# Patient Record
Sex: FEMALE | Race: WHITE | NOT HISPANIC OR LATINO | Employment: OTHER | URBAN - METROPOLITAN AREA
[De-identification: names, ages, dates, MRNs, and addresses within clinical notes are randomized per-mention and may not be internally consistent; named-entity substitution may affect disease eponyms.]

---

## 2017-02-28 ENCOUNTER — TRANSCRIBE ORDERS (OUTPATIENT)
Dept: ADMINISTRATIVE | Facility: HOSPITAL | Age: 82
End: 2017-02-28

## 2017-02-28 ENCOUNTER — HOSPITAL ENCOUNTER (OUTPATIENT)
Dept: CT IMAGING | Facility: HOSPITAL | Age: 82
Discharge: HOME | End: 2017-02-28
Attending: INTERNAL MEDICINE
Payer: MEDICARE

## 2017-02-28 DIAGNOSIS — R06.02 SOB (SHORTNESS OF BREATH): Primary | ICD-10-CM

## 2017-02-28 DIAGNOSIS — R06.02 SOB (SHORTNESS OF BREATH): ICD-10-CM

## 2017-02-28 DIAGNOSIS — R79.89 POSITIVE D DIMER: ICD-10-CM

## 2017-02-28 PROCEDURE — 71275 CT ANGIOGRAPHY CHEST: CPT

## 2017-02-28 RX ADMIN — IOHEXOL 85 ML: 350 INJECTION, SOLUTION INTRAVENOUS at 12:38

## 2017-06-21 ENCOUNTER — ALLSCRIPTS OFFICE VISIT (OUTPATIENT)
Dept: OTHER | Facility: OTHER | Age: 82
End: 2017-06-21

## 2017-12-07 ENCOUNTER — TRANSCRIBE ORDERS (OUTPATIENT)
Dept: ADMINISTRATIVE | Facility: HOSPITAL | Age: 82
End: 2017-12-07

## 2017-12-07 DIAGNOSIS — Z12.39 SCREENING BREAST EXAMINATION: Primary | ICD-10-CM

## 2018-01-12 ENCOUNTER — GENERIC CONVERSION - ENCOUNTER (OUTPATIENT)
Dept: SURGERY | Facility: CLINIC | Age: 83
End: 2018-01-12

## 2018-01-12 ENCOUNTER — HOSPITAL ENCOUNTER (OUTPATIENT)
Dept: RADIOLOGY | Facility: MEDICAL CENTER | Age: 83
Discharge: HOME | End: 2018-01-12
Payer: MEDICARE

## 2018-01-12 DIAGNOSIS — Z12.39 SCREENING BREAST EXAMINATION: ICD-10-CM

## 2018-01-12 PROCEDURE — 77067 SCR MAMMO BI INCL CAD: CPT

## 2018-01-13 VITALS
BODY MASS INDEX: 40.25 KG/M2 | HEIGHT: 60 IN | WEIGHT: 205 LBS | SYSTOLIC BLOOD PRESSURE: 122 MMHG | DIASTOLIC BLOOD PRESSURE: 80 MMHG

## 2019-01-13 DIAGNOSIS — Z12.31 ENCOUNTER FOR SCREENING MAMMOGRAM FOR MALIGNANT NEOPLASM OF BREAST: ICD-10-CM

## 2019-03-11 ENCOUNTER — HOSPITAL ENCOUNTER (EMERGENCY)
Facility: HOSPITAL | Age: 84
Discharge: HOME | End: 2019-03-11
Attending: EMERGENCY MEDICINE
Payer: MEDICARE

## 2019-03-11 ENCOUNTER — APPOINTMENT (EMERGENCY)
Dept: RADIOLOGY | Facility: HOSPITAL | Age: 84
End: 2019-03-11
Payer: MEDICARE

## 2019-03-11 VITALS
DIASTOLIC BLOOD PRESSURE: 83 MMHG | SYSTOLIC BLOOD PRESSURE: 180 MMHG | OXYGEN SATURATION: 96 % | RESPIRATION RATE: 16 BRPM | HEART RATE: 76 BPM | BODY MASS INDEX: 42.5 KG/M2 | HEIGHT: 60 IN | TEMPERATURE: 98.6 F | WEIGHT: 216.49 LBS

## 2019-03-11 DIAGNOSIS — R53.1 GENERALIZED WEAKNESS: Primary | ICD-10-CM

## 2019-03-11 DIAGNOSIS — R26.2 AMBULATORY DYSFUNCTION: ICD-10-CM

## 2019-03-11 LAB
ALBUMIN SERPL BCP-MCNC: 3.2 G/DL (ref 3.5–5)
ALP SERPL-CCNC: 103 U/L (ref 46–116)
ALT SERPL W P-5'-P-CCNC: 32 U/L (ref 12–78)
ANION GAP SERPL CALCULATED.3IONS-SCNC: 9 MMOL/L (ref 4–13)
APTT PPP: 25 SECONDS (ref 26–38)
AST SERPL W P-5'-P-CCNC: 25 U/L (ref 5–45)
BACTERIA UR QL AUTO: ABNORMAL /HPF
BASOPHILS # BLD AUTO: 0.03 THOUSANDS/ΜL (ref 0–0.1)
BASOPHILS NFR BLD AUTO: 0 % (ref 0–1)
BILIRUB SERPL-MCNC: 0.3 MG/DL (ref 0.2–1)
BILIRUB UR QL STRIP: NEGATIVE
BUN SERPL-MCNC: 12 MG/DL (ref 5–25)
CALCIUM SERPL-MCNC: 8.9 MG/DL (ref 8.3–10.1)
CHLORIDE SERPL-SCNC: 101 MMOL/L (ref 100–108)
CLARITY UR: CLEAR
CO2 SERPL-SCNC: 26 MMOL/L (ref 21–32)
COLOR UR: YELLOW
CREAT SERPL-MCNC: 0.75 MG/DL (ref 0.6–1.3)
EOSINOPHIL # BLD AUTO: 0.26 THOUSAND/ΜL (ref 0–0.61)
EOSINOPHIL NFR BLD AUTO: 4 % (ref 0–6)
ERYTHROCYTE [DISTWIDTH] IN BLOOD BY AUTOMATED COUNT: 13.2 % (ref 11.6–15.1)
GFR SERPL CREATININE-BSD FRML MDRD: 74 ML/MIN/1.73SQ M
GLUCOSE SERPL-MCNC: 287 MG/DL (ref 65–140)
GLUCOSE UR STRIP-MCNC: ABNORMAL MG/DL
HCT VFR BLD AUTO: 37 % (ref 34.8–46.1)
HGB BLD-MCNC: 12 G/DL (ref 11.5–15.4)
HGB UR QL STRIP.AUTO: ABNORMAL
IMM GRANULOCYTES # BLD AUTO: 0.03 THOUSAND/UL (ref 0–0.2)
IMM GRANULOCYTES NFR BLD AUTO: 0 % (ref 0–2)
INR PPP: 0.97 (ref 0.86–1.17)
KETONES UR STRIP-MCNC: NEGATIVE MG/DL
LEUKOCYTE ESTERASE UR QL STRIP: NEGATIVE
LYMPHOCYTES # BLD AUTO: 1.32 THOUSANDS/ΜL (ref 0.6–4.47)
LYMPHOCYTES NFR BLD AUTO: 19 % (ref 14–44)
MCH RBC QN AUTO: 28 PG (ref 26.8–34.3)
MCHC RBC AUTO-ENTMCNC: 32.4 G/DL (ref 31.4–37.4)
MCV RBC AUTO: 86 FL (ref 82–98)
MONOCYTES # BLD AUTO: 0.64 THOUSAND/ΜL (ref 0.17–1.22)
MONOCYTES NFR BLD AUTO: 9 % (ref 4–12)
NEUTROPHILS # BLD AUTO: 4.8 THOUSANDS/ΜL (ref 1.85–7.62)
NEUTS SEG NFR BLD AUTO: 68 % (ref 43–75)
NITRITE UR QL STRIP: NEGATIVE
NON-SQ EPI CELLS URNS QL MICRO: ABNORMAL /HPF
NRBC BLD AUTO-RTO: 0 /100 WBCS
NT-PROBNP SERPL-MCNC: 315 PG/ML
PH UR STRIP.AUTO: 5 [PH]
PLATELET # BLD AUTO: 248 THOUSANDS/UL (ref 149–390)
PMV BLD AUTO: 10.4 FL (ref 8.9–12.7)
POTASSIUM SERPL-SCNC: 3.9 MMOL/L (ref 3.5–5.3)
PROT SERPL-MCNC: 7.6 G/DL (ref 6.4–8.2)
PROT UR STRIP-MCNC: ABNORMAL MG/DL
PROTHROMBIN TIME: 12.6 SECONDS (ref 11.8–14.2)
RBC # BLD AUTO: 4.28 MILLION/UL (ref 3.81–5.12)
RBC #/AREA URNS AUTO: ABNORMAL /HPF
SODIUM SERPL-SCNC: 136 MMOL/L (ref 136–145)
SP GR UR STRIP.AUTO: 1.02 (ref 1–1.03)
TROPONIN I SERPL-MCNC: <0.02 NG/ML
UROBILINOGEN UR QL STRIP.AUTO: 0.2 E.U./DL
WBC # BLD AUTO: 7.08 THOUSAND/UL (ref 4.31–10.16)
WBC #/AREA URNS AUTO: ABNORMAL /HPF

## 2019-03-11 PROCEDURE — 71046 X-RAY EXAM CHEST 2 VIEWS: CPT

## 2019-03-11 PROCEDURE — 36415 COLL VENOUS BLD VENIPUNCTURE: CPT | Performed by: EMERGENCY MEDICINE

## 2019-03-11 PROCEDURE — 99285 EMERGENCY DEPT VISIT HI MDM: CPT

## 2019-03-11 PROCEDURE — 93005 ELECTROCARDIOGRAM TRACING: CPT

## 2019-03-11 PROCEDURE — 84484 ASSAY OF TROPONIN QUANT: CPT | Performed by: EMERGENCY MEDICINE

## 2019-03-11 PROCEDURE — 85610 PROTHROMBIN TIME: CPT | Performed by: EMERGENCY MEDICINE

## 2019-03-11 PROCEDURE — 81001 URINALYSIS AUTO W/SCOPE: CPT | Performed by: EMERGENCY MEDICINE

## 2019-03-11 PROCEDURE — 80053 COMPREHEN METABOLIC PANEL: CPT | Performed by: EMERGENCY MEDICINE

## 2019-03-11 PROCEDURE — 83880 ASSAY OF NATRIURETIC PEPTIDE: CPT | Performed by: EMERGENCY MEDICINE

## 2019-03-11 PROCEDURE — 85025 COMPLETE CBC W/AUTO DIFF WBC: CPT | Performed by: EMERGENCY MEDICINE

## 2019-03-11 PROCEDURE — 85730 THROMBOPLASTIN TIME PARTIAL: CPT | Performed by: EMERGENCY MEDICINE

## 2019-03-11 RX ORDER — PYRIDOXINE HCL (VITAMIN B6) 100 MG
1 TABLET ORAL DAILY
COMMUNITY
End: 2021-03-19

## 2019-03-11 NOTE — ED PROVIDER NOTES
History  Chief Complaint   Patient presents with    Weakness - Generalized     per ems pt was being seen by PCP at home to clean ears due to earwax, noticed pt seemed SOB wanted pt to be evaluated in ER       History provided by:  Patient  Malaise - 7 years or greater   Severity:  Moderate  Onset quality:  Gradual  Duration:  1 week  Timing:  Constant  Progression:  Worsening  Chronicity:  New  Context comment:  Increasing work of breathing, PCP went to her house today to see her noticed her work breathing was significantly worse than baseline center here for evaluation  Relieved by:  None tried  Worsened by:  Nothing  Ineffective treatments:  None tried  Associated symptoms: no abdominal pain, no chest pain, no cough, no diarrhea, no dizziness, no dysuria, no fever, no frequency, no headaches, no nausea, no shortness of breath and no vomiting        Prior to Admission Medications   Prescriptions Last Dose Informant Patient Reported? Taking? ASPIRIN 81 PO   Yes No   Sig: Take 81 mg by mouth daily   Pyridoxine HCl (VITAMIN B-6) 100 MG TABS   Yes No   Sig: Take 1 tablet by mouth daily      Facility-Administered Medications: None       History reviewed  No pertinent past medical history  History reviewed  No pertinent surgical history  History reviewed  No pertinent family history  I have reviewed and agree with the history as documented  Social History     Tobacco Use    Smoking status: Never Smoker    Smokeless tobacco: Never Used   Substance Use Topics    Alcohol use: Not Currently    Drug use: Never        Review of Systems   Constitutional: Negative for activity change, chills, diaphoresis and fever  HENT: Negative for congestion, sinus pressure and sore throat  Eyes: Negative for pain and visual disturbance  Respiratory: Negative for cough, chest tightness, shortness of breath, wheezing and stridor  Cardiovascular: Negative for chest pain and palpitations     Gastrointestinal: Negative for abdominal distention, abdominal pain, constipation, diarrhea, nausea and vomiting  Genitourinary: Negative for dysuria and frequency  Musculoskeletal: Negative for neck pain and neck stiffness  Skin: Negative for rash  Neurological: Negative for dizziness, speech difficulty, light-headedness, numbness and headaches  Physical Exam  Physical Exam   Constitutional: She is oriented to person, place, and time  She appears well-developed  No distress  HENT:   Head: Normocephalic and atraumatic  Eyes: Pupils are equal, round, and reactive to light  Neck: Normal range of motion  Neck supple  No tracheal deviation present  Cardiovascular: Normal rate, regular rhythm, normal heart sounds and intact distal pulses  No murmur heard  Pulmonary/Chest: Effort normal and breath sounds normal  No stridor  Tachypnea noted  No respiratory distress  Abdominal: Soft  She exhibits no distension  There is no tenderness  There is no rebound and no guarding  Musculoskeletal: Normal range of motion  Neurological: She is alert and oriented to person, place, and time  Skin: Skin is warm and dry  She is not diaphoretic  No erythema  No pallor  Psychiatric: She has a normal mood and affect  Vitals reviewed        Vital Signs  ED Triage Vitals   Temperature Pulse Respirations Blood Pressure SpO2   03/11/19 1421 03/11/19 1422 03/11/19 1422 03/11/19 1422 03/11/19 1515   99 5 °F (37 5 °C) 80 18 (!) 202/93 94 %      Temp src Heart Rate Source Patient Position - Orthostatic VS BP Location FiO2 (%)   -- -- -- -- --             Pain Score       03/11/19 1627       No Pain           Vitals:    03/11/19 1422 03/11/19 1515 03/11/19 1627   BP: (!) 202/93 (!) 209/91 (!) 180/83   Pulse: 80 78 76       Visual Acuity      ED Medications  Medications - No data to display    Diagnostic Studies  Results Reviewed     Procedure Component Value Units Date/Time    Protime-INR [[id number]]  (Normal) Collected:  03/11/19 1501    Lab Status:  Final result Specimen:  Blood from Arm, Left Updated:  03/11/19 1602     Protime 12 6 seconds      INR 0 97    APTT [[id number]]  (Abnormal) Collected:  03/11/19 1501    Lab Status:  Final result Specimen:  Blood from Arm, Left Updated:  03/11/19 1602     PTT 25 seconds     B-type natriuretic peptide [[id number]]  (Normal) Collected:  03/11/19 1501    Lab Status:  Final result Specimen:  Blood from Arm, Left Updated:  03/11/19 1537     NT-proBNP 315 pg/mL     Troponin I [[id number]]  (Normal) Collected:  03/11/19 1501    Lab Status:  Final result Specimen:  Blood from Arm, Left Updated:  03/11/19 1531     Troponin I <0 02 ng/mL     Comprehensive metabolic panel [[id number]]  (Abnormal) Collected:  03/11/19 1501    Lab Status:  Final result Specimen:  Blood from Arm, Left Updated:  03/11/19 1529     Sodium 136 mmol/L      Potassium 3 9 mmol/L      Chloride 101 mmol/L      CO2 26 mmol/L      ANION GAP 9 mmol/L      BUN 12 mg/dL      Creatinine 0 75 mg/dL      Glucose 287 mg/dL      Calcium 8 9 mg/dL      AST 25 U/L      ALT 32 U/L      Alkaline Phosphatase 103 U/L      Total Protein 7 6 g/dL      Albumin 3 2 g/dL      Total Bilirubin 0 30 mg/dL      eGFR 74 ml/min/1 73sq m     Narrative:       National Kidney Disease Education Program recommendations are as follows:  GFR calculation is accurate only with a steady state creatinine  Chronic Kidney disease less than 60 ml/min/1 73 sq  meters  Kidney failure less than 15 ml/min/1 73 sq  meters      Urine Microscopic [[id number]]  (Abnormal) Collected:  03/11/19 1503    Lab Status:  Final result Specimen:  Urine, Clean Catch Updated:  03/11/19 1524     RBC, UA 0-1 /hpf      WBC, UA 1-2 /hpf      Epithelial Cells Occasional /hpf      Bacteria, UA Moderate /hpf     UA w Reflex to Microscopic w Reflex to Culture [[id number]]  (Abnormal) Collected:  03/11/19 1503    Lab Status:  Final result Specimen:  Urine, Clean Catch Updated:  03/11/19 1513     Color, UA Yellow     Clarity, UA Clear     Specific [place], UA 1 020     pH, UA 5 0     Leukocytes, UA Negative     Nitrite, UA Negative     Protein, UA Trace mg/dl      Glucose,  (1/2%) mg/dl      Ketones, UA Negative mg/dl      Urobilinogen, UA 0 2 E U /dl      Bilirubin, UA Negative     Blood, UA Trace-Intact    CBC and differential [[id number]] Collected:  03/11/19 1501    Lab Status:  Final result Specimen:  Blood from Arm, Left Updated:  03/11/19 1512     WBC 7 08 Thousand/uL      RBC 4 28 Million/uL      Hemoglobin 12 0 g/dL      Hematocrit 37 0 %      MCV 86 fL      MCH 28 0 pg      MCHC 32 4 g/dL      RDW 13 2 %      MPV 10 4 fL      Platelets 286 Thousands/uL      nRBC 0 /100 WBCs      Neutrophils Relative 68 %      Immat GRANS % 0 %      Lymphocytes Relative 19 %      Monocytes Relative 9 %      Eosinophils Relative 4 %      Basophils Relative 0 %      Neutrophils Absolute 4 80 Thousands/µL      Immature Grans Absolute 0 03 Thousand/uL      Lymphocytes Absolute 1 32 Thousands/µL      Monocytes Absolute 0 64 Thousand/µL      Eosinophils Absolute 0 26 Thousand/µL      Basophils Absolute 0 03 Thousands/µL                  XR chest 2 views   ED Interpretation by Stefania Avila DO (03/11 1644)   No acute pathology                 Procedures  Procedures       Phone Contacts  ED Phone Contact    ED Course  ED Course as of Mar 11 1645   Mon Mar 11, 2019   1643 Patient's daughter now at bedside, we had a very long conversation about patient's condition daughter states patient to her seems well and unchanged from her baseline, patient does not want stay in the hospital no source infection    Will discharge                                  MDM  Number of Diagnoses or Management Options  Ambulatory dysfunction: new and requires workup  Generalized weakness: new and requires workup  Diagnosis management comments:       Initial ED assessment:  52-year-old female, increasing weakness increasing work breathing    Initial DDx includes but is not limited to:   Pneumonia, Congestive heart failure, electrolyte abnormality, renal failure    Initial ED plan:   Blood work, chest x-ray,, likely admission        Final ED summary/disposition:   After evaluation and workup in the emergency department, patient does not want to be admitted to the hospital, workup unremarkable  , will discharge         Amount and/or Complexity of Data Reviewed  Clinical lab tests: ordered and reviewed  Tests in the radiology section of CPT®: ordered and reviewed  Decide to obtain previous medical records or to obtain history from someone other than the patient: yes  Obtain history from someone other than the patient: yes  Review and summarize past medical records: yes  Independent visualization of images, tracings, or specimens: yes        Disposition  Final diagnoses:   Generalized weakness   Ambulatory dysfunction     Time reflects when diagnosis was documented in both MDM as applicable and the Disposition within this note     Time User Action Codes Description Comment    3/11/2019  4:44 PM Fahad LINARES Add [R53 1] Generalized weakness     3/11/2019  4:44 PM Sammie Altamirano Add [R26 2] Ambulatory dysfunction       ED Disposition     ED Disposition Condition Date/Time Comment    Discharge Stable Mon Mar 11, 2019  4:44 PM MIGNON/ Kaiden Abarca discharge to home/self care  Follow-up Information     Follow up With Specialties Details Why Contact Info    Gena Henriquez MD Internal Medicine Call in 1 day To arrange for the next available appointment to arrange for home physical therapy , For repeat evaluation and  to ensure resolution [address] [facility] [address] [facility] [address] [phone]            Patient's Medications   Discharge Prescriptions    No medications on file     No discharge procedures on file      ED Provider  Electronically Signed by           Evon Colin DO  03/11/19 6011

## 2019-03-12 LAB
ATRIAL RATE: 81 BPM
P AXIS: 77 DEGREES
PR INTERVAL: 168 MS
QRS AXIS: 78 DEGREES
QRSD INTERVAL: 86 MS
QT INTERVAL: 392 MS
QTC INTERVAL: 455 MS
T WAVE AXIS: 71 DEGREES
VENTRICULAR RATE: 81 BPM

## 2019-03-12 PROCEDURE — 93010 ELECTROCARDIOGRAM REPORT: CPT | Performed by: INTERNAL MEDICINE

## 2019-03-15 ENCOUNTER — TRANSCRIBE ORDERS (OUTPATIENT)
Dept: ADMINISTRATIVE | Facility: HOSPITAL | Age: 84
End: 2019-03-15

## 2019-03-15 ENCOUNTER — HOSPITAL ENCOUNTER (OUTPATIENT)
Dept: CT IMAGING | Facility: HOSPITAL | Age: 84
Discharge: HOME | End: 2019-03-15
Payer: MEDICARE

## 2019-03-15 DIAGNOSIS — I27.82 CHRONIC PULMONARY THROMBOEMBOLISM SYNDROME (HCC): ICD-10-CM

## 2019-03-15 DIAGNOSIS — R07.9 CHEST PAIN, UNSPECIFIED TYPE: ICD-10-CM

## 2019-03-15 DIAGNOSIS — R07.9 CHEST PAIN, UNSPECIFIED TYPE: Primary | ICD-10-CM

## 2019-03-15 PROCEDURE — 71275 CT ANGIOGRAPHY CHEST: CPT

## 2019-03-15 RX ADMIN — IOHEXOL 85 ML: 350 INJECTION, SOLUTION INTRAVENOUS at 10:40

## 2019-05-10 ENCOUNTER — TRANSCRIBE ORDERS (OUTPATIENT)
Dept: ADMINISTRATIVE | Facility: HOSPITAL | Age: 84
End: 2019-05-10

## 2019-05-10 DIAGNOSIS — Z12.39 BREAST SCREENING, UNSPECIFIED: Primary | ICD-10-CM

## 2019-06-27 ENCOUNTER — OFFICE VISIT (OUTPATIENT)
Dept: OBGYN CLINIC | Facility: MEDICAL CENTER | Age: 84
End: 2019-06-27
Payer: MEDICARE

## 2019-06-27 VITALS — SYSTOLIC BLOOD PRESSURE: 146 MMHG | DIASTOLIC BLOOD PRESSURE: 90 MMHG

## 2019-06-27 DIAGNOSIS — N90.89 LESION OF VULVA: Primary | ICD-10-CM

## 2019-06-27 DIAGNOSIS — N90.4 LICHEN SCLEROSUS OF FEMALE GENITALIA: ICD-10-CM

## 2019-06-27 DIAGNOSIS — B37.3 VULVAR CANDIDIASIS: ICD-10-CM

## 2019-06-27 PROCEDURE — 88305 TISSUE EXAM BY PATHOLOGIST: CPT | Performed by: PATHOLOGY

## 2019-06-27 PROCEDURE — 88342 IMHCHEM/IMCYTCHM 1ST ANTB: CPT | Performed by: PATHOLOGY

## 2019-06-27 PROCEDURE — 99213 OFFICE O/P EST LOW 20 MIN: CPT | Performed by: OBSTETRICS & GYNECOLOGY

## 2019-06-27 RX ORDER — NYSTATIN 100000 U/G
CREAM TOPICAL 2 TIMES DAILY
Qty: 30 G | Refills: 0 | Status: SHIPPED | OUTPATIENT
Start: 2019-06-27 | End: 2019-07-24 | Stop reason: SDUPTHER

## 2019-07-02 ENCOUNTER — TELEPHONE (OUTPATIENT)
Dept: OBGYN CLINIC | Facility: MEDICAL CENTER | Age: 84
End: 2019-07-02

## 2019-07-02 NOTE — TELEPHONE ENCOUNTER
Pt denied coverage for topical Lidocaine, only covered for topical anesthetic for dialysis or chemo port   Please advise

## 2019-07-02 NOTE — TELEPHONE ENCOUNTER
Message left by daughter, Jack Pascal, that [facility] is stating patient needs prior authorization for topical Lidocaine  Contacted [facility] in [place], phone number for [facility] provided  Called [phone], spoke with Max Gil at [facility], form for prior auth faxed to office  Forms received, completed and faxed back to [facility] for approval  Notified Jack Pascal that once decision is made, I will get in contact with her

## 2019-07-24 ENCOUNTER — OFFICE VISIT (OUTPATIENT)
Dept: OBGYN CLINIC | Age: 84
End: 2019-07-24
Payer: MEDICARE

## 2019-07-24 VITALS
SYSTOLIC BLOOD PRESSURE: 134 MMHG | WEIGHT: 202 LBS | RESPIRATION RATE: 16 BRPM | BODY MASS INDEX: 39.45 KG/M2 | DIASTOLIC BLOOD PRESSURE: 70 MMHG

## 2019-07-24 DIAGNOSIS — B37.3 VULVAR CANDIDIASIS: ICD-10-CM

## 2019-07-24 DIAGNOSIS — L90.0 LICHEN SCLEROSUS ET ATROPHICUS: Primary | ICD-10-CM

## 2019-07-24 PROCEDURE — 99213 OFFICE O/P EST LOW 20 MIN: CPT | Performed by: OBSTETRICS & GYNECOLOGY

## 2019-07-24 RX ORDER — CLOBETASOL PROPIONATE 0.5 MG/G
CREAM TOPICAL 2 TIMES DAILY
Qty: 30 G | Refills: 0 | Status: SHIPPED | OUTPATIENT
Start: 2019-07-24 | End: 2019-10-14 | Stop reason: SDUPTHER

## 2019-07-24 RX ORDER — NYSTATIN 100000 U/G
CREAM TOPICAL 2 TIMES DAILY
Qty: 30 G | Refills: 0 | Status: SHIPPED | OUTPATIENT
Start: 2019-07-24 | End: 2019-10-14 | Stop reason: SDUPTHER

## 2019-07-24 NOTE — PROGRESS NOTES
Assessment/Plan:      Diagnoses and all orders for this visit:    Lichen sclerosus et atrophicus  -     clobetasol (TEMOVATE) 0 05 % cream; Apply topically 2 (two) times a day    Vulvar candidiasis  -     nystatin (MYCOSTATIN) cream; Apply topically 2 (two) times a day for 30 days          Subjective:     Patient ID: Rene Sanches is a 81 y o  female  Patient is an 67-year-old female here for follow-up  Patient has been using Mycostatin cream on her perineum, and has had improvement  Patient still experiences intense itching around her clitoris and clitoral howell  Review of Systems   Genitourinary:        Vulvar itching           Objective:     Physical Exam   Genitourinary:   Genitourinary Comments: Extensive yeast infection is much diminished not completely resolved  Clitoral howell is pale and has the typical appearance of lichen sclerosis

## 2019-10-14 DIAGNOSIS — B37.3 VULVAR CANDIDIASIS: ICD-10-CM

## 2019-10-14 DIAGNOSIS — L90.0 LICHEN SCLEROSUS ET ATROPHICUS: ICD-10-CM

## 2019-10-15 RX ORDER — CLOBETASOL PROPIONATE 0.5 MG/G
CREAM TOPICAL 2 TIMES DAILY
Qty: 30 G | Refills: 0 | Status: SHIPPED | OUTPATIENT
Start: 2019-10-15 | End: 2020-09-24

## 2019-10-15 RX ORDER — NYSTATIN 100000 U/G
CREAM TOPICAL 2 TIMES DAILY
Qty: 30 G | Refills: 1 | Status: SHIPPED | OUTPATIENT
Start: 2019-10-15 | End: 2020-09-24

## 2020-01-16 ENCOUNTER — OFFICE VISIT (OUTPATIENT)
Dept: OBGYN CLINIC | Facility: MEDICAL CENTER | Age: 85
End: 2020-01-16
Payer: MEDICARE

## 2020-01-16 VITALS — WEIGHT: 172 LBS | DIASTOLIC BLOOD PRESSURE: 70 MMHG | BODY MASS INDEX: 33.59 KG/M2 | SYSTOLIC BLOOD PRESSURE: 130 MMHG

## 2020-01-16 DIAGNOSIS — L90.0 LICHEN SCLEROSUS ET ATROPHICUS: Primary | ICD-10-CM

## 2020-01-16 PROCEDURE — 99213 OFFICE O/P EST LOW 20 MIN: CPT | Performed by: OBSTETRICS & GYNECOLOGY

## 2020-01-16 NOTE — PROGRESS NOTES
Assessment/Plan:      Diagnoses and all orders for this visit:    Lichen sclerosus et atrophicus        Clobetasol episodically  RTO 1 year    Subjective:     Patient ID: Jose R Rhoades is a 81 y o  female  Patient is an 52-year-old female here with her younger sister Shruthi for follow-up care  Patient was recently given clobetasol for the treatment of lichen sclerosis of the vulva  Patient states that she feels much better, vulvar itching is minimal       Review of Systems   Genitourinary: Negative for pelvic pain, vaginal bleeding, vaginal discharge and vaginal pain  Objective:     Physical Exam   Genitourinary:   Genitourinary Comments: Patient still has mild yeast infection in her skin folds and in her groin  Patient was instructed to continue using an anti fungal cream on a regular basis  Vulvar exam is much improved  There are no ulcerations, or erythema  There is pale thinning skin consistent with lichen sclerosis

## 2020-09-24 ENCOUNTER — APPOINTMENT (EMERGENCY)
Dept: CT IMAGING | Facility: HOSPITAL | Age: 85
DRG: 689 | End: 2020-09-24
Payer: MEDICARE

## 2020-09-24 ENCOUNTER — APPOINTMENT (EMERGENCY)
Dept: RADIOLOGY | Facility: HOSPITAL | Age: 85
DRG: 689 | End: 2020-09-24
Payer: MEDICARE

## 2020-09-24 ENCOUNTER — HOSPITAL ENCOUNTER (INPATIENT)
Facility: HOSPITAL | Age: 85
LOS: 3 days | Discharge: HOME HEALTH | DRG: 689 | End: 2020-09-27
Attending: EMERGENCY MEDICINE | Admitting: STUDENT IN AN ORGANIZED HEALTH CARE EDUCATION/TRAINING PROGRAM
Payer: MEDICARE

## 2020-09-24 DIAGNOSIS — R44.1 VISUAL HALLUCINATIONS: ICD-10-CM

## 2020-09-24 DIAGNOSIS — N39.0 URINARY TRACT INFECTION: Primary | ICD-10-CM

## 2020-09-24 PROBLEM — I48.19 PERSISTENT ATRIAL FIBRILLATION (HCC): Status: ACTIVE | Noted: 2020-09-24

## 2020-09-24 PROBLEM — I49.5 TACHY-BRADY SYNDROME (HCC): Status: ACTIVE | Noted: 2020-09-24

## 2020-09-24 PROBLEM — I25.10 CAD (CORONARY ARTERY DISEASE): Status: ACTIVE | Noted: 2020-09-24

## 2020-09-24 PROBLEM — E11.9 DM (DIABETES MELLITUS), TYPE 2 (HCC): Status: ACTIVE | Noted: 2020-09-24

## 2020-09-24 PROBLEM — G93.40 ACUTE ENCEPHALOPATHY: Status: ACTIVE | Noted: 2020-09-24

## 2020-09-24 LAB
ALBUMIN SERPL BCP-MCNC: 3.6 G/DL (ref 3.5–5)
ALP SERPL-CCNC: 81 U/L (ref 46–116)
ALT SERPL W P-5'-P-CCNC: 30 U/L (ref 12–78)
AMMONIA PLAS-SCNC: <10 UMOL/L (ref 11–35)
ANION GAP SERPL CALCULATED.3IONS-SCNC: 8 MMOL/L (ref 4–13)
APAP SERPL-MCNC: <2 UG/ML (ref 10–20)
APTT PPP: 29 SECONDS (ref 23–37)
AST SERPL W P-5'-P-CCNC: 28 U/L (ref 5–45)
ATRIAL RATE: 182 BPM
BACTERIA UR QL AUTO: ABNORMAL /HPF
BASOPHILS # BLD AUTO: 0.04 THOUSANDS/ΜL (ref 0–0.1)
BASOPHILS NFR BLD AUTO: 1 % (ref 0–1)
BILIRUB SERPL-MCNC: 0.6 MG/DL (ref 0.2–1)
BILIRUB UR QL STRIP: NEGATIVE
BUN SERPL-MCNC: 19 MG/DL (ref 5–25)
CALCIUM SERPL-MCNC: 9.2 MG/DL (ref 8.3–10.1)
CHLORIDE SERPL-SCNC: 103 MMOL/L (ref 100–108)
CLARITY UR: ABNORMAL
CO2 SERPL-SCNC: 30 MMOL/L (ref 21–32)
COLOR UR: YELLOW
CREAT SERPL-MCNC: 0.86 MG/DL (ref 0.6–1.3)
EOSINOPHIL # BLD AUTO: 0.26 THOUSAND/ΜL (ref 0–0.61)
EOSINOPHIL NFR BLD AUTO: 4 % (ref 0–6)
ERYTHROCYTE [DISTWIDTH] IN BLOOD BY AUTOMATED COUNT: 15.3 % (ref 11.6–15.1)
ETHANOL SERPL-MCNC: <3 MG/DL (ref 0–3)
GFR SERPL CREATININE-BSD FRML MDRD: 62 ML/MIN/1.73SQ M
GLUCOSE SERPL-MCNC: 83 MG/DL (ref 65–140)
GLUCOSE UR STRIP-MCNC: NEGATIVE MG/DL
HCT VFR BLD AUTO: 37.1 % (ref 34.8–46.1)
HGB BLD-MCNC: 11.5 G/DL (ref 11.5–15.4)
HGB UR QL STRIP.AUTO: ABNORMAL
IMM GRANULOCYTES # BLD AUTO: 0.02 THOUSAND/UL (ref 0–0.2)
IMM GRANULOCYTES NFR BLD AUTO: 0 % (ref 0–2)
INR PPP: 1.19 (ref 0.84–1.19)
KETONES UR STRIP-MCNC: NEGATIVE MG/DL
LACTATE SERPL-SCNC: 0.8 MMOL/L (ref 0.5–2)
LEUKOCYTE ESTERASE UR QL STRIP: ABNORMAL
LYMPHOCYTES # BLD AUTO: 1.44 THOUSANDS/ΜL (ref 0.6–4.47)
LYMPHOCYTES NFR BLD AUTO: 25 % (ref 14–44)
MCH RBC QN AUTO: 27 PG (ref 26.8–34.3)
MCHC RBC AUTO-ENTMCNC: 31 G/DL (ref 31.4–37.4)
MCV RBC AUTO: 87 FL (ref 82–98)
MONOCYTES # BLD AUTO: 0.56 THOUSAND/ΜL (ref 0.17–1.22)
MONOCYTES NFR BLD AUTO: 10 % (ref 4–12)
NEUTROPHILS # BLD AUTO: 3.56 THOUSANDS/ΜL (ref 1.85–7.62)
NEUTS SEG NFR BLD AUTO: 60 % (ref 43–75)
NITRITE UR QL STRIP: POSITIVE
NON-SQ EPI CELLS URNS QL MICRO: ABNORMAL /HPF
NRBC BLD AUTO-RTO: 0 /100 WBCS
PH UR STRIP.AUTO: 8 [PH]
PLATELET # BLD AUTO: 245 THOUSANDS/UL (ref 149–390)
PMV BLD AUTO: 10.2 FL (ref 8.9–12.7)
POTASSIUM SERPL-SCNC: 4.1 MMOL/L (ref 3.5–5.3)
PROT SERPL-MCNC: 8.4 G/DL (ref 6.4–8.2)
PROT UR STRIP-MCNC: NEGATIVE MG/DL
PROTHROMBIN TIME: 14.5 SECONDS (ref 11.6–14.5)
QRS AXIS: 100 DEGREES
QRSD INTERVAL: 86 MS
QT INTERVAL: 402 MS
QTC INTERVAL: 499 MS
RBC # BLD AUTO: 4.26 MILLION/UL (ref 3.81–5.12)
RBC #/AREA URNS AUTO: ABNORMAL /HPF
SALICYLATES SERPL-MCNC: <3 MG/DL (ref 3–20)
SODIUM SERPL-SCNC: 141 MMOL/L (ref 136–145)
SP GR UR STRIP.AUTO: 1.01 (ref 1–1.03)
T WAVE AXIS: 43 DEGREES
TROPONIN I SERPL-MCNC: <0.02 NG/ML
TSH SERPL DL<=0.05 MIU/L-ACNC: 3.32 UIU/ML (ref 0.36–3.74)
UROBILINOGEN UR QL STRIP.AUTO: 0.2 E.U./DL
VENTRICULAR RATE: 93 BPM
WBC # BLD AUTO: 5.88 THOUSAND/UL (ref 4.31–10.16)
WBC #/AREA URNS AUTO: ABNORMAL /HPF

## 2020-09-24 PROCEDURE — 87077 CULTURE AEROBIC IDENTIFY: CPT | Performed by: PHYSICIAN ASSISTANT

## 2020-09-24 PROCEDURE — G1004 CDSM NDSC: HCPCS

## 2020-09-24 PROCEDURE — 82140 ASSAY OF AMMONIA: CPT | Performed by: PHYSICIAN ASSISTANT

## 2020-09-24 PROCEDURE — 83605 ASSAY OF LACTIC ACID: CPT | Performed by: PHYSICIAN ASSISTANT

## 2020-09-24 PROCEDURE — 70450 CT HEAD/BRAIN W/O DYE: CPT

## 2020-09-24 PROCEDURE — 99285 EMERGENCY DEPT VISIT HI MDM: CPT

## 2020-09-24 PROCEDURE — 1123F ACP DISCUSS/DSCN MKR DOCD: CPT | Performed by: PHYSICIAN ASSISTANT

## 2020-09-24 PROCEDURE — 84484 ASSAY OF TROPONIN QUANT: CPT | Performed by: PHYSICIAN ASSISTANT

## 2020-09-24 PROCEDURE — 80053 COMPREHEN METABOLIC PANEL: CPT | Performed by: PHYSICIAN ASSISTANT

## 2020-09-24 PROCEDURE — 87040 BLOOD CULTURE FOR BACTERIA: CPT | Performed by: PHYSICIAN ASSISTANT

## 2020-09-24 PROCEDURE — 99285 EMERGENCY DEPT VISIT HI MDM: CPT | Performed by: PHYSICIAN ASSISTANT

## 2020-09-24 PROCEDURE — 80320 DRUG SCREEN QUANTALCOHOLS: CPT | Performed by: PHYSICIAN ASSISTANT

## 2020-09-24 PROCEDURE — 80329 ANALGESICS NON-OPIOID 1 OR 2: CPT | Performed by: PHYSICIAN ASSISTANT

## 2020-09-24 PROCEDURE — 36415 COLL VENOUS BLD VENIPUNCTURE: CPT | Performed by: PHYSICIAN ASSISTANT

## 2020-09-24 PROCEDURE — 71045 X-RAY EXAM CHEST 1 VIEW: CPT

## 2020-09-24 PROCEDURE — 96361 HYDRATE IV INFUSION ADD-ON: CPT

## 2020-09-24 PROCEDURE — 85610 PROTHROMBIN TIME: CPT | Performed by: PHYSICIAN ASSISTANT

## 2020-09-24 PROCEDURE — 93005 ELECTROCARDIOGRAM TRACING: CPT

## 2020-09-24 PROCEDURE — 96374 THER/PROPH/DIAG INJ IV PUSH: CPT

## 2020-09-24 PROCEDURE — 85730 THROMBOPLASTIN TIME PARTIAL: CPT | Performed by: PHYSICIAN ASSISTANT

## 2020-09-24 PROCEDURE — 87186 SC STD MICRODIL/AGAR DIL: CPT | Performed by: PHYSICIAN ASSISTANT

## 2020-09-24 PROCEDURE — 81001 URINALYSIS AUTO W/SCOPE: CPT | Performed by: PHYSICIAN ASSISTANT

## 2020-09-24 PROCEDURE — 87086 URINE CULTURE/COLONY COUNT: CPT | Performed by: PHYSICIAN ASSISTANT

## 2020-09-24 PROCEDURE — 84443 ASSAY THYROID STIM HORMONE: CPT | Performed by: PHYSICIAN ASSISTANT

## 2020-09-24 PROCEDURE — 93010 ELECTROCARDIOGRAM REPORT: CPT | Performed by: INTERNAL MEDICINE

## 2020-09-24 PROCEDURE — 85025 COMPLETE CBC W/AUTO DIFF WBC: CPT | Performed by: PHYSICIAN ASSISTANT

## 2020-09-24 PROCEDURE — 99223 1ST HOSP IP/OBS HIGH 75: CPT | Performed by: STUDENT IN AN ORGANIZED HEALTH CARE EDUCATION/TRAINING PROGRAM

## 2020-09-24 RX ORDER — DOCUSATE SODIUM 100 MG/1
100 CAPSULE, LIQUID FILLED ORAL DAILY
Status: DISCONTINUED | OUTPATIENT
Start: 2020-09-25 | End: 2020-09-27 | Stop reason: HOSPADM

## 2020-09-24 RX ORDER — PRAVASTATIN SODIUM 20 MG
20 TABLET ORAL DAILY
COMMUNITY
End: 2021-04-15 | Stop reason: SDUPTHER

## 2020-09-24 RX ORDER — DOCUSATE SODIUM 100 MG/1
100 CAPSULE, LIQUID FILLED ORAL
COMMUNITY

## 2020-09-24 RX ORDER — SERTRALINE HYDROCHLORIDE 25 MG/1
25 TABLET, FILM COATED ORAL DAILY
Status: DISCONTINUED | OUTPATIENT
Start: 2020-09-25 | End: 2020-09-27 | Stop reason: HOSPADM

## 2020-09-24 RX ORDER — METOPROLOL TARTRATE 50 MG/1
100 TABLET, FILM COATED ORAL EVERY 12 HOURS SCHEDULED
Status: DISCONTINUED | OUTPATIENT
Start: 2020-09-24 | End: 2020-09-27 | Stop reason: HOSPADM

## 2020-09-24 RX ORDER — GLIMEPIRIDE 4 MG/1
4 TABLET ORAL
COMMUNITY
End: 2021-04-15 | Stop reason: SDUPTHER

## 2020-09-24 RX ORDER — PRIMIDONE 50 MG/1
TABLET ORAL EVERY 12 HOURS SCHEDULED
COMMUNITY
End: 2021-06-08 | Stop reason: SDUPTHER

## 2020-09-24 RX ORDER — METOPROLOL TARTRATE 100 MG/1
100 TABLET ORAL EVERY 12 HOURS SCHEDULED
COMMUNITY
End: 2021-07-07 | Stop reason: SDUPTHER

## 2020-09-24 RX ORDER — BACLOFEN 10 MG/1
5 TABLET ORAL 2 TIMES DAILY
COMMUNITY
End: 2021-03-19

## 2020-09-24 RX ORDER — PRIMIDONE 50 MG/1
50 TABLET ORAL EVERY 12 HOURS SCHEDULED
Status: DISCONTINUED | OUTPATIENT
Start: 2020-09-24 | End: 2020-09-27 | Stop reason: HOSPADM

## 2020-09-24 RX ORDER — FUROSEMIDE 40 MG/1
40 TABLET ORAL 2 TIMES DAILY
Status: DISCONTINUED | OUTPATIENT
Start: 2020-09-24 | End: 2020-09-27 | Stop reason: HOSPADM

## 2020-09-24 RX ORDER — DESLORATADINE 5 MG/1
5 TABLET ORAL DAILY
COMMUNITY
End: 2021-03-19

## 2020-09-24 RX ORDER — GLUCOSAMINE HCL 500 MG
1 TABLET ORAL
COMMUNITY
End: 2021-04-29 | Stop reason: CLARIF

## 2020-09-24 RX ORDER — PRAVASTATIN SODIUM 20 MG
20 TABLET ORAL DAILY
Status: DISCONTINUED | OUTPATIENT
Start: 2020-09-25 | End: 2020-09-27 | Stop reason: HOSPADM

## 2020-09-24 RX ORDER — BACLOFEN 10 MG/1
5 TABLET ORAL 2 TIMES DAILY
Status: DISCONTINUED | OUTPATIENT
Start: 2020-09-24 | End: 2020-09-27 | Stop reason: HOSPADM

## 2020-09-24 RX ORDER — ASPIRIN 81 MG/1
81 TABLET, CHEWABLE ORAL DAILY
Status: DISCONTINUED | OUTPATIENT
Start: 2020-09-25 | End: 2020-09-27 | Stop reason: HOSPADM

## 2020-09-24 RX ORDER — LOSARTAN POTASSIUM 25 MG/1
25 TABLET ORAL DAILY
COMMUNITY
End: 2021-10-04 | Stop reason: SDUPTHER

## 2020-09-24 RX ORDER — FUROSEMIDE 40 MG/1
40 TABLET ORAL 2 TIMES DAILY
COMMUNITY
End: 2021-06-08 | Stop reason: SDUPTHER

## 2020-09-24 RX ORDER — SERTRALINE HYDROCHLORIDE 25 MG/1
25 TABLET, FILM COATED ORAL DAILY
COMMUNITY
End: 2021-03-19 | Stop reason: SDUPTHER

## 2020-09-24 RX ORDER — LOSARTAN POTASSIUM 25 MG/1
25 TABLET ORAL DAILY
Status: DISCONTINUED | OUTPATIENT
Start: 2020-09-25 | End: 2020-09-27 | Stop reason: HOSPADM

## 2020-09-24 RX ADMIN — BACLOFEN 5 MG: 10 TABLET ORAL at 21:50

## 2020-09-24 RX ADMIN — CEFTRIAXONE SODIUM 1000 MG: 10 INJECTION, POWDER, FOR SOLUTION INTRAVENOUS at 16:07

## 2020-09-24 RX ADMIN — FUROSEMIDE 40 MG: 40 TABLET ORAL at 21:50

## 2020-09-24 RX ADMIN — PRIMIDONE 50 MG: 50 TABLET ORAL at 21:50

## 2020-09-24 RX ADMIN — SODIUM CHLORIDE 1000 ML: 0.9 INJECTION, SOLUTION INTRAVENOUS at 13:30

## 2020-09-24 RX ADMIN — APIXABAN 5 MG: 5 TABLET, FILM COATED ORAL at 21:49

## 2020-09-24 RX ADMIN — METOPROLOL TARTRATE 100 MG: 50 TABLET, FILM COATED ORAL at 21:50

## 2020-09-24 NOTE — ED PROVIDER NOTES
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, coronary artery disease, type 2 diabetes, hypertension presenting via EMS for evaluation of altered mental status  Patient has been having visual hallucinations for the past 2 days  She is reportedly seeing people that are not there including seeing "cute boys" in the room with her  Patient also complains of dysuria and feeling fatigued  She denies any other neurologic symptoms including headaches, visual changes, paresthesias, dysarthria  No recent falls  She does admit to decreased PO intake  History provided by:  Patient and EMS personnel   used: No    Altered Mental Status   Presenting symptoms: no behavior changes, no combativeness and no confusion    Presenting symptoms comment:  Visual hallucinations  Severity:  Moderate  Most recent episode: Today  Episode history:  Multiple  Duration:  2 days  Timing:  Constant  Progression:  Unchanged  Chronicity:  New  Associated symptoms: decreased appetite and hallucinations    Associated symptoms: no abdominal pain, normal movement, no agitation, no difficulty breathing, no fever, no headaches, no light-headedness, no nausea, no palpitations, no rash, no seizures, no slurred speech, no suicidal behavior, no visual change and no vomiting        Prior to Admission Medications   Prescriptions Last Dose Informant Patient Reported? Taking?    ASPIRIN 81 PO   Yes No   Sig: Take 81 mg by mouth daily   Glucosamine HCl 500 MG TABS   Yes Yes   Sig: Take 1 tablet by mouth   Pyridoxine HCl (VITAMIN B-6) 100 MG TABS   Yes No   Sig: Take 1 tablet by mouth daily   apixaban (Eliquis) 5 mg  Self Yes Yes   Sig: Take 5 mg by mouth 2 (two) times a day   baclofen 10 mg tablet  Self Yes Yes   Sig: Take 5 mg by mouth 2 (two) times a day   desloratadine (CLARINEX) 5 MG tablet  Self Yes Yes   Sig: Take 5 mg by mouth daily   docusate sodium (COLACE) 100 mg capsule  Self Yes Yes   Sig: Take 100 mg by mouth daily   furosemide (LASIX) 40 mg tablet  Self Yes Yes   Sig: Take 40 mg by mouth 2 (two) times a day   glimepiride (AMARYL) 4 mg tablet  Self Yes Yes   Sig: Take 4 mg by mouth daily with breakfast   losartan (COZAAR) 25 mg tablet  Self Yes Yes   Sig: Take 25 mg by mouth daily   metFORMIN (GLUCOPHAGE) 500 mg tablet  Self Yes Yes   Sig: Take 500 mg by mouth daily with breakfast   metoprolol tartrate (LOPRESSOR) 100 mg tablet  Self Yes Yes   Sig: Take 100 mg by mouth every 12 (twelve) hours   pravastatin (PRAVACHOL) 20 mg tablet  Self Yes Yes   Sig: Take 20 mg by mouth daily   primidone (MYSOLINE) 50 mg tablet  Self Yes Yes   Sig: Take by mouth every 12 (twelve) hours   sertraline (ZOLOFT) 25 mg tablet  Self Yes Yes   Sig: Take 25 mg by mouth daily      Facility-Administered Medications: None       History reviewed  No pertinent past medical history  History reviewed  No pertinent surgical history  History reviewed  No pertinent family history  I have reviewed and agree with the history as documented  E-Cigarette/Vaping     E-Cigarette/Vaping Substances     Social History     Tobacco Use    Smoking status: Never Smoker    Smokeless tobacco: Never Used   Substance Use Topics    Alcohol use: Not Currently    Drug use: Never       Review of Systems   Constitutional: Positive for appetite change, decreased appetite and fatigue  Negative for chills and fever  HENT: Negative for drooling and voice change  Eyes: Negative for discharge and redness  Respiratory: Negative for shortness of breath and stridor  Cardiovascular: Negative for chest pain, palpitations and leg swelling  Gastrointestinal: Negative for abdominal pain, nausea and vomiting  Genitourinary: Positive for dysuria  Negative for hematuria     Musculoskeletal: Negative for neck pain and neck stiffness  Skin: Negative for color change and rash  Neurological: Negative for seizures, syncope, light-headedness and headaches  Psychiatric/Behavioral: Positive for hallucinations  Negative for agitation and confusion  The patient is not nervous/anxious  All other systems reviewed and are negative  Physical Exam  Physical Exam  Vitals signs and nursing note reviewed  Constitutional:       General: She is not in acute distress  Appearance: She is well-developed  She is not diaphoretic  Comments: Non-toxic appearing   HENT:      Head: Normocephalic and atraumatic  Right Ear: External ear normal       Left Ear: External ear normal       Nose: Nose normal    Eyes:      General: No scleral icterus  Right eye: No discharge  Left eye: No discharge  Conjunctiva/sclera: Conjunctivae normal       Pupils: Pupils are equal, round, and reactive to light  Neck:      Musculoskeletal: Normal range of motion and neck supple  Cardiovascular:      Rate and Rhythm: Normal rate and regular rhythm  Heart sounds: Normal heart sounds  No murmur  Pulmonary:      Effort: Pulmonary effort is normal  No respiratory distress  Breath sounds: Normal breath sounds  No stridor  No wheezing or rales  Abdominal:      General: Bowel sounds are normal  There is no distension  Palpations: Abdomen is soft  Tenderness: There is no abdominal tenderness  There is no guarding  Musculoskeletal: Normal range of motion  General: No deformity  Lymphadenopathy:      Cervical: No cervical adenopathy  Skin:     General: Skin is warm and dry  Neurological:      Mental Status: She is alert  She is not disoriented  GCS: GCS eye subscore is 4  GCS verbal subscore is 5  GCS motor subscore is 6  Comments: No focal neurologic deficits  Patient oriented to person, place, and situation  Able to state it was fall but unsure of the year  Psychiatric:         Mood and Affect: Mood normal          Behavior: Behavior normal          Vital Signs  ED Triage Vitals [09/24/20 1237]   Temperature Pulse Respirations Blood Pressure SpO2   98 °F (36 7 °C) 86 16 154/78 100 %      Temp Source Heart Rate Source Patient Position - Orthostatic VS BP Location FiO2 (%)   Oral Monitor Lying Right arm --      Pain Score       --           Vitals:    09/24/20 1530 09/24/20 1600 09/24/20 1730 09/24/20 1800   BP: 164/70 131/83 162/83 170/81   Pulse: 80 85 91 90   Patient Position - Orthostatic VS: Lying Lying Lying Lying         Visual Acuity      ED Medications  Medications   sodium chloride 0 9 % bolus 1,000 mL (0 mL Intravenous Stopped 9/24/20 1430)   ceftriaxone (ROCEPHIN) 1 g/50 mL in dextrose IVPB (1,000 mg Intravenous New Bag 9/24/20 1607)       Diagnostic Studies  Results Reviewed     Procedure Component Value Units Date/Time    Urine Microscopic [[id number]]  (Abnormal) Collected:  09/24/20 1513    Lab Status:  Final result Specimen:  Urine, Straight Cath Updated:  09/24/20 1542     RBC, UA 1-2 /hpf      WBC, UA 30-50 /hpf      Epithelial Cells Occasional /hpf      Bacteria, UA Innumerable /hpf     Urine culture [[id number]] Collected:  09/24/20 1513    Lab Status:   In process Specimen:  Urine, Straight Cath Updated:  09/24/20 1542    UA w Reflex to Microscopic w Reflex to Culture [[id number]]  (Abnormal) Collected:  09/24/20 1513    Lab Status:  Final result Specimen:  Urine, Straight Cath Updated:  09/24/20 1524     Color, UA Yellow     Clarity, UA Cloudy     Specific Gravity, UA 1 015     pH, UA 8 0     Leukocytes, UA Large     Nitrite, UA Positive     Protein, UA Negative mg/dl      Glucose, UA Negative mg/dl      Ketones, UA Negative mg/dl      Urobilinogen, UA 0 2 E U /dl      Bilirubin, UA Negative     Blood, UA Trace-Intact    Ammonia [[id number]]  (Abnormal) Collected:  09/24/20 1330    Lab Status:  Final result Specimen:  Blood from Arm, Left Updated: 09/24/20 1438     Ammonia <10 umol/L     Lactic acid [[id number]]  (Normal) Collected:  09/24/20 1402    Lab Status:  Final result Specimen:  Blood from Arm, Left Updated:  09/24/20 1431     LACTIC ACID 0 8 mmol/L     Narrative:       Result may be elevated if tourniquet was used during collection  Ethanol [[id number]]  (Normal) Collected:  09/24/20 1330    Lab Status:  Final result Specimen:  Blood from Arm, Left Updated:  09/24/20 1429     Ethanol Lvl <3 mg/dL     TSH [[id number]]  (Normal) Collected:  09/24/20 1330    Lab Status:  Final result Specimen:  Blood from Arm, Left Updated:  09/24/20 1408     TSH 3RD GENERATON 3 319 uIU/mL     Narrative:       Patients undergoing fluorescein dye angiography may retain small amounts of fluorescein in the body for 48-72 hours post procedure  Samples containing fluorescein can produce falsely depressed TSH values  If the patient had this procedure,a specimen should be resubmitted post fluorescein clearance  Salicylate level [[id number]]  (Abnormal) Collected:  09/24/20 1330    Lab Status:  Final result Specimen:  Blood from Arm, Left Updated:  07/31/47 5406     Salicylate Lvl <3 mg/dL     Acetaminophen level-If concentration is detectable, please discuss with medical  on call  [[id number]]  (Abnormal) Collected:  09/24/20 1330    Lab Status:  Final result Specimen:  Blood from Arm, Left Updated:  09/24/20 1408     Acetaminophen Level <2 0 ug/mL     Blood culture #1 [[id number]] Collected:  09/24/20 1402    Lab Status:   In process Specimen:  Blood from Arm, Left Updated:  09/24/20 1405    Protime-INR [[id number]]  (Normal) Collected:  09/24/20 1330    Lab Status:  Final result Specimen:  Blood from Arm, Left Updated:  09/24/20 1402     Protime 14 5 seconds      INR 1 19    APTT [[id number]]  (Normal) Collected:  09/24/20 1330    Lab Status:  Final result Specimen:  Blood from Arm, Left Updated:  09/24/20 1402     PTT 29 seconds     Troponin I [[id number]] (Normal) Collected:  09/24/20 1330    Lab Status:  Final result Specimen:  Blood from Arm, Left Updated:  09/24/20 1354     Troponin I <0 02 ng/mL     Comprehensive metabolic panel [[id number]]  (Abnormal) Collected:  09/24/20 1330    Lab Status:  Final result Specimen:  Blood from Arm, Left Updated:  09/24/20 1352     Sodium 141 mmol/L      Potassium 4 1 mmol/L      Chloride 103 mmol/L      CO2 30 mmol/L      ANION GAP 8 mmol/L      BUN 19 mg/dL      Creatinine 0 86 mg/dL      Glucose 83 mg/dL      Calcium 9 2 mg/dL      AST 28 U/L      ALT 30 U/L      Alkaline Phosphatase 81 U/L      Total Protein 8 4 g/dL      Albumin 3 6 g/dL      Total Bilirubin 0 60 mg/dL      eGFR 62 ml/min/1 73sq m     Narrative:       [place] guidelines for Chronic Kidney Disease (CKD):     Stage 1 with normal or high GFR (GFR > 90 mL/min/1 73 square meters)    Stage 2 Mild CKD (GFR = 60-89 mL/min/1 73 square meters)    Stage 3A Moderate CKD (GFR = 45-59 mL/min/1 73 square meters)    Stage 3B Moderate CKD (GFR = 30-44 mL/min/1 73 square meters)    Stage 4 Severe CKD (GFR = 15-29 mL/min/1 73 square meters)    Stage 5 End Stage CKD (GFR <15 mL/min/1 73 square meters)  Note: GFR calculation is accurate only with a steady state creatinine    CBC and differential [[id number]]  (Abnormal) Collected:  09/24/20 1330    Lab Status:  Final result Specimen:  Blood from Arm, Left Updated:  09/24/20 1337     WBC 5 88 Thousand/uL      RBC 4 26 Million/uL      Hemoglobin 11 5 g/dL      Hematocrit 37 1 %      MCV 87 fL      MCH 27 0 pg      MCHC 31 0 g/dL      RDW 15 3 %      MPV 10 2 fL      Platelets 565 Thousands/uL      nRBC 0 /100 WBCs      Neutrophils Relative 60 %      Immat GRANS % 0 %      Lymphocytes Relative 25 %      Monocytes Relative 10 %      Eosinophils Relative 4 %      Basophils Relative 1 %      Neutrophils Absolute 3 56 Thousands/µL      Immature Grans Absolute 0 02 Thousand/uL      Lymphocytes Absolute 1 44 Thousands/µL      Monocytes Absolute 0 56 Thousand/µL      Eosinophils Absolute 0 26 Thousand/µL      Basophils Absolute 0 04 Thousands/µL     Blood culture #2 [[id number]] Collected:  09/24/20 1330    Lab Status: In process Specimen:  Blood from Arm, Left Updated:  09/24/20 1334                 CT head without contrast   Final Result by Landen Burrows MD (09/24 1316)      Mildly degraded by motion  1   No acute intracranial abnormality  Microangiopathic changes  2   Left mastoid fluid  Workstation performed: [id number]         XR chest 1 view portable   Final Result by Jennifer Kennedy MD (09/24 1884)      No acute cardiopulmonary disease  Workstation performed: [id number]                    Procedures  ECG 12 Lead Documentation Only    Date/Time: 9/24/2020 6:14 PM  Performed by: Jonh Acosta PA-C  Authorized by: Jonh Acosta PA-C     Indications / Diagnosis:  AMS  ECG reviewed by me, the ED Provider: yes    Patient location:  ED  Previous ECG:     Previous ECG:  Compared to current    Comparison ECG info:  3/11/19    Similarity:  No change  Interpretation:     Interpretation: abnormal    Rate:     ECG rate:  93    ECG rate assessment: normal    Rhythm:     Rhythm: atrial fibrillation    Ectopy:     Ectopy: none    QRS:     QRS axis:  Normal    QRS intervals:  Normal  Conduction:     Conduction: normal    ST segments:     ST segments:  Normal  T waves:     T waves: normal               ED Course  ED Course as of Sep 24 1920   Thu Sep 24, 2020   1526 Nitrite, UA(!): Positive                     MDM  Number of Diagnoses or Management Options  Urinary tract infection: new and requires workup  Visual hallucinations: new and requires workup  Diagnosis management comments: 82yo female with a history of afib and CAD presenting for visual hallucinations for the past 2 days  Patient is seeing people in the room that are not present  She has no other neurologic symptoms  +Dysuria  Patient afebrile and is hemodynamically stable  Patient is able to answer questions appropriately with no neurologic deficits on exam  Differential diagnosis includes but is not limited to: UTI, other infectious etiology, dehydration, electrolyte abnormality, intracranial bleed, psychiatric    Initial ED plan: Check full workup including coma panel, ammonia, TSH, UA, blood cultures, and CT head  Final assessment: Blood work overall unremarkable  Blood counts, renal function, electrolytes, TSH, ammonia, and coma panel negative  UA is nitrite positive consistent with infection  UA was obtained via straight cath so sample is adequate  CT head negative  Patient given dose of IV Rocephin  Given AMS, will admit for further management  Amount and/or Complexity of Data Reviewed  Clinical lab tests: ordered and reviewed  Tests in the radiology section of CPT®: ordered and reviewed  Review and summarize past medical records: yes  Discuss the patient with other providers: yes  Independent visualization of images, tracings, or specimens: yes    Risk of Complications, Morbidity, and/or Mortality  Presenting problems: high  Diagnostic procedures: high  Management options: moderate        Disposition  Final diagnoses:   Urinary tract infection   Visual hallucinations     Time reflects when diagnosis was documented in both MDM as applicable and the Disposition within this note     Time User Action Codes Description Comment    9/24/2020  4:15  [facility] [place] [N39 0] Urinary tract infection     9/24/2020  4:15 PM [address] [place] [address], [place] [R44 [id number]] Visual hallucinations       ED Disposition     ED Disposition Condition Date/Time Comment    Admit Stable Thu Sep 24, 2020  4:15 PM Case was discussed with Dr Portillo Gilbert and the patient's admission status was agreed to be Admission Status: inpatient status to the service of Dr Portillo Gilbert           Follow-up Information    None         Patient's Medications   Discharge Prescriptions    No medications on file     No discharge procedures on file      PDMP Review     None          ED Provider  Electronically Signed by           Allan Davies PA-C  09/24/20 2246

## 2020-09-24 NOTE — ASSESSMENT & PLAN NOTE
Lab Results   Component Value Date    HGBA1C 6 3 (H) 06/30/2020       No results for input(s): POCGLU in the last 72 hours  Blood Sugar Average: Last 72 hrs:   present admission history of diabetes  Hemoglobin A1c 6 3  Stable  Sliding scale coverage while inpatient

## 2020-09-24 NOTE — ASSESSMENT & PLAN NOTE
Present on admission with history of persistent AFib  Stable  Continue metoprolol, digoxin, Eliquis  Close outpatient follow-up

## 2020-09-24 NOTE — ASSESSMENT & PLAN NOTE
UA indicative of UTI  Continue IV ceftriaxone  Follow-up with pending urine and blood culture sent in ED

## 2020-09-24 NOTE — ASSESSMENT & PLAN NOTE
81year-old female patient presented with altered mental status, hallucinations  Afebrile, hemodynamically stable  No leukocytosis noted  CT head report reviewed and noted negative for acute finding  Patient's symptoms suspected secondary UTI  Continue IV ceftriaxone  Follow-up with pending blood and urine culture  Delirium precautions  Continue supportive care

## 2020-09-24 NOTE — ASSESSMENT & PLAN NOTE
Present on admission with history of tachy-gale syndrome status post pacemaker  Stable  Continue outpatient follow-up

## 2020-09-24 NOTE — ASSESSMENT & PLAN NOTE
Present on admission with history of CAD  History of CABG  Currently asymptomatic  Continue aspirin, statin, beta-blocker  Outpatient follow-up

## 2020-09-24 NOTE — H&P
H&P- Willie Wilson 1935, 81 y o  female MRN: [id number]    Unit/Bed#: -01 Encounter: [id number]    Primary Care Provider: Ana Oh MD   Date and time admitted to hospital: 9/24/2020 12:34 PM        * Acute encephalopathy  Assessment & Plan  81year-old female patient presented with altered mental status, hallucinations  Currently she is awake, alert, oriented to place, self, time  Poor insight  Cooperative during exam   Currently she denies any further episodes of hallucination  Afebrile, hemodynamically stable  No leukocytosis noted  CT head report reviewed and noted negative for acute finding  UA indicative of UTI  Improving already  Currently acutely nontoxic appearing  Patient's symptoms likely secondary UTI  Continue IV ceftriaxone  Follow-up with pending blood and urine culture  Maintain Delirium precautions  Continue supportive care  Patient and her son both agreeable with above plan  UTI (urinary tract infection)  Assessment & Plan  UA indicative of UTI  Continue IV ceftriaxone  Follow-up with pending urine and blood culture sent in ED  DM (diabetes mellitus), type 2 ([facility] )  Assessment & Plan  Lab Results   Component Value Date    HGBA1C 6 3 (H) 06/30/2020       No results for input(s): POCGLU in the last 72 hours  Blood Sugar Average: Last 72 hrs:   present admission history of diabetes  Hemoglobin A1c 6 3  Stable  Sliding scale coverage while inpatient  CAD (coronary artery disease)  Assessment & Plan  Present on admission with history of CAD  History of CABG  Currently asymptomatic  Continue aspirin, statin, beta-blocker  Outpatient follow-up  Persistent atrial fibrillation [facility])  Assessment & Plan  Present on admission with history of persistent AFib  Stable  Continue metoprolol, digoxin, Eliquis  Close outpatient follow-up      Tachy-gale syndrome [facility])  Assessment & Plan  Present on admission with history of tachy-gale syndrome status post pacemaker  Stable  Continue outpatient follow-up  VTE Prophylaxis: Apixaban (Eliquis)    Code Status:  Level 1 full code  POLST: POLST form is not discussed and not completed at this time  Discussion with family:  Spoke with son over the phone at length  Anticipated Length of Stay:  Patient will be admitted on an Inpatient basis with an anticipated length of stay of  > 2 midnights  Justification for Hospital Stay:  Encephalopathy    Total Time for Visit, including Counseling / Coordination of Care: 1 hour  Greater than 50% of this total time spent on direct patient counseling and coordination of care  Chief Complaint:  Change in mental status    History of Present Illness:    Charlotte Murphy is a 81 y o  female with past medical history of tachy-gale syndrome status post pacemaker, AFib on Eliquis, history of CAD, noncompliance, status post CABG, history of diabetes, who presents with altered mental status of 2 day duration  Currently patient is awake, alert, oriented to self, place, time  Patient is not able to give detailed history  History partially obtained from son over the phone  Currently patient reports that " they found me on the floor"  States that 2 days ago she thought she was talking to or eldest daughter which was not present in the room  Son also reports that yesterday  patient was complaining of seeing kids in her house but no kids were present in the house per son  Currently patient denies chest pain, dyspnea, fever, chills, nausea, vomiting, abdominal pain, dysuria, hematuria, diarrhea, denies any localized trauma, bruising, any other new complaints  Currently she denies hallucinations  No other events reported  Patient reports that she lives with her son  Level 1 full code  Review of Systems:    Review of Systems   Constitutional: Negative for chills, fatigue and fever  HENT: Negative for congestion  Eyes: Negative for visual disturbance     Respiratory: Negative for choking, chest tightness and shortness of breath  Cardiovascular: Negative for chest pain and palpitations  Gastrointestinal: Negative for abdominal pain, diarrhea and nausea  Endocrine: Negative for polyuria  Genitourinary: Negative for dysuria  Musculoskeletal: Negative for neck stiffness  Neurological: Positive for weakness  Negative for dizziness, syncope, speech difficulty, light-headedness and headaches  Psychiatric/Behavioral: Positive for confusion and hallucinations  Negative for agitation, self-injury, sleep disturbance and suicidal ideas  Past Medical and Surgical History:     History reviewed  No pertinent past medical history  History reviewed  No pertinent surgical history  Meds/Allergies:    Prior to Admission medications    Medication Sig Start Date End Date Taking? Authorizing Provider   ASPIRIN 81 PO Take 81 mg by mouth daily    Historical Provider, MD   clobetasol (TEMOVATE) 0 05 % cream Apply topically 2 (two) times a day 10/15/19   Lidya Mancia MD   lidocaine (XYLOCAINE) 2 % topical gel Apply 1 application topically daily for 30 days 6/28/19 7/28/19  Lidya Mancia MD   nystatin (MYCOSTATIN) cream Apply topically 2 (two) times a day 10/15/19 11/14/19  Lidya Mancia MD   Pyridoxine HCl (VITAMIN B-6) 100 MG TABS Take 1 tablet by mouth daily    Historical Provider, MD     I have reviewed home medications with a medical source (PCP, Pharmacy, other)      Allergies: No Known Allergies    Social History:     Marital Status: Single     Substance Use History:   Social History     Substance and Sexual Activity   Alcohol Use Not Currently     Social History     Tobacco Use   Smoking Status Never Smoker   Smokeless Tobacco Never Used     Social History     Substance and Sexual Activity   Drug Use Never       Family History:    non-contributory    Physical Exam:     Vitals:   Blood Pressure: 146/87 (09/24/20 1929)  Pulse: (!) 114 (09/24/20 1929)  Temperature: 98 1 °F (36 7 °C) (09/24/20 1929)  Temp Source: Oral (09/24/20 1237)  Respirations: 18 (09/24/20 1929)  Height: 5' (152 4 cm) (09/24/20 1928)  Weight - Scale: 81 3 kg (179 lb 3 7 oz) (09/24/20 1928)  SpO2: 96 % (09/24/20 1929)    Physical Exam  Constitutional:       General: She is not in acute distress  Appearance: Normal appearance  She is not ill-appearing  Comments: Elderly deconditioned female patient, acutely nontoxic appearing  HENT:      Head: Normocephalic and atraumatic  Comments: No signs of head trauma noted  Nose: No rhinorrhea  Mouth/Throat:      Pharynx: No oropharyngeal exudate or posterior oropharyngeal erythema  Eyes:      Extraocular Movements: Extraocular movements intact  Conjunctiva/sclera: Conjunctivae normal       Pupils: Pupils are equal, round, and reactive to light  Neck:      Musculoskeletal: Normal range of motion and neck supple  No neck rigidity  Cardiovascular:      Rate and Rhythm: Normal rate and regular rhythm  Pulses: Normal pulses  Heart sounds: Normal heart sounds  Pulmonary:      Effort: Pulmonary effort is normal       Breath sounds: Normal breath sounds  Abdominal:      General: Bowel sounds are normal  There is no distension  Palpations: Abdomen is soft  Tenderness: There is no abdominal tenderness  Musculoskeletal: Normal range of motion  General: Swelling (Bilateral lower extremity noted with trace edema) present  Neurological:      General: No focal deficit present  Mental Status: She is alert  Comments: Currently patient is awake, alert, oriented to self, place, year  Poor insight  Cooperative during exam    Psychiatric:         Behavior: Behavior normal          Additional Data:     Lab Results: I have personally reviewed pertinent reports        Results from last 7 days   Lab Units 09/24/20  1330   WBC Thousand/uL 5 88   HEMOGLOBIN g/dL 11 5   HEMATOCRIT % 37 1 PLATELETS Thousands/uL 245   NEUTROS PCT % 60   LYMPHS PCT % 25   MONOS PCT % 10   EOS PCT % 4     Results from last 7 days   Lab Units 09/24/20  1330   SODIUM mmol/L 141   POTASSIUM mmol/L 4 1   CHLORIDE mmol/L 103   CO2 mmol/L 30   BUN mg/dL 19   CREATININE mg/dL 0 86   ANION GAP mmol/L 8   CALCIUM mg/dL 9 2   ALBUMIN g/dL 3 6   TOTAL BILIRUBIN mg/dL 0 60   ALK PHOS U/L 81   ALT U/L 30   AST U/L 28   GLUCOSE RANDOM mg/dL 83     Results from last 7 days   Lab Units 09/24/20  1330   INR  1 19             Results from last 7 days   Lab Units 09/24/20  1402   LACTIC ACID mmol/L 0 8       Imaging: I have personally reviewed pertinent reports  CT head without contrast   Final Result by Mary Beth Shetty MD (09/24 1316)      Mildly degraded by motion  1   No acute intracranial abnormality  Microangiopathic changes  2   Left mastoid fluid  Workstation performed: [id number]         XR chest 1 view portable   Final Result by Michelle Underwood MD (09/24 6322)      No acute cardiopulmonary disease  Workstation performed: [id number]             EKG, Pathology, and Other Studies Reviewed on Admission:   · EKG:  Not available if done  Allscripts / Epic Records Reviewed: Yes     ** Please Note: This note has been constructed using a voice recognition system   **

## 2020-09-24 NOTE — ED NOTES
1  CC: UTI/ Visual hallucinations     2  Admission related to injury?: No    3  Orientation status: Alert and Oriented x4    4  Abnormal labs/ focused assessment/ vitals:  Abnormal UA    5  Medications/ drips:  Rocephin: 100mL/hr    6  Narcotic time/ pain medications: n/a  See MAR    7  IV lines/drains/ etc :   20g L AC    8  Isolation status: standard precautions    9  Skin: clean/dry/intact    10  Ambulation status: assist x2    11   ED phone number: [facility] Keith, SHERYL  09/24/20 1659

## 2020-09-25 LAB
GLUCOSE SERPL-MCNC: 116 MG/DL (ref 65–140)
GLUCOSE SERPL-MCNC: 168 MG/DL (ref 65–140)

## 2020-09-25 PROCEDURE — 82948 REAGENT STRIP/BLOOD GLUCOSE: CPT

## 2020-09-25 PROCEDURE — 99232 SBSQ HOSP IP/OBS MODERATE 35: CPT | Performed by: PHYSICIAN ASSISTANT

## 2020-09-25 RX ORDER — NYSTATIN 100000 [USP'U]/G
POWDER TOPICAL 2 TIMES DAILY
Status: DISCONTINUED | OUTPATIENT
Start: 2020-09-25 | End: 2020-09-27 | Stop reason: HOSPADM

## 2020-09-25 RX ADMIN — METOPROLOL TARTRATE 100 MG: 50 TABLET, FILM COATED ORAL at 21:52

## 2020-09-25 RX ADMIN — BACLOFEN 5 MG: 10 TABLET ORAL at 09:50

## 2020-09-25 RX ADMIN — BACLOFEN 5 MG: 10 TABLET ORAL at 17:36

## 2020-09-25 RX ADMIN — DOCUSATE SODIUM 100 MG: 100 CAPSULE, LIQUID FILLED ORAL at 09:50

## 2020-09-25 RX ADMIN — PRAVASTATIN SODIUM 20 MG: 20 TABLET ORAL at 09:50

## 2020-09-25 RX ADMIN — FUROSEMIDE 40 MG: 40 TABLET ORAL at 09:50

## 2020-09-25 RX ADMIN — APIXABAN 5 MG: 5 TABLET, FILM COATED ORAL at 17:36

## 2020-09-25 RX ADMIN — ASPIRIN 81 MG: 81 TABLET, CHEWABLE ORAL at 09:50

## 2020-09-25 RX ADMIN — APIXABAN 5 MG: 5 TABLET, FILM COATED ORAL at 09:50

## 2020-09-25 RX ADMIN — NYSTATIN: 100000 POWDER TOPICAL at 17:37

## 2020-09-25 RX ADMIN — LOSARTAN POTASSIUM 25 MG: 25 TABLET, FILM COATED ORAL at 09:50

## 2020-09-25 RX ADMIN — INSULIN LISPRO 1 UNITS: 100 INJECTION, SOLUTION INTRAVENOUS; SUBCUTANEOUS at 21:52

## 2020-09-25 RX ADMIN — SERTRALINE HYDROCHLORIDE 25 MG: 25 TABLET, FILM COATED ORAL at 09:50

## 2020-09-25 RX ADMIN — METOPROLOL TARTRATE 100 MG: 50 TABLET, FILM COATED ORAL at 09:50

## 2020-09-25 RX ADMIN — PRIMIDONE 50 MG: 50 TABLET ORAL at 21:53

## 2020-09-25 RX ADMIN — PRIMIDONE 50 MG: 50 TABLET ORAL at 09:50

## 2020-09-25 RX ADMIN — CEFTRIAXONE SODIUM 1000 MG: 10 INJECTION, POWDER, FOR SOLUTION INTRAVENOUS at 09:57

## 2020-09-25 RX ADMIN — FUROSEMIDE 40 MG: 40 TABLET ORAL at 17:36

## 2020-09-25 NOTE — PLAN OF CARE
Problem: Potential for Falls  Goal: Patient will remain free of falls  Description: INTERVENTIONS:  - Assess patient frequently for physical needs  -  Identify cognitive and physical deficits and behaviors that affect risk of falls  -  [place] fall precautions as indicated by assessment   - Educate patient/family on patient safety including physical limitations  - Instruct patient to call for assistance with activity based on assessment  - Modify environment to reduce risk of injury  - Consider OT/PT consult to assist with strengthening/mobility  Outcome: Progressing     Problem: Nutrition/Hydration-ADULT  Goal: Nutrient/Hydration intake appropriate for improving, restoring or maintaining nutritional needs  Description: Monitor and assess patient's nutrition/hydration status for malnutrition  Collaborate with interdisciplinary team and initiate plan and interventions as ordered  Monitor patient's weight and dietary intake as ordered or per policy  Utilize nutrition screening tool and intervene as necessary  Determine patient's food preferences and provide high-protein, high-caloric foods as appropriate       INTERVENTIONS:  - Monitor oral intake, urinary output, labs, and treatment plans  - Assess nutrition and hydration status and recommend course of action  - Evaluate amount of meals eaten  - Assist patient with eating if necessary   - Allow adequate time for meals  - Recommend/ encourage appropriate diets, oral nutritional supplements, and vitamin/mineral supplements  - Order, calculate, and assess calorie counts as needed  - Recommend, monitor, and adjust tube feedings and TPN/PPN based on assessed needs  - Assess need for intravenous fluids  - Provide specific nutrition/hydration education as appropriate  - Include patient/family/caregiver in decisions related to nutrition  Outcome: Progressing     Problem: PAIN - ADULT  Goal: Verbalizes/displays adequate comfort level or baseline comfort level  Description: Interventions:  - Encourage patient to monitor pain and request assistance  - Assess pain using appropriate pain scale  - Administer analgesics based on type and severity of pain and evaluate response  - Implement non-pharmacological measures as appropriate and evaluate response  - Consider cultural and social influences on pain and pain management  - Notify physician/advanced practitioner if interventions unsuccessful or patient reports new pain  Outcome: Progressing     Problem: INFECTION - ADULT  Goal: Absence or prevention of progression during hospitalization  Description: INTERVENTIONS:  - Assess and monitor for signs and symptoms of infection  - Monitor lab/diagnostic results  - Monitor all insertion sites, i e  indwelling lines, tubes, and drains  - Monitor endotracheal if appropriate and nasal secretions for changes in amount and color  - [place] appropriate cooling/warming therapies per order  - Administer medications as ordered  - Instruct and encourage patient and family to use good hand hygiene technique  - Identify and instruct in appropriate isolation precautions for identified infection/condition  Outcome: Progressing  Goal: Absence of fever/infection during neutropenic period  Description: INTERVENTIONS:  - Monitor WBC    Outcome: Progressing     Problem: SAFETY ADULT  Goal: Patient will remain free of falls  Description: INTERVENTIONS:  - Assess patient frequently for physical needs  -  Identify cognitive and physical deficits and behaviors that affect risk of falls    -  [place] fall precautions as indicated by assessment   - Educate patient/family on patient safety including physical limitations  - Instruct patient to call for assistance with activity based on assessment  - Modify environment to reduce risk of injury  - Consider OT/PT consult to assist with strengthening/mobility  Outcome: Progressing  Goal: Maintain or return to baseline ADL function  Description: INTERVENTIONS:  -  Assess patient's ability to carry out ADLs; assess patient's baseline for ADL function and identify physical deficits which impact ability to perform ADLs (bathing, care of mouth/teeth, toileting, grooming, dressing, etc )  - Assess/evaluate cause of self-care deficits   - Assess range of motion  - Assess patient's mobility; develop plan if impaired  - Assess patient's need for assistive devices and provide as appropriate  - Encourage maximum independence but intervene and supervise when necessary  - Involve family in performance of ADLs  - Assess for home care needs following discharge   - Consider OT consult to assist with ADL evaluation and planning for discharge  - Provide patient education as appropriate  Outcome: Progressing  Goal: Maintain or return mobility status to optimal level  Description: INTERVENTIONS:  - Assess patient's baseline mobility status (ambulation, transfers, stairs, etc )    - Identify cognitive and physical deficits and behaviors that affect mobility  - Identify mobility aids required to assist with transfers and/or ambulation (gait belt, sit-to-stand, lift, walker, cane, etc )  - [place] fall precautions as indicated by assessment  - Record patient progress and toleration of activity level on Mobility SBAR; progress patient to next Phase/Stage  - Instruct patient to call for assistance with activity based on assessment  - Consider rehabilitation consult to assist with strengthening/weightbearing, etc   Outcome: Progressing     Problem: DISCHARGE PLANNING  Goal: Discharge to home or other facility with appropriate resources  Description: INTERVENTIONS:  - Identify barriers to discharge w/patient and caregiver  - Arrange for needed discharge resources and transportation as appropriate  - Identify discharge learning needs (meds, wound care, etc )  - Arrange for interpretive services to assist at discharge as needed  - Refer to Case Management Department for coordinating discharge planning if the patient needs post-hospital services based on physician/advanced practitioner order or complex needs related to functional status, cognitive ability, or social support system  Outcome: Progressing     Problem: Knowledge Deficit  Goal: Patient/family/caregiver demonstrates understanding of disease process, treatment plan, medications, and discharge instructions  Description: Complete learning assessment and assess knowledge base    Interventions:  - Provide teaching at level of understanding  - Provide teaching via preferred learning methods  Outcome: Progressing

## 2020-09-25 NOTE — CASE MANAGEMENT
CM met with the pt at bedside to discuss dcp  The resides with her son, but states that she is alone during the day  The pt resides in a 1 Story home with 2 MOSES located in [place]  The pt uses a walker and a cane   She uses 3L of O2 which is supplied by [facility]   The pt does not have a Hx of VNA, STR, MH or SA  The pt uses [facility] in [place] and is able to afford her co-pay  The pt POS is her dtr, Graciela Trent  The pt is retired and her family transports her to and from [facility]  CM reviewed discharge planning process including the following: identifying caregivers at home, preference for d/c planning needs, availability of treatment team to discuss questions or concerns patient and/or family may have regarding diagnosis, plan of care, old or new medications and discharge planning  Discharge Checklist reviewed and CM will continue to monitor for progress toward discharge goals in nursing and provider rounds  The pt states that she does not need any HHC at this time  She states that her family will transport her home at dc

## 2020-09-25 NOTE — ASSESSMENT & PLAN NOTE
· Pt with hx of tachy-gale syndrome s/p pacemaker placement   · Currently stable  · Continue follow up as outpatient

## 2020-09-25 NOTE — ASSESSMENT & PLAN NOTE
Lab Results   Component Value Date    HGBA1C 6 3 (H) 06/30/2020       No results for input(s): POCGLU in the last 72 hours      Blood Sugar Average: Last 72 hrs:  · Appears well controlled as an outpatient   · Will place on SSI coverage   · Hold metformin and Amaryl while inpatient   · QID glucose checks  · Consistent carb diet  · Monitor and adjust regimen as needed

## 2020-09-25 NOTE — ASSESSMENT & PLAN NOTE
· POA, UA (+) for leukocytes, blood and nitrites   · With associated altered mental status and hallucinations, now resolved after IV abx administration  · Continue IV ceftriaxone for now pending urine culture results and sensitivities   · Additional plan as above

## 2020-09-25 NOTE — PROGRESS NOTES
Progress Note - Lenin Price 1935, 81 y o  female MRN: [id number]    Unit/Bed#: -01 Encounter: [id number]    Primary Care Provider: Gee Allen MD   Date and time admitted to hospital: 9/24/2020 12:34 PM      DOS: 9/25/2020    * Acute encephalopathy  Assessment & Plan  · Pt presented with altered mental status and hallucinations, found on the floor by family but does not remember what happened   · Likely metabolic encephalopathy in setting of UTI/volume depletion    · Pt is hemodynamically stable without any leukocytosis or fevers   · CT head and CXR unremarkable   · UA (+) for blood, nitrites and leukocytes   · Continue IV ceftriaxone   · Follow up urine and blood culture results   · Delirium precautions  · Pt's hallucinations have now resolved, appears to be at baseline mentation currently  · Obtain PT/OT consultations as patient reports she feels weak today   · Supportive care    UTI (urinary tract infection)  Assessment & Plan  · POA, UA (+) for leukocytes, blood and nitrites   · With associated altered mental status and hallucinations, now resolved after IV abx administration  · Continue IV ceftriaxone for now pending urine culture results and sensitivities   · Additional plan as above    DM [facility]di[facility] mellitus), [facility] 2 ([facility] )  Assessment & Plan  Lab Results   Component Value Date    HGBA1C 6 3 (H) 06/30/2020       No results for input(s): POCGLU in the last 72 hours      Blood Sugar Average: Last 72 hrs:  · Appears well controlled as an outpatient   · Will place on SSI coverage   · Hold metformin and Amaryl while inpatient   · QID glucose checks  · Consistent carb diet  · Monitor and adjust regimen as needed      CAD (coronary artery disease)  Assessment & Plan  · Hx of CABG  · Pt is asymptomatic, denies any chest pain or shortness of breath   · Continue ASA, statin and lopressor  · Monitor    Persistent atrial fibrillation (HCC)  Assessment & Plan  · HR appears controlled on Lopressor 100 mg BID  · Continue Eliquis 5 mg BID for anticoagulation   · Stable, monitor    Tachy-gale syndrome (HCC)  Assessment & Plan  · Pt with hx of tachy-gale syndrome s/p pacemaker placement   · Currently stable  · Continue follow up as outpatient       VTE Pharmacologic Prophylaxis:   Pharmacologic: Apixaban (Eliquis)  Mechanical VTE Prophylaxis in Place: Yes    Patient Centered Rounds: I have evaluated patient without nursing staff present due to speaking to nurse outside patient's room, [facility] with Specialists or Other Care Team Provider: Discussed with RN, sanjana and reviewed previous notes    Education and Discussions with Family / Patient:  Discussed with patient and patient's son at bedside regarding plan of care    Time Spent for Care: 30 minutes  More than 50% of total time spent on counseling and coordination of care as described above  Current Length of Stay: 1 day(s)    Current Patient Status: Inpatient   Certification Statement: The patient will continue to require additional inpatient hospital stay due to Continued IV antibiotics, urine culture results    Discharge Plan:  Not medically stable as above, anticipate next 24-48 hours pending improvement  P T /OT consultations    Code Status: Level 1 - Full Code      Subjective:   Patient reports that she does not feel well today  She states that she does not have a specific complaint but reports that she does not feel like herself  However she does state that her hallucinations have now resolved  She denies any chest pain, shortness of breath, abdominal pain, nausea or vomiting  Feels weak  Did discuss extensively with patient's son outside of the patient's room regarding home situation  Patient's son reports the patient is not motivated  He reports that she has given up on life and sometimes does not take her medications  He reports he is very frustrated and that this has been ongoing for the past few years    He reports that his 1 sibling had wanted to place the patient and nursing home and patient's son got angry about this and has been taking care of his mother since that time  He does report frustration with her lack of motivation and exhaustion of caregiving  However he continues to report that he will not place her in a nursing home  He is thinking that VNA services would be helpful  Recommend outpatient palliative care  Objective:     Vitals:   Temp (24hrs), Av 7 °F (36 5 °C), Min:97 3 °F (36 3 °C), Max:98 1 °F (36 7 °C)    Temp:  [97 3 °F (36 3 °C)-98 1 °F (36 7 °C)] 97 3 °F (36 3 °C)  HR:  [] 68  Resp:  [14-18] 18  BP: (127-170)/(57-87) 130/57  SpO2:  [95 %-98 %] 97 %  Body mass index is 35 kg/m²  Input and Output Summary (last 24 hours): Intake/Output Summary (Last 24 hours) at 2020 1517  Last data filed at 2020 1350  Gross per 24 hour   Intake 780 ml   Output 1779 ml   Net -999 ml       Physical Exam:     Physical Exam  Vitals signs reviewed  Constitutional:       General: She is not in acute distress  Appearance: She is not toxic-appearing  Comments: Patient is in no acute distress lying in her hospital bed resting comfortably  Baseline mild head tremor  Eyes:      Conjunctiva/sclera: Conjunctivae normal       Pupils: Pupils are equal, round, and reactive to light  Cardiovascular:      Rate and Rhythm: Normal rate and regular rhythm  Pulses: Normal pulses  Heart sounds: Normal heart sounds  Pulmonary:      Effort: Pulmonary effort is normal  No respiratory distress  Breath sounds: Normal breath sounds  No wheezing or rales  Abdominal:      General: Abdomen is flat  Bowel sounds are normal  There is no distension  Palpations: Abdomen is soft  Tenderness: There is no abdominal tenderness  There is no guarding  Musculoskeletal:         General: No swelling        Comments: Mild lower extremity edema bilaterally, nonpitting   Skin:     General: Skin is warm and dry       Findings: No erythema  Neurological:      Mental Status: She is alert  Comments: Alert and oriented x3, no additional hallucinations   Psychiatric:      Comments: Depressed mood         Additional Data:     Labs:    Results from last 7 days   Lab Units 09/24/20  1330   WBC Thousand/uL 5 88   HEMOGLOBIN g/dL 11 5   HEMATOCRIT % 37 1   PLATELETS Thousands/uL 245   NEUTROS PCT % 60   LYMPHS PCT % 25   MONOS PCT % 10   EOS PCT % 4     Results from last 7 days   Lab Units 09/24/20  1330   POTASSIUM mmol/L 4 1   CHLORIDE mmol/L 103   CO2 mmol/L 30   BUN mg/dL 19   CREATININE mg/dL 0 86   CALCIUM mg/dL 9 2   ALK PHOS U/L 81   ALT U/L 30   AST U/L 28     Results from last 7 days   Lab Units 09/24/20  1330   INR  1 19       * I Have Reviewed All Lab Data Listed Above  * Additional Pertinent Lab Tests Reviewed: All Labs Within Last 24 Hours Reviewed    Imaging:    Imaging Reports Reviewed Today Include:  CT head  Imaging Personally Reviewed by Myself Includes:  None    Recent Cultures (last 7 days):     Results from last 7 days   Lab Units 09/24/20  1402 09/24/20  1330   BLOOD CULTURE  Received in Microbiology Lab  Culture in Progress  Received in Microbiology Lab  Culture in Progress         Last 24 Hours Medication List:   Current Facility-Administered Medications   Medication Dose Route Frequency Provider Last Rate    apixaban  5 mg Oral BID Leelee Wells MD      aspirin  81 mg Oral Daily Ck NOVAK MD      baclofen  5 mg Oral BID Ck NOVAK MD      cefTRIAXone  1,000 mg Intravenous Q24H Ck NOVAK MD 1,000 mg (09/25/20 0957)    docusate sodium  100 mg Oral Daily Ck NOVAK MD      furosemide  40 mg Oral BID Ck NOVAK MD      insulin lispro  1-5 Units Subcutaneous TID AC Mayda Sheth PA-C      insulin lispro  1-5 Units Subcutaneous HS Mayda Sheth PA-C      losartan  25 mg Oral Daily Ck NOVAK MD      metoprolol tartrate  100 mg Oral Q12H [address] Ck Yon Cardoza MD      nystatin   Topical BID [place] Dora Sheth PA-C      pravastatin  20 mg Oral Daily Yaneli Martínez MD      primidone  50 mg Oral Q12H [address] Ck Cardoza MD      sertraline  25 mg Oral Daily Yaneli Martínez MD          Today, Patient Was Seen By: Silvia Waite PA-C    ** Please Note: Dictation voice to text software may have been used in the creation of this document   **

## 2020-09-25 NOTE — ASSESSMENT & PLAN NOTE
· Hx of CABG  · Pt is asymptomatic, denies any chest pain or shortness of breath   · Continue ASA, statin and lopressor  · Monitor

## 2020-09-25 NOTE — ASSESSMENT & PLAN NOTE
· Pt presented with altered mental status and hallucinations, found on the floor by family but does not remember what happened   · Likely metabolic encephalopathy in setting of UTI/volume depletion    · Pt is hemodynamically stable without any leukocytosis or fevers   · CT head and CXR unremarkable   · UA (+) for blood, nitrites and leukocytes   · Continue IV ceftriaxone   · Follow up urine and blood culture results   · Delirium precautions  · Pt's hallucinations have now resolved, appears to be at baseline mentation currently  · Obtain PT/OT consultations as patient reports she feels weak today   · Supportive care

## 2020-09-25 NOTE — ASSESSMENT & PLAN NOTE
· HR appears controlled on Lopressor 100 mg BID  · Continue Eliquis 5 mg BID for anticoagulation   · Stable, monitor

## 2020-09-25 NOTE — PLAN OF CARE
Problem: Potential for Falls  Goal: Patient will remain free of falls  Description: INTERVENTIONS:  - Assess patient frequently for physical needs  -  Identify cognitive and physical deficits and behaviors that affect risk of falls  -  [place] fall precautions as indicated by assessment   - Educate patient/family on patient safety including physical limitations  - Instruct patient to call for assistance with activity based on assessment  - Modify environment to reduce risk of injury  - Consider OT/PT consult to assist with strengthening/mobility  Outcome: Progressing     Problem: Nutrition/Hydration-ADULT  Goal: Nutrient/Hydration intake appropriate for improving, restoring or maintaining nutritional needs  Description: Monitor and assess patient's nutrition/hydration status for malnutrition  Collaborate with interdisciplinary team and initiate plan and interventions as ordered  Monitor patient's weight and dietary intake as ordered or per policy  Utilize nutrition screening tool and intervene as necessary  Determine patient's food preferences      INTERVENTIONS:  - Monitor oral intake, urinary output, labs, and treatment plans  - Assess nutrition and hydration status and recommend course of action  - Evaluate amount of meals eaten  - Assist patient with eating if necessary   - Allow adequate time for meals  - Provide specific nutrition/hydration education as appropriate  - Include patient/family/caregiver in decisions related to nutrition  Outcome: Progressing     Problem: PAIN - ADULT  Goal: Verbalizes/displays adequate comfort level or baseline comfort level  Description: Interventions:  - Encourage patient to monitor pain and request assistance  - Assess pain using appropriate pain scale  - Administer analgesics based on type and severity of pain and evaluate response  - Implement non-pharmacological measures as appropriate and evaluate response  - Consider cultural and social influences on pain and pain management  - Notify physician/advanced practitioner if interventions unsuccessful or patient reports new pain  Outcome: Progressing     Problem: INFECTION - ADULT  Goal: Absence or prevention of progression during hospitalization  Description: INTERVENTIONS:  - Assess and monitor for signs and symptoms of infection  - Monitor lab/diagnostic results  - Monitor all insertion sites, i e  indwelling lines, tubes, and drains  - Monitor endotracheal if appropriate and nasal secretions for changes in amount and color  - [place] appropriate cooling/warming therapies per order  - Administer medications as ordered  - Instruct and encourage patient and family to use good hand hygiene technique  - Identify and instruct in appropriate isolation precautions for identified infection/condition  Outcome: Progressing  Goal: Absence of fever/infection during neutropenic period  Description: INTERVENTIONS:  - Monitor WBC    Outcome: Progressing     Problem: SAFETY ADULT  Goal: Patient will remain free of falls  Description: INTERVENTIONS:  - Assess patient frequently for physical needs  -  Identify cognitive and physical deficits and behaviors that affect risk of falls    -  [place] fall precautions as indicated by assessment   - Educate patient/family on patient safety including physical limitations  - Instruct patient to call for assistance with activity based on assessment  - Modify environment to reduce risk of injury  - Consider OT/PT consult to assist with strengthening/mobility  Outcome: Progressing  Goal: Maintain or return to baseline ADL function  Description: INTERVENTIONS:  -  Assess patient's ability to carry out ADLs; assess patient's baseline for ADL function and identify physical deficits which impact ability to perform ADLs (bathing, care of mouth/teeth, toileting, grooming, dressing, etc )  - Assess/evaluate cause of self-care deficits   - Assess range of motion  - Assess patient's mobility; develop plan if impaired  - Assess patient's need for assistive devices and provide as appropriate  - Encourage maximum independence but intervene and supervise when necessary  - Involve family in performance of ADLs  - Assess for home care needs following discharge   - Consider OT consult to assist with ADL evaluation and planning for discharge  - Provide patient education as appropriate  Outcome: Progressing  Goal: Maintain or return mobility status to optimal level  Description: INTERVENTIONS:  - Assess patient's baseline mobility status (ambulation, transfers, stairs, etc )    - Identify cognitive and physical deficits and behaviors that affect mobility  - Identify mobility aids required to assist with transfers and/or ambulation (gait belt, sit-to-stand, lift, walker, cane, etc )  - [place] fall precautions as indicated by assessment  - Record patient progress and toleration of activity level on Mobility SBAR; progress patient to next Phase/Stage  - Instruct patient to call for assistance with activity based on assessment  - Consider rehabilitation consult to assist with strengthening/weightbearing, etc   Outcome: Progressing     Problem: DISCHARGE PLANNING  Goal: Discharge to home or other facility with appropriate resources  Description: INTERVENTIONS:  - Identify barriers to discharge w/patient and caregiver  - Arrange for needed discharge resources and transportation as appropriate  - Identify discharge learning needs (meds, wound care, etc )  - Arrange for interpretive services to assist at discharge as needed  - Refer to Case Management Department for coordinating discharge planning if the patient needs post-hospital services based on physician/advanced practitioner order or complex needs related to functional status, cognitive ability, or social support system  Outcome: Progressing     Problem: Knowledge Deficit  Goal: Patient/family/caregiver demonstrates understanding of disease process, treatment plan, medications, and discharge instructions  Description: Complete learning assessment and assess knowledge base    Interventions:  - Provide teaching at level of understanding  - Provide teaching via preferred learning methods  Outcome: Progressing     Problem: Prexisting or High Potential for Compromised Skin Integrity  Goal: Skin integrity is maintained or improved  Description: INTERVENTIONS:  - Identify patients at risk for skin breakdown  - Assess and monitor skin integrity  - Assess and monitor nutrition and hydration status  - Monitor labs   - Assess for incontinence   - Turn and reposition patient  - Assist with mobility/ambulation  - Relieve pressure over bony prominences  - Avoid friction and shearing  - Provide appropriate hygiene as needed including keeping skin clean and dry  - Evaluate need for skin moisturizer/barrier cream  - Collaborate with interdisciplinary team   - Patient/family teaching  - Consider wound care consult   Outcome: Progressing

## 2020-09-26 PROBLEM — R13.10 DYSPHAGIA: Status: ACTIVE | Noted: 2020-09-26

## 2020-09-26 LAB
ANION GAP SERPL CALCULATED.3IONS-SCNC: 6 MMOL/L (ref 4–13)
BUN SERPL-MCNC: 21 MG/DL (ref 5–25)
CALCIUM SERPL-MCNC: 9 MG/DL (ref 8.3–10.1)
CHLORIDE SERPL-SCNC: 103 MMOL/L (ref 100–108)
CO2 SERPL-SCNC: 28 MMOL/L (ref 21–32)
CREAT SERPL-MCNC: 0.93 MG/DL (ref 0.6–1.3)
ERYTHROCYTE [DISTWIDTH] IN BLOOD BY AUTOMATED COUNT: 15.2 % (ref 11.6–15.1)
GFR SERPL CREATININE-BSD FRML MDRD: 57 ML/MIN/1.73SQ M
GLUCOSE SERPL-MCNC: 100 MG/DL (ref 65–140)
GLUCOSE SERPL-MCNC: 110 MG/DL (ref 65–140)
GLUCOSE SERPL-MCNC: 117 MG/DL (ref 65–140)
GLUCOSE SERPL-MCNC: 119 MG/DL (ref 65–140)
GLUCOSE SERPL-MCNC: 133 MG/DL (ref 65–140)
GLUCOSE SERPL-MCNC: 134 MG/DL (ref 65–140)
HCT VFR BLD AUTO: 35.6 % (ref 34.8–46.1)
HGB BLD-MCNC: 10.9 G/DL (ref 11.5–15.4)
MCH RBC QN AUTO: 27 PG (ref 26.8–34.3)
MCHC RBC AUTO-ENTMCNC: 30.6 G/DL (ref 31.4–37.4)
MCV RBC AUTO: 88 FL (ref 82–98)
PLATELET # BLD AUTO: 228 THOUSANDS/UL (ref 149–390)
PMV BLD AUTO: 10.2 FL (ref 8.9–12.7)
POTASSIUM SERPL-SCNC: 3.8 MMOL/L (ref 3.5–5.3)
RBC # BLD AUTO: 4.04 MILLION/UL (ref 3.81–5.12)
SODIUM SERPL-SCNC: 137 MMOL/L (ref 136–145)
WBC # BLD AUTO: 6.1 THOUSAND/UL (ref 4.31–10.16)

## 2020-09-26 PROCEDURE — 97167 OT EVAL HIGH COMPLEX 60 MIN: CPT

## 2020-09-26 PROCEDURE — 80048 BASIC METABOLIC PNL TOTAL CA: CPT | Performed by: PHYSICIAN ASSISTANT

## 2020-09-26 PROCEDURE — 82948 REAGENT STRIP/BLOOD GLUCOSE: CPT

## 2020-09-26 PROCEDURE — 97163 PT EVAL HIGH COMPLEX 45 MIN: CPT

## 2020-09-26 PROCEDURE — 99232 SBSQ HOSP IP/OBS MODERATE 35: CPT | Performed by: PHYSICIAN ASSISTANT

## 2020-09-26 PROCEDURE — 85027 COMPLETE CBC AUTOMATED: CPT | Performed by: PHYSICIAN ASSISTANT

## 2020-09-26 RX ADMIN — BACLOFEN 5 MG: 10 TABLET ORAL at 09:08

## 2020-09-26 RX ADMIN — BACLOFEN 5 MG: 10 TABLET ORAL at 18:15

## 2020-09-26 RX ADMIN — APIXABAN 5 MG: 5 TABLET, FILM COATED ORAL at 09:07

## 2020-09-26 RX ADMIN — FUROSEMIDE 40 MG: 40 TABLET ORAL at 09:07

## 2020-09-26 RX ADMIN — DOCUSATE SODIUM 100 MG: 100 CAPSULE, LIQUID FILLED ORAL at 09:07

## 2020-09-26 RX ADMIN — LOSARTAN POTASSIUM 25 MG: 25 TABLET, FILM COATED ORAL at 09:08

## 2020-09-26 RX ADMIN — PRIMIDONE 50 MG: 50 TABLET ORAL at 21:17

## 2020-09-26 RX ADMIN — ASPIRIN 81 MG: 81 TABLET, CHEWABLE ORAL at 09:07

## 2020-09-26 RX ADMIN — APIXABAN 5 MG: 5 TABLET, FILM COATED ORAL at 18:15

## 2020-09-26 RX ADMIN — SERTRALINE HYDROCHLORIDE 25 MG: 25 TABLET, FILM COATED ORAL at 09:08

## 2020-09-26 RX ADMIN — FUROSEMIDE 40 MG: 40 TABLET ORAL at 18:15

## 2020-09-26 RX ADMIN — METOPROLOL TARTRATE 100 MG: 50 TABLET, FILM COATED ORAL at 09:07

## 2020-09-26 RX ADMIN — PRIMIDONE 50 MG: 50 TABLET ORAL at 09:07

## 2020-09-26 RX ADMIN — CEFTRIAXONE SODIUM 1000 MG: 10 INJECTION, POWDER, FOR SOLUTION INTRAVENOUS at 09:37

## 2020-09-26 RX ADMIN — PRAVASTATIN SODIUM 20 MG: 20 TABLET ORAL at 09:07

## 2020-09-26 RX ADMIN — NYSTATIN 1 APPLICATION: 100000 POWDER TOPICAL at 09:08

## 2020-09-26 RX ADMIN — METOPROLOL TARTRATE 100 MG: 50 TABLET, FILM COATED ORAL at 21:17

## 2020-09-26 RX ADMIN — NYSTATIN: 100000 POWDER TOPICAL at 18:15

## 2020-09-26 NOTE — PLAN OF CARE
Problem: PHYSICAL THERAPY ADULT  Goal: Performs mobility at highest level of function for planned discharge setting  See evaluation for individualized goals  Description: Treatment/Interventions: Functional transfer training, LE strengthening/ROM, Elevations, Therapeutic exercise, Endurance training, Patient/family training, Equipment eval/education, Bed mobility, Gait training, Spoke to nursing, Spoke to case management, OT  Equipment Recommended: Walker(RW)       See flowsheet documentation for full assessment, interventions and recommendations  Note: Prognosis: Good  Problem List: Decreased strength, Decreased endurance, Impaired balance, Decreased mobility, Decreased cognition, Decreased safety awareness, Pain  Assessment: Pt is 81 y o  female seen for PT evaluation on 9/26/2020 s/p [facility] to [facility] 9[facility]/2020 w/ Acute encephalopathy  Pt presents s/p AMS, being found on the floor by family  PT consulted to assess pt's functional mobility and d/c needs  Order placed for PT eval and tx, w/ up and OOB as tolerated order  Performed at least 2 patient identifiers during session: Name and wristband  Comorbidities affecting pt's physical performance at time of assessment include: tachy-gale syndrome status post pacemaker, AFib on Eliquis, history of CAD, noncompliance, status post CABG, history of diabetes  PTA, pt was independent w/ all functional mobility w/ walker in AM, then no AD in afternoon, ambulates unrestricted distances and all terrain, has 2 MOSES, lives w/ son in 2 level home / 1st floor set up, sponge bathes on 1st floor and retired   Personal factors affecting pt at time of IE include: inaccessible home environment, ambulating w/ assistive device, stairs to enter home, inability to navigate level surfaces w/o external assistance, unable to perform dynamic tasks in community, limited home support, positive fall history, decreased initiation and engagement and limited insight into impairments  Please find objective findings from PT assessment regarding body systems outlined above with impairments and limitations including weakness, impaired balance, decreased endurance, gait deviations, decreased activity tolerance, decreased safety awareness, fall risk and decreased cognition, as well as mobility assessment (need for min assist w/ all phases of mobility when usually ambulating independently and need for cueing for mobility technique)  The following objective measures performed on IE also reveal limitations: [place] Index: 45/100 and Modified [place]: 4 (moderate/severe disability)  Pt's clinical presentation is currently unstable/unpredictable seen in pt's presentation of abnormal lab value(s), need for input for task focus and mobility technique and ongoing medical assessment  Pt to benefit from continued PT tx to address deficits as defined above and maximize level of functional independent mobility and consistency  From PT/mobility standpoint, recommendation at time of d/c would be STR pending progress in order to facilitate return to PLOF  Barriers to Discharge: Decreased caregiver support, Inaccessible home environment     PT Discharge Recommendation: [id number]     PT - OK to Discharge: Yes(when medically cleared if to STR)    See flowsheet documentation for full assessment

## 2020-09-26 NOTE — OCCUPATIONAL THERAPY NOTE
Occupational Therapy Evaluation        Patient Name: Teo Sandoval  XKLCR'C Date: 9/26/2020 09/26/20 1107   Note Type   Note type Eval only   Restrictions/Precautions   Weight Bearing Precautions Per Order No   Braces or Orthoses Other (Comment)  (None at baseline)   Other Precautions Multiple lines; Fall Risk;O2;Chair Alarm; Bed Alarm  (Home O2 at 2 liters- "usually wears at night")   Pain Assessment   Pain Assessment Tool Pain Assessment not indicated - pt denies pain   Pain Score No Pain   Home Living   Type of Home House   Home Layout Two level; Able to live on main level with bedroom/bathroom; Performs ADLs on one level;Stairs to enter with rails  (2 MOSES)   Bathroom Shower/Tub None  (sponge bathes on the 1st floor)   [place] [address] Other (Comment)  (none at baseline)   [address] Walker;Life alert   Additional Comments usually walks with walker in the AM, then without any AD   Prior Function   Level of [place] Needs assistance with IADLs; Needs assistance with ADLs and functional mobility   Lives With Son   Receives Help From Family  (sister and daughter assist with care)   ADL Assistance Needs assistance   IADLs Needs assistance   Falls in the last 6 months 1 to 4  (1 )   Vocational Retired   Lifestyle   Autonomy Patient reported independent with ADLs/ family assists with IADLs  Patient lives with her Son in a 2 story house, 2 MOSES with 1st floor set up  Patient ambulates with RW during AM hours and as needed t/o the day, however mostly independnet with no AD  patient has a Life Alert , pt's sister and daughter visit during the day and assist patient with IADLs, patient is sponge bathing on her own and preparing light meals  without assistance     Reciprocal Relationships Supportive family   Psychosocial   Psychosocial (WDL) WDL   ADL   Eating Assistance 5  Supervision/Setup   Grooming Assistance 5  Supervision/Setup   UB Bathing Assistance 4  Minimal Assistance   LB Bathing Assistance 3  Moderate Assistance   UB Dressing Assistance 4  Minimal Assistance   LB Dressing Assistance 3  Moderate Assistance   Toileting Assistance  3  Moderate Assistance   Functional Assistance 4  Minimal Assistance   Bed Mobility   Supine to Sit 4  Minimal assistance   Additional items Assist x 1;HOB elevated; Bedrails; Increased time required;Verbal cues   Transfers   Sit to Stand 4  Minimal assistance   Additional items Assist x 1; Increased time required;Verbal cues   Stand to Sit 4  Minimal assistance   Additional items Assist x 1; Increased time required;Verbal cues   Functional Mobility   Functional Mobility 4  Minimal assistance   Additional items Rolling walker   Balance   Static Sitting Fair +   Dynamic Sitting Fair   Static Standing Fair   Dynamic Standing Fair -   Ambulatory Poor +   Activity Tolerance   Activity Tolerance Patient limited by fatigue   Nurse Made Aware SHERYL Tello verbalized pt appropriate to see, made aware of session outcome/recs   RUE Assessment   RUE Assessment X  (AROM WFL, strength deficit)   RUE Strength   RUE Overall Strength Deficits  (3+/5)   LUE Assessment   LUE Assessment X  (AROM WFL, strength deficit)   LUE Strength   LUE Overall Strength Deficits  (3+/5)   Hand Function   Gross Motor Coordination Impaired   Fine Motor Coordination Functional   Sensation   Light Touch No apparent deficits  (BUEs)   Vision-Basic Assessment   Current Vision Wears glasses all the time   Patient Visual Report Other (Comment)  (no significant changes reported)   Cognition   Overall Cognitive Status Impaired   Arousal/Participation Alert; Responsive; Cooperative   Attention Within functional limits   Orientation Level Oriented to person;Oriented to place;Oriented to situation;Disoriented to time   Memory Decreased short term memory;Decreased recall of recent events   Following Commands Follows one step commands without difficulty   Assessment Limitation Decreased ADL status; Decreased UE strength;Decreased Safe judgement during ADL;Decreased cognition;Decreased endurance;Decreased self-care trans;Decreased high-level ADLs   Prognosis Good   Assessment Patient is a 81 y o  female seen for OT evaluation s/p admit to [address] [facility] on 9/24/2020 w/Acute encephalopathy  Commorbidities affecting patient's functional performance at time of assessment include: tachy-gale syndrome status post pacemaker, AFib on Eliquis, history of CAD, noncompliance, status post CABG, history of diabetes  Orders placed for OT evaluation and treatment  Performed at least two patient identifiers during session including name and wristband  Prior to admission, Patient reported independent with ADLs/ family assists with IADLs  Patient lives with her Son in a 2 story house, 2 MOSES with 1st floor set up  Patient ambulates with RW during AM hours and as needed t/o the day, however mostly independnet with no AD  patient has a Life Alert , pt's sister and daughter visit during the day and assist patient with IADLs, patient is sponge bathing on her own and preparing light meals  without assistance  Personal factors affecting patient at time of initial evaluation include: steps to enter, difficulty performing ADLs and difficulty performing IADLs  Upon evaluation, patient requires minimal  assist for UB ADLs, moderate assist for LB ADLs, transfers and functional ambulation in room and bathroom with minimal  assist, with the use of Rolling Walker  Patient is alert, oriented to name,, oriented to place,, oriented to situation, and disoriented to time,, presents with limited ability to recall information after a brief period of time (short term memory) / working memory , further Cognitive assessment to follow    Occupational performance is affected by the following deficits: orientation, decreased muscle strength, degenerative arthritic joint changes, impaired gross motor coordination, dynamic sit/ stand balance deficit with poor standing tolerance time for self care and functional mobility, decreased activity tolerance, impaired memory, decreased safety awareness and postural control and postural alignment deficit, requiring external assistance to complete transitional movements  Therapist completed  extensive additional review of medical records and additional review of physical, cognitive or psychosocial history, clinical examination identifying 5 or more performance deficits, clinical decision making of a high complexity , consistent with a high complexity level evaluation  Goals   Patient Goals to return home, pt refuses STR   Plan   Treatment Interventions ADL retraining;Functional transfer training;UE strengthening/ROM; Endurance training;Patient/family training;Equipment evaluation/education; Compensatory technique education;Continued evaluation; Energy conservation; Activityengagement   Goal Expiration Date 10/10/20   Recommendation   OT Discharge Recommendation Post-Acute Rehabilitation Services   Barthel Index   Feeding 5   Bathing 0   Grooming Score 0   Dressing Score 5   Bladder Score 10   Bowels Score 10   Toilet Use Score 5   Transfers (Bed/Chair) Score 10   Mobility (Level Surface) Score 0   Stairs Score 0   Barthel Index Score 45   Modified [place] Scale   Modified [place] Scale 4     Patient to benefit from continued Occupational Therapy treatment while in the hospital to address deficits as defined above and maximize level of functional independence with ADLs and functional mobility  Occupational Performance areas to address include: bathing/ shower, dressing, toilet hygiene, transfer to all surfaces, functional mobility, emergency response, health maintenance, IADLs: safety procedures, IADLs: meal prep/ clean up, Leisure Participation and Social participation   From OT standpoint, recommendation at time of d/c would be Short Term Rehab         1 - Patient will verbalize and demonstrate use of energy conservation/ deep breathing technique and work simplification skills during functional activity with no verbal cues  2 - Patient will verbalize and demonstrate good body mechanics and joint protection techniques during  ADLs/ IADLs with no verbal cues  3 - Patient will increase OOB/ sitting tolerance to 2-4 hours per day for increased participation in self care and leisure tasks with no s/s of exertion  4 - Patient will increase standing tolerance time to 5  minutes with unilateral UE support to complete sink level ADLs@ mod I level  5 - Patient will increase sitting tolerance at edge of bed to 20 minutes to complete UB ADLs @ set up assist level  6 - Patient will transfer bed to Chair / toilet at Set up assist level with AD as indicated  7 - Patient will complete UB ADLs with set up assist      8 - Patient will complete LB ADLs with min assist with the use of adaptive equipment  9 - Patient will complete toileting hygiene with set up assist/ supervision for thoroughness    10 - Patient/ Family  will demonstrate competency with UE Home Exercise Program       11- Patient will participate in further cognitive assessment to ascertain current level of functioning

## 2020-09-26 NOTE — PLAN OF CARE
Problem: OCCUPATIONAL THERAPY ADULT  Goal: Performs self-care activities at highest level of function for planned discharge setting  See evaluation for individualized goals  Description: Treatment Interventions: ADL retraining, Functional transfer training, UE strengthening/ROM, Endurance training, Patient/family training, Equipment evaluation/education, Compensatory technique education, Continued evaluation, Energy conservation, Activityengagement          See flowsheet documentation for full assessment, interventions and recommendations  Note: Limitation: Decreased ADL status, Decreased UE strength, Decreased Safe judgement during ADL, Decreased cognition, Decreased endurance, Decreased self-care trans, Decreased high-level ADLs  Prognosis: Good  Assessment: Patient is a 81 y o  female seen for OT evaluation s/p admit to [id number] [place] [address] on 9/24/2020 w/Acute encephalopathy  Commorbidities affecting patient's functional performance at time of assessment include: tachy-gale syndrome status post pacemaker, AFib on Eliquis, history of CAD, noncompliance, status post CABG, history of diabetes  Orders placed for OT evaluation and treatment  Performed at least two patient identifiers during session including name and wristband  Prior to admission, Patient reported independent with ADLs/ family assists with IADLs  Patient lives with her Son in a 2 story house[facility] with [facility]st [facility] set up  Patient ambulates with RW during AM hours and as needed t/o the day, however mostly independnet with no AD  patient has a Life Alert , pt's sister and daughter visit during the day and assist patient with IADLs, patient is sponge bathing on her own and preparing light meals  without assistance  Personal factors affecting patient at time of initial evaluation include: steps to enter, difficulty performing ADLs and difficulty performing IADLs   Upon evaluation, patient requires minimal  assist for UB ADLs, moderate assist for LB ADLs, transfers and functional ambulation in room and bathroom with minimal  assist, with the use of Rolling Walker  Patient is alert, oriented to name,, oriented to place,, oriented to situation, and disoriented to time,, presents with limited ability to recall information after a brief period of time (short term memory) / working memory , further Cognitive assessment to follow  Occupational performance is affected by the following deficits: orientation, decreased muscle strength, degenerative arthritic joint changes, impaired gross motor coordination, dynamic sit/ stand balance deficit with poor standing tolerance time for self care and functional mobility, decreased activity tolerance, impaired memory, decreased safety awareness and postural control and postural alignment deficit, requiring external assistance to complete transitional movements  Therapist completed  extensive additional review of medical records and additional review of physical, cognitive or psychosocial history, clinical examination identifying 5 or more performance deficits, clinical decision making of a high complexity , consistent with a high complexity level evaluation        OT Discharge Recommendation: Post-Acute Rehabilitation Services

## 2020-09-26 NOTE — PHYSICAL THERAPY NOTE
Physical Therapy Evaluation     Patient's Name: Melia Garcia    Admitting Diagnosis  Hallucination [R44 3]  Visual hallucinations [R44 1]  Urinary tract infection [N39 0]    Problem List  Patient Active Problem List   Diagnosis    [facility]achy-gale syndrome ([facility] [address] )    [facility]    CAD (coronary artery disease)    DM (diabetes mellitus), type 2 ([facility] )    Acute encephalopathy    UTI (urinary tract infection)       Past Medical History  History reviewed  No pertinent past medical history  Past Surgical History  History reviewed  No pertinent surgical history  09/26/20 1128   PT Last Visit   PT Visit Date 09/26/20   Note Type   Note type Eval only   Pain Assessment   Pain Assessment Tool Pain Assessment not indicated - pt denies pain   Pain Score No Pain   Home Living   Type of Home House   Home Layout Two level; Able to live on main level with bedroom/bathroom; Performs ADLs on one level;Stairs to enter with rails  (2 MOSES)   Bathroom Shower/Tub   (sponge bathes on the 1st floor )   Bathroom Toilet Standard   Bathroom Equipment   (none at baseline )   [address] Walker;Life alert   Additional Comments usually walks with walker in the AM, then without any AD    Prior Function   Level of [place] Needs assistance with IADLs; Needs assistance with ADLs and functional mobility   Lives With Son   Receives Help From Family  (sister and daughter assist with care)   ADL Assistance Needs assistance   IADLs Needs assistance   Falls in the last 6 months 1 to 4  (1 )   Vocational Retired   Restrictions/Precautions   [place] Bearing Precautions Per Order No   Braces or Orthoses Other (Comment)  (None at baseline)   Other Precautions Multiple lines; Fall Risk;O2;Chair Alarm; Bed Alarm  (Home O2 at 2 liters- "usually wears at night" )   General   Family/Caregiver Present No   Cognition   Overall Cognitive Status Impaired   Arousal/Participation Alert Orientation Level Oriented to person;Oriented to place;Oriented to situation   Memory Decreased short term memory;Decreased recall of recent events   Following Commands Follows one step commands without difficulty   Comments pt agreeable to PT evaluation   RUE Assessment   RUE Assessment   (defer to OT eval for comments)   LUE Assessment   LUE Assessment   (defer to OT eval for comments)   RLE Assessment   RLE Assessment   (grossly 3+/5)   LLE Assessment   LLE Assessment   (grossly 3+/5)   Coordination   Movements are Fluid and Coordinated   (tremulous movements)   Sensation WFL   Bed Mobility   Supine to Sit 4  Minimal assistance   Additional items Assist x 1;HOB elevated; Bedrails; Increased time required;Verbal cues   Transfers   Sit to Stand 4  Minimal assistance   Additional items Assist x 1; Increased time required;Verbal cues   Stand to Sit 4  Minimal assistance   Additional items Assist x 1; Increased time required;Verbal cues   Ambulation/Elevation   Gait pattern Decreased foot clearance; Short stride   Gait Assistance 4  Minimal assist   Additional items Assist x 1;Verbal cues   Assistive Device Rolling walker   Distance 15'   Stair Management Assistance Not tested   Balance   Static Sitting Fair +   Dynamic Sitting Fair   Static Standing Fair   Dynamic Standing Fair -   Ambulatory Poor +   Endurance Deficit   Endurance Deficit Yes   Activity Tolerance   Activity Tolerance Patient limited by fatigue   Medical Staff Made Aware OT Ophelia Painter notified recommendation via TT   Nurse Made Aware SHERYL Lowe verbalized pt appropriate to see, made aware of session outcome/recs    Assessment   Prognosis Good   Problem List Decreased strength;Decreased endurance; Impaired balance;Decreased mobility; Decreased cognition;Decreased safety awareness;Pain   Assessment Pt is 81 y o  female seen for PT evaluation on 9/26/2020 s/p admit to [facility] on 9/24/2020 w/ Acute encephalopathy   Pt presents s/p AMS, being found on the floor by family  PT consulted to assess pt's functional mobility and d/c needs  Order placed for PT eval and tx, w/ up and OOB as tolerated order  Performed at least 2 patient identifiers during session: Name and wristband  Comorbidities affecting pt's physical performance at time of assessment include: tachy-gale syndrome status post pacemaker, AFib on Eliquis, history of CAD, noncompliance, status post CABG, history of diabetes  PTA, pt was independent w/ all functional mobility w/ walker in AM, then no AD in afternoon, ambulates unrestricted distances and all terrain, has 2 MOSES, lives w/ son in 2 level home / 1st floor set up, sponge bathes on 1st floor and retired  Personal factors affecting pt at time of IE include: inaccessible home environment, ambulating w/ assistive device, stairs to enter home, inability to navigate level surfaces w/o external assistance, unable to perform dynamic tasks in community, limited home support, positive fall history, decreased initiation and engagement and limited insight into impairments  Please find objective findings from PT assessment regarding body systems outlined above with impairments and limitations including weakness, impaired balance, decreased endurance, gait deviations, decreased activity tolerance, decreased safety awareness, fall risk and decreased cognition, as well as mobility assessment (need for min assist w/ all phases of mobility when usually ambulating independently and need for cueing for mobility technique)  The following objective measures performed on IE also reveal limitations: Barthel Index: 45/100 and Modified [place]: 4 (moderate/severe disability)  Pt's clinical presentation is currently unstable/unpredictable seen in pt's presentation of abnormal lab value(s), need for input for task focus and mobility technique and ongoing medical assessment   Pt to benefit from continued PT tx to address deficits as defined above and maximize level of functional independent mobility and consistency  From PT/mobility standpoint, recommendation at time of d/c would be STR pending progress in order to facilitate return to PLOF  Barriers to Discharge Decreased caregiver support; Inaccessible home environment   Goals   Patient Goals to return home, pt refuses STR   STG Expiration Date 10/06/20   Short Term Goal #1 In 7-10 days: Increase bilateral LE strength 1/2 grade to facilitate independent mobility, Perform all bed mobility tasks modified independent to decrease caregiver burden, Perform all transfers modified independent to improve independence, Ambulate > 150 ft  with least restrictive assistive device modified independent w/o LOB and w/ normalized gait pattern 100% of the time, Navigate 2 stairs modified independent with unilateral handrail to facilitate return to previous living environment, Increase all balance 1/2 grade to decrease risk for falls, Tolerate 4 hr OOB to faciliate upright tolerance, Improve Barthel Index score to 60 or greater to facilitate independence and PT provider will perform functional balance assessment to determine fall risk   PT Treatment Day 0   Plan   Treatment/Interventions Functional transfer training;LE strengthening/ROM; Elevations; Therapeutic exercise; Endurance training;Patient/family training;Equipment eval/education; Bed mobility;Gait training;Spoke to nursing;Spoke to case management;OT   PT Frequency   (3-5x/wk)   Recommendation   PT Discharge Recommendation Post-Acute Rehabilitation Services   Equipment Recommended Walker  (RW)   PT - OK to Discharge Yes  (when medically cleared if to STR)   Modified [place] Scale   Modified [place] Scale 4   Barthel Index   Feeding 5   Bathing 0   Grooming Score 0   Dressing Score 5   Bladder Score 10   Bowels Score 10   Toilet Use Score 5   Transfers (Bed/Chair) Score 10   Mobility (Level Surface) Score 0   Stairs Score 0   Barthel Index Score 45       Gabe Reed, PT

## 2020-09-26 NOTE — ASSESSMENT & PLAN NOTE
· Pt with difficulties swallowing noted by nursing staff this AM with breakfast  · Upon further discussion, pt reports that she has underlying dysphagia symptoms   · Obtain SPEECH consultation  · Downgrade patient to level 3 dental soft for now  · Monitor closely

## 2020-09-26 NOTE — ASSESSMENT & PLAN NOTE
· Pt presented with altered mental status and hallucinations, found on the floor by family but does not remember what happened   · Likely metabolic encephalopathy in setting of UTI/volume depletion    · Pt is hemodynamically stable without any leukocytosis or fevers   · CT head and CXR unremarkable   · UA (+) for blood, nitrites and leukocytes   · Continue IV ceftriaxone  · Blood cultures negative x 24 hours  · Urine culture results are pending   · Delirium precautions  · Pt's hallucinations have now resolved, appears to be at baseline mentation currently  · PT/OT recommending STR, however patient is adamantly declining, pt's son also refused during discussion yesterday   Would strongly recommend home with [facility]NA, CM to follow  · Supportive care

## 2020-09-26 NOTE — ASSESSMENT & PLAN NOTE
Lab Results   Component Value Date    HGBA1C 6 3 (H) 06/30/2020       Recent Labs     09/25/20  1526 09/25/20 2019 09/26/20  0625 09/26/20  1047   POCGLU 116 168* 110 133       Blood Sugar Average: Last 72 hrs:  · Appears well controlled as an outpatient   · Continue SSI coverage   · Hold metformin and Amaryl while inpatient   · QID glucose checks  · Consistent carb diet  · Monitor and adjust regimen as needed

## 2020-09-26 NOTE — PROGRESS NOTES
Progress Note - Julee Valladares 1935, 81 y o  female MRN: [id number]    Unit/Bed#: -01 Encounter: [id number]    Primary Care Provider: Annel Christian MD   Date and time admitted to hospital: 2020 12:34 PM      DOS: 2020    * Acute encephalopathy  Assessment & Plan  · Pt presented with altered mental status and hallucinations, found on the floor by family but does not remember what happened   · Likely metabolic encephalopathy in setting of UTI/volume depletion    · Pt is hemodynamically stable without any leukocytosis or fevers   · CT head and CXR unremarkable   · UA (+) for blood, nitrites and leukocytes   · Continue IV ceftriaxone  · Blood cultures negative x 24 hours  · Urine culture results are pending   · Delirium precautions  · Pt's hallucinations have now resolved, appears to be at baseline mentation currently  · PT/OT recommending STR, however patient is adamantly declining, pt's son also refused during discussion yesterday   Would strongly recommend home with [facility] CM to follow  · Supportive care    Dysphagia  Assessment & Plan  · Pt with difficulties swallowing noted by nursing staff this AM with breakfast  · Upon further discussion, pt reports that she has underlying dysphagia symptoms   · Obtain SPEECH consultation  · Downgrade patient to level 3 dental soft for now  · Monitor closely     UTI (urinary tract infection)  Assessment & Plan  · POA, UA (+) for leukocytes, blood and nitrites   · With associated altered mental status and hallucinations, now resolved after IV abx administration  · Continue IV ceftriaxone for now pending urine culture results and sensitivities   · Additional plan as above    DM (diabetes mellitus), type 2 [facility])  Assessment & Plan  Lab Results   Component Value Date    HGBA1C 6 3 (H) 2020       Recent Labs     20  1526 20  0625 20  1047   POCGLU 116 168* 110 133       Blood Sugar Average: Last 72 hrs:  · Appears well controlled as an outpatient   · Continue SSI coverage   · Hold metformin and Amaryl while inpatient   · QID glucose checks  · Consistent carb diet  · Monitor and adjust regimen as needed    CAD (coronary artery disease)  Assessment & Plan  · Hx of CABG  · Pt is asymptomatic, denies any chest pain or shortness of breath   · Continue ASA, statin and lopressor  · Monitor    Persistent atrial fibrillation (HCC)  Assessment & Plan  · HR appears controlled on Lopressor 100 mg BID  · Continue Eliquis 5 mg BID for anticoagulation   · Stable, monitor    Tachy-gale syndrome (HCC)  Assessment & Plan  · Pt with hx of tachy-gale syndrome s/p pacemaker placement   · Currently stable  · Continue follow up as outpatient       VTE Pharmacologic Prophylaxis:   Pharmacologic: Apixaban (Eliquis)  Mechanical VTE Prophylaxis in Place: No    Patient Centered Rounds: I have evaluated patient without nursing staff present due to Speaking to nurse outside patient's room    Discussions with Specialists or Other Care Team Provider:  Discussed with RN, sanjana and reviewed previous notes    Education and Discussions with Family / Patient:  Discussed with patient and patient's daughter Mariella Coyne bedside regarding plan of care    Time Spent for Care: 20 minutes  More than 50% of total time spent on counseling and coordination of care as described above  Current Length of Stay: 2 day(s)    Current Patient Status: Inpatient   Certification Statement: The patient will continue to require additional inpatient hospital stay due to Continued IV antibiotics, urine culture results, speech therapy evaluation    Discharge Plan:  Anticipate next 24 hours pending urine culture results, continued improvement in speech therapy evaluation  Patient is refusing short-term rehab, is agreeable to [address]    Code Status: Level 3 - DNAR and DNI      Subjective:   Patient reports that she is feeling better today    However reports that she had a episode dose morning when she had issues swallowing her breakfast   She reports that she coughed up her food but did not vomit  This was confirmed by nursing staff  Patient does report that she has baseline dysphagia at home but does not have any modified diet  She currently denies any chest pain, shortness of breath, abdominal pain, nausea or vomiting  Denies any hallucinations  Patient's daughter reports that she is at her baseline mentation  Patient does report that she has underlying depression but denies any SI/HI  She is not interested in following up with Psychiatry as an outpatient  Objective:     Vitals:   Temp (24hrs), Av 4 °F (36 3 °C), Min:97 3 °F (36 3 °C), Max:97 6 °F (36 4 °C)    Temp:  [97 3 °F (36 3 °C)-97 6 °F (36 4 °C)] 97 3 °F (36 3 °C)  HR:  [70-74] 74  Resp:  [12-18] 12  BP: (122-147)/(65-80) 122/75  SpO2:  [94 %-98 %] 98 %  Body mass index is 35 kg/m²  Input and Output Summary (last 24 hours): Intake/Output Summary (Last 24 hours) at 2020 1542  Last data filed at 2020 1154  Gross per 24 hour   Intake 240 ml   Output 2130 ml   Net -1890 ml       Physical Exam:     Physical Exam  Vitals signs reviewed  Constitutional:       General: She is not in acute distress  Appearance: She is not toxic-appearing  Comments: Patient is in no acute distress sitting in her hospital chair resting comfortably  Baseline head tremor   HENT:      Head: Normocephalic and atraumatic  Eyes:      Conjunctiva/sclera: Conjunctivae normal       Pupils: Pupils are equal, round, and reactive to light  Cardiovascular:      Rate and Rhythm: Normal rate and regular rhythm  Pulses: Normal pulses  Heart sounds: Normal heart sounds  Pulmonary:      Effort: Pulmonary effort is normal  No respiratory distress  Breath sounds: Normal breath sounds  No wheezing or rales  Abdominal:      General: Abdomen is flat  Bowel sounds are normal  There is no distension  Palpations: Abdomen is soft  Tenderness: There is no abdominal tenderness  There is no guarding  Musculoskeletal:         General: No swelling  Comments: Mild, trace nonpitting lower extremity edema, stable   Skin:     General: Skin is warm and dry  Findings: No erythema  Neurological:      Mental Status: She is alert  Psychiatric:         Mood and Affect: Mood normal       Comments: Patient denies any SI/HI         Additional Data:     Labs:    Results from last 7 days   Lab Units 09/26/20  0434 09/24/20  1330   WBC Thousand/uL 6 10 5 88   HEMOGLOBIN g/dL 10 9* 11 5   HEMATOCRIT % 35 6 37 1   PLATELETS Thousands/uL 228 245   NEUTROS PCT %  --  60   LYMPHS PCT %  --  25   MONOS PCT %  --  10   EOS PCT %  --  4     Results from last 7 days   Lab Units 09/26/20  0434 09/24/20  1330   POTASSIUM mmol/L 3 8 4 1   CHLORIDE mmol/L 103 103   CO2 mmol/L 28 30   BUN mg/dL 21 19   CREATININE mg/dL 0 93 0 86   CALCIUM mg/dL 9 0 9 2   ALK PHOS U/L  --  81   ALT U/L  --  30   AST U/L  --  28     Results from last 7 days   Lab Units 09/24/20  1330   INR  1 19       * I Have Reviewed All Lab Data Listed Above  * Additional Pertinent Lab Tests Reviewed: All Labs Within Last 24 Hours Reviewed    Imaging:    Imaging Reports Reviewed Today Include:  None  Imaging Personally Reviewed by Myself Includes:  None    Recent Cultures (last 7 days):     Results from last 7 days   Lab Units 09/24/20  1513 09/24/20  1402 09/24/20  1330   BLOOD CULTURE   --  No Growth at 24 hrs  No Growth at 24 hrs  URINE CULTURE  Culture results to follow    --   --        Last 24 Hours Medication List:   Current Facility-Administered Medications   Medication Dose Route Frequency Provider Last Rate    apixaban  5 mg Oral BID Joaquim Medina MD      aspirin  81 mg Oral Daily Ck NOVAK MD      baclofen  5 mg Oral BID Fortunato NOVAK MD      cefTRIAXone  1,000 mg Intravenous Q24H Joaquim Medina MD Stopped (09/26/20 1147)    docusate sodium  100 mg Oral Daily Mickie Woodall MD      furosemide  40 mg Oral BID Ck NOVAK MD      insulin lispro  1-5 Units Subcutaneous TID AC Mayda Sheth PA-C      insulin lispro  1-5 Units Subcutaneous HS Mayda Sheth PA-C      losartan  25 mg Oral Daily Ck NOVAK MD      metoprolol tartrate  100 mg Oral Q12H [address] Ck NOVAK MD      nystatin   Topical BID Mayda Sheth PA-C      pravastatin  20 mg Oral Daily Ck NOVAK MD      primidone  50 mg Oral Q12H [address] Ck Aranda MD      sertraline  25 mg Oral Daily Mickie Woodall MD          Today, Patient Was Seen By: Faustino Haro PA-C    ** Please Note: Dictation voice to text software may have been used in the creation of this document   **

## 2020-09-26 NOTE — PLAN OF CARE
Problem: Potential for Falls  Goal: Patient will remain free of falls  Description: INTERVENTIONS:  - Assess patient frequently for physical needs  -  Identify cognitive and physical deficits and behaviors that affect risk of falls  -  [place] fall precautions as indicated by assessment   - Educate patient/family on patient safety including physical limitations  - Instruct patient to call for assistance with activity based on assessment  - Modify environment to reduce risk of injury  - Consider OT/PT consult to assist with strengthening/mobility  Outcome: Progressing     Problem: Nutrition/Hydration-ADULT  Goal: Nutrient/Hydration intake appropriate for improving, restoring or maintaining nutritional needs  Description: Monitor and assess patient's nutrition/hydration status for malnutrition  Collaborate with interdisciplinary team and initiate plan and interventions as ordered  Monitor patient's weight and dietary intake as ordered or per policy  Utilize nutrition screening tool and intervene as necessary  Determine patient's food preferences      INTERVENTIONS:  - Monitor oral intake, urinary output, labs, and treatment plans  - Assess nutrition and hydration status and recommend course of action  - Evaluate amount of meals eaten  - Assist patient with eating if necessary   - Allow adequate time for meals  - Provide specific nutrition/hydration education as appropriate  - Include patient/family/caregiver in decisions related to nutrition  Outcome: Progressing     Problem: PAIN - ADULT  Goal: Verbalizes/displays adequate comfort level or baseline comfort level  Description: Interventions:  - Encourage patient to monitor pain and request assistance  - Assess pain using appropriate pain scale  - Administer analgesics based on type and severity of pain and evaluate response  - Implement non-pharmacological measures as appropriate and evaluate response  - Consider cultural and social influences on pain and pain management  - Notify physician/advanced practitioner if interventions unsuccessful or patient reports new pain  Outcome: Progressing     Problem: INFECTION - ADULT  Goal: Absence or prevention of progression during hospitalization  Description: INTERVENTIONS:  - Assess and monitor for signs and symptoms of infection  - Monitor lab/diagnostic results  - Monitor all insertion sites, i e  indwelling lines, tubes, and drains  - Monitor endotracheal if appropriate and nasal secretions for changes in amount and color  - [place] appropriate cooling/warming therapies per order  - Administer medications as ordered  - Instruct and encourage patient and family to use good hand hygiene technique  - Identify and instruct in appropriate isolation precautions for identified infection/condition  Outcome: Progressing  Goal: Absence of fever/infection during neutropenic period  Description: INTERVENTIONS:  - Monitor WBC    Outcome: Progressing     Problem: SAFETY ADULT  Goal: Patient will remain free of falls  Description: INTERVENTIONS:  - Assess patient frequently for physical needs  -  Identify cognitive and physical deficits and behaviors that affect risk of falls    -  [place] fall precautions as indicated by assessment   - Educate patient/family on patient safety including physical limitations  - Instruct patient to call for assistance with activity based on assessment  - Modify environment to reduce risk of injury  - Consider OT/PT consult to assist with strengthening/mobility  Outcome: Progressing  Goal: Maintain or return to baseline ADL function  Description: INTERVENTIONS:  -  Assess patient's ability to carry out ADLs; assess patient's baseline for ADL function and identify physical deficits which impact ability to perform ADLs (bathing, care of mouth/teeth, toileting, grooming, dressing, etc )  - Assess/evaluate cause of self-care deficits   - Assess range of motion  - Assess patient's mobility; develop plan if impaired  - Assess patient's need for assistive devices and provide as appropriate  - Encourage maximum independence but intervene and supervise when necessary  - Involve family in performance of ADLs  - Assess for home care needs following discharge   - Consider OT consult to assist with ADL evaluation and planning for discharge  - Provide patient education as appropriate  Outcome: Progressing  Goal: Maintain or return mobility status to optimal level  Description: INTERVENTIONS:  - Assess patient's baseline mobility status (ambulation, transfers, stairs, etc )    - Identify cognitive and physical deficits and behaviors that affect mobility  - Identify mobility aids required to assist with transfers and/or ambulation (gait belt, sit-to-stand, lift, walker, cane, etc )  - [place] fall precautions as indicated by assessment  - Record patient progress and toleration of activity level on Mobility SBAR; progress patient to next Phase/Stage  - Instruct patient to call for assistance with activity based on assessment  - Consider rehabilitation consult to assist with strengthening/weightbearing, etc   Outcome: Progressing     Problem: DISCHARGE PLANNING  Goal: Discharge to home or other facility with appropriate resources  Description: INTERVENTIONS:  - Identify barriers to discharge w/patient and caregiver  - Arrange for needed discharge resources and transportation as appropriate  - Identify discharge learning needs (meds, wound care, etc )  - Arrange for interpretive services to assist at discharge as needed  - Refer to Case Management Department for coordinating discharge planning if the patient needs post-hospital services based on physician/advanced practitioner order or complex needs related to functional status, cognitive ability, or social support system  Outcome: Progressing     Problem: Knowledge Deficit  Goal: Patient/family/caregiver demonstrates understanding of disease process, treatment plan, medications, and discharge instructions  Description: Complete learning assessment and assess knowledge base    Interventions:  - Provide teaching at level of understanding  - Provide teaching via preferred learning methods  Outcome: Progressing     Problem: Prexisting or High Potential for Compromised Skin Integrity  Goal: Skin integrity is maintained or improved  Description: INTERVENTIONS:  - Identify patients at risk for skin breakdown  - Assess and monitor skin integrity  - Assess and monitor nutrition and hydration status  - Monitor labs   - Assess for incontinence   - Turn and reposition patient  - Assist with mobility/ambulation  - Relieve pressure over bony prominences  - Avoid friction and shearing  - Provide appropriate hygiene as needed including keeping skin clean and dry  - Evaluate need for skin moisturizer/barrier cream  - Collaborate with interdisciplinary team   - Patient/family teaching  - Consider wound care consult   Outcome: Progressing

## 2020-09-27 VITALS
DIASTOLIC BLOOD PRESSURE: 73 MMHG | HEIGHT: 60 IN | WEIGHT: 179.23 LBS | OXYGEN SATURATION: 98 % | BODY MASS INDEX: 35.19 KG/M2 | SYSTOLIC BLOOD PRESSURE: 145 MMHG | TEMPERATURE: 97.3 F | RESPIRATION RATE: 16 BRPM | HEART RATE: 81 BPM

## 2020-09-27 LAB
BACTERIA UR CULT: ABNORMAL
BACTERIA UR CULT: ABNORMAL
GLUCOSE SERPL-MCNC: 115 MG/DL (ref 65–140)
GLUCOSE SERPL-MCNC: 169 MG/DL (ref 65–140)

## 2020-09-27 PROCEDURE — 92610 EVALUATE SWALLOWING FUNCTION: CPT

## 2020-09-27 PROCEDURE — 99239 HOSP IP/OBS DSCHRG MGMT >30: CPT | Performed by: PHYSICIAN ASSISTANT

## 2020-09-27 PROCEDURE — 82948 REAGENT STRIP/BLOOD GLUCOSE: CPT

## 2020-09-27 RX ORDER — NYSTATIN 100000 [USP'U]/G
POWDER TOPICAL 2 TIMES DAILY
Qty: 15 G | Refills: 0 | Status: SHIPPED | OUTPATIENT
Start: 2020-09-27 | End: 2021-08-31 | Stop reason: HOSPADM

## 2020-09-27 RX ORDER — CEPHALEXIN 500 MG/1
500 CAPSULE ORAL EVERY 12 HOURS SCHEDULED
Qty: 6 CAPSULE | Refills: 0 | Status: SHIPPED | OUTPATIENT
Start: 2020-09-28 | End: 2020-10-01

## 2020-09-27 RX ADMIN — INSULIN LISPRO 1 UNITS: 100 INJECTION, SOLUTION INTRAVENOUS; SUBCUTANEOUS at 12:05

## 2020-09-27 RX ADMIN — PRAVASTATIN SODIUM 20 MG: 20 TABLET ORAL at 09:49

## 2020-09-27 RX ADMIN — NYSTATIN: 100000 POWDER TOPICAL at 09:49

## 2020-09-27 RX ADMIN — LOSARTAN POTASSIUM 25 MG: 25 TABLET, FILM COATED ORAL at 09:49

## 2020-09-27 RX ADMIN — PRIMIDONE 50 MG: 50 TABLET ORAL at 09:49

## 2020-09-27 RX ADMIN — METOPROLOL TARTRATE 100 MG: 50 TABLET, FILM COATED ORAL at 09:49

## 2020-09-27 RX ADMIN — ASPIRIN 81 MG: 81 TABLET, CHEWABLE ORAL at 09:49

## 2020-09-27 RX ADMIN — BACLOFEN 5 MG: 10 TABLET ORAL at 09:49

## 2020-09-27 RX ADMIN — FUROSEMIDE 40 MG: 40 TABLET ORAL at 09:49

## 2020-09-27 RX ADMIN — CEFTRIAXONE SODIUM 1000 MG: 10 INJECTION, POWDER, FOR SOLUTION INTRAVENOUS at 09:48

## 2020-09-27 RX ADMIN — APIXABAN 5 MG: 5 TABLET, FILM COATED ORAL at 09:49

## 2020-09-27 RX ADMIN — DOCUSATE SODIUM 100 MG: 100 CAPSULE, LIQUID FILLED ORAL at 09:49

## 2020-09-27 RX ADMIN — SERTRALINE HYDROCHLORIDE 25 MG: 25 TABLET, FILM COATED ORAL at 09:49

## 2020-09-27 NOTE — SPEECH THERAPY NOTE
Speech Language/Pathology  Speech-Language Pathology Bedside Swallow Evaluation        Patient Name: Delois Collet    AKKZV'R Date: 9/27/2020     Problem List  Principal Problem:    Acute encephalopathy  Active Problems:    Tachy-gale syndrome (HCC)    Persistent atrial fibrillation (HCC)    CAD (coronary artery disease)    DM (diabetes mellitus), type 2 ([address] )    UTI (urinary tract infection)    Dysphagia         Past Medical History  History reviewed  No pertinent past medical history  Past Surgical History  History reviewed  No pertinent surgical history  Summary    Pt presents with swallowing skills grossly WFL on the materials assessed in today's evaluation  However pt  Reports an underlying "swallowing problem" but she could not be specific and no documentation (speech notes/VBS) was found in Epic  Recommendations:   Diet: soft/level 3 diet and thin liquids (only because pt 's lower dentures are at her home)  Meds: whole with liquid, whole with puree and crushed with puree   Frequent Oral care: 4x/day  Aspiration precautions and compensatory swallowing strategies: upright posture and small bites/sips  Other Recommendations/ considerations: none       Current Medical Status  Pt is a 81 y o  female who presented to [facility] with history of atrial fibrillation on Eliquis, tachy-gale syndrome with pacemaker in place, coronary artery disease, type 2 diabetes, hypertension presenting via EMS for evaluation of altered mental status  Patient has been having visual hallucinations for the past 2 days  She is reportedly seeing people that are not there including seeing "cute boys" in the room with her  Patient also complains of dysuria and feeling fatigued  She denies any other neurologic symptoms including headaches, visual changes, paresthesias, dysarthria  No recent falls  She does admit to decreased PO intake  Lajean Javed     Past medical history:   Please see H&P for details    Special Studies:  9/24/20: CT head wo contrast: 1  No acute intracranial abnormality  Microangiopathic changes    2   Left mastoid fluid  Social/Education/Vocational Hx:  Pt lives with family    Swallow Information   Current Risks for Dysphagia & Aspiration: known history of dysphagia  Current Symptoms/Concerns: none  Current Diet: soft/level 3 diet and thin liquids   Baseline Diet: regular diet and thin liquids    Baseline Assessment   Behavior/Cognition: alert  Speech/Language Status: able to participate in basic conversation and able to follow commands  Patient Positioning: upright in bed     Swallow Mechanism Exam   Facial: symmetrical  Labial: WFL  Lingual: WFL  Velum: symmetrical  Mandible: adequate ROM  Dentition: upper dentures and lower dentures (only has upper dentures with her)  Vocal quality:clear/adequate   Volitional Cough: strong/productive   Respiratory: 2L NC    Consistencies Assessed and Performance   Consistencies Administered: thin liquids, puree, soft solids and hard solids  Pt  See with breakfast tray, which was bowl of cream of wheat and a fruit cup  She stated she doesn't know why she did not get more to eat  Pt  Also trialed with a hard cookie  Pt  Reports having 'swallowing difficulties" but denies altering her foods or liquids at home  She could not specifically relay what the difficulties are  She denies problems taking her pills with liquids, denies trouble chewing when her dentures are in, and denies food getting "stuck"  She also denied coughing while eating or drinking at home  Oral Stage: WFL  Orientation, mastication, bolus formation, and transfer were adequate and timely with materials trialed today  No significant oral residue was noted  No pocketing was noted  No overt s/s of reduced oral control  Pharyngeal Stage: [facility]  Swallow initiation appeared prompt  Laryngeal rise was palpated and judged to be within functional limits   No coughing, throat clearing, change in vocal quality, or respiratory status noted with today's materials      Esophageal Concerns: none reported      Results Reviewed with: patient   Dysphagia Goals: none at this time  Discharge recommendation: likely no follow up needed for swallowing    Speech Therapy Prognosis   Prognosis: good    Prognosis Considerations: age and prior medical history    Masha Martínez MS CCC-SLP  Speech Language Pathologist  Available via [address] [place] [address] License # [id number]  [address] # [id number]- [id number]

## 2020-09-27 NOTE — ASSESSMENT & PLAN NOTE
· Pt with difficulties swallowing noted by nursing staff on 9/25  · Pt reports having baseline dysphagia symptoms   · SPEECH evaluated, recommending dental soft diet, thin liquids, no additional recommendations at this time   · Pt doing better on dysphagia diet, encourage to continue while at home

## 2020-09-27 NOTE — PLAN OF CARE
Problem: Potential for Falls  Goal: Patient will remain free of falls  Description: INTERVENTIONS:  - Assess patient frequently for physical needs  -  Identify cognitive and physical deficits and behaviors that affect risk of falls  -  [place] fall precautions as indicated by assessment   - Educate patient/family on patient safety including physical limitations  - Instruct patient to call for assistance with activity based on assessment  - Modify environment to reduce risk of injury  - Consider OT/PT consult to assist with strengthening/mobility  Outcome: Progressing     Problem: Nutrition/Hydration-ADULT  Goal: Nutrient/Hydration intake appropriate for improving, restoring or maintaining nutritional needs  Description: Monitor and assess patient's nutrition/hydration status for malnutrition  Collaborate with interdisciplinary team and initiate plan and interventions as ordered  Monitor patient's weight and dietary intake as ordered or per policy  Utilize nutrition screening tool and intervene as necessary  Determine patient's food preferences      INTERVENTIONS:  - Monitor oral intake, urinary output, labs, and treatment plans  - Assess nutrition and hydration status and recommend course of action  - Evaluate amount of meals eaten  - Assist patient with eating if necessary   - Allow adequate time for meals  - Provide specific nutrition/hydration education as appropriate  - Include patient/family/caregiver in decisions related to nutrition  Outcome: Progressing     Problem: PAIN - ADULT  Goal: Verbalizes/displays adequate comfort level or baseline comfort level  Description: Interventions:  - Encourage patient to monitor pain and request assistance  - Assess pain using appropriate pain scale  - Administer analgesics based on type and severity of pain and evaluate response  - Implement non-pharmacological measures as appropriate and evaluate response  - Consider cultural and social influences on pain and pain management  - Notify physician/advanced practitioner if interventions unsuccessful or patient reports new pain  Outcome: Progressing     Problem: INFECTION - ADULT  Goal: Absence or prevention of progression during hospitalization  Description: INTERVENTIONS:  - Assess and monitor for signs and symptoms of infection  - Monitor lab/diagnostic results  - Monitor all insertion sites, i e  indwelling lines, tubes, and drains  - Monitor endotracheal if appropriate and nasal secretions for changes in amount and color  - [place] appropriate cooling/warming therapies per order  - Administer medications as ordered  - Instruct and encourage patient and family to use good hand hygiene technique  - Identify and instruct in appropriate isolation precautions for identified infection/condition  Outcome: Progressing  Goal: Absence of fever/infection during neutropenic period  Description: INTERVENTIONS:  - Monitor WBC    Outcome: Progressing     Problem: SAFETY ADULT  Goal: Patient will remain free of falls  Description: INTERVENTIONS:  - Assess patient frequently for physical needs  -  Identify cognitive and physical deficits and behaviors that affect risk of falls    -  [place] fall precautions as indicated by assessment   - Educate patient/family on patient safety including physical limitations  - Instruct patient to call for assistance with activity based on assessment  - Modify environment to reduce risk of injury  - Consider OT/PT consult to assist with strengthening/mobility  Outcome: Progressing  Goal: Maintain or return to baseline ADL function  Description: INTERVENTIONS:  -  Assess patient's ability to carry out ADLs; assess patient's baseline for ADL function and identify physical deficits which impact ability to perform ADLs (bathing, care of mouth/teeth, toileting, grooming, dressing, etc )  - Assess/evaluate cause of self-care deficits   - Assess range of motion  - Assess patient's mobility; develop plan if impaired  - Assess patient's need for assistive devices and provide as appropriate  - Encourage maximum independence but intervene and supervise when necessary  - Involve family in performance of ADLs  - Assess for home care needs following discharge   - Consider OT consult to assist with ADL evaluation and planning for discharge  - Provide patient education as appropriate  Outcome: Progressing  Goal: Maintain or return mobility status to optimal level  Description: INTERVENTIONS:  - Assess patient's baseline mobility status (ambulation, transfers, stairs, etc )    - Identify cognitive and physical deficits and behaviors that affect mobility  - Identify mobility aids required to assist with transfers and/or ambulation (gait belt, sit-to-stand, lift, walker, cane, etc )  - [place] fall precautions as indicated by assessment  - Record patient progress and toleration of activity level on Mobility SBAR; progress patient to next Phase/Stage  - Instruct patient to call for assistance with activity based on assessment  - Consider rehabilitation consult to assist with strengthening/weightbearing, etc   Outcome: Progressing     Problem: DISCHARGE PLANNING  Goal: Discharge to home or other facility with appropriate resources  Description: INTERVENTIONS:  - Identify barriers to discharge w/patient and caregiver  - Arrange for needed discharge resources and transportation as appropriate  - Identify discharge learning needs (meds, wound care, etc )  - Arrange for interpretive services to assist at discharge as needed  - Refer to Case Management Department for coordinating discharge planning if the patient needs post-hospital services based on physician/advanced practitioner order or complex needs related to functional status, cognitive ability, or social support system  Outcome: Progressing     Problem: Knowledge Deficit  Goal: Patient/family/caregiver demonstrates understanding of disease process, treatment plan, medications, and discharge instructions  Description: Complete learning assessment and assess knowledge base    Interventions:  - Provide teaching at level of understanding  - Provide teaching via preferred learning methods  Outcome: Progressing     Problem: Prexisting or High Potential for Compromised Skin Integrity  Goal: Skin integrity is maintained or improved  Description: INTERVENTIONS:  - Identify patients at risk for skin breakdown  - Assess and monitor skin integrity  - Assess and monitor nutrition and hydration status  - Monitor labs   - Assess for incontinence   - Turn and reposition patient  - Assist with mobility/ambulation  - Relieve pressure over bony prominences  - Avoid friction and shearing  - Provide appropriate hygiene as needed including keeping skin clean and dry  - Evaluate need for skin moisturizer/barrier cream  - Collaborate with interdisciplinary team   - Patient/family teaching  - Consider wound care consult   Outcome: Progressing

## 2020-09-27 NOTE — ASSESSMENT & PLAN NOTE
Lab Results   Component Value Date    HGBA1C 6 3 (H) 06/30/2020       Recent Labs     09/26/20  1635 09/26/20  2046 09/27/20  0622 09/27/20  1053   POCGLU 119 117 115 169*       Blood Sugar Average: Last 72 hrs:  · Appears well controlled as an outpatient   · Continue SSI coverage   · Hold metformin and Amaryl while inpatient, resume on discharge   · QID glucose checks  · Consistent carb diet  · Monitor and adjust regimen as needed

## 2020-09-27 NOTE — ASSESSMENT & PLAN NOTE
· POA, UA (+) for leukocytes, blood and nitrites   · With associated altered mental status and hallucinations, now resolved after IV abx administration  · Completed 4 days of IV ceftriaxone in the hospital  · Urine culture with > 100,000 gram negative max enteric like, likely e   Coli   · Transition to PO Keflex to complete 7 day course, follow up on final sensitivities for any adjustments  · Additional plan as above

## 2020-09-27 NOTE — CASE MANAGEMENT
Per NP both pt and son (caretaker) are refusing STR  Cm met w pt who is refusing STR  Agreeable to [facility] for PT and RN  [facility]  Referrals sent  Cm attempted to call son Ryder, but received VM  Cm spoke w pts daughter and Dioni Colonshelby  She agreed they did not want STR, but did want [facility]  She will relay information to [address] [place] [address] provided to pt

## 2020-09-27 NOTE — ASSESSMENT & PLAN NOTE
· Pt presented with altered mental status and hallucinations, found on the floor by family but does not remember what happened, resolved pt is at baseline mental status, no additional hallucinations for > 48 hours  · Likely metabolic encephalopathy in setting of UTI/volume depletion    · Pt is hemodynamically stable without any leukocytosis or fevers   · CT head and CXR unremarkable   · UA (+) for blood, nitrites and leukocytes  · Urine culture (+) > 100,000 gram negative max enteric like, likely e  Coli   · Will send patient on PO Keflex 500 mg Q12H to complete total 7 day course, pt does not have history of MDRO  Will follow up on urine culture results and make any adjustments if needed based on sensitivities   · Completed 4 days of IV ceftriaxone in the hospital  · Blood cultures negative x 48 hours  · Delirium precautions  · PT/OT recommending STR, however patient and family adamantly declining   Would strongly recommend home with [facility], CM following, pt and family in agreement to [facility]  · Supportive care

## 2020-09-27 NOTE — PLAN OF CARE
Problem: Potential for Falls  Goal: Patient will remain free of falls  Description: INTERVENTIONS:  - Assess patient frequently for physical needs  -  Identify cognitive and physical deficits and behaviors that affect risk of falls  -  [place] fall precautions as indicated by assessment   - Educate patient/family on patient safety including physical limitations  - Instruct patient to call for assistance with activity based on assessment  - Modify environment to reduce risk of injury  - Consider OT/PT consult to assist with strengthening/mobility  Outcome: Progressing     Problem: Nutrition/Hydration-ADULT  Goal: Nutrient/Hydration intake appropriate for improving, restoring or maintaining nutritional needs  Description: Monitor and assess patient's nutrition/hydration status for malnutrition  Collaborate with interdisciplinary team and initiate plan and interventions as ordered  Monitor patient's weight and dietary intake as ordered or per policy  Utilize nutrition screening tool and intervene as necessary  Determine patient's food preferences      INTERVENTIONS:  - Monitor oral intake, urinary output, labs, and treatment plans  - Assess nutrition and hydration status and recommend course of action  - Evaluate amount of meals eaten  - Assist patient with eating if necessary   - Allow adequate time for meals  - Provide specific nutrition/hydration education as appropriate  - Include patient/family/caregiver in decisions related to nutrition  Outcome: Progressing     Problem: PAIN - ADULT  Goal: Verbalizes/displays adequate comfort level or baseline comfort level  Description: Interventions:  - Encourage patient to monitor pain and request assistance  - Assess pain using appropriate pain scale  - Administer analgesics based on type and severity of pain and evaluate response  - Implement non-pharmacological measures as appropriate and evaluate response  - Consider cultural and social influences on pain and pain management  - Notify physician/advanced practitioner if interventions unsuccessful or patient reports new pain  Outcome: Progressing     Problem: INFECTION - ADULT  Goal: Absence or prevention of progression during hospitalization  Description: INTERVENTIONS:  - Assess and monitor for signs and symptoms of infection  - Monitor lab/diagnostic results  - Monitor all insertion sites, i e  indwelling lines, tubes, and drains  - Monitor endotracheal if appropriate and nasal secretions for changes in amount and color  - [place] appropriate cooling/warming therapies per order  - Administer medications as ordered  - Instruct and encourage patient and family to use good hand hygiene technique  - Identify and instruct in appropriate isolation precautions for identified infection/condition  Outcome: Progressing  Goal: Absence of fever/infection during neutropenic period  Description: INTERVENTIONS:  - Monitor WBC    Outcome: Progressing     Problem: SAFETY ADULT  Goal: Patient will remain free of falls  Description: INTERVENTIONS:  - Assess patient frequently for physical needs  -  Identify cognitive and physical deficits and behaviors that affect risk of falls    -  [place] fall precautions as indicated by assessment   - Educate patient/family on patient safety including physical limitations  - Instruct patient to call for assistance with activity based on assessment  - Modify environment to reduce risk of injury  - Consider OT/PT consult to assist with strengthening/mobility  Outcome: Progressing  Goal: Maintain or return to baseline ADL function  Description: INTERVENTIONS:  -  Assess patient's ability to carry out ADLs; assess patient's baseline for ADL function and identify physical deficits which impact ability to perform ADLs (bathing, care of mouth/teeth, toileting, grooming, dressing, etc )  - Assess/evaluate cause of self-care deficits   - Assess range of motion  - Assess patient's mobility; develop plan if impaired  - Assess patient's need for assistive devices and provide as appropriate  - Encourage maximum independence but intervene and supervise when necessary  - Involve family in performance of ADLs  - Assess for home care needs following discharge   - Consider OT consult to assist with ADL evaluation and planning for discharge  - Provide patient education as appropriate  Outcome: Progressing  Goal: Maintain or return mobility status to optimal level  Description: INTERVENTIONS:  - Assess patient's baseline mobility status (ambulation, transfers, stairs, etc )    - Identify cognitive and physical deficits and behaviors that affect mobility  - Identify mobility aids required to assist with transfers and/or ambulation (gait belt, sit-to-stand, lift, walker, cane, etc )  - [place] fall precautions as indicated by assessment  - Record patient progress and toleration of activity level on Mobility SBAR; progress patient to next Phase/Stage  - Instruct patient to call for assistance with activity based on assessment  - Consider rehabilitation consult to assist with strengthening/weightbearing, etc   Outcome: Progressing     Problem: DISCHARGE PLANNING  Goal: Discharge to home or other facility with appropriate resources  Description: INTERVENTIONS:  - Identify barriers to discharge w/patient and caregiver  - Arrange for needed discharge resources and transportation as appropriate  - Identify discharge learning needs (meds, wound care, etc )  - Arrange for interpretive services to assist at discharge as needed  - Refer to Case Management Department for coordinating discharge planning if the patient needs post-hospital services based on physician/advanced practitioner order or complex needs related to functional status, cognitive ability, or social support system  Outcome: Progressing     Problem: Knowledge Deficit  Goal: Patient/family/caregiver demonstrates understanding of disease process, treatment plan, medications, and discharge instructions  Description: Complete learning assessment and assess knowledge base    Interventions:  - Provide teaching at level of understanding  - Provide teaching via preferred learning methods  Outcome: Progressing     Problem: Prexisting or High Potential for Compromised Skin Integrity  Goal: Skin integrity is maintained or improved  Description: INTERVENTIONS:  - Identify patients at risk for skin breakdown  - Assess and monitor skin integrity  - Assess and monitor nutrition and hydration status  - Monitor labs   - Assess for incontinence   - Turn and reposition patient  - Assist with mobility/ambulation  - Relieve pressure over bony prominences  - Avoid friction and shearing  - Provide appropriate hygiene as needed including keeping skin clean and dry  - Evaluate need for skin moisturizer/barrier cream  - Collaborate with interdisciplinary team   - Patient/family teaching  - Consider wound care consult   Outcome: Progressing

## 2020-09-27 NOTE — DISCHARGE SUMMARY
Discharge- Kristina Blancas 1935, 81 y o  female MRN: [id number]    Unit/Bed#: -01 Encounter: [id number]    Primary Care Provider: Asha Manzo MD   Date and time admitted to hospital: 9/24/2020 12:34 PM      DOS: 9/27/2020    * Acute encephalopathy  Assessment & Plan  · Pt presented with altered mental status and hallucinations, found on the floor by family but does not remember what happened, resolved pt is at baseline mental status, no additional hallucinations for > 48 hours  · Likely metabolic encephalopathy in setting of UTI/volume depletion    · Pt is hemodynamically stable without any leukocytosis or fevers   · CT head and CXR unremarkable   · UA (+) for blood, nitrites and leukocytes  · Urine culture (+) > 100,000 gram negative max enteric like, likely e  Coli   · Will send patient on PO Keflex 500 mg Q12H to complete total 7 day course, pt does not have history of MDRO  Will follow up on urine culture results and make any adjustments if needed based on sensitivities   · Completed 4 days of IV ceftriaxone in the hospital  · Blood cultures negative x 48 hours  · Delirium precautions  · PT/OT recommending STR, however patient and family adamantly declining   Would strongly recommend home with VNA, CM following, pt and family in agreement to [facility]  · Supportive care    Dysphagia  Assessment & Plan  · Pt with difficulties swallowing noted by nursing staff on 9/25  · Pt reports having baseline dysphagia symptoms   · SPEECH evaluated, recommending dental soft diet, thin liquids, no additional recommendations at this time   · Pt doing better on dysphagia diet, encourage to continue while at home    UTI (urinary tract infection)  Assessment & Plan  · POA, UA (+) for leukocytes, blood and nitrites   · With associated altered mental status and hallucinations, now resolved after IV abx administration  · Completed 4 days of IV ceftriaxone in the hospital  · Urine culture with > 100,000 gram negative max enteric like, likely e  Coli   · Transition to PO Keflex to complete 7 day course, follow up on final sensitivities for any adjustments  · Additional plan as above    DM (diabetes mellitus), type 2 [facility])  Assessment & Plan  Lab Results   Component Value Date    HGBA1C 6 3 (H) 06/30/2020       Recent Labs     09/26/20  1635 09/26/20  2046 09/27/20  0622 09/27/20  1053   POCGLU 119 117 115 169*       Blood Sugar Average: Last 72 hrs:  · Appears well controlled as an outpatient   · Continue SSI coverage   · Hold metformin and Amaryl while inpatient, resume on discharge   · QID glucose checks  · Consistent carb diet  · Monitor and adjust regimen as needed    CAD (coronary artery disease)  Assessment & Plan  · Hx of CABG  · Pt is asymptomatic, denies any chest pain or shortness of breath   · Continue ASA, statin and lopressor  · Monitor    Persistent atrial fibrillation (HCC)  Assessment & Plan  · HR appears controlled on Lopressor 100 mg BID  · Continue Eliquis 5 mg BID for anticoagulation   · Stable, monitor    Tachy-gale syndrome ([address] )  Assessment & Plan  · Pt with hx of tachy-gale syndrome s/p pacemaker placement   · Currently stable  · Continue follow up as outpatient       Discharging Physician / Practitioner: Laura Vaughan PA-C  PCP: Mary Santiago MD  Admission Date:   Admission Orders (From admission, onward)     Ordered        09/24/20 1616  Inpatient Admission  Once                   Discharge Date: 09/27/20    Resolved Problems  Date Reviewed: 9/27/2020    None          Consultations During Hospital Stay:  · None    Procedures Performed:   · CXR 9/24 - No acute cardiopulmonary disease   · CT head 9/24 - No acute intracranial abnormality  Microangiopathic changes  Left mastoid fluid       Significant Findings / Test Results:   · Troponin negative   · Lactic acid negative   · Acetaminophen and salicylate levels negative   · TSH WNL   · UA (+) blood, nitrites, leukocytes and innumerable bacteria   · Blood cultures negative x 48 hours   · Urine culture (+) for > 100,000 gram negative max enteric like     Incidental Findings:   · None     Test Results Pending at Discharge (will require follow up): · Final urine culture results and sensitivities   · Final blood cultures      Outpatient Tests Requested:  · Per PCP     Complications:  None    Reason for Admission: Altered mental status/hallucinations and dysuria    Hospital Course:     Omayra Escalante is a 81 y o  female patient with significant past medical history of diabetes mellitus, CAD, atrial fibrillation, tachy-gale syndrome who originally presented to the hospital on 9/24/2020 due to altered mental status and hallucinations  Patient was brought in by family members as patient was having visual hallucination and was found by family member on the floor  She did not recall the events that took place prior to that  Urinalysis was obtained that was positive for likely UTI  Therefore she was placed on IV ceftriaxone with significant improvement of symptoms  Patient was evaluated by PT/OT and recommended short-term rehab, however patient and family were declining  She will be discharged with VNA services  There was concern by family members that the patient is depressed and feels as though she has given up and does not participate much at home  She denies any suicidal or homicidal ideations  She is declining any psychiatric therapy as an outpatient  Patient's hallucinations resolved and dysuria significantly improved  Patient remained hemodynamically stable throughout hospitalization  She was transition to p o  Keflex on discharge  Patient was discharged in stable condition  For additional information please refer to medical records  Medication changes include: Addition of Keflex 500 mg Q 12 hour to complete 7 day course of treatment, nystatin powder for intertrigo      Please see above list of diagnoses and related plan for additional information  Condition at Discharge: stable     Discharge Day Visit / Exam:     Subjective:  Pt reports that she feels well again today  Denies any additional hallucinations, is anxious to go home  Is now agreeable to VNA services  She reports that the dysuria continues to improve  Denies any chest pain, shortness of breath, nausea, vomiting or abdominal pain  Is maintained on 2 L NC at baseline at home  Vitals: Blood Pressure: 145/73 (09/27/20 0736)  Pulse: 81 (09/27/20 0736)  Temperature: (!) 97 3 °F (36 3 °C) (09/27/20 0736)  Temp Source: Oral (09/26/20 1506)  Respirations: 16 (09/27/20 0736)  Height: 5' (152 4 cm) (09/25/20 1458)  Weight - Scale: 81 3 kg (179 lb 3 7 oz) (09/25/20 1454)  SpO2: 98 % (09/27/20 0736)  Exam:   Physical Exam  Vitals signs reviewed  Constitutional:       General: She is not in acute distress  Appearance: She is not toxic-appearing  Comments: Pt is in no acute distress sitting in her hospital chair resting comfortably  Baseline head tremor  HENT:      Head: Normocephalic and atraumatic  Eyes:      Conjunctiva/sclera: Conjunctivae normal       Pupils: Pupils are equal, round, and reactive to light  Cardiovascular:      Rate and Rhythm: Normal rate and regular rhythm  Pulses: Normal pulses  Heart sounds: Normal heart sounds  Pulmonary:      Effort: Pulmonary effort is normal  No respiratory distress  Breath sounds: Normal breath sounds  No wheezing or rales  Abdominal:      General: Abdomen is flat  Bowel sounds are normal  There is no distension  Palpations: Abdomen is soft  Tenderness: There is no abdominal tenderness  There is no guarding  Musculoskeletal:      Right lower leg: Edema present  Left lower leg: Edema present  Comments: Trace lower extremity edema bilaterally, non-pitting   Skin:     General: Skin is warm and dry  Findings: No erythema  Neurological:      Mental Status: She is alert     Psychiatric:         Mood and Affect: Mood normal        Discussion with Family: Discussed with patient and patient's daughter Delmi Mendoza over the phone regarding plan of care  Advised follow-up with PCP within 1 week  Discussed addition of antibiotics to complete as an outpatient  Also advised [facility] services on discharge  Patient is in agreement to plan and verbalized understanding  Discharge instructions/Information to patient and family:   See after visit summary for information provided to patient and family  Provisions for Follow-Up Care:  See after visit summary for information related to follow-up care and any pertinent home health orders  Disposition:     Home with VNA Services (Reminder: Complete face to face encounter)    For Discharges to [facility] SNF:   · Not Applicable to this Patient - Not Applicable to this Patient    Planned Readmission:  None     Discharge Statement:  I spent 40 minutes discharging the patient  This time was spent on the day of discharge  I had direct contact with the patient on the day of discharge  Greater than 50% of the total time was spent examining patient, answering all patient questions, arranging and discussing plan of care with patient as well as directly providing post-discharge instructions  Additional time then spent on discharge activities  Discharge Medications:  See after visit summary for reconciled discharge medications provided to patient and family        ** Please Note: This note has been constructed using a voice recognition system **

## 2020-09-27 NOTE — DISCHARGE INSTRUCTIONS
Please follow up with your primary care provider within one week   Continue antibiotics until completion   If you begin to have any worsening burning with urination or confusion please come back to the hospital for further evaluation  Urinary Tract Infection in Women, Ambulatory Care   GENERAL INFORMATION:   A urinary tract infection (UTI)  is caused by bacteria that get inside your urinary tract  Most bacteria that enter your urinary tract are expelled when you urinate  If the bacteria stay in your urinary tract, you may get an infection  Your urinary tract includes your kidneys, ureters, bladder, and urethra  Urine is made in your kidneys, and it flows from the ureters to the bladder  Urine leaves the bladder through the urethra  A UTI is more common in your lower urinary tract, which includes your bladder and urethra  Common symptoms include the following:   · Urinating more often or waking from sleep to urinate    · Pain or burning when you urinate    · Pain or pressure in your lower abdomen     · Urine that smells bad    · Blood in your urine    · Leaking urine  Seek immediate care for the following symptoms:   · Urinating very little or not at all    · Vomiting    · Shaking chills with a fever    · Side or back pain that gets worse  Treatment for a UTI  may include medicines to treat a bacterial infection  You may also need medicines to decrease pain and burning, or decrease the urge to urinate often  Prevent a UTI:   · Urinate when you feel the urge  Do not hold your urine  Urinate as soon as you feel you have to  · Drink liquids as directed  Ask how much liquid to drink each day and which liquids are best for you  You may need to drink more fluids than usual to help flush out the bacteria  Do not drink alcohol, caffeine, and citrus juices  These can irritate your bladder and increase your symptoms  · Apply heat  on your abdomen for 20 to 30 minutes every 2 hours for as many days as directed  Heat helps decrease discomfort and pressure in your bladder  Follow up with your healthcare provider as directed:  Write down your questions so you remember to ask them during your visits  CARE AGREEMENT:   You have the right to help plan your care  Learn about your health condition and how it may be treated  Discuss treatment options with your caregivers to decide what care you want to receive  You always have the right to refuse treatment  The above information is an  only  It is not intended as medical advice for individual conditions or treatments  Talk to your doctor, nurse or pharmacist before following any medical regimen to see if it is safe and effective for you  © 2014 [address] is for End User's use only and may not be sold, redistributed or otherwise used for commercial purposes  All illustrations and images included in CareNotes® are the copyrighted property of [employer]  or [employer] Burgess

## 2020-09-29 LAB
BACTERIA BLD CULT: NORMAL
BACTERIA BLD CULT: NORMAL

## 2020-12-19 ENCOUNTER — APPOINTMENT (EMERGENCY)
Dept: RADIOLOGY | Facility: HOSPITAL | Age: 85
DRG: 291 | End: 2020-12-19
Payer: MEDICARE

## 2020-12-19 ENCOUNTER — HOSPITAL ENCOUNTER (INPATIENT)
Facility: HOSPITAL | Age: 85
LOS: 3 days | Discharge: HOME HEALTH | DRG: 291 | End: 2020-12-22
Attending: EMERGENCY MEDICINE | Admitting: INTERNAL MEDICINE
Payer: MEDICARE

## 2020-12-19 DIAGNOSIS — I50.9 CHF EXACERBATION (HCC): Primary | ICD-10-CM

## 2020-12-19 DIAGNOSIS — R53.1 WEAKNESS: ICD-10-CM

## 2020-12-19 DIAGNOSIS — I48.91 ATRIAL FIBRILLATION WITH RVR (HCC): ICD-10-CM

## 2020-12-19 PROBLEM — R07.9 CHEST PAIN: Status: ACTIVE | Noted: 2020-12-19

## 2020-12-19 LAB
ALBUMIN SERPL BCP-MCNC: 3.4 G/DL (ref 3.5–5)
ALP SERPL-CCNC: 90 U/L (ref 46–116)
ALT SERPL W P-5'-P-CCNC: 26 U/L (ref 12–78)
ANION GAP SERPL CALCULATED.3IONS-SCNC: 11 MMOL/L (ref 4–13)
AST SERPL W P-5'-P-CCNC: 23 U/L (ref 5–45)
ATRIAL RATE: 340 BPM
BASOPHILS # BLD AUTO: 0.04 THOUSANDS/ΜL (ref 0–0.1)
BASOPHILS NFR BLD AUTO: 1 % (ref 0–1)
BILIRUB SERPL-MCNC: 0.6 MG/DL (ref 0.2–1)
BUN SERPL-MCNC: 16 MG/DL (ref 5–25)
CALCIUM ALBUM COR SERPL-MCNC: 9.7 MG/DL (ref 8.3–10.1)
CALCIUM SERPL-MCNC: 9.2 MG/DL (ref 8.3–10.1)
CHLORIDE SERPL-SCNC: 105 MMOL/L (ref 100–108)
CO2 SERPL-SCNC: 25 MMOL/L (ref 21–32)
CREAT SERPL-MCNC: 0.93 MG/DL (ref 0.6–1.3)
EOSINOPHIL # BLD AUTO: 0.27 THOUSAND/ΜL (ref 0–0.61)
EOSINOPHIL NFR BLD AUTO: 3 % (ref 0–6)
ERYTHROCYTE [DISTWIDTH] IN BLOOD BY AUTOMATED COUNT: 14.3 % (ref 11.6–15.1)
FLUAV RNA RESP QL NAA+PROBE: NEGATIVE
FLUBV RNA RESP QL NAA+PROBE: NEGATIVE
GFR SERPL CREATININE-BSD FRML MDRD: 57 ML/MIN/1.73SQ M
GLUCOSE SERPL-MCNC: 146 MG/DL (ref 65–140)
HCT VFR BLD AUTO: 35.6 % (ref 34.8–46.1)
HGB BLD-MCNC: 11 G/DL (ref 11.5–15.4)
IMM GRANULOCYTES # BLD AUTO: 0.03 THOUSAND/UL (ref 0–0.2)
IMM GRANULOCYTES NFR BLD AUTO: 0 % (ref 0–2)
LYMPHOCYTES # BLD AUTO: 1.54 THOUSANDS/ΜL (ref 0.6–4.47)
LYMPHOCYTES NFR BLD AUTO: 18 % (ref 14–44)
MCH RBC QN AUTO: 28 PG (ref 26.8–34.3)
MCHC RBC AUTO-ENTMCNC: 30.9 G/DL (ref 31.4–37.4)
MCV RBC AUTO: 91 FL (ref 82–98)
MONOCYTES # BLD AUTO: 0.53 THOUSAND/ΜL (ref 0.17–1.22)
MONOCYTES NFR BLD AUTO: 6 % (ref 4–12)
NEUTROPHILS # BLD AUTO: 6.21 THOUSANDS/ΜL (ref 1.85–7.62)
NEUTS SEG NFR BLD AUTO: 72 % (ref 43–75)
NRBC BLD AUTO-RTO: 0 /100 WBCS
NT-PROBNP SERPL-MCNC: 7233 PG/ML
PLATELET # BLD AUTO: 225 THOUSANDS/UL (ref 149–390)
PMV BLD AUTO: 10.5 FL (ref 8.9–12.7)
POTASSIUM SERPL-SCNC: 4.1 MMOL/L (ref 3.5–5.3)
PROT SERPL-MCNC: 8.5 G/DL (ref 6.4–8.2)
QRS AXIS: 103 DEGREES
QRSD INTERVAL: 86 MS
QT INTERVAL: 352 MS
QTC INTERVAL: 497 MS
RBC # BLD AUTO: 3.93 MILLION/UL (ref 3.81–5.12)
RSV RNA RESP QL NAA+PROBE: NEGATIVE
SARS-COV-2 RNA RESP QL NAA+PROBE: NEGATIVE
SODIUM SERPL-SCNC: 141 MMOL/L (ref 136–145)
T WAVE AXIS: -38 DEGREES
TROPONIN I SERPL-MCNC: 0.02 NG/ML
TROPONIN I SERPL-MCNC: <0.02 NG/ML
TROPONIN I SERPL-MCNC: <0.02 NG/ML
VENTRICULAR RATE: 120 BPM
WBC # BLD AUTO: 8.62 THOUSAND/UL (ref 4.31–10.16)

## 2020-12-19 PROCEDURE — 80053 COMPREHEN METABOLIC PANEL: CPT | Performed by: EMERGENCY MEDICINE

## 2020-12-19 PROCEDURE — 0241U HB NFCT DS VIR RESP RNA 4 TRGT: CPT | Performed by: EMERGENCY MEDICINE

## 2020-12-19 PROCEDURE — 99223 1ST HOSP IP/OBS HIGH 75: CPT | Performed by: NURSE PRACTITIONER

## 2020-12-19 PROCEDURE — 99223 1ST HOSP IP/OBS HIGH 75: CPT | Performed by: FAMILY MEDICINE

## 2020-12-19 PROCEDURE — 36415 COLL VENOUS BLD VENIPUNCTURE: CPT | Performed by: EMERGENCY MEDICINE

## 2020-12-19 PROCEDURE — 85025 COMPLETE CBC W/AUTO DIFF WBC: CPT | Performed by: EMERGENCY MEDICINE

## 2020-12-19 PROCEDURE — 96375 TX/PRO/DX INJ NEW DRUG ADDON: CPT

## 2020-12-19 PROCEDURE — 96374 THER/PROPH/DIAG INJ IV PUSH: CPT

## 2020-12-19 PROCEDURE — 93005 ELECTROCARDIOGRAM TRACING: CPT

## 2020-12-19 PROCEDURE — 84484 ASSAY OF TROPONIN QUANT: CPT | Performed by: EMERGENCY MEDICINE

## 2020-12-19 PROCEDURE — 99291 CRITICAL CARE FIRST HOUR: CPT | Performed by: EMERGENCY MEDICINE

## 2020-12-19 PROCEDURE — 83880 ASSAY OF NATRIURETIC PEPTIDE: CPT | Performed by: EMERGENCY MEDICINE

## 2020-12-19 PROCEDURE — 84484 ASSAY OF TROPONIN QUANT: CPT | Performed by: NURSE PRACTITIONER

## 2020-12-19 PROCEDURE — 99285 EMERGENCY DEPT VISIT HI MDM: CPT

## 2020-12-19 PROCEDURE — 71045 X-RAY EXAM CHEST 1 VIEW: CPT

## 2020-12-19 PROCEDURE — 84484 ASSAY OF TROPONIN QUANT: CPT | Performed by: FAMILY MEDICINE

## 2020-12-19 PROCEDURE — 93010 ELECTROCARDIOGRAM REPORT: CPT | Performed by: INTERNAL MEDICINE

## 2020-12-19 RX ORDER — SERTRALINE HYDROCHLORIDE 25 MG/1
25 TABLET, FILM COATED ORAL DAILY
Status: DISCONTINUED | OUTPATIENT
Start: 2020-12-20 | End: 2020-12-22 | Stop reason: HOSPADM

## 2020-12-19 RX ORDER — LORATADINE 10 MG/1
10 TABLET ORAL DAILY
Status: DISCONTINUED | OUTPATIENT
Start: 2020-12-20 | End: 2020-12-22 | Stop reason: HOSPADM

## 2020-12-19 RX ORDER — DOCUSATE SODIUM 100 MG/1
100 CAPSULE, LIQUID FILLED ORAL DAILY
Status: DISCONTINUED | OUTPATIENT
Start: 2020-12-20 | End: 2020-12-22 | Stop reason: HOSPADM

## 2020-12-19 RX ORDER — LOSARTAN POTASSIUM 25 MG/1
25 TABLET ORAL DAILY
Status: DISCONTINUED | OUTPATIENT
Start: 2020-12-20 | End: 2020-12-22 | Stop reason: HOSPADM

## 2020-12-19 RX ORDER — METOPROLOL TARTRATE 5 MG/5ML
5 INJECTION INTRAVENOUS ONCE
Status: COMPLETED | OUTPATIENT
Start: 2020-12-19 | End: 2020-12-19

## 2020-12-19 RX ORDER — FUROSEMIDE 10 MG/ML
40 INJECTION INTRAMUSCULAR; INTRAVENOUS
Status: DISCONTINUED | OUTPATIENT
Start: 2020-12-20 | End: 2020-12-21

## 2020-12-19 RX ORDER — NITROGLYCERIN 0.4 MG/1
0.4 TABLET SUBLINGUAL
Status: DISCONTINUED | OUTPATIENT
Start: 2020-12-19 | End: 2020-12-19

## 2020-12-19 RX ORDER — METOPROLOL TARTRATE 5 MG/5ML
5 INJECTION INTRAVENOUS EVERY 6 HOURS PRN
Status: DISCONTINUED | OUTPATIENT
Start: 2020-12-19 | End: 2020-12-22 | Stop reason: HOSPADM

## 2020-12-19 RX ORDER — PRAVASTATIN SODIUM 20 MG
20 TABLET ORAL DAILY
Status: DISCONTINUED | OUTPATIENT
Start: 2020-12-20 | End: 2020-12-22 | Stop reason: HOSPADM

## 2020-12-19 RX ORDER — BACLOFEN 10 MG/1
5 TABLET ORAL 2 TIMES DAILY
Status: DISCONTINUED | OUTPATIENT
Start: 2020-12-19 | End: 2020-12-22 | Stop reason: HOSPADM

## 2020-12-19 RX ORDER — DILTIAZEM HYDROCHLORIDE 5 MG/ML
20 INJECTION INTRAVENOUS ONCE
Status: COMPLETED | OUTPATIENT
Start: 2020-12-19 | End: 2020-12-19

## 2020-12-19 RX ORDER — ASPIRIN 81 MG/1
81 TABLET, CHEWABLE ORAL DAILY
Status: DISCONTINUED | OUTPATIENT
Start: 2020-12-20 | End: 2020-12-22 | Stop reason: HOSPADM

## 2020-12-19 RX ORDER — FUROSEMIDE 10 MG/ML
40 INJECTION INTRAMUSCULAR; INTRAVENOUS ONCE
Status: COMPLETED | OUTPATIENT
Start: 2020-12-19 | End: 2020-12-19

## 2020-12-19 RX ORDER — METOPROLOL TARTRATE 50 MG/1
100 TABLET, FILM COATED ORAL EVERY 12 HOURS SCHEDULED
Status: DISCONTINUED | OUTPATIENT
Start: 2020-12-19 | End: 2020-12-22 | Stop reason: HOSPADM

## 2020-12-19 RX ORDER — MORPHINE SULFATE 4 MG/ML
4 INJECTION, SOLUTION INTRAMUSCULAR; INTRAVENOUS ONCE
Status: COMPLETED | OUTPATIENT
Start: 2020-12-19 | End: 2020-12-19

## 2020-12-19 RX ORDER — NITROGLYCERIN 0.4 MG/1
0.4 TABLET SUBLINGUAL
Status: DISCONTINUED | OUTPATIENT
Start: 2020-12-19 | End: 2020-12-22 | Stop reason: HOSPADM

## 2020-12-19 RX ADMIN — METOPROLOL TARTRATE 100 MG: 50 TABLET, FILM COATED ORAL at 21:13

## 2020-12-19 RX ADMIN — FUROSEMIDE 40 MG: 10 INJECTION, SOLUTION INTRAMUSCULAR; INTRAVENOUS at 13:38

## 2020-12-19 RX ADMIN — APIXABAN 5 MG: 5 TABLET, FILM COATED ORAL at 18:35

## 2020-12-19 RX ADMIN — BACLOFEN 5 MG: 10 TABLET ORAL at 18:35

## 2020-12-19 RX ADMIN — MORPHINE SULFATE 4 MG: 4 INJECTION INTRAVENOUS at 10:39

## 2020-12-19 RX ADMIN — METOROPROLOL TARTRATE 5 MG: 5 INJECTION, SOLUTION INTRAVENOUS at 14:45

## 2020-12-19 RX ADMIN — MORPHINE SULFATE 2 MG: 2 INJECTION, SOLUTION INTRAMUSCULAR; INTRAVENOUS at 14:45

## 2020-12-19 RX ADMIN — NITROGLYCERIN 0.4 MG: 0.4 TABLET SUBLINGUAL at 14:46

## 2020-12-19 RX ADMIN — DILTIAZEM HYDROCHLORIDE 20 MG: 5 INJECTION INTRAVENOUS at 10:39

## 2020-12-19 NOTE — ASSESSMENT & PLAN NOTE
Lab Results   Component Value Date    HGBA1C 6 4 (H) 12/16/2020       No results for input(s): POCGLU in the last 72 hours      Blood Sugar Average: Last 72 hrs:   hold oral home medication  Initiate SSI and blood glucose monitoring

## 2020-12-19 NOTE — H&P
H&P- Tabatha Mantle 1935, 81 y o  female MRN: [id number]    Unit/Bed#: ED 07 Encounter: [id number]    Primary Care Provider: Leanne Pineda MD   Date and time admitted to hospital: 12/19/2020 10:06 AM        CHF (congestive heart failure) ([facility] [address] )  Assessment & Plan  Wt Readings from Last 3 Encounters:   12/19/20 81 6 kg (179 lb 14 3 oz)   09/25/20 81 3 kg (179 lb 3 7 oz)   01/16/20 78 kg (172 lb)     BNP elevated however weight seems stable from previously  Daily weight, I/O, low-sodium diet  Will initiate IV Lasix 40 mg b i d  Await further recommendations from Cardiology  Patient had no lower extremity edema with some mild pulmonary congestion on x-ray  Check echocardiogram      * Chest pain  Assessment & Plan  · Has a long time history of medication noncompliance complaints of midsternal chest pain and shortness of breath  · Trend troponin x3  · ROGER score of 5  · P o  nitro for chest pain  · Consult cardiology  · Telemetry monitoring  · Continue ASA, statin, beta-blocker  · Note patient is influenza, RSV, COVID negative    CAD (coronary artery disease)  Assessment & Plan  · Continue statin, aspirin, beta-blocker    Persistent atrial fibrillation (HCC)  Assessment & Plan  · Known history continue Eliquis  · Patient noted to be in RVR has received a dose of IV Lopressor and IV Cardizem rate has improved some  · Continue telemetry  · IV Lopressor for sustained heart rates greater than 110    DM (diabetes mellitus), type 2 (HCC)  Assessment & Plan  Lab Results   Component Value Date    HGBA1C 6 4 (H) 12/16/2020       No results for input(s): POCGLU in the last 72 hours      Blood Sugar Average: Last 72 hrs:   hold oral home medication  Initiate SSI and blood glucose monitoring    Tachy-gale syndrome (HCC)  Assessment & Plan  · Known history patient status post permanent pacemaker placement  · Patient appear to be last seen by her cardiologist in 02/2020 where they discussed a cardioversion however declined given patient's ongoing medication noncompliance        VTE Prophylaxis: Apixaban (Eliquis)  / sequential compression device   Code Status:  Full code  POLST: POLST is not applicable to this patient  Discussion with family:      Anticipated Length of Stay:  Patient will be admitted on an Inpatient basis with an anticipated length of stay of  > 2 midnights  Justification for Hospital Stay:  Chest pain workup    Total Time for Visit, including Counseling / Coordination of Care: 40 minutes  Greater than 50% of this total time spent on direct patient counseling and coordination of care  Chief Complaint:   I have chest pain and shortness of breath    History of Present Illness:    Ana Lacy is a 81 y o  female who presents with known history of tachy-gale syndrome status post pacemaker, AFib on Eliquis, history of CAD diabetes with a longstanding history of medication noncompliance presenting to the ED after having chest pain while lying flat that was intermittent for 2 hours a did worsen with walking  Patient longstanding history of not taking her meds appropriately her son also endorses this to the ED staff  Patient brought to the ED for further evaluation where she denies any waking recently denies any radiation to the pain states that stays mid sternal does not radiate to her jaw back or arms  Cites that she has been having shortness of breath the episodes denies dizziness visual changes denies any recent illness no fevers chills  Patient cites that she has lost weight as her sister recently passed away and has not been eating like she did when her sister brought her meals  Review of Systems:    Review of Systems   Constitutional: Positive for activity change and fatigue  Negative for chills, diaphoresis, fever and unexpected weight change  HENT: Negative  Respiratory: Positive for shortness of breath  Cardiovascular: Positive for chest pain and palpitations  Negative for leg swelling  Gastrointestinal: Negative for abdominal pain, constipation, diarrhea and nausea  Genitourinary: Negative  Musculoskeletal: Negative  Neurological: Positive for weakness  Negative for dizziness, light-headedness and headaches  Psychiatric/Behavioral: Negative  Past Medical and Surgical History:     Past Medical History:   Diagnosis Date    CHF (congestive heart failure) ([facility] )     Diabetes mellitus ([facility] )     Hypertension        Past Surgical History:   Procedure Laterality Date    CARDIAC SURGERY         Meds/Allergies:    Prior to Admission medications    Medication Sig Start Date End Date Taking?  Authorizing Provider   apixaban (Eliquis) 5 mg Take 5 mg by mouth 2 (two) times a day    Historical Provider, MD   ASPIRIN 81 PO Take 81 mg by mouth daily    Historical Provider, MD   baclofen 10 mg tablet Take 5 mg by mouth 2 (two) times a day    Historical Provider, MD   desloratadine (CLARINEX) 5 MG tablet Take 5 mg by mouth daily    Historical Provider, MD   docusate sodium (COLACE) 100 mg capsule Take 100 mg by mouth daily    Historical Provider, MD   furosemide (LASIX) 40 mg tablet Take 40 mg by mouth 2 (two) times a day    Historical Provider, MD   glimepiride (AMARYL) 4 mg tablet Take 4 mg by mouth daily with breakfast    Historical Provider, MD   Glucosamine HCl 500 MG TABS Take 1 tablet by mouth    Historical Provider, MD   losartan (COZAAR) 25 mg tablet Take 25 mg by mouth daily    Historical Provider, MD   metFORMIN (GLUCOPHAGE) 500 mg tablet Take 500 mg by mouth daily with breakfast    Historical Provider, MD   metoprolol tartrate (LOPRESSOR) 100 mg tablet Take 100 mg by mouth every 12 (twelve) hours    Historical Provider, MD   nystatin (MYCOSTATIN) powder Apply topically 2 (two) times a day Groin and under breasts 9/27/20   Arnaldo Reis PA-C   pravastatin (PRAVACHOL) 20 mg tablet Take 20 mg by mouth daily    Historical Provider, MD   primidone (MYSOLINE) 50 mg tablet Take by mouth every 12 (twelve) hours    Historical Provider, MD   Pyridoxine HCl (VITAMIN B-6) 100 MG TABS Take 1 tablet by mouth daily    Historical Provider, MD   sertraline (ZOLOFT) 25 mg tablet Take 25 mg by mouth daily    Historical Provider, MD     I have reviewed home medications using allscripts  Allergies: Allergies   Allergen Reactions    Clarithromycin Confusion and Other (See Comments)       Social History:     Marital Status: Single   Occupation:    Patient Pre-hospital Living Situation:    Patient Pre-hospital Level of Mobility:      Patient Pre-hospital Diet Restrictions:    Substance Use History:   Social History     Substance and Sexual Activity   Alcohol Use Not Currently     Social History     Tobacco Use   Smoking Status Never Smoker   Smokeless Tobacco Never Used     Social History     Substance and Sexual Activity   Drug Use Never       Family History:    non-contributory     Physical Exam:     Vitals:   Blood Pressure: 137/67 (12/19/20 1545)  Pulse: 94 (12/19/20 1545)  Temperature: 97 5 °F (36 4 °C) (12/19/20 1022)  Temp Source: Temporal (12/19/20 1022)  Respirations: 18 (12/19/20 1545)  Weight - Scale: 81 6 kg (179 lb 14 3 oz) (12/19/20 1020)  SpO2: 99 % (12/19/20 1545)    Physical Exam  Constitutional:       General: She is sleeping  HENT:      Head: Normocephalic and atraumatic  Nose: Nose normal       Mouth/Throat:      Mouth: Mucous membranes are moist    Eyes:      Pupils: Pupils are equal, round, and reactive to light  Cardiovascular:      Rate and Rhythm: Normal rate and regular rhythm  Pulmonary:      Effort: Accessory muscle usage present  No tachypnea or respiratory distress  Breath sounds: Examination of the right-lower field reveals decreased breath sounds and rhonchi  Examination of the left-lower field reveals decreased breath sounds and rhonchi  Decreased breath sounds and rhonchi present     Abdominal:      General: Bowel sounds are normal    Musculoskeletal: Normal range of motion  Skin:     General: Skin is warm and dry  Neurological:      Mental Status: She is easily aroused  She is disoriented  Comments: Patient is slightly disoriented upon awakening did not know where she was at a know her name             Additional Data:     Lab Results: I have personally reviewed pertinent reports  Results from last 7 days   Lab Units 12/19/20  1037   WBC Thousand/uL 8 62   HEMOGLOBIN g/dL 11 0*   HEMATOCRIT % 35 6   PLATELETS Thousands/uL 225   NEUTROS PCT % 72   LYMPHS PCT % 18   MONOS PCT % 6   EOS PCT % 3     Results from last 7 days   Lab Units 12/19/20  1037   SODIUM mmol/L 141   POTASSIUM mmol/L 4 1   CHLORIDE mmol/L 105   CO2 mmol/L 25   BUN mg/dL 16   CREATININE mg/dL 0 93   ANION GAP mmol/L 11   CALCIUM mg/dL 9 2   ALBUMIN g/dL 3 4*   TOTAL BILIRUBIN mg/dL 0 60   ALK PHOS U/L 90   ALT U/L 26   AST U/L 23   GLUCOSE RANDOM mg/dL 146*             Results from last 7 days   Lab Units 12/16/20  0950   HEMOGLOBIN A1C % 6 4*           Imaging: I have personally reviewed pertinent reports  XR chest portable   ED Interpretation by Edward Alonzo DO (12/19 1157)   Pulmonary edema      Final Result by Rock Adonis MD (12/19 1210)      Mild pulmonary venous congestion  Workstation performed: [id number]             EKG, Pathology, and Other Studies Reviewed on Admission:   · EKG:      Allscripts / Epic Records Reviewed: Yes     ** Please Note: This note has been constructed using a voice recognition system   **

## 2020-12-19 NOTE — ASSESSMENT & PLAN NOTE
· Known history continue Eliquis  · Patient noted to be in RVR has received a dose of IV Lopressor and IV Cardizem rate has improved some  · Continue telemetry  · IV Lopressor for sustained heart rates greater than 110

## 2020-12-19 NOTE — ASSESSMENT & PLAN NOTE
· Has a long time history of medication noncompliance complaints of midsternal chest pain and shortness of breath  · Trend troponin x3  · ROGER score of 5  · P o  nitro for chest pain  · Consult cardiology  · Telemetry monitoring  · Continue ASA, statin, beta-blocker  · Note patient is influenza, RSV, COVID negative

## 2020-12-19 NOTE — PLAN OF CARE
Problem: PAIN - ADULT  Goal: Verbalizes/displays adequate comfort level or baseline comfort level  Description: Interventions:  - Encourage patient to monitor pain and request assistance  - Assess pain using appropriate pain scale  - Administer analgesics based on type and severity of pain and evaluate response  - Implement non-pharmacological measures as appropriate and evaluate response  - Consider cultural and social influences on pain and pain management  - Notify physician/advanced practitioner if interventions unsuccessful or patient reports new pain  Outcome: Progressing     Problem: INFECTION - ADULT  Goal: Absence or prevention of progression during hospitalization  Description: INTERVENTIONS:  - Assess and monitor for signs and symptoms of infection  - Monitor lab/diagnostic results  - Monitor all insertion sites, i e  indwelling lines, tubes, and drains  - Monitor endotracheal if appropriate and nasal secretions for changes in amount and color  - [place] appropriate cooling/warming therapies per order  - Administer medications as ordered  - Instruct and encourage patient and family to use good hand hygiene technique  - Identify and instruct in appropriate isolation precautions for identified infection/condition  Outcome: Progressing  Goal: Absence of fever/infection during neutropenic period  Description: INTERVENTIONS:  - Monitor WBC    Outcome: Progressing     Problem: SAFETY ADULT  Goal: Patient will remain free of falls  Description: INTERVENTIONS:  - Assess patient frequently for physical needs  -  Identify cognitive and physical deficits and behaviors that affect risk of falls    -  [place] fall precautions as indicated by assessment   - Educate patient/family on patient safety including physical limitations  - Instruct patient to call for assistance with activity based on assessment  - Modify environment to reduce risk of injury  - Consider OT/PT consult to assist with strengthening/mobility  Outcome: Progressing  Goal: Maintain or return to baseline ADL function  Description: INTERVENTIONS:  -  Assess patient's ability to carry out ADLs; assess patient's baseline for ADL function and identify physical deficits which impact ability to perform ADLs (bathing, care of mouth/teeth, toileting, grooming, dressing, etc )  - Assess/evaluate cause of self-care deficits   - Assess range of motion  - Assess patient's mobility; develop plan if impaired  - Assess patient's need for assistive devices and provide as appropriate  - Encourage maximum independence but intervene and supervise when necessary  - Involve family in performance of ADLs  - Assess for home care needs following discharge   - Consider OT consult to assist with ADL evaluation and planning for discharge  - Provide patient education as appropriate  Outcome: Progressing  Goal: Maintain or return mobility status to optimal level  Description: INTERVENTIONS:  - Assess patient's baseline mobility status (ambulation, transfers, stairs, etc )    - Identify cognitive and physical deficits and behaviors that affect mobility  - Identify mobility aids required to assist with transfers and/or ambulation (gait belt, sit-to-stand, lift, walker, cane, etc )  - [place] fall precautions as indicated by assessment  - Record patient progress and toleration of activity level on Mobility SBAR; progress patient to next Phase/Stage  - Instruct patient to call for assistance with activity based on assessment  - Consider rehabilitation consult to assist with strengthening/weightbearing, etc   Outcome: Progressing     Problem: DISCHARGE PLANNING  Goal: Discharge to home or other facility with appropriate resources  Description: INTERVENTIONS:  - Identify barriers to discharge w/patient and caregiver  - Arrange for needed discharge resources and transportation as appropriate  - Identify discharge learning needs (meds, wound care, etc )  - Arrange for interpretive services to assist at discharge as needed  - Refer to Case Management Department for coordinating discharge planning if the patient needs post-hospital services based on physician/advanced practitioner order or complex needs related to functional status, cognitive ability, or social support system  Outcome: Progressing     Problem: Knowledge Deficit  Goal: Patient/family/caregiver demonstrates understanding of disease process, treatment plan, medications, and discharge instructions  Description: Complete learning assessment and assess knowledge base    Interventions:  - Provide teaching at level of understanding  - Provide teaching via preferred learning methods  Outcome: Progressing

## 2020-12-19 NOTE — ASSESSMENT & PLAN NOTE
Wt Readings from Last 3 Encounters:   12/19/20 81 6 kg (179 lb 14 3 oz)   09/25/20 81 3 kg (179 lb 3 7 oz)   01/16/20 78 kg (172 lb)     BNP elevated however weight seems stable from previously  Will continue oral Lasix rate

## 2020-12-19 NOTE — ASSESSMENT & PLAN NOTE
· Known history patient status post permanent pacemaker placement  · Patient appear to be last seen by her cardiologist in 02/2020 where they discussed a cardioversion however declined given patient's ongoing medication noncompliance

## 2020-12-19 NOTE — ED PROVIDER NOTES
History  Chief Complaint   Patient presents with    Chest Pain     Pt c/o CP and SOB starting this morning more than yesterday  Per daughter pt doesn't take her medications like she is supposed to  Mid sternal chest pain  Denies fevers  Patient is 56-year-old female past medical history of atrial fibrillation on Eliquis, CHF on 2 L home oxygen, CAD, diabetes presenting for chest pain shortness of breath  Patient states that she began experiencing central nonradiating chest pain which is worse with lying flat and worse with exertion and has been intermittent for the past 2-1/2 hours  She also states that she has had accompanying shortness of breath as well as 2 days of intermittent dry cough  She is noncompliant with her medications per her son and patient states that she has had these symptoms before while in the hospital   Son states that at roughly 1:00 a m , 9 av hours ago she woke from sleep it appeared confused, stating I will feel myself but at that time was not complaining of any pain or shortness of breath and then went back to sleep after her family comforted her  Patient denies any leg pain or swelling, prior blood clots, upper respiratory symptoms, rashes, vision changes, dysuria, nausea/vomiting/diarrhea/constipation, fevers, dizziness  Prior to Admission Medications   Prescriptions Last Dose Informant Patient Reported? Taking?    ASPIRIN 81 PO   Yes No   Sig: Take 81 mg by mouth daily   Glucosamine HCl 500 MG TABS   Yes No   Sig: Take 1 tablet by mouth   Pyridoxine HCl (VITAMIN B-6) 100 MG TABS   Yes No   Sig: Take 1 tablet by mouth daily   apixaban (Eliquis) 5 mg  Self Yes No   Sig: Take 5 mg by mouth 2 (two) times a day   baclofen 10 mg tablet  Self Yes No   Sig: Take 5 mg by mouth 2 (two) times a day   desloratadine (CLARINEX) 5 MG tablet  Self Yes No   Sig: Take 5 mg by mouth daily   docusate sodium (COLACE) 100 mg capsule  Self Yes No   Sig: Take 100 mg by mouth daily furosemide (LASIX) 40 mg tablet  Self Yes No   Sig: Take 40 mg by mouth 2 (two) times a day   glimepiride (AMARYL) 4 mg tablet  Self Yes No   Sig: Take 4 mg by mouth daily with breakfast   losartan (COZAAR) 25 mg tablet  Self Yes No   Sig: Take 25 mg by mouth daily   metFORMIN (GLUCOPHAGE) 500 mg tablet  Self Yes No   Sig: Take 500 mg by mouth daily with breakfast   metoprolol tartrate (LOPRESSOR) 100 mg tablet  Self Yes No   Sig: Take 100 mg by mouth every 12 (twelve) hours   nystatin (MYCOSTATIN) powder   No No   Sig: Apply topically 2 (two) times a day Groin and under breasts   pravastatin (PRAVACHOL) 20 mg tablet  Self Yes No   Sig: Take 20 mg by mouth daily   primidone (MYSOLINE) 50 mg tablet  Self Yes No   Sig: Take by mouth every 12 (twelve) hours   sertraline (ZOLOFT) 25 mg tablet  Self Yes No   Sig: Take 25 mg by mouth daily      Facility-Administered Medications: None       Past Medical History:   Diagnosis Date    [facility]congestive [facility] failure[facility]     Diabetes mellitus ([facility] [address] )     Hypertension        Past Surgical History:   Procedure Laterality Date    CARDIAC SURGERY         History reviewed  No pertinent family history  I have reviewed and agree with the history as documented  E-Cigarette/Vaping     E-Cigarette/Vaping Substances     Social History     Tobacco Use    Smoking status: Never Smoker    Smokeless tobacco: Never Used   Substance Use Topics    Alcohol use: Not Currently    Drug use: Never       Review of Systems   All other systems reviewed and are negative  Physical Exam  Physical Exam  Vitals signs reviewed  Constitutional:       General: She is not in acute distress  Appearance: Normal appearance  She is not ill-appearing  HENT:      Mouth/Throat:      Mouth: Mucous membranes are moist    Eyes:      Conjunctiva/sclera: Conjunctivae normal    Neck:      Musculoskeletal: Neck supple  Cardiovascular:      Rate and Rhythm: Tachycardia present  Rhythm irregular  Pulses:           Radial pulses are 2+ on the right side and 2+ on the left side  Heart sounds: Normal heart sounds  Pulmonary:      Effort: Pulmonary effort is normal       Breath sounds: Examination of the right-middle field reveals decreased breath sounds  Examination of the left-middle field reveals decreased breath sounds  Examination of the right-lower field reveals decreased breath sounds  Examination of the left-lower field reveals decreased breath sounds  Decreased breath sounds present  No wheezing  Abdominal:      General: Abdomen is flat  Palpations: Abdomen is soft  Tenderness: There is no abdominal tenderness  Musculoskeletal: Normal range of motion  General: No swelling  Right lower leg: She exhibits no tenderness  Edema present  Left lower leg: She exhibits no tenderness  Edema present  Skin:     General: Skin is warm and dry  Neurological:      General: No focal deficit present  Mental Status: She is alert     Psychiatric:         Mood and Affect: Mood normal          Vital Signs  ED Triage Vitals   Temperature Pulse Respirations Blood Pressure SpO2   12/19/20 1022 12/19/20 1020 12/19/20 1020 12/19/20 1020 12/19/20 1020   97 5 °F (36 4 °C) (!) 140 (!) 24 127/83 99 %      Temp Source Heart Rate Source Patient Position - Orthostatic VS BP Location FiO2 (%)   12/19/20 1022 12/19/20 1020 12/19/20 1020 12/19/20 1020 --   Temporal Monitor Lying Right arm       Pain Score       12/19/20 1039       6           Vitals:    12/19/20 1330 12/19/20 1345 12/19/20 1445 12/19/20 1500   BP: 147/97 154/100 142/88 126/62   Pulse: (!) 112 (!) 115 (!) 118 (!) 106   Patient Position - Orthostatic VS: Lying Lying Lying Lying         Visual Acuity      ED Medications  Medications   nitroglycerin (NITROSTAT) SL tablet 0 4 mg (0 4 mg Sublingual Given 12/19/20 1446)   diltiazem (CARDIZEM) injection 20 mg (20 mg Intravenous Given 12/19/20 1039)   morphine (PF) 4 mg/mL injection 4 mg (4 mg Intravenous Given 12/19/20 1039)   furosemide (LASIX) injection 40 mg (40 mg Intravenous Given 12/19/20 1338)   metoprolol (LOPRESSOR) injection 5 mg (5 mg Intravenous Given 12/19/20 1445)   morphine injection 2 mg (2 mg Intravenous Given 12/19/20 1445)       Diagnostic Studies  Results Reviewed     Procedure Component Value Units Date/Time    Troponin I [[id number]]  (Normal) Collected: 12/19/20 1451    Lab Status: Final result Specimen: Blood from Arm, Right Updated: 12/19/20 1522     Troponin I 0 02 ng/mL     Troponin I [[id number]]     Lab Status: No result Specimen: Blood     COVID19, Influenza A/B, RSV PCR, [facility] [[id number]]  (Normal) Collected: 12/19/20 1037    Lab Status: Final result Specimen: Nares from Nasopharyngeal Swab Updated: 12/19/20 1141     SARS-CoV-2 Negative     INFLUENZA A PCR Negative     INFLUENZA B PCR Negative     RSV PCR Negative    Narrative: This test has been authorized by FDA under an EUA (Emergency Use Assay) for use by authorized laboratories  Clinical caution and judgement should be used with the interpretation of these results with consideration of the clinical impression and other laboratory testing  Testing reported as "Positive" or "Negative" has been proven to be accurate according to standard laboratory validation requirements  All testing is performed with control materials showing appropriate reactivity at standard intervals      NT-BNP PRO [[id number]]  (Abnormal) Collected: 12/19/20 1037    Lab Status: Final result Specimen: Blood from Arm, Right Updated: 12/19/20 1131     NT-proBNP 7,233 pg/mL     Comprehensive metabolic panel [[id number]]  (Abnormal) Collected: 12/19/20 1037    Lab Status: Final result Specimen: Blood from Arm, Right Updated: 12/19/20 1125     Sodium 141 mmol/L      Potassium 4 1 mmol/L      Chloride 105 mmol/L      CO2 25 mmol/L      ANION GAP 11 mmol/L      BUN 16 mg/dL      Creatinine 0 93 mg/dL      Glucose 146 mg/dL      Calcium 9 2 mg/dL      Corrected Calcium 9 7 mg/dL      AST 23 U/L      ALT 26 U/L      Alkaline Phosphatase 90 U/L      Total Protein 8 5 g/dL      Albumin 3 4 g/dL      Total Bilirubin 0 60 mg/dL      eGFR 57 ml/min/1 73sq m     Narrative:      [place] guidelines for Chronic Kidney Disease (CKD):     Stage 1 with normal or high GFR (GFR > 90 mL/min/1 73 square meters)    Stage 2 Mild CKD (GFR = 60-89 mL/min/1 73 square meters)    Stage 3A Moderate CKD (GFR = 45-59 mL/min/1 73 square meters)    Stage 3B Moderate CKD (GFR = 30-44 mL/min/1 73 square meters)    Stage 4 Severe CKD (GFR = 15-29 mL/min/1 73 square meters)    Stage 5 End Stage CKD (GFR <15 mL/min/1 73 square meters)  Note: GFR calculation is accurate only with a steady state creatinine    Troponin I [[id number]]  (Normal) Collected: 12/19/20 1037    Lab Status: Final result Specimen: Blood from Arm, Right Updated: 12/19/20 1124     Troponin I <0 02 ng/mL     CBC and differential [[id number]]  (Abnormal) Collected: 12/19/20 1037    Lab Status: Final result Specimen: Blood from Arm, Right Updated: 12/19/20 1057     WBC 8 62 Thousand/uL      RBC 3 93 Million/uL      Hemoglobin 11 0 g/dL      Hematocrit 35 6 %      MCV 91 fL      MCH 28 0 pg      MCHC 30 9 g/dL      RDW 14 3 %      MPV 10 5 fL      Platelets 377 Thousands/uL      nRBC 0 /100 WBCs      Neutrophils Relative 72 %      Immat GRANS % 0 %      Lymphocytes Relative 18 %      Monocytes Relative 6 %      Eosinophils Relative 3 %      Basophils Relative 1 %      Neutrophils Absolute 6 21 Thousands/µL      Immature Grans Absolute 0 03 Thousand/uL      Lymphocytes Absolute 1 54 Thousands/µL      Monocytes Absolute 0 53 Thousand/µL      Eosinophils Absolute 0 27 Thousand/µL      Basophils Absolute 0 04 Thousands/µL                  XR chest portable   ED Interpretation by Nayeli Milian DO (12/19 1157)   Pulmonary edema      Final Result by Regino Hernandez MD (12/19 1210)      Mild pulmonary venous congestion  Workstation performed: [id number]                    Procedures  ECG 12 Lead Documentation Only    Date/Time: 12/19/2020 10:31 AM  Performed by: Farida Trevino DO  Authorized by: Farida Trevino DO     ECG reviewed by me, the ED Provider: yes    Patient location:  ED  Previous ECG:     Previous ECG:  Compared to current    Similarity:  No change  Interpretation:     Interpretation: abnormal    Rate:     ECG rate assessment: tachycardic    Rhythm:     Rhythm: atrial fibrillation    QRS:     QRS axis:  Normal    QRS intervals:  Normal  Conduction:     Conduction: normal    ST segments:     ST segments:  Normal  T waves:     T waves: normal      CriticalCare Time  Performed by: Farida Trevino DO  Authorized by: Farida Trevino DO     Critical care provider statement:     Critical care time (minutes):  30    Critical care was necessary to treat or prevent imminent or life-threatening deterioration of the following conditions:  Cardiac failure and circulatory failure    Critical care was time spent personally by me on the following activities:  Obtaining history from patient or surrogate, development of treatment plan with patient or surrogate, evaluation of patient's response to treatment, examination of patient, interpretation of cardiac output measurements, ordering and performing treatments and interventions, ordering and review of laboratory studies, ordering and review of radiographic studies and re-evaluation of patient's condition             ED Course  ED Course as of Dec 19 1532   Sat Dec 19, 2020   1125 Patient now rate controlled after 1 dose of Cardizem      1149 Patient with very elevated BNP, will give Lasix and admit        1157 Pulmonary edema on chest x-ray                  Identification of Seniors at Risk      Most Recent Value   (ISAR) Identification of Seniors at Risk   Before the illness or injury that brought you to the Emergency, did you need someone to help you on a regular basis? 1 Filed at: 12/19/2020 1023   In the last 24 hours, have you needed more help than usual?  1 Filed at: 12/19/2020 1023   Have you been hospitalized for one or more nights during the past 6 months? 0 Filed at: 12/19/2020 1023   In general, do you see well?  0 Filed at: 12/19/2020 1023   In general, do you have serious problems with your memory? 0 Filed at: 12/19/2020 1023   Do you take more than three different medications every day? 1 Filed at: 12/19/2020 1023   ISAR Score  3 Filed at: 12/19/2020 1023                    SBIRT 22yo+      Most Recent Value   SBIRT (23 yo +)   In order to provide better care to our patients, we are screening all of our patients for alcohol and drug use  Would it be okay to ask you these screening questions? No Filed at: 12/19/2020 1047        ROGER Risk Score      Most Recent Value   Age >= 72  1 Filed at: 12/19/2020 1525   Known CAD (stenosis >= 50%)  1 Filed at: 12/19/2020 1525   Recent (<=24 hrs) Service Angina  1 Filed at: 12/19/2020 1525   ST Deviation >= 0 5 mm     3+ CAD Risk Factors (FHx, HTN, HLP, DM, Smoker)  1 Filed at: 12/19/2020 1525   Aspirin Use Past 7 Days  1 Filed at: 12/19/2020 1525   Elevated Cardiac Markers  0 Filed at: 12/19/2020 1525   ROGER Risk Score (Calculated)                    MDM  Number of Diagnoses or Management Options  Diagnosis management comments: Patient is an 23-year-old female past medical history of atrial fibrillation on Eliquis, CAD, diabetes, CHF on home oxygen presenting for chest pain or shortness of breath  Patient is currently saturating appropriately on 2 5 L of oxygen with heart rate between 120s and 140s in atrial fibrillation with diminished lung sounds bilaterally, bilateral lower extremity edema but no conversational dyspnea, no accessory muscle use  At this time will administer Cardizem, pain control, and obtain cardiac workup, COVID swab, and admit        Disposition  Final diagnoses:   CHF exacerbation ([facility] [address] )   Atrial fibrillation with RVR ([facility] [address] )     Time reflects when diagnosis was documented in both MDM as applicable and the Disposition within this note     Time User Action Codes Description Comment    12/19/2020 12:00 PM Cornell Ram Add [I50 9] CHF exacerbation ([facility] [address] )     12/19/2020 12:01 PM Renée Potter Add [I48 91] Atrial fibrillation with RVR [facility])       ED Disposition     ED Disposition Condition Date/Time Comment    Admit Stable Sat Dec 19, 2020 12:02 PM Case was discussed with Dr Nickolas Quintana and the patient's admission status was agreed to be Admission Status: inpatient status to the service of Dr Nickolas Quintana         Follow-up Information    None         Patient's Medications   Discharge Prescriptions    No medications on file     No discharge procedures on file      PDMP Review     None          ED Provider  Electronically Signed by           Attila Guerrero DO  12/19/20 0696

## 2020-12-20 LAB
GLUCOSE SERPL-MCNC: 130 MG/DL (ref 65–140)
GLUCOSE SERPL-MCNC: 156 MG/DL (ref 65–140)
GLUCOSE SERPL-MCNC: 247 MG/DL (ref 65–140)

## 2020-12-20 PROCEDURE — 99232 SBSQ HOSP IP/OBS MODERATE 35: CPT | Performed by: NURSE PRACTITIONER

## 2020-12-20 PROCEDURE — 99222 1ST HOSP IP/OBS MODERATE 55: CPT | Performed by: INTERNAL MEDICINE

## 2020-12-20 PROCEDURE — 82948 REAGENT STRIP/BLOOD GLUCOSE: CPT

## 2020-12-20 RX ORDER — NYSTATIN 100000 [USP'U]/G
1 POWDER TOPICAL 3 TIMES DAILY
Status: DISCONTINUED | OUTPATIENT
Start: 2020-12-20 | End: 2020-12-22 | Stop reason: HOSPADM

## 2020-12-20 RX ADMIN — ASPIRIN 81 MG CHEWABLE TABLET 81 MG: 81 TABLET CHEWABLE at 10:25

## 2020-12-20 RX ADMIN — LOSARTAN POTASSIUM 25 MG: 25 TABLET, FILM COATED ORAL at 10:24

## 2020-12-20 RX ADMIN — NYSTATIN 1 APPLICATION: 100000 POWDER TOPICAL at 10:26

## 2020-12-20 RX ADMIN — METOPROLOL TARTRATE 100 MG: 50 TABLET, FILM COATED ORAL at 10:34

## 2020-12-20 RX ADMIN — APIXABAN 5 MG: 5 TABLET, FILM COATED ORAL at 18:41

## 2020-12-20 RX ADMIN — DOCUSATE SODIUM 100 MG: 100 CAPSULE, LIQUID FILLED ORAL at 10:25

## 2020-12-20 RX ADMIN — BACLOFEN 5 MG: 10 TABLET ORAL at 18:42

## 2020-12-20 RX ADMIN — APIXABAN 5 MG: 5 TABLET, FILM COATED ORAL at 10:25

## 2020-12-20 RX ADMIN — LORATADINE 10 MG: 10 TABLET ORAL at 10:24

## 2020-12-20 RX ADMIN — METOPROLOL TARTRATE 100 MG: 50 TABLET, FILM COATED ORAL at 21:26

## 2020-12-20 RX ADMIN — FUROSEMIDE 40 MG: 10 INJECTION, SOLUTION INTRAMUSCULAR; INTRAVENOUS at 10:26

## 2020-12-20 RX ADMIN — SERTRALINE HYDROCHLORIDE 25 MG: 25 TABLET ORAL at 10:24

## 2020-12-20 RX ADMIN — NYSTATIN 1 APPLICATION: 100000 POWDER TOPICAL at 18:41

## 2020-12-20 RX ADMIN — PRAVASTATIN SODIUM 20 MG: 20 TABLET ORAL at 10:26

## 2020-12-20 RX ADMIN — FUROSEMIDE 40 MG: 10 INJECTION, SOLUTION INTRAMUSCULAR; INTRAVENOUS at 18:42

## 2020-12-20 RX ADMIN — BACLOFEN 5 MG: 10 TABLET ORAL at 10:24

## 2020-12-20 NOTE — PLAN OF CARE
Problem: PAIN - ADULT  Goal: Verbalizes/displays adequate comfort level or baseline comfort level  Description: Interventions:  - Encourage patient to monitor pain and request assistance  - Assess pain using appropriate pain scale  - Administer analgesics based on type and severity of pain and evaluate response  - Implement non-pharmacological measures as appropriate and evaluate response  - Consider cultural and social influences on pain and pain management  - Notify physician/advanced practitioner if interventions unsuccessful or patient reports new pain  Outcome: Progressing     Problem: INFECTION - ADULT  Goal: Absence or prevention of progression during hospitalization  Description: INTERVENTIONS:  - Assess and monitor for signs and symptoms of infection  - Monitor lab/diagnostic results  - Monitor all insertion sites, i e  indwelling lines, tubes, and drains  - Monitor endotracheal if appropriate and nasal secretions for changes in amount and color  - [place] appropriate cooling/warming therapies per order  - Administer medications as ordered  - Instruct and encourage patient and family to use good hand hygiene technique  - Identify and instruct in appropriate isolation precautions for identified infection/condition  Outcome: Progressing  Goal: Absence of fever/infection during neutropenic period  Description: INTERVENTIONS:  - Monitor WBC    Outcome: Progressing     Problem: SAFETY ADULT  Goal: Patient will remain free of falls  Description: INTERVENTIONS:  - Assess patient frequently for physical needs  -  Identify cognitive and physical deficits and behaviors that affect risk of falls    -  [place] fall precautions as indicated by assessment   - Educate patient/family on patient safety including physical limitations  - Instruct patient to call for assistance with activity based on assessment  - Modify environment to reduce risk of injury  - Consider OT/PT consult to assist with strengthening/mobility  Outcome: Progressing  Goal: Maintain or return to baseline ADL function  Description: INTERVENTIONS:  -  Assess patient's ability to carry out ADLs; assess patient's baseline for ADL function and identify physical deficits which impact ability to perform ADLs (bathing, care of mouth/teeth, toileting, grooming, dressing, etc )  - Assess/evaluate cause of self-care deficits   - Assess range of motion  - Assess patient's mobility; develop plan if impaired  - Assess patient's need for assistive devices and provide as appropriate  - Encourage maximum independence but intervene and supervise when necessary  - Involve family in performance of ADLs  - Assess for home care needs following discharge   - Consider OT consult to assist with ADL evaluation and planning for discharge  - Provide patient education as appropriate  Outcome: Progressing  Goal: Maintain or return mobility status to optimal level  Description: INTERVENTIONS:  - Assess patient's baseline mobility status (ambulation, transfers, stairs, etc )    - Identify cognitive and physical deficits and behaviors that affect mobility  - Identify mobility aids required to assist with transfers and/or ambulation (gait belt, sit-to-stand, lift, walker, cane, etc )  - [place] fall precautions as indicated by assessment  - Record patient progress and toleration of activity level on Mobility SBAR; progress patient to next Phase/Stage  - Instruct patient to call for assistance with activity based on assessment  - Consider rehabilitation consult to assist with strengthening/weightbearing, etc   Outcome: Progressing     Problem: DISCHARGE PLANNING  Goal: Discharge to home or other facility with appropriate resources  Description: INTERVENTIONS:  - Identify barriers to discharge w/patient and caregiver  - Arrange for needed discharge resources and transportation as appropriate  - Identify discharge learning needs (meds, wound care, etc )  - Arrange for interpretive services to assist at discharge as needed  - Refer to Case Management Department for coordinating discharge planning if the patient needs post-hospital services based on physician/advanced practitioner order or complex needs related to functional status, cognitive ability, or social support system  Outcome: Progressing     Problem: Knowledge Deficit  Goal: Patient/family/caregiver demonstrates understanding of disease process, treatment plan, medications, and discharge instructions  Description: Complete learning assessment and assess knowledge base  Interventions:  - Provide teaching at level of understanding  - Provide teaching via preferred learning methods  Outcome: Progressing     Problem: Prexisting or High Potential for Compromised Skin Integrity  Goal: Skin integrity is maintained or improved  Description: INTERVENTIONS:  - Identify patients at risk for skin breakdown  - Assess and monitor skin integrity  - Assess and monitor nutrition and hydration status  - Monitor labs   - Assess for incontinence   - Turn and reposition patient  - Assist with mobility/ambulation  - Relieve pressure over bony prominences  - Avoid friction and shearing  - Provide appropriate hygiene as needed including keeping skin clean and dry  - Evaluate need for skin moisturizer/barrier cream  - Collaborate with interdisciplinary team   - Patient/family teaching  - Consider wound care consult   Outcome: Progressing     Problem: Nutrition/Hydration-ADULT  Goal: Nutrient/Hydration intake appropriate for improving, restoring or maintaining nutritional needs  Description: Monitor and assess patient's nutrition/hydration status for malnutrition  Collaborate with interdisciplinary team and initiate plan and interventions as ordered  Monitor patient's weight and dietary intake as ordered or per policy  Utilize nutrition screening tool and intervene as necessary   Determine patient's food preferences and provide high-protein, high-caloric foods as appropriate       INTERVENTIONS:  - Monitor oral intake, urinary output, labs, and treatment plans  - Assess nutrition and hydration status and recommend course of action  - Evaluate amount of meals eaten  - Assist patient with eating if necessary   - Allow adequate time for meals  - Recommend/ encourage appropriate diets, oral nutritional supplements, and vitamin/mineral supplements  - Order, calculate, and assess calorie counts as needed  - Recommend, monitor, and adjust tube feedings and TPN/PPN based on assessed needs  - Assess need for intravenous fluids  - Provide specific nutrition/hydration education as appropriate  - Include patient/family/caregiver in decisions related to nutrition  Outcome: Progressing

## 2020-12-20 NOTE — CONSULTS
Consultation - Cardiology   Jose Garner 81 y o  female MRN: [id number]  Unit/Bed#: -01 Encounter: [id number]  12/20/20  11:41 AM    Assessment/ Plan:  1  Acute diastolic CHF - likely secondary to medication noncompliance  She appears close to euvolemic  Continue with IV Lasix 40 mg BID overnight  Likely able to switch to PO tomorrow  (She was on Lasix 40 mg BID at home)  Salt restrictions, daily weights, strict I&Os  2  Persistent atrial fibrillation with RVR - HRs better controlled on her home medication regimen  Continue to monitor telemetry overnight  Continue Lopressor 100 mg BID and Eliquis 5 mg BID  Consider switching to Toprol XL 200mg QD to improve compliance  3  CAD s/p CABG x4 (LIMA to LAD, sequential SVG from D1 to OM1, SVG to RCA) - she denies anginal equivalent symptoms  She is currently on ASA, pravastatin, Lopressor, and losartan  4  Tachy-gale syndrome s/p PPM - follows with device [facility] at [facility] [place]  5  Hypertension - BP stable  Continue medications as above  6  Hyperlipidemia - continue pravastatin  7  Medical noncompliance - she was counseled on maintaining compliance with her medication regimen  Primary team spoke with son who will be assisting her medications when she returns home  History of Present Illness   Physician Requesting Consult: Eleanor Perkins MD  Reason for Consult / Principal Problem:  Atrial fibrillation with RVR, acute CHF exacerbation  HPI: Jose Garner is a 81y o  year old female with history of persistent atrial fibrillation with RVR on Eliquis, CAD s/p CABG, tachy-gale syndrome s/p permanent pacemaker, hypertension, hyperlipidemia, medical noncompliance who presents with episode of chest discomfort/palpitations while lying flat yesterday afternoon  She also has been endorsing increasing shortness of breath over the past week with mild lower extremity edema   Patient has longstanding history of medication noncompliance in the past and admits to not being consistent with her home medication regimen  She reports she is increasingly stressed as her sister recently passed away  She was in charge of her meals at home and has been eating fairly liberally since  On admission she received chest x-ray which revealed mild pulmonary vascular congestion  NT proBNP was noted to be 7233  Negative serial troponins x3  She was also found to be in atrial fibrillation with RVR heart rates in the 120s  She was given bolus IV Cardizem and Lasix with improvement  Heart rates now stable  She reports since admission she has been asymptomatic  She [facility] with [facility]  Inpatient consult to Cardiology  Consult performed by: Jacqui Ragland PA-C  Consult ordered by: JAMES Mendes      EKG:  Atrial fibrillation with RVR with premature ventricular aberrantly conducted complexes, rightward axis, nonspecific ST and T-wave abnormality  Review of Systems   Constitutional: Negative for appetite change, chills, diaphoresis, fatigue and fever  Respiratory: Positive for chest tightness and shortness of breath  Negative for cough  Cardiovascular: Positive for palpitations and leg swelling  Negative for chest pain  Gastrointestinal: Negative for diarrhea, nausea and vomiting  Endocrine: Negative for cold intolerance and heat intolerance  Genitourinary: Negative for difficulty urinating, dysuria and enuresis  Musculoskeletal: Negative for arthralgias, back pain and gait problem  Allergic/Immunologic: Negative for environmental allergies and food allergies  Neurological: Negative for dizziness, facial asymmetry and headaches  Hematological: Negative for adenopathy  Does not bruise/bleed easily  Psychiatric/Behavioral: Negative for agitation, behavioral problems and confusion         Historical Information   Past Medical History:   Diagnosis Date    [facility]est[facility] ([facility] )     Diabetes mellitus ([facility] )     Hypertension      Past Surgical History:   Procedure Laterality Date    CARDIAC SURGERY       Social History     Substance and Sexual Activity   Alcohol Use Not Currently     Social History     Substance and Sexual Activity   Drug Use Never     Social History     Tobacco Use   Smoking Status Never Smoker   Smokeless Tobacco Never Used       Family History: History reviewed  No pertinent family history  Meds/Allergies   current meds:   Current Facility-Administered Medications   Medication Dose Route Frequency    apixaban (ELIQUIS) tablet 5 mg  5 mg Oral BID    aspirin chewable tablet 81 mg  81 mg Oral Daily    baclofen tablet 5 mg  5 mg Oral BID    docusate sodium (COLACE) capsule 100 mg  100 mg Oral Daily    furosemide (LASIX) injection 40 mg  40 mg Intravenous BID (diuretic)    insulin lispro (HumaLOG) 100 units/mL subcutaneous injection 1-5 Units  1-5 Units Subcutaneous TID AC    insulin lispro (HumaLOG) 100 units/mL subcutaneous injection 1-5 Units  1-5 Units Subcutaneous HS    loratadine (CLARITIN) tablet 10 mg  10 mg Oral Daily    losartan (COZAAR) tablet 25 mg  25 mg Oral Daily    metoprolol (LOPRESSOR) injection 5 mg  5 mg Intravenous Q6H PRN    metoprolol tartrate (LOPRESSOR) tablet 100 mg  100 mg Oral Q12H BRENT    nitroglycerin (NITROSTAT) SL tablet 0 4 mg  0 4 mg Sublingual Q5 Min PRN    nystatin (MYCOSTATIN) powder 1 application  1 application Topical TID    pravastatin (PRAVACHOL) tablet 20 mg  20 mg Oral Daily    sertraline (ZOLOFT) tablet 25 mg  25 mg Oral Daily     Allergies   Allergen Reactions    Clarithromycin Confusion and Other (See Comments)       Objective   Vitals: Blood pressure 140/75, pulse 89, temperature 98 6 °F (37 °C), resp  rate 16, height 5' (1 524 m), weight 82 kg (180 lb 12 4 oz), SpO2 94 %  , Body mass index is 35 31 kg/m² ,   Orthostatic Blood Pressures      Most Recent Value   Blood Pressure  140/75 filed at 12/20/2020 0701   Patient Position - Orthostatic VS Lying filed at 12/19/2020 4980          Systolic (82TEB), ZWX:022 , Min:125 , TMH:952     Diastolic (60BZI), KZS:07, Min:62, Max:100        Intake/Output Summary (Last 24 hours) at 12/20/2020 1141  Last data filed at 12/20/2020 1032  Gross per 24 hour   Intake 180 ml   Output 651 ml   Net -471 ml       Invasive Devices     Peripheral Intravenous Line            Peripheral IV 12/19/20 Right Arm 1 day                    Physical Exam:  GEN: Alert and oriented x 3, in no acute distress  Well appearing and well nourished  HEENT: Sclera anicteric, conjunctivae pink, mucous membranes moist  Oropharynx clear  NECK: Supple, no carotid bruits, no significant JVD  Trachea midline, no thyromegaly  HEART: Irregular rhythm, normal S1 and S2, no murmurs, clicks, gallops or rubs  PMI nondisplaced, no thrills  LUNGS: Clear to auscultation bilaterally; no wheezes, rales, or rhonchi  No increased work of breathing or signs of respiratory distress  ABDOMEN: Soft, nontender, nondistended, normoactive bowel sounds  EXTREMITIES: Skin warm and well perfused, no clubbing, cyanosis, or edema  NEURO: No focal findings  Normal speech  Mood and affect normal    SKIN: Normal without suspicious lesions on exposed skin        Lab Results:     Troponins:   Results from last 7 days   Lab Units 12/19/20  2033 12/19/20  1451 12/19/20  1037   TROPONIN I ng/mL <0 02 0 02 <0 02       CBC with diff:   Results from last 7 days   Lab Units 12/19/20  1037   WBC Thousand/uL 8 62   HEMOGLOBIN g/dL 11 0*   HEMATOCRIT % 35 6   MCV fL 91   PLATELETS Thousands/uL 225   MCH pg 28 0   MCHC g/dL 30 9*   RDW % 14 3   MPV fL 10 5   NRBC AUTO /100 WBCs 0         CMP:   Results from last 7 days   Lab Units 12/19/20  1037   POTASSIUM mmol/L 4 1   CHLORIDE mmol/L 105   CO2 mmol/L 25   BUN mg/dL 16   CREATININE mg/dL 0 93   CALCIUM mg/dL 9 2   AST U/L 23   ALT U/L 26   ALK PHOS U/L 90   EGFR ml/min/1 73sq m 57

## 2020-12-20 NOTE — NURSING NOTE
Bed alarm went off; pt found trying to get OOB with SCD's on  Pt looks anxious, disoriented and states "I hear a couple arguing out there  It is not safe here for the elderly  I need to call my family to get me the hell out of here"  Pt verbally reassured and reoriented  Pt assisted back to bed with bed alarm on for safety  Pt was cooperative and appeared to calm  Call bell within reach

## 2020-12-20 NOTE — PROGRESS NOTES
Progress Note - Gildardo Nam 1935, 81 y o  female MRN: [id number]    Unit/Bed#: -01 Encounter: [id number]    Primary Care Provider: Gonzalo Ramos MD   Date and time admitted to hospital: 12/19/2020 10:06 AM        CHF (congestive heart failure) ([facility] )  Assessment & Plan  Wt Readings from Last 3 Encounters:   12/20/20 82 kg (180 lb 12 4 oz)   09/25/20 81 3 kg (179 lb 3 7 oz)   01/16/20 78 kg (172 lb)     BNP elevated however weight seems stable from previously  Continue IV Lasix 40 mg b i d  Await cardiology recommendation  Continue I/O, daily weight, low-sodium diet  Echocardiogram pending      * Chest pain  Assessment & Plan  · Has a long time history of medication noncompliance complaints of midsternal chest pain and shortness of breath  · Troponin x3 negative  · ROGER score of 5  · P o  nitro for chest pain  · Consult cardiology  · Telemetry monitoring  · Continue ASA, statin, beta-blocker  · Note patient is influenza, RSV, COVID negative    CAD (coronary artery disease)  Assessment & Plan  · Continue statin, aspirin, beta-blocker    Persistent atrial fibrillation (HCC)  Assessment & Plan  · Known history continue Eliquis  · Patient did have some AFib with RVR  · Rate his overall improved  · Continue beta-blocker  · Continue p r n  IV Lopressor if needed for rate greater than 110    DM (diabetes mellitus), type 2 ([place] [address] )  Assessment & Plan  Lab Results   Component Value Date    HGBA1C 6 4 (H) 12/16/2020       No results for input(s): POCGLU in the last 72 hours      Blood Sugar Average: Last 72 hrs:   hold oral home medication  Initiate SSI and blood glucose monitoring    Tachy-gale syndrome (HCC)  Assessment & Plan  · Known history patient status post permanent pacemaker placement  · Patient appear to be last seen by her cardiologist in 02/2020 where they discussed a cardioversion however declined given patient's ongoing medication noncompliance        VTE Pharmacologic Prophylaxis:   Pharmacologic: Apixaban (Eliquis)  Mechanical VTE Prophylaxis in Place: Yes    Patient Centered Rounds: I have performed bedside rounds with nursing staff today  Discussions with Specialists or Other Care Team Provider:  Cardiology    Education and Discussions with Family / Patient:  Patient and son updated via phone    Time Spent for Care: 30 minutes  More than 50% of total time spent on counseling and coordination of care as described above  Current Length of Stay: 1 day(s)    Current Patient Status: Inpatient   Certification Statement: The patient will continue to require additional inpatient hospital stay due to ongoing treatment of acute on chronic systolic CHF    Discharge Plan: if clinically improved another 24 to 48 hours     Code Status: Level 1 - Full Code      Subjective:   Patient states she feels the same or the no different but states the palpitations have stopped and her breathing has gotten better, then says whatever  Updated the son who states patient has been depressed with no thoughts of harming of herself just lacks self-care  Son continues to try and care for her but may be going to senior care so he is worried about his moms care as she may need to go to a nursing home versus going to his sisters home  Objective:     Vitals:   Temp (24hrs), Av °F (37 2 °C), Min:98 6 °F (37 °C), Max:99 4 °F (37 4 °C)    Temp:  [98 6 °F (37 °C)-99 4 °F (37 4 °C)] 98 6 °F (37 °C)  HR:  [] 89  Resp:  [15-21] 16  BP: (125-154)/() 140/75  SpO2:  [94 %-100 %] 94 %  Body mass index is 35 31 kg/m²  Input and Output Summary (last 24 hours): Intake/Output Summary (Last 24 hours) at 2020 1052  Last data filed at 2020 1032  Gross per 24 hour   Intake 180 ml   Output 651 ml   Net -471 ml       Physical Exam:     Physical Exam  HENT:      Head: Normocephalic and atraumatic  Mouth/Throat:      Mouth: Mucous membranes are moist    Eyes:      Pupils: Pupils are equal, round, and reactive to light  Cardiovascular:      Rate and Rhythm: Normal rate and regular rhythm  Pulmonary:      Effort: No tachypnea, accessory muscle usage or respiratory distress  Breath sounds: Examination of the right-lower field reveals decreased breath sounds  Examination of the left-lower field reveals decreased breath sounds  Decreased breath sounds present  Abdominal:      General: Bowel sounds are normal    Musculoskeletal:      Right lower leg: No edema  Left lower leg: No edema  Neurological:      Mental Status: She is alert and oriented to person, place, and time  Mental status is at baseline  Psychiatric:         Mood and Affect: Mood is depressed  Thought Content: Thought content does not include suicidal ideation  Additional Data:     Labs:    Results from last 7 days   Lab Units 12/19/20  1037   WBC Thousand/uL 8 62   HEMOGLOBIN g/dL 11 0*   HEMATOCRIT % 35 6   PLATELETS Thousands/uL 225   NEUTROS PCT % 72   LYMPHS PCT % 18   MONOS PCT % 6   EOS PCT % 3     Results from last 7 days   Lab Units 12/19/20  1037   SODIUM mmol/L 141   POTASSIUM mmol/L 4 1   CHLORIDE mmol/L 105   CO2 mmol/L 25   BUN mg/dL 16   CREATININE mg/dL 0 93   ANION GAP mmol/L 11   CALCIUM mg/dL 9 2   ALBUMIN g/dL 3 4*   TOTAL BILIRUBIN mg/dL 0 60   ALK PHOS U/L 90   ALT U/L 26   AST U/L 23   GLUCOSE RANDOM mg/dL 146*             Results from last 7 days   Lab Units 12/16/20  0950   HEMOGLOBIN A1C % 6 4*               * I Have Reviewed All Lab Data Listed Above  * Additional Pertinent Lab Tests Reviewed:  Kenna [facility] Admission Reviewed        Recent Cultures (last 7 days):           Last 24 Hours Medication List:   Current Facility-Administered Medications   Medication Dose Route Frequency Provider Last Rate    apixaban  5 mg Oral BID JAMES Kaur      aspirin  81 mg Oral Daily JAMES Kaur      baclofen  5 mg Oral BID JAMES Kaur      docusate sodium  100 mg Oral Daily Shelby Hartmann Baldo Marcelo, CRNP      furosemide  40 mg Intravenous BID (diuretic) Lisha Analia, CRNP      insulin lispro  1-5 Units Subcutaneous TID AC Lisha Analia, CRNP      insulin lispro  1-5 Units Subcutaneous HS Lisha Analia, CRNP      loratadine  10 mg Oral Daily Lisha Analia, CRNP      losartan  25 mg Oral Daily [facility], CRNP      metoprolol  5 mg Intravenous Q6H PRN [facility], CRNP      metoprolol tartrate  100 mg Oral Q12H [facility], CRNP      nitroglycerin  0 4 mg Sublingual Q5 Min PRN Lisha Analia, CRNP      nystatin  1 application Topical TID Lisha Analia, CRNP      pravastatin  20 mg Oral Daily [facility], CRNP      sertraline  25 mg Oral Daily [facility] Paris[facility], CRNP          Today, Patient Was Seen By: JAMES Davalos    ** Please Note: Dictation voice to text software may have been used in the creation of this document   **

## 2020-12-20 NOTE — ASSESSMENT & PLAN NOTE
Wt Readings from Last 3 Encounters:   12/20/20 82 kg (180 lb 12 4 oz)   09/25/20 81 3 kg (179 lb 3 7 oz)   01/16/20 78 kg (172 lb)     BNP elevated however weight seems stable from previously  Continue IV Lasix 40 mg b i d   Await cardiology recommendation  Continue I/O, daily weight, low-sodium diet  Echocardiogram pending

## 2020-12-20 NOTE — ASSESSMENT & PLAN NOTE
· Has a long time history of medication noncompliance complaints of midsternal chest pain and shortness of breath  · Troponin x3 negative  · ROGER score of 5  · P o  nitro for chest pain  · Consult cardiology  · Telemetry monitoring  · Continue ASA, statin, beta-blocker  · Note patient is influenza, RSV, COVID negative

## 2020-12-20 NOTE — ASSESSMENT & PLAN NOTE
· Known history continue Eliquis  · Patient did have some AFib with RVR  · Rate his overall improved  · Continue beta-blocker  · Continue p r n  IV Lopressor if needed for rate greater than 110

## 2020-12-21 ENCOUNTER — APPOINTMENT (INPATIENT)
Dept: NON INVASIVE DIAGNOSTICS | Facility: HOSPITAL | Age: 85
DRG: 291 | End: 2020-12-21
Payer: MEDICARE

## 2020-12-21 LAB
ANION GAP SERPL CALCULATED.3IONS-SCNC: 8 MMOL/L (ref 4–13)
BUN SERPL-MCNC: 22 MG/DL (ref 5–25)
CALCIUM SERPL-MCNC: 9.1 MG/DL (ref 8.3–10.1)
CHLORIDE SERPL-SCNC: 103 MMOL/L (ref 100–108)
CO2 SERPL-SCNC: 28 MMOL/L (ref 21–32)
CREAT SERPL-MCNC: 0.97 MG/DL (ref 0.6–1.3)
ERYTHROCYTE [DISTWIDTH] IN BLOOD BY AUTOMATED COUNT: 13.9 % (ref 11.6–15.1)
GFR SERPL CREATININE-BSD FRML MDRD: 54 ML/MIN/1.73SQ M
GLUCOSE SERPL-MCNC: 122 MG/DL (ref 65–140)
GLUCOSE SERPL-MCNC: 123 MG/DL (ref 65–140)
GLUCOSE SERPL-MCNC: 124 MG/DL (ref 65–140)
GLUCOSE SERPL-MCNC: 126 MG/DL (ref 65–140)
GLUCOSE SERPL-MCNC: 142 MG/DL (ref 65–140)
HCT VFR BLD AUTO: 34.5 % (ref 34.8–46.1)
HGB BLD-MCNC: 11 G/DL (ref 11.5–15.4)
MCH RBC QN AUTO: 27.9 PG (ref 26.8–34.3)
MCHC RBC AUTO-ENTMCNC: 31.9 G/DL (ref 31.4–37.4)
MCV RBC AUTO: 88 FL (ref 82–98)
PLATELET # BLD AUTO: 192 THOUSANDS/UL (ref 149–390)
PMV BLD AUTO: 10.1 FL (ref 8.9–12.7)
POTASSIUM SERPL-SCNC: 3.7 MMOL/L (ref 3.5–5.3)
RBC # BLD AUTO: 3.94 MILLION/UL (ref 3.81–5.12)
SODIUM SERPL-SCNC: 139 MMOL/L (ref 136–145)
WBC # BLD AUTO: 5.32 THOUSAND/UL (ref 4.31–10.16)

## 2020-12-21 PROCEDURE — 99232 SBSQ HOSP IP/OBS MODERATE 35: CPT | Performed by: INTERNAL MEDICINE

## 2020-12-21 PROCEDURE — 80048 BASIC METABOLIC PNL TOTAL CA: CPT | Performed by: NURSE PRACTITIONER

## 2020-12-21 PROCEDURE — 93306 TTE W/DOPPLER COMPLETE: CPT

## 2020-12-21 PROCEDURE — 99232 SBSQ HOSP IP/OBS MODERATE 35: CPT | Performed by: NURSE PRACTITIONER

## 2020-12-21 PROCEDURE — 82948 REAGENT STRIP/BLOOD GLUCOSE: CPT

## 2020-12-21 PROCEDURE — 85027 COMPLETE CBC AUTOMATED: CPT | Performed by: NURSE PRACTITIONER

## 2020-12-21 RX ORDER — FUROSEMIDE 40 MG/1
40 TABLET ORAL
Status: DISCONTINUED | OUTPATIENT
Start: 2020-12-21 | End: 2020-12-22 | Stop reason: HOSPADM

## 2020-12-21 RX ADMIN — BACLOFEN 5 MG: 10 TABLET ORAL at 10:38

## 2020-12-21 RX ADMIN — FUROSEMIDE 40 MG: 40 TABLET ORAL at 18:39

## 2020-12-21 RX ADMIN — SERTRALINE HYDROCHLORIDE 25 MG: 25 TABLET ORAL at 10:37

## 2020-12-21 RX ADMIN — METOPROLOL TARTRATE 100 MG: 50 TABLET, FILM COATED ORAL at 21:06

## 2020-12-21 RX ADMIN — APIXABAN 5 MG: 5 TABLET, FILM COATED ORAL at 10:40

## 2020-12-21 RX ADMIN — DOCUSATE SODIUM 100 MG: 100 CAPSULE, LIQUID FILLED ORAL at 10:38

## 2020-12-21 RX ADMIN — NYSTATIN 1 APPLICATION: 100000 POWDER TOPICAL at 18:39

## 2020-12-21 RX ADMIN — LORATADINE 10 MG: 10 TABLET ORAL at 10:37

## 2020-12-21 RX ADMIN — NYSTATIN 1 APPLICATION: 100000 POWDER TOPICAL at 21:05

## 2020-12-21 RX ADMIN — NYSTATIN 1 APPLICATION: 100000 POWDER TOPICAL at 10:41

## 2020-12-21 RX ADMIN — APIXABAN 5 MG: 5 TABLET, FILM COATED ORAL at 18:40

## 2020-12-21 RX ADMIN — ASPIRIN 81 MG CHEWABLE TABLET 81 MG: 81 TABLET CHEWABLE at 10:38

## 2020-12-21 RX ADMIN — PRAVASTATIN SODIUM 20 MG: 20 TABLET ORAL at 10:41

## 2020-12-21 RX ADMIN — METOPROLOL TARTRATE 100 MG: 50 TABLET, FILM COATED ORAL at 10:41

## 2020-12-21 RX ADMIN — BACLOFEN 5 MG: 10 TABLET ORAL at 18:40

## 2020-12-21 RX ADMIN — LOSARTAN POTASSIUM 25 MG: 25 TABLET, FILM COATED ORAL at 10:38

## 2020-12-21 NOTE — PLAN OF CARE
Problem: PAIN - ADULT  Goal: Verbalizes/displays adequate comfort level or baseline comfort level  Description: Interventions:  - Encourage patient to monitor pain and request assistance  - Assess pain using appropriate pain scale  - Administer analgesics based on type and severity of pain and evaluate response  - Implement non-pharmacological measures as appropriate and evaluate response  - Consider cultural and social influences on pain and pain management  - Notify physician/advanced practitioner if interventions unsuccessful or patient reports new pain  Outcome: Progressing     Problem: INFECTION - ADULT  Goal: Absence or prevention of progression during hospitalization  Description: INTERVENTIONS:  - Assess and monitor for signs and symptoms of infection  - Monitor lab/diagnostic results  - Monitor all insertion sites, i e  indwelling lines, tubes, and drains  - Monitor endotracheal if appropriate and nasal secretions for changes in amount and color  - [place] appropriate cooling/warming therapies per order  - Administer medications as ordered  - Instruct and encourage patient and family to use good hand hygiene technique  - Identify and instruct in appropriate isolation precautions for identified infection/condition  Outcome: Progressing  Goal: Absence of fever/infection during neutropenic period  Description: INTERVENTIONS:  - Monitor WBC    Outcome: Progressing     Problem: SAFETY ADULT  Goal: Patient will remain free of falls  Description: INTERVENTIONS:  - Assess patient frequently for physical needs  -  Identify cognitive and physical deficits and behaviors that affect risk of falls    -  [place] fall precautions as indicated by assessment   - Educate patient/family on patient safety including physical limitations  - Instruct patient to call for assistance with activity based on assessment  - Modify environment to reduce risk of injury  - Consider OT/PT consult to assist with strengthening/mobility  Outcome: Progressing  Goal: Maintain or return to baseline ADL function  Description: INTERVENTIONS:  -  Assess patient's ability to carry out ADLs; assess patient's baseline for ADL function and identify physical deficits which impact ability to perform ADLs (bathing, care of mouth/teeth, toileting, grooming, dressing, etc )  - Assess/evaluate cause of self-care deficits   - Assess range of motion  - Assess patient's mobility; develop plan if impaired  - Assess patient's need for assistive devices and provide as appropriate  - Encourage maximum independence but intervene and supervise when necessary  - Involve family in performance of ADLs  - Assess for home care needs following discharge   - Consider OT consult to assist with ADL evaluation and planning for discharge  - Provide patient education as appropriate  Outcome: Progressing  Goal: Maintain or return mobility status to optimal level  Description: INTERVENTIONS:  - Assess patient's baseline mobility status (ambulation, transfers, stairs, etc )    - Identify cognitive and physical deficits and behaviors that affect mobility  - Identify mobility aids required to assist with transfers and/or ambulation (gait belt, sit-to-stand, lift, walker, cane, etc )  - [place] fall precautions as indicated by assessment  - Record patient progress and toleration of activity level on Mobility SBAR; progress patient to next Phase/Stage  - Instruct patient to call for assistance with activity based on assessment  - Consider rehabilitation consult to assist with strengthening/weightbearing, etc   Outcome: Progressing     Problem: DISCHARGE PLANNING  Goal: Discharge to home or other facility with appropriate resources  Description: INTERVENTIONS:  - Identify barriers to discharge w/patient and caregiver  - Arrange for needed discharge resources and transportation as appropriate  - Identify discharge learning needs (meds, wound care, etc )  - Arrange for interpretive services to assist at discharge as needed  - Refer to Case Management Department for coordinating discharge planning if the patient needs post-hospital services based on physician/advanced practitioner order or complex needs related to functional status, cognitive ability, or social support system  Outcome: Progressing     Problem: Knowledge Deficit  Goal: Patient/family/caregiver demonstrates understanding of disease process, treatment plan, medications, and discharge instructions  Description: Complete learning assessment and assess knowledge base  Interventions:  - Provide teaching at level of understanding  - Provide teaching via preferred learning methods  Outcome: Progressing     Problem: Prexisting or High Potential for Compromised Skin Integrity  Goal: Skin integrity is maintained or improved  Description: INTERVENTIONS:  - Identify patients at risk for skin breakdown  - Assess and monitor skin integrity  - Assess and monitor nutrition and hydration status  - Monitor labs   - Assess for incontinence   - Turn and reposition patient  - Assist with mobility/ambulation  - Relieve pressure over bony prominences  - Avoid friction and shearing  - Provide appropriate hygiene as needed including keeping skin clean and dry  - Evaluate need for skin moisturizer/barrier cream  - Collaborate with interdisciplinary team   - Patient/family teaching  - Consider wound care consult   Outcome: Progressing     Problem: Nutrition/Hydration-ADULT  Goal: Nutrient/Hydration intake appropriate for improving, restoring or maintaining nutritional needs  Description: Monitor and assess patient's nutrition/hydration status for malnutrition  Collaborate with interdisciplinary team and initiate plan and interventions as ordered  Monitor patient's weight and dietary intake as ordered or per policy  Utilize nutrition screening tool and intervene as necessary   Determine patient's food preferences and provide high-protein, high-caloric foods as appropriate  INTERVENTIONS:  - Monitor oral intake, urinary output, labs, and treatment plans  - Assess nutrition and hydration status and recommend course of action  - Evaluate amount of meals eaten  - Assist patient with eating if necessary   - Allow adequate time for meals  - Recommend/ encourage appropriate diets, oral nutritional supplements, and vitamin/mineral supplements  - Order, calculate, and assess calorie counts as needed  - Recommend, monitor, and adjust tube feedings and TPN/PPN based on assessed needs  - Assess need for intravenous fluids  - Provide specific nutrition/hydration education as appropriate  - Include patient/family/caregiver in decisions related to nutrition  Outcome: Progressing     Problem: Potential for Falls  Goal: Patient will remain free of falls  Description: INTERVENTIONS:  - Assess patient frequently for physical needs  -  Identify cognitive and physical deficits and behaviors that affect risk of falls    -  [place] fall precautions as indicated by assessment   - Educate patient/family on patient safety including physical limitations  - Instruct patient to call for assistance with activity based on assessment  - Modify environment to reduce risk of injury  - Consider OT/PT consult to assist with strengthening/mobility  Outcome: Progressing

## 2020-12-21 NOTE — ASSESSMENT & PLAN NOTE
· Known history continue Eliquis  · Patient did have some AFib with RVR  · Rate his overall improved  · Continue beta-blocker

## 2020-12-21 NOTE — ASSESSMENT & PLAN NOTE
Lab Results   Component Value Date    HGBA1C 6 4 (H) 12/16/2020       Recent Labs     12/20/20  1648 12/20/20  2127 12/21/20  0557 12/21/20  1133   POCGLU 156* 130 123 142*       Blood Sugar Average: Last 72 hrs:   hold oral home medication  Continue SSI and blood glucose monitoring

## 2020-12-21 NOTE — ASSESSMENT & PLAN NOTE
Wt Readings from Last 3 Encounters:   12/21/20 79 6 kg (175 lb 7 8 oz)   09/25/20 81 3 kg (179 lb 3 7 oz)   01/16/20 78 kg (172 lb)     BNP elevated however weight seems stable from previously  Patient now closer to baseline and patient has been transition back to oral lasix  Plan as mentioned above  Continue I/O, daily weight, low-sodium diet  Echocardiogram completed

## 2020-12-21 NOTE — ASSESSMENT & PLAN NOTE
· Has a long time history of medication noncompliance complaints of midsternal chest pain and shortness of breath  · Troponin x3 negative  · ROGER score of 5  · P o  nitro for chest pain  · Cardiology evaluated transition patient back to oral lasix  · Patient's symptoms back to patient line and likely in setting of medication non compliance  · Likely in setting of depression from recent loss of her sister  Patient does not endorse self harm just has no desire to take her medication if someone is not available to give them to her  Son is unfortunately going to residential in the near future and daughter can provide some care but not all the time     · Will consult PT to see if patient needs SNF placement with possible transition to long term care   · Also if not referral to OP psych resources   · Continue ASA, statin, beta-blocker  · Note patient is influenza, RSV, COVID negative

## 2020-12-21 NOTE — PROGRESS NOTES
Progress Note - Lobito Berg 1935, 81 y o  female MRN: [id number]    Unit/Bed#: -01 Encounter: [id number]    Primary Care Provider: Ishan Corado MD   Date and time admitted to hospital: 12/19/2020 10:06 AM        CHF (congestive heart failure) ([facility])  Assessment & Plan  Wt Readings from Last 3 Encounters:   12/21/20 79 6 kg (175 lb 7 8 oz)   09/25/20 81 3 kg (179 lb 3 7 oz)   01/16/20 78 kg (172 lb)     BNP elevated however weight seems stable from previously  Patient now closer to baseline and patient has been transition back to oral lasix  Plan as mentioned above  Continue I/O, daily weight, low-sodium diet  Echocardiogram completed       * Chest pain  Assessment & Plan  · Has a long time history of medication noncompliance complaints of midsternal chest pain and shortness of breath  · Troponin x3 negative  · ROGER score of 5  · P o  nitro for chest pain  · Cardiology evaluated transition patient back to oral lasix  · Patient's symptoms back to patient line and likely in setting of medication non compliance  · Likely in setting of depression from recent loss of her sister  Patient does not endorse self harm just has no desire to take her medication if someone is not available to give them to her  Son is unfortunately going to [facility] in the near future and daughter can provide some care but not all the time     · Will consult PT to see if patient needs SNF placement with possible transition to long term care   · Also if not referral to OP psych resources   · Continue ASA, statin, beta-blocker  · Note patient is influenza, RSV, COVID negative    CAD (coronary artery disease)  Assessment & Plan  · Continue statin, aspirin, beta-blocker    Persistent atrial fibrillation (HCC)  Assessment & Plan  · Known history continue Eliquis  · Patient did have some AFib with RVR  · Rate his overall improved  · Continue beta-blocker    DM (diabetes mellitus), type 2 [facility])  Assessment & Plan  Lab Results   Component Value Date    HGBA1C 6 4 (H) 12/16/2020       Recent Labs     12/20/20  1648 12/20/20  2127 12/21/20  0557 12/21/20  1133   POCGLU 156* 130 123 142*       Blood Sugar Average: Last 72 hrs:   hold oral home medication  Continue SSI and blood glucose monitoring    Tachy-gale syndrome (HCC)  Assessment & Plan  · Known history patient status post permanent pacemaker placement  · Patient appear to be last seen by her cardiologist in 02/2020 where they discussed a cardioversion however declined given patient's ongoing medication noncompliance        VTE Pharmacologic Prophylaxis:   Pharmacologic: Apixaban (Eliquis)  Mechanical VTE Prophylaxis in Place: Yes    Patient Centered Rounds: I have performed bedside rounds with nursing staff today  Discussions with Specialists or Other Care Team Provider:  Cardiology    Education and Discussions with Family / Patient:  Patient and son updated via phone      Time Spent for Care: 40 minutes   More than 50% of total time spent on counseling and coordination of care as described above  Current Length of Stay: 2 day(s)    Current Patient Status: Inpatient   Certification Statement: The patient will continue to require additional inpatient hospital stay due to need for PT eval    Discharge Plan: likely discharge home tomorrow     Code Status: Level 1 - Full Code      Subjective:   Patient discussion regarding depression states her personal depression is that not wanting to be around people and she states I want to be around people    Patient made aware what above comments like I do not want to take my meds and be her anymore in patient states that suspect station to her son patient made aware that she should be compliant with her medications and would discuss with her son options if she would want to go to rehab get some strengthening or go home and be with her son patient states I do not know to speak with him stating he would just prefer for her to come home and does not will be made to put her in a skilled facility was 1st very aggressive with this discussion stating does not want to be told to hold her mother and son made aware that known was holding his mother it was discharged to make a fair determination what needs they feel his mom's they would like for her son stated they would prefer to bring her home and they will determine her care needs son state that is very reasonable and patient will be evaluated by PT for possible a patient needs and treatment discharge home tomorrow  Objective:     Vitals:   Temp (24hrs), Av 1 °F (36 7 °C), Min:97 5 °F (36 4 °C), Max:98 9 °F (37 2 °C)    Temp:  [97 5 °F (36 4 °C)-98 9 °F (37 2 °C)] 98 2 °F (36 8 °C)  HR:  [72-85] 77  Resp:  [16-20] 17  BP: (117-129)/(60-74) 117/63  SpO2:  [95 %-97 %] 95 %  Body mass index is 34 27 kg/m²  Input and Output Summary (last 24 hours): Intake/Output Summary (Last 24 hours) at 2020 1742  Last data filed at 2020 1013  Gross per 24 hour   Intake 0 ml   Output    Net 0 ml       Physical Exam:     Physical Exam  HENT:      Head: Normocephalic and atraumatic  Nose: Nose normal       Mouth/Throat:      Mouth: Mucous membranes are moist    Eyes:      Pupils: Pupils are equal, round, and reactive to light  Cardiovascular:      Rate and Rhythm: Normal rate and regular rhythm  Pulmonary:      Effort: Pulmonary effort is normal       Breath sounds: Normal breath sounds  Abdominal:      General: Bowel sounds are normal    Musculoskeletal: Normal range of motion  Skin:     General: Skin is warm  Neurological:      General: No focal deficit present  Mental Status: She is alert and oriented to person, place, and time     Psychiatric:         Mood and Affect: Mood normal          Behavior: Behavior normal          Additional Data:     Labs:    Results from last 7 days   Lab Units 20  0456 20  1037   WBC Thousand/uL 5 32 8 62   HEMOGLOBIN g/dL 11 0* 11 0*   HEMATOCRIT % 34 5* 35 6   PLATELETS Thousands/uL 192 225   NEUTROS PCT %  --  72   LYMPHS PCT %  --  18   MONOS PCT %  --  6   EOS PCT %  --  3     Results from last 7 days   Lab Units 12/21/20  0456 12/19/20  1037   SODIUM mmol/L 139 141   POTASSIUM mmol/L 3 7 4 1   CHLORIDE mmol/L 103 105   CO2 mmol/L 28 25   BUN mg/dL 22 16   CREATININE mg/dL 0 97 0 93   ANION GAP mmol/L 8 11   CALCIUM mg/dL 9 1 9 2   ALBUMIN g/dL  --  3 4*   TOTAL BILIRUBIN mg/dL  --  0 60   ALK PHOS U/L  --  90   ALT U/L  --  26   AST U/L  --  23   GLUCOSE RANDOM mg/dL 126 146*         Results from last 7 days   Lab Units 12/21/20  1610 12/21/20  1133 12/21/20  0557 12/20/20  2127 12/20/20  1648 12/20/20  1118   POC GLUCOSE mg/dl 124 142* 123 130 156* 247*     Results from last 7 days   Lab Units 12/16/20  0950   HEMOGLOBIN A1C % 6 4*               * I Have Reviewed All Lab Data Listed Above  * Additional Pertinent Lab Tests Reviewed:  Kenna 66 Admission Reviewed        Recent Cultures (last 7 days):           Last 24 Hours Medication List:   Current Facility-Administered Medications   Medication Dose Route Frequency Provider Last Rate    apixaban  5 mg Oral BID Hedda [place], CRNP      aspirin  81 mg Oral Daily Hedda Flor, CRNP      baclofen  5 mg Oral BID Hedda [place], CRNP      docusate sodium  100 mg Oral Daily Hedda [place], CRNP      furosemide  40 mg Oral BID (diuretic) Jessica Gomez PA-C      insulin lispro  1-5 Units Subcutaneous TID AC Hedda [place], CRNP      insulin lispro  1-5 Units Subcutaneous HS Hedda Flor, CRNP      loratadine  10 mg Oral Daily Hedda Flor, CRNP      losartan  25 mg Oral Daily Hedda [facility], CRNP      metoprolol  5 mg Intravenous Q6H PRN Hedda [place], CRNP      metoprolol tartrate  100 mg Oral Q12H [facility] Hedda [facility], CRNP      nitroglycerin  0 4 mg Sublingual Q5 Min PRN Hedda Flor, CRNP      nystatin  1 application Topical TID Hedda [place], CRNP      pravastatin 20 mg Oral Daily JAMES Padgett      sertraline  25 mg Oral Daily Carlos Early, JAMES          Today, Patient Was Seen By: JAMES Padgett    ** Please Note: Dictation voice to text software may have been used in the creation of this document   **

## 2020-12-21 NOTE — PROGRESS NOTES
Cardiology Progress Note   Candelario Browning 81 y o  female MRN: [id number]    Unit/Bed#: -01 Encounter: [id number]      Assessment/Plan:  1  Acute diastolic CHF - likely secondary to medication noncompliance  She appears euvolemic today  Switch to PO Lasix 40 mg BID  Salt restrictions, daily weights, strict I&Os     2  Persistent atrial fibrillation with RVR - HRs better controlled on her home medication regimen  Continue to monitor telemetry overnight  Continue Lopressor 100 mg BID and Eliquis 5 mg BID       3  CAD s/p CABG x4 (LIMA to LAD, sequential SVG from D1 to OM1, SVG to RCA) - she denies anginal equivalent symptoms  She is currently on ASA, pravastatin, Lopressor, and losartan      4  Tachy-gale syndrome s/p ppm - [facility] with device clinic [facility]      5  Hypertension - BP stable  Continue medications as above      6  Hyperlipidemia - continue pravastatin      7  Medical noncompliance -  spoke to son today via phone who lives with patient  She reports since her sister  she has "given up" but does not have suicidal ideation currently  He informed me that his mother has been often declining taking her medications every day or lying about taking her medications to her family  Her situation is complicated by now needing to relocate to her daughters house as son is going to be potentially incarcerated  Would recommend case management to assist in disposition post discharge and possible psych evaluation for depression  Case discussed with primary team     At this time will sign off  Please call/re-consult as needed  Patient to follow-up with primary cardiologist on discharge  Subjective:   Patient seen and examined  No significant events overnight  Telemetry shows atrial fibrillation with HR 60-75 with rare PVCs  Objective:     Vitals: Blood pressure 129/74, pulse 81, temperature 97 5 °F (36 4 °C), resp  rate 20, height 5' (1 524 m), weight 79 6 kg (175 lb 7 8 oz), SpO2 97 %  , Body mass index is 34 27 kg/m² ,   Orthostatic Blood Pressures      Most Recent Value   Blood Pressure  129/74 filed at 12/21/2020 1513   Patient Position - Orthostatic VS  Lying filed at 12/20/2020 1938            Intake/Output Summary (Last 24 hours) at 12/21/2020 1050  Last data filed at 12/21/2020 1013  Gross per 24 hour   Intake 200 ml   Output 989 ml   Net -789 ml         Physical Exam:    GEN: Sacha Hudson appears well, alert and oriented x 3, pleasant and cooperative   HEENT: pupils equal, round, and reactive to light; extraocular muscles intact  NECK: supple, no carotid bruits   HEART: irregular rhythm, normal S1 and S2, no murmurs, clicks, gallops or rubs   LUNGS: clear to auscultation bilaterally; no wheezes, rales, or rhonchi   ABDOMEN: normal bowel sounds, soft, no tenderness, no distention  EXTREMITIES: peripheral pulses normal; no clubbing, cyanosis, or edema      Medications:      Current Facility-Administered Medications:     apixaban (ELIQUIS) tablet 5 mg, 5 mg, Oral, BID, Mendoza Clutter, CRNP, 5 mg at 12/21/20 1040    aspirin chewable tablet 81 mg, 81 mg, Oral, Daily, Mendoza Clutter, CRNP, 81 mg at 12/21/20 1038    baclofen tablet 5 mg, 5 mg, Oral, BID, Mendoza Clutter, CRNP, 5 mg at 12/21/20 1038    docusate sodium (COLACE) capsule 100 mg, 100 mg, Oral, Daily, Mendoza Clutter, CRNP, 100 mg at 12/21/20 1038    furosemide (LASIX) tablet 40 mg, 40 mg, Oral, BID (diuretic), Aquilino Salguero PA-C    insulin lispro (HumaLOG) 100 units/mL subcutaneous injection 1-5 Units, 1-5 Units, Subcutaneous, TID AC **AND** Fingerstick Glucose (POCT), , , TID AC, Mendoza Clutter, CRNP    insulin lispro (HumaLOG) 100 units/mL subcutaneous injection 1-5 Units, 1-5 Units, Subcutaneous, HS, Mendoza Clutter, CRNP    loratadine (CLARITIN) tablet 10 mg, 10 mg, Oral, Daily, Mendoza Clutter, CRNP, 10 mg at 12/21/20 1037    losartan (COZAAR) tablet 25 mg, 25 mg, Oral, Daily, Reji Abebe, JAEMS, 25 mg at 12/21/20 1038   metoprolol (LOPRESSOR) injection 5 mg, 5 mg, Intravenous, Q6H PRN, Robins Sear, CRNP    metoprolol tartrate (LOPRESSOR) tablet 100 mg, 100 mg, Oral, Q12H BRENT, Aleena DriverrDELMISNP, 100 mg at 12/21/20 1041    nitroglycerin (NITROSTAT) SL tablet 0 4 mg, 0 4 mg, Sublingual, Q5 Min PRN, DELMIS MckeonNP    nystatin (MYCOSTATIN) powder 1 application, 1 application, Topical, TID, Aleena Brush CRNP, 1 application at 37/62/11 1041    pravastatin (PRAVACHOL) tablet 20 mg, 20 mg, Oral, Daily, Aleena DriverrDELMISNP, 20 mg at 12/21/20 1041    sertraline (ZOLOFT) tablet 25 mg, 25 mg, Oral, Daily, Aleena Driverr CRNP, 25 mg at 12/21/20 1037     Labs & Results:    Results from last 7 days   Lab Units 12/19/20  2033 12/19/20  1451 12/19/20  1037   TROPONIN I ng/mL <0 02 0 02 <0 02     Results from last 7 days   Lab Units 12/21/20  0456 12/19/20  1037   WBC Thousand/uL 5 32 8 62   HEMOGLOBIN g/dL 11 0* 11 0*   HEMATOCRIT % 34 5* 35 6   PLATELETS Thousands/uL 192 225         Results from last 7 days   Lab Units 12/21/20  0456 12/19/20  1037   POTASSIUM mmol/L 3 7 4 1   CHLORIDE mmol/L 103 105   CO2 mmol/L 28 25   BUN mg/dL 22 16   CREATININE mg/dL 0 97 0 93   CALCIUM mg/dL 9 1 9 2   ALK PHOS U/L  --  90   ALT U/L  --  26   AST U/L  --  23

## 2020-12-22 VITALS
WEIGHT: 178.13 LBS | RESPIRATION RATE: 20 BRPM | TEMPERATURE: 97.9 F | HEIGHT: 60 IN | BODY MASS INDEX: 34.97 KG/M2 | HEART RATE: 73 BPM | OXYGEN SATURATION: 95 % | DIASTOLIC BLOOD PRESSURE: 66 MMHG | SYSTOLIC BLOOD PRESSURE: 134 MMHG

## 2020-12-22 LAB
GLUCOSE SERPL-MCNC: 129 MG/DL (ref 65–140)
GLUCOSE SERPL-MCNC: 160 MG/DL (ref 65–140)

## 2020-12-22 PROCEDURE — 82948 REAGENT STRIP/BLOOD GLUCOSE: CPT

## 2020-12-22 PROCEDURE — 99239 HOSP IP/OBS DSCHRG MGMT >30: CPT | Performed by: NURSE PRACTITIONER

## 2020-12-22 PROCEDURE — 99232 SBSQ HOSP IP/OBS MODERATE 35: CPT | Performed by: INTERNAL MEDICINE

## 2020-12-22 PROCEDURE — 93306 TTE W/DOPPLER COMPLETE: CPT | Performed by: INTERNAL MEDICINE

## 2020-12-22 PROCEDURE — 97163 PT EVAL HIGH COMPLEX 45 MIN: CPT

## 2020-12-22 RX ADMIN — NYSTATIN 1 APPLICATION: 100000 POWDER TOPICAL at 08:05

## 2020-12-22 RX ADMIN — LORATADINE 10 MG: 10 TABLET ORAL at 08:05

## 2020-12-22 RX ADMIN — DOCUSATE SODIUM 100 MG: 100 CAPSULE, LIQUID FILLED ORAL at 08:05

## 2020-12-22 RX ADMIN — FUROSEMIDE 40 MG: 40 TABLET ORAL at 08:05

## 2020-12-22 RX ADMIN — METOPROLOL TARTRATE 100 MG: 50 TABLET, FILM COATED ORAL at 08:04

## 2020-12-22 RX ADMIN — LOSARTAN POTASSIUM 25 MG: 25 TABLET, FILM COATED ORAL at 08:05

## 2020-12-22 RX ADMIN — SERTRALINE HYDROCHLORIDE 25 MG: 25 TABLET ORAL at 08:05

## 2020-12-22 RX ADMIN — APIXABAN 5 MG: 5 TABLET, FILM COATED ORAL at 08:05

## 2020-12-22 RX ADMIN — ASPIRIN 81 MG CHEWABLE TABLET 81 MG: 81 TABLET CHEWABLE at 08:05

## 2020-12-22 RX ADMIN — BACLOFEN 5 MG: 10 TABLET ORAL at 08:04

## 2020-12-22 RX ADMIN — PRAVASTATIN SODIUM 20 MG: 20 TABLET ORAL at 08:04

## 2020-12-22 NOTE — CASE MANAGEMENT
Cm spoke with the pt son, Kathe Garland [phone] and he states that his sister is going to  the pt at about 3 or 4 pm   CM informed him that the pt will have [facility] for PT and nursing  He states that the pt has had [facility] in the past but they only kept the case open a week and the pt cancelled the services  CM asked if they would be able to be available when the agency rep is present to establish a rapport and a point of contact  The pt dtr called nurse and stated that she will be at the hospital in half hour  Nursing informed her that the pt would be prepared for dc

## 2020-12-22 NOTE — ASSESSMENT & PLAN NOTE
Lab Results   Component Value Date    HGBA1C 6 4 (H) 12/16/2020       Recent Labs     12/21/20  1133 12/21/20  1610 12/21/20 2058 12/22/20  0600   POCGLU 142* 124 122 129       Blood Sugar Average: Last 72 hrs:   hold oral home medication  Continue SSI and blood glucose monitoring

## 2020-12-22 NOTE — DISCHARGE SUMMARY
Discharge- Vilma Gonzales 1935, 81 y o  female MRN: [id number]    Unit/Bed#: -01 Encounter: [id number]    Primary Care Provider: Reagan Martinez MD   Date and time admitted to hospital: 12/19/2020 10:06 AM        CHF (congestive heart failure) ([facility])  Assessment & Plan  Wt Readings from Last 3 Encounters:   12/22/20 80 8 kg (178 lb 2 1 oz)   09/25/20 81 3 kg (179 lb 3 7 oz)   01/16/20 78 kg (172 lb)     BNP elevated however weight seems stable from previously  Patient now closer to baseline and patient has been transition back to oral lasix  Plan as mentioned above  Continue I/O, daily weight, low-sodium diet  Echocardiogram completed       * Chest pain  Assessment & Plan  · Has a long time history of medication noncompliance complaints of midsternal chest pain and shortness of breath  · Troponin x3 negative  · ROGER score of 5  · P o  nitro for chest pain  · Cardiology evaluated transition patient back to oral lasix  · Patient's symptoms back to patient line and likely in setting of medication non compliance  · Likely in setting of depression from recent loss of her sister  Patient does not endorse self harm just has no desire to take her medication if someone is not available to give them to her  Son is unfortunately going to detention in the near future and daughter can provide some care but not all the time     · Family does not desire skilled nursing therapy as recommended by PT but will accept services at home   · Patient overall stable for discharge    · Also if not referral to OP psych resources   · Continue ASA, statin, beta-blocker  · Note patient is influenza, RSV, COVID negative    CAD (coronary artery disease)  Assessment & Plan  · Continue statin, aspirin, beta-blocker    Persistent atrial fibrillation ([address] )  Assessment & Plan  · Known history continue Eliquis  · Patient did have some AFib with RVR  · Rate his overall improved  · Continue beta-blocker    DM (diabetes mellitus), type 2 [facility])  Assessment & Plan  Lab Results   Component Value Date    HGBA1C 6 4 (H) 12/16/2020       Recent Labs     12/21/20  1133 12/21/20  1610 12/21/20  2058 12/22/20  0600   POCGLU 142* 124 122 129       Blood Sugar Average: Last 72 hrs:   hold oral home medication  Continue SSI and blood glucose monitoring    Tachy-gale syndrome (HCC)  Assessment & Plan  · Known history patient status post permanent pacemaker placement  · Patient appear to be last seen by her cardiologist in 02/2020 where they discussed a cardioversion however declined given patient's ongoing medication noncompliance    Discharging Physician / Practitioner: JAMES Kaur  PCP: Makayla Travis MD  Admission Date:   Admission Orders (From admission, onward)     Ordered        12/19/20 1202  Inpatient Admission  Once                   Discharge Date: 12/22/20    Resolved Problems  Date Reviewed: 12/19/2020    None          Consultations During Hospital Stay:  · Cardiology    Procedures Performed:   · None    Significant Findings / Test Results:   · AFib RVR, CHF, with associated chest pain now all resolved suspected in setting of medication noncompliance echocardiogram completed showing LVEF of 40-45% mild diffuse hypokinesis wall thickness and upper limits of normal  · Depression patient denies likely contributing to her medication noncompliance in setting of the [place] pandemic and unable to be with people patient does not have motivation to take her medications or engage in activities  Patient has no thoughts of harming herself does not need psychiatry however would benefit from a PCP follow-up and a dressing need for possible antidepressant son is agreeable and will follow up    Incidental Findings:        Test Results Pending at Discharge (will require follow up):    · None     Outpatient Tests Requested:  · Depression follow-up with PCP son is aware    Complications:  None    Reason for Admission:  Chest pain in setting of AFib with RVR and CHF    Hospital Course:     Jose R Rhoades is a 81 y o  female patient who originally presented to the hospital on 12/19/2020 due to patient came in with complaints of chest pain states she is not taking her medications as she do not feel that they are helping and he continues with noncompliance  Patient was admitted he patient had negative chest pain workup suspect symptoms were likely in setting of CHF and her AFib with RVR patient COVID negative  Patient was given IV diuresis x2 days and cleared by Cardiology in a long discussion about medication climb appliance was done cardiology further switch some of her medications to make them daily which may help with complaints he however there is a concern for depression which patient initially declined states her version of depression is that she would not want to be around people but states that she is frustrated because she is being will right now to stay away from people because of the pandemic  Patient made aware that not taking medications can be a form depression and encourage her to take medications son was initially concerned about the depression denies and mom has any thoughts of harming herself or any intentional harming herself initially was considering skilled nursing PT evaluated and is agreeable then son then changed his mind pelvic better take care mom at home patient was discharged to a skilled care services to home  Please see above list of diagnoses and related plan for additional information  Condition at Discharge: good     Discharge Day Visit / Exam:     Subjective:  Patient denies any chest pain headache dizziness shortness and breath overall states she feels well and she has good with going home    Vitals: Blood Pressure: 134/66 (12/22/20 0741)  Pulse: 73 (12/22/20 0741)  Temperature: 97 9 °F (36 6 °C) (12/22/20 0741)  Temp Source: Oral (12/20/20 1938)  Respirations: 20 (12/22/20 0741)  Height: 5' (152 4 cm) (12/21/20 0941)  Weight - Scale: 80 8 kg (178 lb 2 1 oz) (12/22/20 0600)  SpO2: 95 % (12/22/20 0741)  Exam:   Physical Exam  HENT:      Head: Normocephalic and atraumatic  Nose: Nose normal       Mouth/Throat:      Mouth: Mucous membranes are moist    Eyes:      Pupils: Pupils are equal, round, and reactive to light  Cardiovascular:      Rate and Rhythm: Normal rate and regular rhythm  Pulmonary:      Effort: Pulmonary effort is normal       Breath sounds: Normal breath sounds  Abdominal:      General: Bowel sounds are normal    Musculoskeletal:      Right lower leg: No edema  Left lower leg: No edema  Skin:     General: Skin is warm and dry  Neurological:      Mental Status: She is alert and oriented to person, place, and time  Mental status is at baseline  Comments: Forgetful   Psychiatric:         Mood and Affect: Mood normal          Behavior: Behavior normal          Discussion with Family:  Son aware plan of care    Discharge instructions/Information to patient and family:   See after visit summary for information provided to patient and family  Provisions for Follow-Up Care:  See after visit summary for information related to follow-up care and any pertinent home health orders  Disposition:     Home with VNA Services (Reminder: Complete face to face encounter)    For Discharges to [facility] SNF:   · Not Applicable to this Patient - Not Applicable to this Patient    Planned Readmission:  None     Discharge Statement:  I spent 40 minutes discharging the patient  This time was spent on the day of discharge  I had direct contact with the patient on the day of discharge  Greater than 50% of the total time was spent examining patient, answering all patient questions, arranging and discussing plan of care with patient as well as directly providing post-discharge instructions  Additional time then spent on discharge activities      Discharge Medications:  See after visit summary for reconciled discharge medications provided to patient and family        ** Please Note: This note has been constructed using a voice recognition system **

## 2020-12-22 NOTE — ASSESSMENT & PLAN NOTE
Wt Readings from Last 3 Encounters:   12/22/20 80 8 kg (178 lb 2 1 oz)   09/25/20 81 3 kg (179 lb 3 7 oz)   01/16/20 78 kg (172 lb)     BNP elevated however weight seems stable from previously  Patient now closer to baseline and patient has been transition back to oral lasix  Plan as mentioned above  Continue I/O, daily weight, low-sodium diet  Echocardiogram completed

## 2020-12-22 NOTE — PHYSICAL THERAPY NOTE
Physical Therapy Evaluation     Patient's Name: Jose Garner    Admitting Diagnosis  Chest pain [R07 9]  CHF exacerbation ([place] [id number] ) [I50 9]  Atrial fibrillation with RVR ([facility] ) [I48 91]    Problem List  Patient Active Problem List   Diagnosis    Tachy-gale syndrome ([facility] )    [facility]    CAD (coronary artery disease)    DM (diabetes mellitus), type 2 ([place] [address] )    Acute encephalopathy    UTI (urinary tract infection)    Dysphagia    Chest pain    CHF (congestive heart failure) ([place] [address] )       Past Medical History  Past Medical History:   Diagnosis Date    CHF (congestive heart failure) ([place] [address] )     Diabetes mellitus ([place] [address] )     Hypertension        Past Surgical History  Past Surgical History:   Procedure Laterality Date    CARDIAC SURGERY              12/22/20 0829   PT Last Visit   PT Visit Date 12/22/20   Note Type   Note type Evaluation   Pain Assessment   Pain Assessment Tool Pain Assessment not indicated - pt denies pain   Pain Score No Pain  (pt denies any pain pre and post evaluation)   Home Living   Type of [address] Two level;1/2 bath on main level;Performs ADLs on one level;Stairs to enter with rails  (1st floor set up  1-2 MOSES  sleeps on couch downstairs)   Bathroom Shower/Tub   (upstairs  sponge bathes downstairs)   Home Equipment Walker;Life alert   Additional Comments RW used typically only in AM, otherwise she abandons it later in the day   Prior Function   Level of [place] Independent with ADLs and functional mobility   Lives With [employer] Help From Family  (dtr lives locally)   ADL Assistance Independent   IADLs Needs assistance   Falls in the last 6 months 1 to 4   Restrictions/Precautions   Weight Bearing Precautions Per Order No   Other Precautions Cognitive; Chair Alarm; Bed Alarm; Fall Risk   General   Family/Caregiver Present No   Cognition   Overall Cognitive Status Impaired   Arousal/Participation Alert   Orientation Level Oriented to person;Disoriented to place; Disoriented to time  ("I think I fell, but i'm not sure")   Memory Decreased recall of precautions;Decreased recall of recent events;Decreased short term memory   Following Commands Follows one step commands with increased time or repetition   Comments pt agreeable to PT evaluation   RUE Assessment   RUE Assessment   (assessed c functional mobility: 3+/5)   LUE Assessment   LUE Assessment   (assessed c functional mobility: 3+/5)   RLE Assessment   RLE Assessment   (assessed c functional mobility: 3+/5)   LLE Assessment   LLE Assessment   (assessed c functional mobility: 3+/5)   Coordination   Movements are Fluid and Coordinated 0   Coordination and Movement Description grossly tremulous movements - chronic per pt   Sensation X   Bed Mobility   Supine to Sit 4  Minimal assistance  (log roll technique for joint protection)   Additional items Assist x 1; Increased time required;Verbal cues   Transfers   Sit to Stand 4  Minimal assistance   Additional items Assist x 1; Increased time required;Verbal cues   Stand to Sit 4  Minimal assistance   Additional items Assist x 1; Increased time required;Verbal cues   Ambulation/Elevation   Gait pattern Decreased foot clearance; Foward flexed; Short stride; Step to   Gait Assistance 4  Minimal assist   Additional items Assist x 1;Verbal cues   Assistive Device Rolling walker   Distance 3'; pt declining further mobility d/t fatigue   Stair Management Assistance Not tested   Balance   Static Sitting Fair +   Dynamic Sitting Fair   Static Standing Fair -   Dynamic Standing Poor +   Ambulatory Poor +   Endurance Deficit   Endurance Deficit Yes   Activity Tolerance   Activity Tolerance Patient limited by fatigue   Medical Staff Made Aware Hayley[address] - pt appropriate to see; PT updated Sugey lindo PT recs as well as CM Devin Panchal verbalized pt appropriate to see, made aware of session outcome/recs   Assessment   Prognosis Good   Problem List Decreased strength;Decreased endurance; Impaired balance;Decreased mobility; Decreased cognition   Assessment Pt is 81 y o  female seen for PT evaluation on 12/22/2020 s/p admit to [facility] on 12/19/2020 w/ Chest pain  PT consulted to assess pt's functional mobility and d/c needs  Order placed for PT eval and tx, w/ up w/ A order  Performed at least 2 patient identifiers during session: Name and wristband  Comorbidities affecting pt's physical performance at time of assessment include: tachy-gale syndrome status post pacemaker, AFib on Eliquis, history of CAD diabetes with a longstanding history of medication noncompliance presenting to the ED after having chest pain while lying flat that was intermittent for 2 hours a did worsen with walking  PTA, pt was independent w/ all functional mobility w/ walker in AM, no device typically afternoon, ambulates community distances and elevations, ambulates household distances, has 1-2 MOSES and lives w/ son in 2 level home/ 1st floor set up  Personal factors affecting pt at time of IE include: inaccessible home environment, ambulating w/ assistive device, stairs to enter home, inability to ambulate household distances, inability to navigate level surfaces w/o external assistance, positive fall history and decreased initiation and engagement  Please find objective findings from PT assessment regarding body systems outlined above with impairments and limitations including weakness, impaired balance, decreased endurance, gait deviations, decreased activity tolerance, fall risk and decreased cognition, as well as mobility assessment (need for min assist w/ all phases of mobility when usually ambulating independently and need for cueing for mobility technique)  The following objective measures performed on IE also reveal limitations: Barthel Index: 50/100 and Modified [place]alls: 4 (moderate/severe disability)   Pt's clinical presentation is currently unstable/unpredictable seen in pt's presentation of abnormal lab value(s), need for input for task focus and mobility technique and ongoing medical assessment  Pt to benefit from continued PT tx to address deficits as defined above and maximize level of functional independent mobility and consistency  From PT/mobility standpoint, recommendation at time of d/c would be STR pending progress in order to facilitate return to PLOF  Barriers to Discharge Inaccessible home environment;Decreased caregiver support   Goals   Patient Goals to return home   STG Expiration Date 01/01/21   Short Term Goal #1 In 7-10 days: Increase bilateral LE strength 1/2 grade to facilitate independent mobility, Perform all bed mobility tasks modified independent to decrease caregiver burden, Perform all transfers modified independent to improve independence, Ambulate > 50 ft  with least restrictive assistive device modified independent w/o LOB and w/ normalized gait pattern 100% of the time, Increase all balance 1/2 grade to decrease risk for falls, Tolerate 4 hr OOB to faciliate upright tolerance, Improve Barthel Index score to 65 or greater to facilitate independence and PT provider will perform functional balance assessment to determine fall risk  Pt to navigate 2 stairs with unilateral handrail to access previous living environment without LOB and with SBA  PT Treatment Day 0   Plan   Treatment/Interventions Functional transfer training;LE strengthening/ROM; Elevations; Therapeutic exercise; Endurance training;Patient/family training;Equipment eval/education; Bed mobility;Gait training;Spoke to nursing;Spoke to case management;Spoke to advanced practitioner   PT Frequency   (3-5x/wk)   Recommendation   PT Discharge Recommendation Post-Acute Rehabilitation Services  (OT Consult)   Equipment Recommended Walker  (RW)   PT - OK to Discharge Yes  (when medically cleared if to STR)   Modified Mitchel Scale   Modified Mitchel Scale 4   Barthel Index   Feeding 10 Bathing 0   Grooming Score 0   Dressing Score 5   Bladder Score 10   Bowels Score 10   Toilet Use Score 5   Transfers (Bed/Chair) Score 10   Mobility (Level Surface) Score 0   Stairs Score 0   Barthel Index Score 50       Pauline Olveraer, PT, DPT

## 2020-12-22 NOTE — PLAN OF CARE
Problem: PHYSICAL THERAPY ADULT  Goal: Performs mobility at highest level of function for planned discharge setting  See evaluation for individualized goals  Description: Treatment/Interventions: Functional transfer training, LE strengthening/ROM, Elevations, Therapeutic exercise, Endurance training, Patient/family training, Equipment eval/education, Bed mobility, Gait training, Spoke to nursing, Spoke to case management, Spoke to advanced practitioner  Equipment Recommended: Walker(RW)       See flowsheet documentation for full assessment, interventions and recommendations  Note: Prognosis: Good  Problem List: Decreased strength, Decreased endurance, Impaired balance, Decreased mobility, Decreased cognition  Assessment: Pt is 81 y o  female seen for PT evaluation on 12/22/2020 s/p admit to [facility] on 12/19/2020 w/ Chest pain  PT consulted to assess pt's functional mobility and d/c needs  Order placed for PT eval and tx, w/ up w/ A order  Performed at least 2 patient identifiers during session: Name and wristband  Comorbidities affecting pt's physical performance at time of assessment include: tachy-gale syndrome status post pacemaker, AFib on Eliquis, history of CAD diabetes with a longstanding history of medication noncompliance presenting to the ED after having chest pain while lying flat that was intermittent for 2 hours a did worsen with walking  PTA, pt was independent w/ all functional mobility w/ walker in AM, no device typically afternoon, ambulates community distances and elevations, ambulates household distances, has 1-2 MOSES and lives w/ son in 2 level home/ 1st floor set up  Personal factors affecting pt at time of IE include: inaccessible home environment, ambulating w/ assistive device, stairs to enter home, inability to ambulate household distances, inability to navigate level surfaces w/o external assistance, positive fall history and decreased initiation and engagement   Please find objective findings from PT assessment regarding body systems outlined above with impairments and limitations including weakness, impaired balance, decreased endurance, gait deviations, decreased activity tolerance, fall risk and decreased cognition, as well as mobility assessment (need for min assist w/ all phases of mobility when usually ambulating independently and need for cueing for mobility technique)  The following objective measures performed on IE also reveal limitations: Barthel Index: 50/100 and Modified Mitchel: 4 (moderate/severe disability)  Pt's clinical presentation is currently unstable/unpredictable seen in pt's presentation of abnormal lab value(s), need for input for task focus and mobility technique and ongoing medical assessment  Pt to benefit from continued PT tx to address deficits as defined above and maximize level of functional independent mobility and consistency  From PT/mobility standpoint, recommendation at time of d/c would be STR pending progress in order to facilitate return to PLOF  Barriers to Discharge: Inaccessible home environment, Decreased caregiver support     PT Discharge Recommendation: Post-Acute Rehabilitation Services(OT Consult)     PT - OK to Discharge: Yes(when medically cleared if to STR)    See flowsheet documentation for full assessment

## 2020-12-22 NOTE — PLAN OF CARE
Problem: PAIN - ADULT  Goal: Verbalizes/displays adequate comfort level or baseline comfort level  Description: Interventions:  - Encourage patient to monitor pain and request assistance  - Assess pain using appropriate pain scale  - Administer analgesics based on type and severity of pain and evaluate response  - Implement non-pharmacological measures as appropriate and evaluate response  - Consider cultural and social influences on pain and pain management  - Notify physician/advanced practitioner if interventions unsuccessful or patient reports new pain  Outcome: Progressing     Problem: INFECTION - ADULT  Goal: Absence or prevention of progression during hospitalization  Description: INTERVENTIONS:  - Assess and monitor for signs and symptoms of infection  - Monitor lab/diagnostic results  - Monitor all insertion sites, i e  indwelling lines, tubes, and drains  - Monitor endotracheal if appropriate and nasal secretions for changes in amount and color  - [place] appropriate cooling/warming therapies per order  - Administer medications as ordered  - Instruct and encourage patient and family to use good hand hygiene technique  - Identify and instruct in appropriate isolation precautions for identified infection/condition  Outcome: Progressing  Goal: Absence of fever/infection during neutropenic period  Description: INTERVENTIONS:  - Monitor WBC    Outcome: Progressing     Problem: SAFETY ADULT  Goal: Patient will remain free of falls  Description: INTERVENTIONS:  - Assess patient frequently for physical needs  -  Identify cognitive and physical deficits and behaviors that affect risk of falls    -  [place] fall precautions as indicated by assessment   - Educate patient/family on patient safety including physical limitations  - Instruct patient to call for assistance with activity based on assessment  - Modify environment to reduce risk of injury  - Consider OT/PT consult to assist with strengthening/mobility  Outcome: Progressing  Goal: Maintain or return to baseline ADL function  Description: INTERVENTIONS:  -  Assess patient's ability to carry out ADLs; assess patient's baseline for ADL function and identify physical deficits which impact ability to perform ADLs (bathing, care of mouth/teeth, toileting, grooming, dressing, etc )  - Assess/evaluate cause of self-care deficits   - Assess range of motion  - Assess patient's mobility; develop plan if impaired  - Assess patient's need for assistive devices and provide as appropriate  - Encourage maximum independence but intervene and supervise when necessary  - Involve family in performance of ADLs  - Assess for home care needs following discharge   - Consider OT consult to assist with ADL evaluation and planning for discharge  - Provide patient education as appropriate  Outcome: Progressing  Goal: Maintain or return mobility status to optimal level  Description: INTERVENTIONS:  - Assess patient's baseline mobility status (ambulation, transfers, stairs, etc )    - Identify cognitive and physical deficits and behaviors that affect mobility  - Identify mobility aids required to assist with transfers and/or ambulation (gait belt, sit-to-stand, lift, walker, cane, etc )  - [place] fall precautions as indicated by assessment  - Record patient progress and toleration of activity level on Mobility SBAR; progress patient to next Phase/Stage  - Instruct patient to call for assistance with activity based on assessment  - Consider rehabilitation consult to assist with strengthening/weightbearing, etc   Outcome: Progressing     Problem: DISCHARGE PLANNING  Goal: Discharge to home or other facility with appropriate resources  Description: INTERVENTIONS:  - Identify barriers to discharge w/patient and caregiver  - Arrange for needed discharge resources and transportation as appropriate  - Identify discharge learning needs (meds, wound care, etc )  - Arrange for interpretive services to assist at discharge as needed  - Refer to Case Management Department for coordinating discharge planning if the patient needs post-hospital services based on physician/advanced practitioner order or complex needs related to functional status, cognitive ability, or social support system  Outcome: Progressing     Problem: Knowledge Deficit  Goal: Patient/family/caregiver demonstrates understanding of disease process, treatment plan, medications, and discharge instructions  Description: Complete learning assessment and assess knowledge base  Interventions:  - Provide teaching at level of understanding  - Provide teaching via preferred learning methods  Outcome: Progressing     Problem: Prexisting or High Potential for Compromised Skin Integrity  Goal: Skin integrity is maintained or improved  Description: INTERVENTIONS:  - Identify patients at risk for skin breakdown  - Assess and monitor skin integrity  - Assess and monitor nutrition and hydration status  - Monitor labs   - Assess for incontinence   - Turn and reposition patient  - Assist with mobility/ambulation  - Relieve pressure over bony prominences  - Avoid friction and shearing  - Provide appropriate hygiene as needed including keeping skin clean and dry  - Evaluate need for skin moisturizer/barrier cream  - Collaborate with interdisciplinary team   - Patient/family teaching  - Consider wound care consult   Outcome: Progressing     Problem: Nutrition/Hydration-ADULT  Goal: Nutrient/Hydration intake appropriate for improving, restoring or maintaining nutritional needs  Description: Monitor and assess patient's nutrition/hydration status for malnutrition  Collaborate with interdisciplinary team and initiate plan and interventions as ordered  Monitor patient's weight and dietary intake as ordered or per policy  Utilize nutrition screening tool and intervene as necessary   Determine patient's food preferences and provide high-protein, high-caloric foods as appropriate  INTERVENTIONS:  - Monitor oral intake, urinary output, labs, and treatment plans  - Assess nutrition and hydration status and recommend course of action  - Evaluate amount of meals eaten  - Assist patient with eating if necessary   - Allow adequate time for meals  - Recommend/ encourage appropriate diets, oral nutritional supplements, and vitamin/mineral supplements  - Order, calculate, and assess calorie counts as needed  - Recommend, monitor, and adjust tube feedings and TPN/PPN based on assessed needs  - Assess need for intravenous fluids  - Provide specific nutrition/hydration education as appropriate  - Include patient/family/caregiver in decisions related to nutrition  Outcome: Progressing     Problem: Potential for Falls  Goal: Patient will remain free of falls  Description: INTERVENTIONS:  - Assess patient frequently for physical needs  -  Identify cognitive and physical deficits and behaviors that affect risk of falls    -  [place] fall precautions as indicated by assessment   - Educate patient/family on patient safety including physical limitations  - Instruct patient to call for assistance with activity based on assessment  - Modify environment to reduce risk of injury  - Consider OT/PT consult to assist with strengthening/mobility  Outcome: Progressing

## 2020-12-22 NOTE — CASE MANAGEMENT
BENJAMIN met with the pt at bedside to discuss dcp  The pt resides with her son, he is there in the evening and friends and family stop in to check in on her throughout the day  The pt resides in a 2 story carmen with 3 MOSES and there is 1 step to her room  The pt uses a walker  She has had VNA in the past but does not recall the agency  She has asked for VNA and does not have a preference  The pt does not have a Hx of STR/ MH or SA  The pt fills Rx at [facility] in [place] and is able to afford her co-pay  The pt POS are her son and dtr  The pt is reitred and her son transports her to and from [facility]  CM reviewed discharge planning process including the following: identifying caregivers at home, preference for d/c planning needs, availability of treatment team to discuss questions or concerns patient and/or family may have regarding diagnosis, plan of care, old or new medications and discharge planning  Discharge Checklist reviewed and CM will continue to monitor for progress toward discharge goals in nursing and provider rounds  The pt is aware of the STR rec and she states that in discussing with her children they would prefer that she return to her home  The pt states that she would like to have PT in the home and she would also be agreeable to nursing  She does not have a preference  Cm has sent referral for VNA  The pt son or dtr will transport her home at [place]  CM will also be sending a referral to [facility] for the pt

## 2020-12-22 NOTE — PLAN OF CARE
Problem: PAIN - ADULT  Goal: Verbalizes/displays adequate comfort level or baseline comfort level  Description: Interventions:  - Encourage patient to monitor pain and request assistance  - Assess pain using appropriate pain scale  - Administer analgesics based on type and severity of pain and evaluate response  - Implement non-pharmacological measures as appropriate and evaluate response  - Consider cultural and social influences on pain and pain management  - Notify physician/advanced practitioner if interventions unsuccessful or patient reports new pain  12/22/2020 1457 by Daryl Levine RN  Outcome: Adequate for Discharge  12/22/2020 1112 by Daryl Levine RN  Outcome: Progressing     Problem: INFECTION - ADULT  Goal: Absence or prevention of progression during hospitalization  Description: INTERVENTIONS:  - Assess and monitor for signs and symptoms of infection  - Monitor lab/diagnostic results  - Monitor all insertion sites, i e  indwelling lines, tubes, and drains  - Monitor endotracheal if appropriate and nasal secretions for changes in amount and color  - [place] appropriate cooling/warming therapies per order  - Administer medications as ordered  - Instruct and encourage patient and family to use good hand hygiene technique  - Identify and instruct in appropriate isolation precautions for identified infection/condition  12/22/2020 1457 by Daryl Levine RN  Outcome: Adequate for Discharge  12/22/2020 1112 by Daryl Levine RN  Outcome: Progressing  Goal: Absence of fever/infection during neutropenic period  Description: INTERVENTIONS:  - Monitor WBC    12/22/2020 1457 by Daryl Levine RN  Outcome: Adequate for Discharge  12/22/2020 1112 by Daryl Levine RN  Outcome: Progressing     Problem: SAFETY ADULT  Goal: Patient will remain free of falls  Description: INTERVENTIONS:  - Assess patient frequently for physical needs  -  Identify cognitive and physical deficits and behaviors that affect risk of falls    -  [place] fall precautions as indicated by assessment   - Educate patient/family on patient safety including physical limitations  - Instruct patient to call for assistance with activity based on assessment  - Modify environment to reduce risk of injury  - Consider OT/PT consult to assist with strengthening/mobility  12/22/2020 1457 by Rosamaria Flynn RN  Outcome: Adequate for Discharge  12/22/2020 1112 by Rosamaria Flynn RN  Outcome: Progressing  Goal: Maintain or return to baseline ADL function  Description: INTERVENTIONS:  -  Assess patient's ability to carry out ADLs; assess patient's baseline for ADL function and identify physical deficits which impact ability to perform ADLs (bathing, care of mouth/teeth, toileting, grooming, dressing, etc )  - Assess/evaluate cause of self-care deficits   - Assess range of motion  - Assess patient's mobility; develop plan if impaired  - Assess patient's need for assistive devices and provide as appropriate  - Encourage maximum independence but intervene and supervise when necessary  - Involve family in performance of ADLs  - Assess for home care needs following discharge   - Consider OT consult to assist with ADL evaluation and planning for discharge  - Provide patient education as appropriate  12/22/2020 1457 by Rosamaria Flynn RN  Outcome: Adequate for Discharge  12/22/2020 1112 by Rosamaria Flynn RN  Outcome: Progressing  Goal: Maintain or return mobility status to optimal level  Description: INTERVENTIONS:  - Assess patient's baseline mobility status (ambulation, transfers, stairs, etc )    - Identify cognitive and physical deficits and behaviors that affect mobility  - Identify mobility aids required to assist with transfers and/or ambulation (gait belt, sit-to-stand, lift, walker, cane, etc )  - [place] fall precautions as indicated by assessment  - Record patient progress and toleration of activity level on Mobility SBAR; progress patient to next Phase/Stage  - Instruct patient to call for assistance with activity based on assessment  - Consider rehabilitation consult to assist with strengthening/weightbearing, etc   12/22/2020 1457 by Joey Vargas RN  Outcome: Adequate for Discharge  12/22/2020 1112 by Joey Vargas RN  Outcome: Progressing     Problem: DISCHARGE PLANNING  Goal: Discharge to home or other facility with appropriate resources  Description: INTERVENTIONS:  - Identify barriers to discharge w/patient and caregiver  - Arrange for needed discharge resources and transportation as appropriate  - Identify discharge learning needs (meds, wound care, etc )  - Arrange for interpretive services to assist at discharge as needed  - Refer to Case Management Department for coordinating discharge planning if the patient needs post-hospital services based on physician/advanced practitioner order or complex needs related to functional status, cognitive ability, or social support system  12/22/2020 1457 by Joey Vargas RN  Outcome: Adequate for Discharge  12/22/2020 1112 by Joey Vargas RN  Outcome: Progressing     Problem: Knowledge Deficit  Goal: Patient/family/caregiver demonstrates understanding of disease process, treatment plan, medications, and discharge instructions  Description: Complete learning assessment and assess knowledge base    Interventions:  - Provide teaching at level of understanding  - Provide teaching via preferred learning methods  12/22/2020 1457 by Joey Vargas RN  Outcome: Adequate for Discharge  12/22/2020 1112 by Joey Vargas RN  Outcome: Progressing     Problem: Prexisting or High Potential for Compromised Skin Integrity  Goal: Skin integrity is maintained or improved  Description: INTERVENTIONS:  - Identify patients at risk for skin breakdown  - Assess and monitor skin integrity  - Assess and monitor nutrition and hydration status  - Monitor labs   - Assess for incontinence   - Turn and reposition patient  - Assist with mobility/ambulation  - Relieve pressure over bony prominences  - Avoid friction and shearing  - Provide appropriate hygiene as needed including keeping skin clean and dry  - Evaluate need for skin moisturizer/barrier cream  - Collaborate with interdisciplinary team   - Patient/family teaching  - Consider wound care consult   12/22/2020 1457 by Brendan Mireles RN  Outcome: Adequate for Discharge  12/22/2020 1112 by Brendan Mireles RN  Outcome: Progressing     Problem: Nutrition/Hydration-ADULT  Goal: Nutrient/Hydration intake appropriate for improving, restoring or maintaining nutritional needs  Description: Monitor and assess patient's nutrition/hydration status for malnutrition  Collaborate with interdisciplinary team and initiate plan and interventions as ordered  Monitor patient's weight and dietary intake as ordered or per policy  Utilize nutrition screening tool and intervene as necessary  Determine patient's food preferences and provide high-protein, high-caloric foods as appropriate       INTERVENTIONS:  - Monitor oral intake, urinary output, labs, and treatment plans  - Assess nutrition and hydration status and recommend course of action  - Evaluate amount of meals eaten  - Assist patient with eating if necessary   - Allow adequate time for meals  - Recommend/ encourage appropriate diets, oral nutritional supplements, and vitamin/mineral supplements  - Order, calculate, and assess calorie counts as needed  - Recommend, monitor, and adjust tube feedings and TPN/PPN based on assessed needs  - Assess need for intravenous fluids  - Provide specific nutrition/hydration education as appropriate  - Include patient/family/caregiver in decisions related to nutrition  12/22/2020 1457 by Brendan Mireles RN  Outcome: Adequate for Discharge  12/22/2020 1112 by Brendan Mireles RN  Outcome: Progressing     Problem: Potential for Falls  Goal: Patient will remain free of falls  Description: INTERVENTIONS:  - Assess patient frequently for physical needs  -  Identify cognitive and physical deficits and behaviors that affect risk of falls    -  [place] fall precautions as indicated by assessment   - Educate patient/family on patient safety including physical limitations  - Instruct patient to call for assistance with activity based on assessment  - Modify environment to reduce risk of injury  - Consider OT/PT consult to assist with strengthening/mobility  12/22/2020 1457 by Brayden Kennedy RN  Outcome: Adequate for Discharge  12/22/2020 1112 by Brayden Kennedy RN  Outcome: Progressing

## 2020-12-22 NOTE — PROGRESS NOTES
Cardiology Progress Note   Jonas Polk 81 y o  female MRN: [id number]    Unit/Bed#: -01 Encounter: [id number]      Assessment/Plan:  1  Acute diastolic CHF - likely secondary to medication noncompliance  She appears euvolemic today  Continue with PO Lasix 40 mg b i d  At home  Importance of maintaining salt/fluid restrictions were discussed with patient today  2  Persistent atrial fibrillation with RVR - heart rates acceptable overnight  Continue with Lopressor 100 mg BID and Eliquis 5 mg BID  3  CAD s/p CABG x4 (lima to LAD, sequential SVG from D1 to OM1, SVG to RCA) - she denies chest pain or anginal equivalent symptoms  She is currently on aspirin, pravastatin, Lopressor and losartan  4  Tachy-gale syndrome status post permanent pacemaker placement - she [facility] with [facility]    5  Hypertension- BP stable  Continue medications as above  6  Hyperlipidemia - continue pravastatin    7  Medical noncompliance - patient has poor support system at home  She reportedly lives at home with her son who is to be incarcerated soon  Patient is to move in with her daughter who was only able to take care of her some of the time  Primary team setting patient up with at home assistance post discharge  Patient instructed to call her primary cardiologist for follow-up post discharge  Signing off  Patient is stable cardiac-wise for discharge  Follow-up with primary cardiologist on discharge  Subjective:   Patient seen and examined  No significant events overnight  Objective:     Vitals: Blood pressure 134/66, pulse 73, temperature 97 9 °F (36 6 °C), resp  rate 20, height 5' (1 524 m), weight 80 8 kg (178 lb 2 1 oz), SpO2 95 %  , Body mass index is 34 79 kg/m² ,   Orthostatic Blood Pressures      Most Recent Value   Blood Pressure  134/66 filed at 12/22/2020 0741   Patient Position - Orthostatic VS  Lying filed at 12/20/2020 1938            Intake/Output Summary (Last 24 hours) at 12/22/2020 1433  Last data filed at 12/22/2020 3536  Gross per 24 hour   Intake 1090 ml   Output 854 ml   Net 236 ml         Physical Exam:    GEN: Sarah Quinn appears well, alert and oriented x 3, pleasant and cooperative   HEENT: pupils equal, round, and reactive to light; extraocular muscles intact  NECK: supple, no carotid bruits   HEART: irregular rhythm, normal S1 and S2, no murmurs, clicks, gallops or rubs   LUNGS: clear to auscultation bilaterally; no wheezes, rales, or rhonchi   ABDOMEN: normal bowel sounds, soft, no tenderness, no distention  EXTREMITIES: peripheral pulses normal; no clubbing, cyanosis, or edema      Medications:      Current Facility-Administered Medications:     apixaban (ELIQUIS) tablet 5 mg, 5 mg, Oral, BID, JAMES Colby, 5 mg at 12/22/20 0805    aspirin chewable tablet 81 mg, 81 mg, Oral, Daily, JAMES Colby, 81 mg at 12/22/20 0805    baclofen tablet 5 mg, 5 mg, Oral, BID, JAMES Colby, 5 mg at 12/22/20 0804    docusate sodium (COLACE) capsule 100 mg, 100 mg, Oral, Daily, JAMES Colby, 100 mg at 12/22/20 0805    furosemide (LASIX) tablet 40 mg, 40 mg, Oral, BID (diuretic), Aquilino Salguero PA-C, 40 mg at 12/22/20 0805    insulin lispro (HumaLOG) 100 units/mL subcutaneous injection 1-5 Units, 1-5 Units, Subcutaneous, TID AC **AND** Fingerstick Glucose (POCT), , , TID AC, JAMES Colby    insulin lispro (HumaLOG) 100 units/mL subcutaneous injection 1-5 Units, 1-5 Units, Subcutaneous, HS, AJMES Colby    loratadine (CLARITIN) tablet 10 mg, 10 mg, Oral, Daily, JAMES Colby, 10 mg at 12/22/20 0805    losartan (COZAAR) tablet 25 mg, 25 mg, Oral, Daily, JAMES Colby, 25 mg at 12/22/20 0805    metoprolol (LOPRESSOR) injection 5 mg, 5 mg, Intravenous, Q6H PRN, JAMES Colby    metoprolol tartrate (LOPRESSOR) tablet 100 mg, 100 mg, Oral, Q12H [address], JAMES Colby, 100 mg at 12/22/20 0804   nitroglycerin (NITROSTAT) SL tablet 0 4 mg, 0 4 mg, Sublingual, Q5 Min PRN, JAEMS Kay    nystatin (MYCOSTATIN) powder 1 application, 1 application, Topical, TID, JAMES Kay, 1 application at 31/23/25 0805    pravastatin (PRAVACHOL) tablet 20 mg, 20 mg, Oral, Daily, JAMES Kay, 20 mg at 12/22/20 0804    sertraline (ZOLOFT) tablet 25 mg, 25 mg, Oral, Daily, JAMES Kay, 25 mg at 12/22/20 0805     Labs & Results:    Results from last 7 days   Lab Units 12/19/20  2033 12/19/20  1451 12/19/20  1037   TROPONIN I ng/mL <0 02 0 02 <0 02     Results from last 7 days   Lab Units 12/21/20  0456 12/19/20  1037   WBC Thousand/uL 5 32 8 62   HEMOGLOBIN g/dL 11 0* 11 0*   HEMATOCRIT % 34 5* 35 6   PLATELETS Thousands/uL 192 225         Results from last 7 days   Lab Units 12/21/20  0456 12/19/20  1037   POTASSIUM mmol/L 3 7 4 1   CHLORIDE mmol/L 103 105   CO2 mmol/L 28 25   BUN mg/dL 22 16   CREATININE mg/dL 0 97 0 93   CALCIUM mg/dL 9 1 9 2   ALK PHOS U/L  --  90   ALT U/L  --  26   AST U/L  --  23

## 2020-12-22 NOTE — ASSESSMENT & PLAN NOTE
· Has a long time history of medication noncompliance complaints of midsternal chest pain and shortness of breath  · Troponin x3 negative  · ROGER score of 5  · P o  nitro for chest pain  · Cardiology evaluated transition patient back to oral lasix  · Patient's symptoms back to patient line and likely in setting of medication non compliance  · Likely in setting of depression from recent loss of her sister  Patient does not endorse self harm just has no desire to take her medication if someone is not available to give them to her  Son is unfortunately going to correction in the near future and daughter can provide some care but not all the time     · Family does not desire skilled nursing therapy as recommended by PT but will accept services at home   · Patient overall stable for discharge    · Also if not referral to OP psych resources   · Continue ASA, statin, beta-blocker  · Note patient is influenza, RSV, COVID negative

## 2020-12-24 ENCOUNTER — PATIENT OUTREACH (OUTPATIENT)
Dept: CASE MANAGEMENT | Facility: OTHER | Age: 85
End: 2020-12-24

## 2020-12-31 ENCOUNTER — PATIENT OUTREACH (OUTPATIENT)
Dept: CASE MANAGEMENT | Facility: OTHER | Age: 85
End: 2020-12-31

## 2021-01-25 ENCOUNTER — APPOINTMENT (EMERGENCY)
Dept: CT IMAGING | Facility: HOSPITAL | Age: 86
End: 2021-01-25
Payer: MEDICARE

## 2021-01-25 ENCOUNTER — APPOINTMENT (EMERGENCY)
Dept: RADIOLOGY | Facility: HOSPITAL | Age: 86
End: 2021-01-25
Payer: MEDICARE

## 2021-01-25 ENCOUNTER — HOSPITAL ENCOUNTER (EMERGENCY)
Facility: HOSPITAL | Age: 86
Discharge: HOME | End: 2021-01-26
Attending: EMERGENCY MEDICINE | Admitting: EMERGENCY MEDICINE
Payer: MEDICARE

## 2021-01-25 DIAGNOSIS — N39.0 UTI (URINARY TRACT INFECTION): ICD-10-CM

## 2021-01-25 DIAGNOSIS — T14.8XXA CONTUSION: ICD-10-CM

## 2021-01-25 DIAGNOSIS — N76.0 VAGINITIS: Primary | ICD-10-CM

## 2021-01-25 LAB
ALBUMIN SERPL BCP-MCNC: 3.2 G/DL (ref 3.5–5)
ALP SERPL-CCNC: 96 U/L (ref 46–116)
ALT SERPL W P-5'-P-CCNC: 23 U/L (ref 12–78)
ANION GAP SERPL CALCULATED.3IONS-SCNC: 5 MMOL/L (ref 4–13)
AST SERPL W P-5'-P-CCNC: 17 U/L (ref 5–45)
BACTERIA UR QL AUTO: ABNORMAL /HPF
BASOPHILS # BLD AUTO: 0.06 THOUSANDS/ΜL (ref 0–0.1)
BASOPHILS NFR BLD AUTO: 1 % (ref 0–1)
BILIRUB SERPL-MCNC: 0.3 MG/DL (ref 0.2–1)
BILIRUB UR QL STRIP: NEGATIVE
BUN SERPL-MCNC: 18 MG/DL (ref 5–25)
CALCIUM ALBUM COR SERPL-MCNC: 9.8 MG/DL (ref 8.3–10.1)
CALCIUM SERPL-MCNC: 9.2 MG/DL (ref 8.3–10.1)
CHLORIDE SERPL-SCNC: 103 MMOL/L (ref 100–108)
CLARITY UR: ABNORMAL
CO2 SERPL-SCNC: 32 MMOL/L (ref 21–32)
COLOR UR: YELLOW
CREAT SERPL-MCNC: 0.95 MG/DL (ref 0.6–1.3)
EOSINOPHIL # BLD AUTO: 0.29 THOUSAND/ΜL (ref 0–0.61)
EOSINOPHIL NFR BLD AUTO: 4 % (ref 0–6)
ERYTHROCYTE [DISTWIDTH] IN BLOOD BY AUTOMATED COUNT: 13.9 % (ref 11.6–15.1)
GFR SERPL CREATININE-BSD FRML MDRD: 55 ML/MIN/1.73SQ M
GLUCOSE SERPL-MCNC: 121 MG/DL (ref 65–140)
GLUCOSE UR STRIP-MCNC: NEGATIVE MG/DL
HCT VFR BLD AUTO: 34.8 % (ref 34.8–46.1)
HGB BLD-MCNC: 10.8 G/DL (ref 11.5–15.4)
HGB UR QL STRIP.AUTO: ABNORMAL
IMM GRANULOCYTES # BLD AUTO: 0.04 THOUSAND/UL (ref 0–0.2)
IMM GRANULOCYTES NFR BLD AUTO: 1 % (ref 0–2)
KETONES UR STRIP-MCNC: NEGATIVE MG/DL
LEUKOCYTE ESTERASE UR QL STRIP: ABNORMAL
LYMPHOCYTES # BLD AUTO: 2.31 THOUSANDS/ΜL (ref 0.6–4.47)
LYMPHOCYTES NFR BLD AUTO: 28 % (ref 14–44)
MCH RBC QN AUTO: 28.1 PG (ref 26.8–34.3)
MCHC RBC AUTO-ENTMCNC: 31 G/DL (ref 31.4–37.4)
MCV RBC AUTO: 91 FL (ref 82–98)
MONOCYTES # BLD AUTO: 0.86 THOUSAND/ΜL (ref 0.17–1.22)
MONOCYTES NFR BLD AUTO: 10 % (ref 4–12)
NEUTROPHILS # BLD AUTO: 4.73 THOUSANDS/ΜL (ref 1.85–7.62)
NEUTS SEG NFR BLD AUTO: 56 % (ref 43–75)
NITRITE UR QL STRIP: POSITIVE
NON-SQ EPI CELLS URNS QL MICRO: ABNORMAL /HPF
NRBC BLD AUTO-RTO: 0 /100 WBCS
OTHER STN SPEC: ABNORMAL
PH UR STRIP.AUTO: 6 [PH]
PLATELET # BLD AUTO: 241 THOUSANDS/UL (ref 149–390)
PMV BLD AUTO: 9.6 FL (ref 8.9–12.7)
POTASSIUM SERPL-SCNC: 3.5 MMOL/L (ref 3.5–5.3)
PROT SERPL-MCNC: 8.4 G/DL (ref 6.4–8.2)
PROT UR STRIP-MCNC: ABNORMAL MG/DL
RBC # BLD AUTO: 3.84 MILLION/UL (ref 3.81–5.12)
RBC #/AREA URNS AUTO: ABNORMAL /HPF
SODIUM SERPL-SCNC: 140 MMOL/L (ref 136–145)
SP GR UR STRIP.AUTO: 1.02 (ref 1–1.03)
UROBILINOGEN UR QL STRIP.AUTO: 0.2 E.U./DL
WBC # BLD AUTO: 8.29 THOUSAND/UL (ref 4.31–10.16)
WBC #/AREA URNS AUTO: ABNORMAL /HPF

## 2021-01-25 PROCEDURE — 87086 URINE CULTURE/COLONY COUNT: CPT | Performed by: EMERGENCY MEDICINE

## 2021-01-25 PROCEDURE — 87077 CULTURE AEROBIC IDENTIFY: CPT | Performed by: EMERGENCY MEDICINE

## 2021-01-25 PROCEDURE — 73130 X-RAY EXAM OF HAND: CPT

## 2021-01-25 PROCEDURE — 74177 CT ABD & PELVIS W/CONTRAST: CPT

## 2021-01-25 PROCEDURE — 81001 URINALYSIS AUTO W/SCOPE: CPT | Performed by: EMERGENCY MEDICINE

## 2021-01-25 PROCEDURE — 99284 EMERGENCY DEPT VISIT MOD MDM: CPT

## 2021-01-25 PROCEDURE — 87186 SC STD MICRODIL/AGAR DIL: CPT | Performed by: EMERGENCY MEDICINE

## 2021-01-25 PROCEDURE — 85025 COMPLETE CBC W/AUTO DIFF WBC: CPT | Performed by: EMERGENCY MEDICINE

## 2021-01-25 PROCEDURE — 80053 COMPREHEN METABOLIC PANEL: CPT | Performed by: EMERGENCY MEDICINE

## 2021-01-25 PROCEDURE — 36415 COLL VENOUS BLD VENIPUNCTURE: CPT | Performed by: EMERGENCY MEDICINE

## 2021-01-25 RX ADMIN — IOHEXOL 100 ML: 350 INJECTION, SOLUTION INTRAVENOUS at 22:35

## 2021-01-25 RX ADMIN — IOHEXOL 100 ML: 350 INJECTION, SOLUTION INTRAVENOUS at 23:19

## 2021-01-26 ENCOUNTER — PATIENT OUTREACH (OUTPATIENT)
Dept: CASE MANAGEMENT | Facility: HOSPITAL | Age: 86
End: 2021-01-26

## 2021-01-26 VITALS
TEMPERATURE: 97.2 F | HEART RATE: 93 BPM | SYSTOLIC BLOOD PRESSURE: 150 MMHG | BODY MASS INDEX: 34.76 KG/M2 | WEIGHT: 178 LBS | RESPIRATION RATE: 17 BRPM | OXYGEN SATURATION: 98 % | DIASTOLIC BLOOD PRESSURE: 67 MMHG

## 2021-01-26 PROCEDURE — 99284 EMERGENCY DEPT VISIT MOD MDM: CPT | Performed by: EMERGENCY MEDICINE

## 2021-01-26 RX ORDER — CEPHALEXIN 250 MG/1
500 CAPSULE ORAL ONCE
Status: COMPLETED | OUTPATIENT
Start: 2021-01-26 | End: 2021-01-26

## 2021-01-26 RX ORDER — CEPHALEXIN 500 MG/1
500 CAPSULE ORAL EVERY 8 HOURS SCHEDULED
Qty: 21 CAPSULE | Refills: 0 | Status: SHIPPED | OUTPATIENT
Start: 2021-01-26 | End: 2021-02-02

## 2021-01-26 RX ADMIN — CEPHALEXIN 500 MG: 250 CAPSULE ORAL at 01:03

## 2021-01-26 NOTE — PROGRESS NOTES
Call to patient to follow up after ED visit- no answer  Call to patient daughter, [facility] where patient is now residing  She reports mom is starting to feel better after starting medication  Denies that patient has any signs of fluid build up and weights are stable  I reinforced low sodium diet and when to call provider  Daughter states they have been in contact with PCP, Dr Chris Schmidt for any concerns      Lalit Greenfield MS, RN Outpatient Care Manager

## 2021-01-27 NOTE — ED PROVIDER NOTES
History  Chief Complaint   Patient presents with    Vaginal Itching     Pt  reports vaginal itching xfew weeks  Pt 's daughter reports using desitin ointment without relief   Bleeding/Bruising     Pt  reports significant bruisng noted to right hand  Unknown injury  80 yo f presenting to the emergency department for eval of vaginal ithcing  Pt has had vaginal dryness  and itching for 2-3 weeks as well as dysuria, daughter has tried dessitin cream without relief  Pt also has contusion of hand without injury           Prior to Admission Medications   Prescriptions Last Dose Informant Patient Reported? Taking?    ASPIRIN 81 PO   Yes No   Sig: Take 81 mg by mouth daily   Glucosamine HCl 500 MG TABS   Yes No   Sig: Take 1 tablet by mouth   Pyridoxine HCl (VITAMIN B-6) 100 MG TABS   Yes No   Sig: Take 1 tablet by mouth daily   apixaban (Eliquis) 5 mg  Self Yes No   Sig: Take 5 mg by mouth 2 (two) times a day   baclofen 10 mg tablet  Self Yes No   Sig: Take 5 mg by mouth 2 (two) times a day   desloratadine (CLARINEX) 5 MG tablet  Self Yes No   Sig: Take 5 mg by mouth daily   docusate sodium (COLACE) 100 mg capsule  Self Yes No   Sig: Take 100 mg by mouth daily   furosemide (LASIX) 40 mg tablet  Self Yes No   Sig: Take 40 mg by mouth 2 (two) times a day   glimepiride (AMARYL) 4 mg tablet  Self Yes No   Sig: Take 4 mg by mouth daily with breakfast   losartan (COZAAR) 25 mg tablet  Self Yes No   Sig: Take 25 mg by mouth daily   metFORMIN (GLUCOPHAGE) 500 mg tablet  Self Yes No   Sig: Take 500 mg by mouth daily with breakfast   metoprolol tartrate (LOPRESSOR) 100 mg tablet  Self Yes No   Sig: Take 100 mg by mouth every 12 (twelve) hours   nystatin (MYCOSTATIN) powder   No No   Sig: Apply topically 2 (two) times a day Groin and under breasts   pravastatin (PRAVACHOL) 20 mg tablet  Self Yes No   Sig: Take 20 mg by mouth daily   primidone (MYSOLINE) 50 mg tablet  Self Yes No   Sig: Take by mouth every 12 (twelve) hours sertraline (ZOLOFT) 25 mg tablet  Self Yes No   Sig: Take 25 mg by mouth daily      Facility-Administered Medications: None       Past Medical History:   Diagnosis Date    CHF (congestive heart failure) ([facility] )     Diabetes mellitus ([facility] )     Hypertension        Past Surgical History:   Procedure Laterality Date    CARDIAC SURGERY         History reviewed  No pertinent family history  I have reviewed and agree with the history as documented  E-Cigarette/Vaping     E-Cigarette/Vaping Substances     Social History     Tobacco Use    Smoking status: Never Smoker    Smokeless tobacco: Never Used   Substance Use Topics    Alcohol use: Not Currently    Drug use: Never       Review of Systems   Constitutional: Negative for appetite change, chills, fatigue and fever  HENT: Negative for sneezing and sore throat  Eyes: Negative for visual disturbance  Respiratory: Negative for cough, choking, chest tightness, shortness of breath and wheezing  Cardiovascular: Negative for chest pain and palpitations  Gastrointestinal: Negative for abdominal pain, constipation, diarrhea, nausea and vomiting  Genitourinary: Positive for vaginal discharge and vaginal pain  Negative for difficulty urinating and dysuria  Neurological: Negative for dizziness, weakness, light-headedness, numbness and headaches  Hematological: Bruises/bleeds easily  All other systems reviewed and are negative  Physical Exam  Physical Exam  Vitals signs and nursing note reviewed  Constitutional:       General: She is not in acute distress  Appearance: She is well-developed  She is not diaphoretic  HENT:      Head: Normocephalic and atraumatic  Eyes:      Pupils: Pupils are equal, round, and reactive to light  Neck:      Vascular: No JVD  Trachea: No tracheal deviation  Cardiovascular:      Rate and Rhythm: Normal rate and regular rhythm  Heart sounds: Normal heart sounds  No murmur  No friction rub   No gallop  Pulmonary:      Effort: Pulmonary effort is normal  No respiratory distress  Breath sounds: Normal breath sounds  No wheezing or rales  Abdominal:      General: Bowel sounds are normal  There is no distension  Palpations: Abdomen is soft  Tenderness: There is abdominal tenderness in the suprapubic area  There is no guarding or rebound  Genitourinary:     Comments: vuvar erythema noted but difficult to assess discharge d/t application of dessitin  Skin:     General: Skin is warm and dry  Coloration: Skin is not pale  Comments: Contusion noted to r hand  Neurological:      Mental Status: She is alert and oriented to person, place, and time  Cranial Nerves: No cranial nerve deficit  Motor: No abnormal muscle tone     Psychiatric:         Behavior: Behavior normal          Vital Signs  ED Triage Vitals   Temperature Pulse Respirations Blood Pressure SpO2   01/25/21 1849 01/25/21 1849 01/25/21 1849 01/25/21 1849 01/25/21 1849   (!) 97 2 °F (36 2 °C) 82 20 124/70 99 %      Temp Source Heart Rate Source Patient Position - Orthostatic VS BP Location FiO2 (%)   01/25/21 1849 01/25/21 1849 01/25/21 1849 01/25/21 2100 --   Oral Monitor Lying Left arm       Pain Score       --                  Vitals:    01/25/21 1849 01/25/21 2100   BP: 124/70 150/67   Pulse: 82 93   Patient Position - Orthostatic VS: Lying Lying         Visual Acuity      ED Medications  Medications   iohexol (OMNIPAQUE) 350 MG/ML injection (SINGLE-DOSE) 100 mL (100 mL Intravenous Given 1/25/21 2235)   iohexol (OMNIPAQUE) 350 MG/ML injection (SINGLE-DOSE) 100 mL (100 mL Intravenous Given 1/25/21 2319)   cephalexin (KEFLEX) capsule 500 mg (500 mg Oral Given 1/26/21 0103)       Diagnostic Studies  Results Reviewed     Procedure Component Value Units Date/Time    Urine Microscopic [[id number]]  (Abnormal) Collected: 01/25/21 2140    Lab Status: Final result Specimen: Urine, Clean Catch Updated: 01/25/21 2156 RBC, UA 0-1 /hpf      WBC, UA 10-20 /hpf      Epithelial Cells Occasional /hpf      Bacteria, UA Moderate /hpf      OTHER OBSERVATIONS WBCs Clumped    Urine culture [[id number]] Collected: 01/25/21 2140    Lab Status:  In process Specimen: Urine, Clean Catch Updated: 01/25/21 2156    UA w Reflex to Microscopic w Reflex to Culture [[id number]]  (Abnormal) Collected: 01/25/21 2140    Lab Status: Final result Specimen: Urine, Clean Catch Updated: 01/25/21 2149     Color, UA Yellow     Clarity, UA Cloudy     Specific Gravity, UA 1 025     pH, UA 6 0     Leukocytes, UA Moderate     Nitrite, UA Positive     Protein, UA 30 (1+) mg/dl      Glucose, UA Negative mg/dl      Ketones, UA Negative mg/dl      Urobilinogen, UA 0 2 E U /dl      Bilirubin, UA Negative     Blood, UA Trace-Intact    Comprehensive metabolic panel [[id number]]  (Abnormal) Collected: 01/25/21 2122    Lab Status: Final result Specimen: Blood from Arm, Left Updated: 01/25/21 2144     Sodium 140 mmol/L      Potassium 3 5 mmol/L      Chloride 103 mmol/L      CO2 32 mmol/L      ANION GAP 5 mmol/L      BUN 18 mg/dL      Creatinine 0 95 mg/dL      Glucose 121 mg/dL      Calcium 9 2 mg/dL      Corrected Calcium 9 8 mg/dL      AST 17 U/L      ALT 23 U/L      Alkaline Phosphatase 96 U/L      Total Protein 8 4 g/dL      Albumin 3 2 g/dL      Total Bilirubin 0 30 mg/dL      eGFR 55 ml/min/1 73sq m     Narrative:      [place] guidelines for Chronic Kidney Disease (CKD):     Stage 1 with normal or high GFR (GFR > 90 mL/min/1 73 square meters)    Stage 2 Mild CKD (GFR = 60-89 mL/min/1 73 square meters)    Stage 3A Moderate CKD (GFR = 45-59 mL/min/1 73 square meters)    Stage 3B Moderate CKD (GFR = 30-44 mL/min/1 73 square meters)    Stage 4 Severe CKD (GFR = 15-29 mL/min/1 73 square meters)    Stage 5 End Stage CKD (GFR <15 mL/min/1 73 square meters)  Note: GFR calculation is accurate only with a steady state creatinine    CBC and differential [[id number]]  (Abnormal) Collected: 01/25/21 2122    Lab Status: Final result Specimen: Blood from Arm, Left Updated: 01/25/21 2127     WBC 8 29 Thousand/uL      RBC 3 84 Million/uL      Hemoglobin 10 8 g/dL      Hematocrit 34 8 %      MCV 91 fL      MCH 28 1 pg      MCHC 31 0 g/dL      RDW 13 9 %      MPV 9 6 fL      Platelets 190 Thousands/uL      nRBC 0 /100 WBCs      Neutrophils Relative 56 %      Immat GRANS % 1 %      Lymphocytes Relative 28 %      Monocytes Relative 10 %      Eosinophils Relative 4 %      Basophils Relative 1 %      Neutrophils Absolute 4 73 Thousands/µL      Immature Grans Absolute 0 04 Thousand/uL      Lymphocytes Absolute 2 31 Thousands/µL      Monocytes Absolute 0 86 Thousand/µL      Eosinophils Absolute 0 29 Thousand/µL      Basophils Absolute 0 06 Thousands/µL                  CT abdomen pelvis with contrast   Final Result by Jacobo Lau MD (01/25 2595)      Tiny focus of air at the anterior bladder with mild circumferential wall thickening and mural hyperenhancement  Correlate for recent instrumentation and/or with clinical lab values for infectious etiology  Suspect small amount of fluid within the endometrial cavity, nonspecific  No definite discrete vaginal collection/lesion although prominent enhancement in this region may reflect infectious/inflammatory? Changes  Physical examination/visual inspection    and GYN evaluation is recommended  Cardiomegaly  Stool throughout the colon and rectal vault  Finding was marked significant in Epic for notification  Workstation performed: [id number]         XR hand 3+ views RIGHT   Final Result by Diana Fuentes MD (01/26 1019)      No acute osseous abnormality  Degenerative changes as described  Dorsal soft tissue swelling              Workstation performed: [id number]                    Procedures  Procedures         ED Course               Identification of Seniors at Risk      Most Recent Value   (ISAR) Identification of Seniors at Risk   Before the illness or injury that brought you to the Emergency, did you need someone to help you on a regular basis? 1 Filed at: 01/25/2021 1851   In the last 24 hours, have you needed more help than usual?  0 Filed at: 01/25/2021 1851   Have you been hospitalized for one or more nights during the past 6 months? 1 Filed at: 01/25/2021 1851   In general, do you see well?  0 Filed at: 01/25/2021 1851   In general, do you have serious problems with your memory? 1 Filed at: 01/25/2021 1851   Do you take more than three different medications every day? 1 Filed at: 01/25/2021 1851   ISAR Score  4 Filed at: 01/25/2021 1851                    SBIRT 20yo+      Most Recent Value   SBIRT (23 yo +)   In order to provide better care to our patients, we are screening all of our patients for alcohol and drug use  Would it be okay to ask you these screening questions? Unable to answer at this time Filed at: 01/25/2021 2145                    MDM  Number of Diagnoses or Management Options  Contusion:   UTI (urinary tract infection):   Vaginitis:   Diagnosis management comments: 80 yo f with vulvar irritation and hand contusion, will check labs, urine, ct, reassess      Disposition  Final diagnoses:   Vaginitis   UTI (urinary tract infection)   Contusion     Time reflects when diagnosis was documented in both MDM as applicable and the Disposition within this note     Time User Action Codes Description Comment    1/26/2021 12:24 AM Dylon Dee Add [N76 0] Vaginitis     1/26/2021 12:24 AM Idris Dee Add [N39 0] UTI (urinary tract infection)     1/26/2021 12:24 AM Idris Dee Add Antoine Isabel  8XXA] Contusion       ED Disposition     ED Disposition Condition Date/Time Comment    Discharge Stable Tue Jan 26, 2021 12:24 AM Astrid Howe discharge to home/self care              Follow-up Information     Follow up With Specialties Details Why Contact Info    Carlo Flores MD Internal [address]  [address]  [address] [facility]      Camilo Khan MD Obstetrics and Gynecology, Obstetrics, Gynecology   [phone] [address] [facility] [place] [address]  [phone]            Discharge Medication List as of 1/26/2021 12:45 AM      START taking these medications    Details   cephalexin (KEFLEX) 500 mg capsule Take 1 capsule (500 mg total) by mouth every 8 (eight) hours for 7 days, Starting Tue 1/26/2021, Until Tue 2/2/2021, Normal      miconazole (MONISTAT-7) 2 % vaginal cream Insert 1 applicator into the vagina daily at bedtime, Starting Tue 1/26/2021, Normal         CONTINUE these medications which have NOT CHANGED    Details   apixaban (Eliquis) 5 mg Take 5 mg by mouth 2 (two) times a day, Historical Med      ASPIRIN 81 PO Take 81 mg by mouth daily, Historical Med      baclofen 10 mg tablet Take 5 mg by mouth 2 (two) times a day, Historical Med      desloratadine (CLARINEX) 5 MG tablet Take 5 mg by mouth daily, Historical Med      docusate sodium (COLACE) 100 mg capsule Take 100 mg by mouth daily, Historical Med      furosemide (LASIX) 40 mg tablet Take 40 mg by mouth 2 (two) times a day, Historical Med      glimepiride (AMARYL) 4 mg tablet Take 4 mg by mouth daily with breakfast, Historical Med      Glucosamine HCl 500 MG TABS Take 1 tablet by mouth, Historical Med      losartan (COZAAR) 25 mg tablet Take 25 mg by mouth daily, Historical Med      metFORMIN (GLUCOPHAGE) 500 mg tablet Take 500 mg by mouth daily with breakfast, Historical Med      metoprolol tartrate (LOPRESSOR) 100 mg tablet Take 100 mg by mouth every 12 (twelve) hours, Historical Med      nystatin (MYCOSTATIN) powder Apply topically 2 (two) times a day Groin and under breasts, Starting Sun 9/27/2020, Normal      pravastatin (PRAVACHOL) 20 mg tablet Take 20 mg by mouth daily, Historical Med      primidone (MYSOLINE) 50 mg tablet Take by mouth every 12 (twelve) hours, Historical Med      Pyridoxine HCl (VITAMIN B-6) 100 MG TABS Take 1 tablet by mouth daily, Historical Med      sertraline (ZOLOFT) 25 mg tablet Take 25 mg by mouth daily, Historical Med           No discharge procedures on file      PDMP Review     None          ED Provider  Electronically Signed by           Jean Pickering MD  01/26/21 0081

## 2021-01-28 LAB
BACTERIA UR CULT: ABNORMAL
BACTERIA UR CULT: ABNORMAL

## 2021-02-05 ENCOUNTER — OFFICE VISIT (OUTPATIENT)
Dept: OBGYN CLINIC | Facility: MEDICAL CENTER | Age: 86
End: 2021-02-05
Payer: MEDICARE

## 2021-02-05 VITALS — WEIGHT: 182.8 LBS | BODY MASS INDEX: 35.7 KG/M2 | SYSTOLIC BLOOD PRESSURE: 132 MMHG | DIASTOLIC BLOOD PRESSURE: 78 MMHG

## 2021-02-05 DIAGNOSIS — L25.9 CONTACT DERMATITIS, UNSPECIFIED CONTACT DERMATITIS TYPE, UNSPECIFIED TRIGGER: ICD-10-CM

## 2021-02-05 DIAGNOSIS — N90.89 VULVAR IRRITATION: Primary | ICD-10-CM

## 2021-02-05 LAB
BV WHIFF TEST VAG QL: NORMAL
CLUE CELLS SPEC QL WET PREP: NORMAL
LACTOBACILLUS (SPECIES) (HISTORICAL): NORMAL
SL AMB POCT WET MOUNT: NORMAL
T VAGINALIS VAG QL WET PREP: NORMAL
YEAST VAG QL WET PREP: NORMAL

## 2021-02-05 PROCEDURE — 87210 SMEAR WET MOUNT SALINE/INK: CPT | Performed by: NURSE PRACTITIONER

## 2021-02-05 PROCEDURE — 99213 OFFICE O/P EST LOW 20 MIN: CPT | Performed by: NURSE PRACTITIONER

## 2021-02-05 NOTE — PATIENT INSTRUCTIONS
Consider possible triggers of skin irritants- new products, diapers, urine incontinence  Rinse skin daily  Bathe every 2-3 days using dove bar soap  Protect skin with emollient such as A&D ointment  Maintain adequate blood sugars to promote healing  No infection noted on wet mount  Return to office with any worsening of symptoms

## 2021-02-05 NOTE — PROGRESS NOTES
Assessment/Plan:  Consider possible triggers of skin irritants- new products, diapers, urine incontinence  Rinse skin daily  Bathe every 2-3 days using dove bar soap  Protect skin with emollient such as A&D ointment  Maintain adequate blood sugars to promote healing  No infection noted on wet mount  Return to office with any worsening of symptoms  ER records reviewed  Diagnoses and all orders for this visit:    Vulvar irritation  -     POCT wet mount    Contact dermatitis, unspecified contact dermatitis type, unspecified trigger          Subjective:      Patient ID: Jonas Polk is a 81 y o  female  Jonas Polk is a 81 y o  female who is here today for a problem visit accompanied by her daughter/caretaker Jennifer Alvarez lived alone until this past Christmas and now moved in with [employer])  She was treated in the ER 2 weeks ago for a vaginal yeast and bladder infection with monistat and an unknown antibiotic  All medication was completed with slight improvement for a couple days  Iintravaginal itching and burning returned soon after but never as severe as it was orginally  Denies vaginal bleeding  Incontinent of urine and is currently wearing a new diaper  Giana Potts admits that Kathryn Malloy has bathed only 3 times since Christmas  Giana Potts denies any other new skin products  Kathryn Malloy needed to be asked multiple times to keep her mask over her nose and mouth but screams that it is too difficult to breathe with a mask on  The following portions of the patient's history were reviewed and updated as appropriate: allergies, current medications, past medical history, past surgical history and problem list     Review of Systems   Constitutional: Negative  Gastrointestinal: Negative for abdominal pain, constipation, diarrhea, nausea and rectal pain  Genitourinary: Positive for vaginal pain   Negative for dysuria, flank pain, frequency, genital sores, pelvic pain, urgency, vaginal bleeding and vaginal discharge  Musculoskeletal: Positive for gait problem (unsteady and uses walker)  Negative for myalgias  Skin: Negative  Hematological: Negative for adenopathy  Psychiatric/Behavioral: Positive for agitation (with needing to wear a mask at all times)  All other systems reviewed and are negative  Objective:      /78 (BP Location: Left arm, Patient Position: Sitting, Cuff Size: Standard)   Wt 82 9 kg (182 lb 12 8 oz)   BMI 35 70 kg/m²          Physical Exam  Vitals signs and nursing note reviewed  Exam conducted with a chaperone present  Constitutional:       Appearance: She is well-developed  She is obese  Genitourinary:     Exam position: Supine  Labia:         Right: Tenderness present  No rash, lesion or injury  Left: Tenderness present  No rash, lesion or injury  Urethra: No urethral swelling  Vagina: Tenderness present  Cervix: No discharge  Adnexa:         Right: No mass, tenderness or fullness  Left: No mass, tenderness or fullness  Comments: Labia majora and minora slight swelling  Inguinal linear areas of excoriation which daughter states is similar to when she was evaluated in ER 2 weeks ago  Vulvovaginal atrophy  Unable to tolerate vaginal speculum for more than a few seconds  Cervix not visualized  No vaginal discharge  Light pink erythema noted from mons to top 1/3 of her thighs  No satellite lesions noted  Lymphadenopathy:      Lower Body: No right inguinal adenopathy  No left inguinal adenopathy  Skin:     General: Skin is warm and dry  Neurological:      Mental Status: She is alert and oriented to person, place, and time     Psychiatric:         Mood and Affect: Mood normal

## 2021-02-12 DIAGNOSIS — Z23 ENCOUNTER FOR IMMUNIZATION: ICD-10-CM

## 2021-02-15 ENCOUNTER — PATIENT OUTREACH (OUTPATIENT)
Dept: CASE MANAGEMENT | Facility: HOSPITAL | Age: 86
End: 2021-02-15

## 2021-03-08 ENCOUNTER — OFFICE VISIT (OUTPATIENT)
Dept: OBGYN CLINIC | Facility: MEDICAL CENTER | Age: 86
End: 2021-03-08
Payer: MEDICARE

## 2021-03-08 VITALS
SYSTOLIC BLOOD PRESSURE: 126 MMHG | HEIGHT: 60 IN | DIASTOLIC BLOOD PRESSURE: 87 MMHG | WEIGHT: 182.8 LBS | BODY MASS INDEX: 35.89 KG/M2 | HEART RATE: 87 BPM

## 2021-03-08 DIAGNOSIS — S60.221A TRAUMATIC HEMATOMA OF RIGHT HAND, INITIAL ENCOUNTER: Primary | ICD-10-CM

## 2021-03-08 PROCEDURE — 99203 OFFICE O/P NEW LOW 30 MIN: CPT | Performed by: ORTHOPAEDIC SURGERY

## 2021-03-08 RX ORDER — PANTOPRAZOLE SODIUM 40 MG/1
TABLET, DELAYED RELEASE ORAL
COMMUNITY
Start: 2021-03-05 | End: 2021-03-19

## 2021-03-08 RX ORDER — CIPROFLOXACIN 500 MG/1
500 TABLET, FILM COATED ORAL 2 TIMES DAILY
COMMUNITY
Start: 2021-02-25 | End: 2021-03-19

## 2021-03-08 RX ORDER — PHENAZOPYRIDINE HYDROCHLORIDE 100 MG/1
100 TABLET, FILM COATED ORAL 2 TIMES DAILY
COMMUNITY
Start: 2021-02-06 | End: 2021-03-19

## 2021-03-08 NOTE — PROGRESS NOTES
CHIEF COMPLAINT:  Chief Complaint   Patient presents with    Right Hand - Pain       SUBJECTIVE:  Jose R Rhoades is a 81y o  year old  female who presents with right hand pain  Patient stated that she woke up 6 weeks ago with a lump on the top of her right hand that she stated she possibly hit during the night  She stated that the swelling has gone down since then  She stated that she went to the emergency room on 1/25/2021 where they took x-rays but they did not find any significant findings  Patient stated she never injured the right hand before  She denied any numbness or tingling  PAST MEDICAL HISTORY:  Past Medical History:   Diagnosis Date    CHF (congestive heart failure) ([address] )     Diabetes mellitus ([address] [facility] )     Hypertension        PAST SURGICAL HISTORY:  Past Surgical History:   Procedure Laterality Date    CARDIAC SURGERY         FAMILY HISTORY:  History reviewed  No pertinent family history      SOCIAL HISTORY:  Social History     Tobacco Use    Smoking status: Never Smoker    Smokeless tobacco: Never Used   Substance Use Topics    Alcohol use: Not Currently    Drug use: Never       MEDICATIONS:    Current Outpatient Medications:     apixaban (Eliquis) 5 mg, Take 5 mg by mouth 2 (two) times a day, Disp: , Rfl:     ASPIRIN 81 PO, Take 81 mg by mouth daily, Disp: , Rfl:     baclofen 10 mg tablet, Take 5 mg by mouth 2 (two) times a day, Disp: , Rfl:     ciprofloxacin (CIPRO) 500 mg tablet, Take 500 mg by mouth 2 (two) times a day, Disp: , Rfl:     desloratadine (CLARINEX) 5 MG tablet, Take 5 mg by mouth daily, Disp: , Rfl:     docusate sodium (COLACE) 100 mg capsule, Take 100 mg by mouth daily, Disp: , Rfl:     furosemide (LASIX) 40 mg tablet, Take 40 mg by mouth 2 (two) times a day, Disp: , Rfl:     glimepiride (AMARYL) 4 mg tablet, Take 4 mg by mouth daily with breakfast, Disp: , Rfl:     Glucosamine HCl 500 MG TABS, Take 1 tablet by mouth, Disp: , Rfl:     losartan (COZAAR) 25 mg tablet, Take 25 mg by mouth daily, Disp: , Rfl:     metFORMIN (GLUCOPHAGE) 500 mg tablet, Take 500 mg by mouth daily with breakfast, Disp: , Rfl:     metoprolol tartrate (LOPRESSOR) 100 mg tablet, Take 100 mg by mouth every 12 (twelve) hours, Disp: , Rfl:     miconazole (MONISTAT-7) 2 % vaginal cream, Insert 1 applicator into the vagina daily at bedtime, Disp: 45 g, Rfl: 0    nystatin (MYCOSTATIN) powder, Apply topically 2 (two) times a day Groin and under breasts, Disp: 15 g, Rfl: 0    pantoprazole (PROTONIX) 40 mg tablet, , Disp: , Rfl:     phenazopyridine (PYRIDIUM) 100 mg tablet, Take 100 mg by mouth 2 (two) times a day, Disp: , Rfl:     pravastatin (PRAVACHOL) 20 mg tablet, Take 20 mg by mouth daily, Disp: , Rfl:     primidone (MYSOLINE) 50 mg tablet, Take by mouth every 12 (twelve) hours, Disp: , Rfl:     Pyridoxine HCl (VITAMIN B-6) 100 MG TABS, Take 1 tablet by mouth daily, Disp: , Rfl:     sertraline (ZOLOFT) 25 mg tablet, Take 25 mg by mouth daily, Disp: , Rfl:     ALLERGIES:  Allergies   Allergen Reactions    Clarithromycin Confusion and Other (See Comments)       REVIEW OF SYSTEMS:  Review of Systems   Constitutional: Negative for chills and fever  HENT: Negative for ear pain and sore throat  Eyes: Negative for pain and visual disturbance  Respiratory: Negative for cough and shortness of breath  Cardiovascular: Negative for chest pain and palpitations  Gastrointestinal: Negative for abdominal pain and vomiting  Genitourinary: Negative for dysuria and hematuria  Musculoskeletal: Negative for arthralgias and back pain  Skin: Negative for color change and rash  Neurological: Negative for seizures and syncope  All other systems reviewed and are negative        VITALS:  Vitals:    03/08/21 1317   BP: 126/87   Pulse: 87       LABS:  HgA1c:   Lab Results   Component Value Date    HGBA1C 6 4 (H) 12/16/2020     BMP:   Lab Results   Component Value Date    GLUCOSE 100 07/31/2015 CALCIUM 9 2 01/25/2021     07/31/2015    K 3 5 01/25/2021    CO2 32 01/25/2021     01/25/2021    BUN 18 01/25/2021    CREATININE 0 95 01/25/2021       _____________________________________________________  PHYSICAL EXAMINATION:  General: well developed and well nourished, alert, oriented times 3 and appears comfortable  Psychiatric: Normal  HEENT: Trachea Midline, No torticollis  Pulmonary: No audible wheezing or strider  Cardiovascular: No discernable arrhythmia   Skin: No masses, erythema, lacerations, fluctation, ulcerations  Neurovascular: Sensation Intact to the Median, Ulnar, Radial Nerve, Motor Intact to the Median, Ulnar, Radial Nerve and Pulses Intact    MUSCULOSKELETAL EXAMINATION:  Right Hand:  Skin intact   Hematoma noted dorsal aspect of the right hand   No erythema noted  Ecchymosis noted about hematoma   Sensation intact to median, ulnar and radian nerve distribution   Brisk capillary refill  Palpable distal radial pulse     ___________________________________________________  STUDIES REVIEWED:  Images obtained on 1/25/2021  of the right hand 3 views demonstrate no osseous abnormalities       PROCEDURES PERFORMED:  Procedures  No Procedures performed today    _____________________________________________________  ASSESSMENT/PLAN:    81 y o  female with mature hematoma of the right hand    -I explained to the patient that the hematoma will most likely go away on its own but may take some time    -I discussed with the patient that she should watch for signs of infection such as redness in which she should call the office   -I will see the patient back in office on an as needed basis if symptoms worsen or fail to improve  Diagnoses and all orders for this visit:    Traumatic hematoma of right hand, initial encounter    Other orders  -     ciprofloxacin (CIPRO) 500 mg tablet;  Take 500 mg by mouth 2 (two) times a day  -     pantoprazole (PROTONIX) 40 mg tablet  -     phenazopyridine (PYRIDIUM) 100 mg tablet; Take 100 mg by mouth 2 (two) times a day        Follow Up:  Return if symptoms worsen or fail to improve  To Do Next Visit:  PRN      Scribe Attestation    I,:  Eli Caldwell am acting as a scribe while in the presence of the attending physician :       I,:  Dane Ashley MD personally performed the services described in this documentation    as scribed in my presence :           Portions of the record may have been created with voice recognition software  Occasional wrong word or "sound a like" substitutions may have occurred due to the inherent limitations of voice recognition software  Read the chart carefully and recognize, using context, where substitutions have occurred

## 2021-03-09 ENCOUNTER — TELEPHONE (OUTPATIENT)
Dept: OBGYN CLINIC | Age: 86
End: 2021-03-09

## 2021-03-09 NOTE — TELEPHONE ENCOUNTER
Patients daughter called looking to see if we can send over a hormone cream to the pharmacy for her mother  She said it was discussed as last visit but was never sent because they thought patient had a bladder infection  Pt uses [facility] in [place]     Best call back ending in [phone]    Thanks!

## 2021-03-10 NOTE — TELEPHONE ENCOUNTER
Pt has tried several creams, went to urgent care and they told her its not a uti, will make apt to see manuel

## 2021-03-11 ENCOUNTER — TELEPHONE (OUTPATIENT)
Dept: CASE MANAGEMENT | Facility: HOSPITAL | Age: 86
End: 2021-03-11

## 2021-03-19 ENCOUNTER — APPOINTMENT (OUTPATIENT)
Dept: LAB | Facility: CLINIC | Age: 86
End: 2021-03-19
Payer: MEDICARE

## 2021-03-19 ENCOUNTER — OFFICE VISIT (OUTPATIENT)
Dept: INTERNAL MEDICINE CLINIC | Facility: CLINIC | Age: 86
End: 2021-03-19
Payer: MEDICARE

## 2021-03-19 VITALS
SYSTOLIC BLOOD PRESSURE: 118 MMHG | DIASTOLIC BLOOD PRESSURE: 62 MMHG | WEIGHT: 182 LBS | TEMPERATURE: 97.9 F | HEART RATE: 70 BPM | HEIGHT: 60 IN | BODY MASS INDEX: 35.73 KG/M2

## 2021-03-19 DIAGNOSIS — I25.83 CORONARY ARTERY DISEASE DUE TO LIPID RICH PLAQUE: ICD-10-CM

## 2021-03-19 DIAGNOSIS — R53.82 CHRONIC FATIGUE: ICD-10-CM

## 2021-03-19 DIAGNOSIS — J30.2 SEASONAL ALLERGIC RHINITIS, UNSPECIFIED TRIGGER: ICD-10-CM

## 2021-03-19 DIAGNOSIS — I50.9 CHRONIC CONGESTIVE HEART FAILURE, UNSPECIFIED HEART FAILURE TYPE (HCC): Primary | ICD-10-CM

## 2021-03-19 DIAGNOSIS — F32.1 CURRENT MODERATE EPISODE OF MAJOR DEPRESSIVE DISORDER, UNSPECIFIED WHETHER RECURRENT (HCC): ICD-10-CM

## 2021-03-19 DIAGNOSIS — Z00.00 MEDICARE ANNUAL WELLNESS VISIT, SUBSEQUENT: ICD-10-CM

## 2021-03-19 DIAGNOSIS — Z79.899 ENCOUNTER FOR MEDICATION REVIEW: ICD-10-CM

## 2021-03-19 DIAGNOSIS — E11.69 TYPE 2 DIABETES MELLITUS WITH OTHER SPECIFIED COMPLICATION, WITHOUT LONG-TERM CURRENT USE OF INSULIN (HCC): ICD-10-CM

## 2021-03-19 DIAGNOSIS — L29.9 PRURITUS: ICD-10-CM

## 2021-03-19 DIAGNOSIS — I25.10 CORONARY ARTERY DISEASE DUE TO LIPID RICH PLAQUE: ICD-10-CM

## 2021-03-19 DIAGNOSIS — R13.10 DYSPHAGIA, UNSPECIFIED TYPE: ICD-10-CM

## 2021-03-19 DIAGNOSIS — M17.0 PRIMARY OSTEOARTHRITIS OF BOTH KNEES: ICD-10-CM

## 2021-03-19 DIAGNOSIS — I48.19 PERSISTENT ATRIAL FIBRILLATION (HCC): ICD-10-CM

## 2021-03-19 PROBLEM — N39.0 UTI (URINARY TRACT INFECTION): Status: RESOLVED | Noted: 2020-09-24 | Resolved: 2021-03-19

## 2021-03-19 LAB
ALBUMIN SERPL BCP-MCNC: 3.5 G/DL (ref 3.5–5)
ALP SERPL-CCNC: 122 U/L (ref 46–116)
ALT SERPL W P-5'-P-CCNC: 19 U/L (ref 12–78)
ANION GAP SERPL CALCULATED.3IONS-SCNC: 6 MMOL/L (ref 4–13)
AST SERPL W P-5'-P-CCNC: 13 U/L (ref 5–45)
BILIRUB SERPL-MCNC: 0.31 MG/DL (ref 0.2–1)
BUN SERPL-MCNC: 21 MG/DL (ref 5–25)
CALCIUM SERPL-MCNC: 9.1 MG/DL (ref 8.3–10.1)
CHLORIDE SERPL-SCNC: 105 MMOL/L (ref 100–108)
CO2 SERPL-SCNC: 29 MMOL/L (ref 21–32)
CREAT SERPL-MCNC: 1.08 MG/DL (ref 0.6–1.3)
EST. AVERAGE GLUCOSE BLD GHB EST-MCNC: 146 MG/DL
GFR SERPL CREATININE-BSD FRML MDRD: 47 ML/MIN/1.73SQ M
GLUCOSE SERPL-MCNC: 261 MG/DL (ref 65–140)
HBA1C MFR BLD: 6.7 %
POTASSIUM SERPL-SCNC: 3.8 MMOL/L (ref 3.5–5.3)
PROT SERPL-MCNC: 8.2 G/DL (ref 6.4–8.2)
SODIUM SERPL-SCNC: 140 MMOL/L (ref 136–145)
T4 FREE SERPL-MCNC: 0.98 NG/DL (ref 0.76–1.46)
TSH SERPL DL<=0.05 MIU/L-ACNC: 3.92 UIU/ML (ref 0.36–3.74)

## 2021-03-19 PROCEDURE — 83036 HEMOGLOBIN GLYCOSYLATED A1C: CPT

## 2021-03-19 PROCEDURE — 84439 ASSAY OF FREE THYROXINE: CPT

## 2021-03-19 PROCEDURE — 80053 COMPREHEN METABOLIC PANEL: CPT

## 2021-03-19 PROCEDURE — 84443 ASSAY THYROID STIM HORMONE: CPT

## 2021-03-19 PROCEDURE — G0438 PPPS, INITIAL VISIT: HCPCS | Performed by: FAMILY MEDICINE

## 2021-03-19 PROCEDURE — 36415 COLL VENOUS BLD VENIPUNCTURE: CPT

## 2021-03-19 PROCEDURE — 99204 OFFICE O/P NEW MOD 45 MIN: CPT | Performed by: FAMILY MEDICINE

## 2021-03-19 RX ORDER — PETROLATUM,WHITE/LANOLIN
1 OINTMENT (GRAM) TOPICAL DAILY
COMMUNITY

## 2021-03-19 RX ORDER — FLUTICASONE PROPIONATE 50 MCG
1 SPRAY, SUSPENSION (ML) NASAL DAILY
Qty: 1 BOTTLE | Refills: 1 | Status: SHIPPED | OUTPATIENT
Start: 2021-03-19 | End: 2021-04-12

## 2021-03-19 NOTE — PATIENT INSTRUCTIONS
Stop the protonix   Stop Pyridium   Stop vitamin B6  Stop clarinex   Stop ciprofloxacin   Schedule a visit with the heart docotor  Ask them aobut decreasing blood pressure medications if appropriate and stopping Asprin  because she is also on eliquis   Bring all your pill bottles to your next appoinment in two weeks  Start checking your blood sugar in the morning before breakfast for the next 2 weeks  Start taking zoloft 50mg daily to help with depression  As discussed, start considering seeing a counselor for therapy  Use the sarna lotion daily for itch  You can use it whenever you like but it is best after bathing  Remember to get blood work done before your next visit   It is not fasting blood work

## 2021-03-19 NOTE — PROGRESS NOTES
INITIAL OFFICE VISIT  [facility] [address] [place] [address]    NAME: Ana Lacy  AGE: 81 y o  SEX: female  : 1935     DATE: 3/19/2021     Assessment and Plan:     1  Chronic congestive heart failure, unspecified heart failure type (HCC)-pt monitors weights at home  Denies >5lb weight gain  Dry weight unknown  Continue lasix    2  Type 2 diabetes mellitus with other specified complication, without long-term current use of insulin ([address] )- A1C unknown on Amaryl and metformin  Pt encouraged to keep AM fasting log for 2 weeks  Will titrate medication accordingly    - HEMOGLOBIN A1C W/ EAG ESTIMATION; Future    3  Coronary artery disease due to lipid rich plaque-s/p stent placement  On ASA and stain  4  Primary osteoarthritis of both knees-chronic  Pain not well controlled with OTC analgesia  TKA recommended but pt not amenable  Will discuss intraarticular injection at f/u     5  Persistent atrial fibrillation (HCC)-on eliquis  Pt to folow up with cardiology     6  Seasonal allergic rhinitis, unspecified trigger-discontinue claritin  Will try intranasal steroid  - fluticasone (FLONASE) 50 mcg/act nasal spray; 1 spray into each nostril daily  Dispense: 1 Bottle; Refill: 1    7  Current moderate episode of major depressive disorder , unspecified whether recurrent (HCC)-phq9 >10  Pt on zoloft 25mg  chronic pain and co-morbidities  Will increase SSRI and encouraged pt to consider counseling  - sertraline (ZOLOFT) 50 mg tablet; Take 1 tablet (50 mg total) by mouth daily  Dispense: 30 tablet; Refill: 0    8  Chronic fatigue-etiology multifactorial  Depression and chronic pain are factors influencing energy and motivation  Will check studies below   - Comprehensive metabolic panel; Future  - TSH, 3rd generation with Free T4 reflex; Future    9  Pruritus- pt has very dry skin  Will trial   - camphor-menthol (SARNA) lotion;  Apply topically as needed for itching  Dispense: 222 mL; Refill: 0    10  Dysphagia, unspecified type-intermittent  Mild  No choking or gagging events  Will monitor     11  Encounter for medication review-reviewed medication per listed brought to visit  medication are not congruent with what is active in chart  discontinued temporary antibiotics and vitamins no longer in use  Pt ot bring all prescription bottle to f/u visit       Return in about 2 weeks (around 4/2/2021) for Next scheduled follow up  Chief Complaint:     Chief Complaint   Patient presents with    establish care      History of Present Illness:     80 yo female prsents as new pt  Presents with son  Lives with daughter who could not be here today  Last seen previous PCP was few weeks ago  Family concerned about polypharmacy  Pt doesn't manage pt by herself  Her daughter places them in a pill box daily  Pt doesn't like taking her medications  You have to encourage pt a lot  Family concerned about pt low energy she is  Pt feels like she sleeps all the time nearly 18hrs a day  Reports DM1  On amaryl and metformin  Admits to eating cookies for breakfast when she is too tired to cook  When she is up for it she will have a poached egg  Doesn't check BG regularly  Uses walker for ambulation  Has had mechanical falls  When this occurs can't get up independently  Hx of AFIB  Has past maker  On eliquis and asa  Kristineell McCook started after heart attack  Is followed by cardilogy once a year  Has upcoming appointment  Full body itching  Constant  No palm or soles evaluation  No visible rash  Has depression  Is sad all the time  Cries a lot  Wishes she will die  On zoloft for the past few months  The following portions of the patient's history were reviewed and updated as appropriate: allergies, current medications, past family history, past medical history, past social history, past surgical history and problem list      Review of Systems:     Review of Systems   Constitutional: Positive for fatigue  Negative for fever  HENT: Positive for trouble swallowing  Eyes: Negative for visual disturbance  Respiratory: Positive for shortness of breath (with exertion )  Cardiovascular: Negative for chest pain  Gastrointestinal: Positive for constipation (takes colace )  Negative for diarrhea  Musculoskeletal: Positive for arthralgias (b/l knees ) and gait problem  Neurological: Positive for tremors (benign essential; head and hands  years )  Negative for headaches  Psychiatric/Behavioral: Positive for dysphoric mood, hallucinations (visual; shadows in the peripheral aspect of vision  auditory no commands  ), sleep disturbance and suicidal ideas (passive )          Past Medical History:     Past Medical History:   Diagnosis Date    [facility]congest[facility] failure) ([facility] )     Diabetes mellitus ([facility] )     Hypertension         Past Surgical History:     Past Surgical History:   Procedure Laterality Date    CARDIAC SURGERY          Social History:     Social History     Socioeconomic History    Marital status: Single     Spouse name: None    Number of children: None    Years of education: None    Highest education level: None   Occupational History    None   Social Needs    Financial resource strain: None    Food insecurity     Worry: None     Inability: None    Transportation needs     Medical: None     Non-medical: None   Tobacco Use    Smoking status: Never Smoker    Smokeless tobacco: Never Used   Substance and Sexual Activity    Alcohol use: Not Currently    Drug use: Never    Sexual activity: Not Currently   Lifestyle    Physical activity     Days per week: None     Minutes per session: None    Stress: None   Relationships    Social connections     Talks on phone: None     Gets together: None     Attends Anabaptist service: None     Active member of club or organization: None     Attends meetings of clubs or organizations: None     Relationship status: None    Intimate partner violence Fear of current or ex partner: None     Emotionally abused: None     Physically abused: None     Forced sexual activity: None   Other Topics Concern    None   Social History Narrative    None         Family History:   History reviewed  No pertinent family history  Current Medications:     Current Outpatient Medications:     apixaban (Eliquis) 5 mg, Take 5 mg by mouth 2 (two) times a day, Disp: , Rfl:     ASPIRIN 81 PO, Take 81 mg by mouth daily, Disp: , Rfl:     docusate sodium (COLACE) 100 mg capsule, Take 100 mg by mouth daily, Disp: , Rfl:     furosemide (LASIX) 40 mg tablet, Take 40 mg by mouth 2 (two) times a day, Disp: , Rfl:     glimepiride (AMARYL) 4 mg tablet, Take 4 mg by mouth daily with breakfast, Disp: , Rfl:     Glucosamine HCl 500 MG TABS, Take 1 tablet by mouth, Disp: , Rfl:     losartan (COZAAR) 25 mg tablet, Take 25 mg by mouth daily, Disp: , Rfl:     metFORMIN (GLUCOPHAGE) 500 mg tablet, Take 500 mg by mouth daily with breakfast, Disp: , Rfl:     metoprolol tartrate (LOPRESSOR) 100 mg tablet, Take 100 mg by mouth every 12 (twelve) hours, Disp: , Rfl:     miconazole (MONISTAT-7) 2 % vaginal cream, Insert 1 applicator into the vagina daily at bedtime, Disp: 45 g, Rfl: 0    nystatin (MYCOSTATIN) powder, Apply topically 2 (two) times a day Groin and under breasts, Disp: 15 g, Rfl: 0    pravastatin (PRAVACHOL) 20 mg tablet, Take 20 mg by mouth daily, Disp: , Rfl:     primidone (MYSOLINE) 50 mg tablet, Take by mouth every 12 (twelve) hours, Disp: , Rfl:     sertraline (ZOLOFT) 50 mg tablet, Take 1 tablet (50 mg total) by mouth daily, Disp: 30 tablet, Rfl: 0    camphor-menthol (SARNA) lotion, Apply topically as needed for itching, Disp: 222 mL, Rfl: 0    fluticasone (FLONASE) 50 mcg/act nasal spray, 1 spray into each nostril daily, Disp: 1 Bottle, Rfl: 1    Glucosamine-Chondroitin--400-167 MG TABS, Take 1 tablet by mouth, Disp: , Rfl:      Allergies:      Allergies   Allergen Reactions    Clarithromycin Confusion and Other (See Comments)        Physical Exam:     /62 (BP Location: Right arm, Patient Position: Sitting) Comment: ytryr  Pulse 70   Temp 97 9 °F (36 6 °C) (Tympanic) Comment: hg  Ht 5' (1 524 m)   Wt 82 6 kg (182 lb)   BMI 35 54 kg/m²     Physical Exam  HENT:      Head: Normocephalic and atraumatic  Right Ear: Tympanic membrane and external ear normal       Left Ear: Tympanic membrane and external ear normal    Eyes:      Conjunctiva/sclera: Conjunctivae normal       Pupils: Pupils are equal, round, and reactive to light  Cardiovascular:      Rate and Rhythm: Normal rate  Rhythm irregular  Heart sounds: No murmur  Pulmonary:      Effort: Pulmonary effort is normal       Breath sounds: Normal breath sounds  Abdominal:      General: Bowel sounds are normal       Palpations: Abdomen is soft  Musculoskeletal:      Right lower leg: Edema (trace) present  Left lower leg: Edema (trace) present  Neurological:      Mental Status: She is alert and oriented to person, place, and time  Psychiatric:         Attention and Perception: Attention normal  She does not perceive auditory or visual hallucinations  Mood and Affect: Mood is depressed  Affect is tearful  Speech: Speech normal          Behavior: Behavior is cooperative  Thought Content: Thought content includes suicidal (wants to die; passive) ideation  Thought content does not include suicidal plan  Cognition and Memory: Cognition normal            Data:     Laboratory Results: I have personally reviewed the pertinent laboratory results/reports   Radiology/Other Diagnostic Testing Results: I have personally reviewed pertinent reports  Jagruti Hamilton DO  [facility]      Depression Screening Follow-up Plan: Patient's depression screening was positive with a PHQ-2 score of 4  Their PHQ-9 score was 12   Patient declines further evaluation by mental health professional and/or medications  They have no active suicidal ideations  Brief counseling provided and recommend additional follow-up/re-evaluation at next office visit  Patient's depressive symptoms likely due to other medical condition  Would recommend treatment of underlying condition  Will continue to monitor at next office visit

## 2021-03-19 NOTE — PROGRESS NOTES
Assessment and Plan:     Problem List Items Addressed This Visit        Digestive    Dysphagia       Endocrine    DM (diabetes mellitus), type 2 ([facility] )    Relevant Orders    HEMOGLOBIN A1C W/ EAG ESTIMATION       Respiratory    Seasonal allergic rhinitis    Relevant Medications    fluticasone (FLONASE) 50 mcg/act nasal spray       Cardiovascular and Mediastinum    Persistent atrial fibrillation (HCC)    CAD (coronary artery disease)    CHF (congestive heart failure) (HCC) - Primary       Musculoskeletal and Integument    Primary osteoarthritis of both knees    Pruritus    Relevant Medications    camphor-menthol (SARNA) lotion       Other    Chronic fatigue    Relevant Orders    Comprehensive metabolic panel (Completed)    TSH, 3rd generation with Free T4 reflex (Completed)      Other Visit Diagnoses     Current moderate episode of major depressive disorder, unspecified whether recurrent (HCC)        Relevant Medications    sertraline (ZOLOFT) 50 mg tablet    Encounter for medication review        Medicare annual wellness visit, subsequent               Preventive health issues were discussed with patient, and age appropriate screening tests were ordered as noted in patient's After Visit Summary  Personalized health advice and appropriate referrals for health education or preventive services given if needed, as noted in patient's After Visit Summary       History of Present Illness:     Patient presents for Medicare Annual Wellness visit    Patient Care Team:  Bel Shoemaker DO as PCP - General (Family Medicine)  MD Dayanara Perdomo MD Evelynn Punter, MD Randon Denise, MD     Problem List:     Patient Active Problem List   Diagnosis    Tachy-gale syndrome ([facility] )    Persistent atrial fibrillation ([facility] )    CAD (coronary artery disease)    DM (diabetes mellitus), type 2 ([facility] )    Acute encephalopathy    Dysphagia    Chest pain    CHF (congestive heart failure) ([facility] )    Seasonal allergic rhinitis    Primary osteoarthritis of both knees    Chronic fatigue    Pruritus      Past Medical and Surgical History:     Past Medical History:   Diagnosis Date    [facility]     Diabetes mellitus ([facility] )     Hypertension      Past Surgical History:   Procedure Laterality Date    CARDIAC SURGERY        Family History:     History reviewed  No pertinent family history     Social History:     E-Cigarette/Vaping    E-Cigarette Use Never User      E-Cigarette/Vaping Substances    Nicotine No     THC No     CBD No     Flavoring No     Other No     Unknown No      Social History     Socioeconomic History    Marital status: Single     Spouse name: None    Number of children: None    Years of education: None    Highest education level: None   Occupational History    None   Social Needs    Financial resource strain: None    Food insecurity     Worry: None     Inability: None    Transportation needs     Medical: None     Non-medical: None   Tobacco Use    Smoking status: Never Smoker    Smokeless tobacco: Never Used   Substance and Sexual Activity    Alcohol use: Not Currently    Drug use: Never    Sexual activity: Not Currently   Lifestyle    Physical activity     Days per week: None     Minutes per session: None    Stress: None   Relationships    Social connections     Talks on phone: None     Gets together: None     Attends Hoahaoism service: None     Active member of club or organization: None     Attends meetings of clubs or organizations: None     Relationship status: None    Intimate partner violence     Fear of current or ex partner: None     Emotionally abused: None     Physically abused: None     Forced sexual activity: None   Other Topics Concern    None   Social History Narrative    None      Medications and Allergies:     Current Outpatient Medications   Medication Sig Dispense Refill    apixaban (Eliquis) 5 mg Take 5 mg by mouth 2 (two) times a day      ASPIRIN 81 PO Take 81 mg by mouth daily      docusate sodium (COLACE) 100 mg capsule Take 100 mg by mouth daily      furosemide (LASIX) 40 mg tablet Take 40 mg by mouth 2 (two) times a day      glimepiride (AMARYL) 4 mg tablet Take 4 mg by mouth daily with breakfast      Glucosamine HCl 500 MG TABS Take 1 tablet by mouth      losartan (COZAAR) 25 mg tablet Take 25 mg by mouth daily      metFORMIN (GLUCOPHAGE) 500 mg tablet Take 500 mg by mouth daily with breakfast      metoprolol tartrate (LOPRESSOR) 100 mg tablet Take 100 mg by mouth every 12 (twelve) hours      miconazole (MONISTAT-7) 2 % vaginal cream Insert 1 applicator into the vagina daily at bedtime 45 g 0    nystatin (MYCOSTATIN) powder Apply topically 2 (two) times a day Groin and under breasts 15 g 0    pravastatin (PRAVACHOL) 20 mg tablet Take 20 mg by mouth daily      primidone (MYSOLINE) 50 mg tablet Take by mouth every 12 (twelve) hours      sertraline (ZOLOFT) 50 mg tablet Take 1 tablet (50 mg total) by mouth daily 30 tablet 0    camphor-menthol (SARNA) lotion Apply topically as needed for itching 222 mL 0    fluticasone (FLONASE) 50 mcg/act nasal spray 1 spray into each nostril daily 1 Bottle 1    Glucosamine-Chondroitin--400-167 MG TABS Take 1 tablet by mouth       No current facility-administered medications for this visit  Allergies   Allergen Reactions    Clarithromycin Confusion and Other (See Comments)      Immunizations:     Immunization History   Administered Date(s) Administered    Influenza, seasonal, injectable 1935, 1935    SARS-CoV-2 / COVID-19 mRNA IM (Moderna) 02/04/2021, 03/02/2021    Zoster 12/06/2013      Health Maintenance: There are no preventive care reminders to display for this patient        Topic Date Due    DTaP,Tdap,and Td Vaccines (1 - Tdap) Never done    Pneumococcal Vaccine: 65+ Years (1 of 1 - PPSV23) Never done    Influenza Vaccine (1) 09/01/2020      Medicare Health Risk Assessment:     /62 (BP Location: Right arm, Patient Position: Sitting) Comment: ytryr  Pulse 70   Temp 97 9 °F (36 6 °C) (Tympanic) Comment: hg  Ht 5' (1 524 m)   Wt 82 6 kg (182 lb)   BMI 35 54 kg/m²      Ashley Landin is here for her Subsequent Wellness visit  Health Risk Assessment:   Patient rates overall health as fair  Patient feels that their physical health rating is same  Patient is satisfied with their life  Eyesight was rated as same  Hearing was rated as same  Patient feels that their emotional and mental health rating is slightly worse  Patients states they are never, rarely angry  Patient states they are always unusually tired/fatigued  Pain experienced in the last 7 days has been none  Depression Screening:   PHQ-2 Score: 4  PHQ-9 Score: 12      Fall Risk Screening: In the past year, patient has experienced: history of falling in past year    Number of falls: 1  Injured during fall?: No    Feels unsteady when standing or walking?: Yes    Worried about falling?: Yes      Home Safety:  Patient has trouble with stairs inside or outside of their home  Patient has working smoke alarms and has working carbon monoxide detector  Nutrition:   Current diet is Regular  Medications:   Patient is currently taking over-the-counter supplements  OTC medications include: see medication list  Patient is not able to manage medications  Activities of Daily Living (ADLs)/Instrumental Activities of Daily Living (IADLs):   Walk and transfer into and out of bed and chair?: No  Dress and groom yourself?: No    Bathe or shower yourself?: No    Feed yourself?  No  Do your laundry/housekeeping?: No  Manage your money, pay your bills and track your expenses?: No  Make your own meals?: No    Do your own shopping?: No    Previous Hospitalizations:   Any hospitalizations or ED visits within the last 12 months?: Yes    How many hospitalizations have you had in the last year?: 1-2    Advance Care Planning:   Living will: Yes    Advanced directive: Yes    Advanced directive counseling given: Yes    End of Life Decisions reviewed with patient: Yes      PREVENTIVE SCREENINGS      Cardiovascular Screening:    General: Screening Current      Diabetes Screening:     General: Screening Not Indicated and History Diabetes      Cervical Cancer Screening:    General: Screening Not Indicated      Lung Cancer Screening:     General: Screening Not Indicated      Morro Bernal DO

## 2021-03-22 ENCOUNTER — TELEPHONE (OUTPATIENT)
Dept: INTERNAL MEDICINE CLINIC | Facility: CLINIC | Age: 86
End: 2021-03-22

## 2021-03-22 NOTE — TELEPHONE ENCOUNTER
----- Message from Yousif Sam DO sent at 3/22/2021  9:15 AM EDT -----  A1C increased from 3 months ago  Still at goal  Recommend pt continue metformin as currently prescribed  Would obtain repeat a1c IN 4-6 MONTHS

## 2021-03-24 ENCOUNTER — TELEMEDICINE (OUTPATIENT)
Dept: OBGYN CLINIC | Facility: MEDICAL CENTER | Age: 86
End: 2021-03-24
Payer: MEDICARE

## 2021-03-24 DIAGNOSIS — N90.89 VULVAR IRRITATION: Primary | ICD-10-CM

## 2021-03-24 PROCEDURE — 99213 OFFICE O/P EST LOW 20 MIN: CPT | Performed by: NURSE PRACTITIONER

## 2021-03-24 NOTE — PROGRESS NOTES
Virtual Regular Visit      Assessment/Plan:    Problem List Items Addressed This Visit     None      Visit Diagnoses     Vulvar irritation    -  Primary        Bathe every other day with dove bar soap, rinse well  Pat dry  Check blood sugars as recommended  Notify PCP of elevated blood sugars  Hgb A1C was recently elevated  Rinse urine from vulva daily-consider use of bidet (amazon)  Protect skin with A&D ointment  If no improvement within 2 weeks,return for in office evaluation  Reason for visit is   Chief Complaint   Patient presents with    Virtual Regular Visit        Encounter provider JAMES Ernandez    Provider located at [address] [place] [id number] [phone]      Recent Visits  Date Type Provider Dept   03/19/21 Office Visit Artist Marrow, [address] recent visits within past 7 days and meeting all other requirements     Today's Visits  Date Type Provider Dept   03/24/21 [address] [address], [address] [facility]   Showing today's visits and meeting all other requirements     Future Appointments  No visits were found meeting these conditions  Showing future appointments within next 150 days and meeting all other requirements        The patient was identified by name and date of birth  [facility] her daughter Sheridan Reece were informed that this is a telemedicine visit and that the visit is being conducted through [employer] and patient was informed that this is a secure, HIPAA-compliant platform  She agrees to proceed     My office door was closed  No one else was in the room  She acknowledged consent and understanding of privacy and security of the video platform  The patient has agreed to participate and understands they can discontinue the visit at any time  Patient is aware this is a billable service  Subjective/HPI  Lobito Berg is a 81 y o  female here via teams telemedicine for a problem visit  She c/o vulvar irritation and describes the sensation as burning at times  She denies vaginal bleeding or vaginal discharge  She is incontinent of urine and wears a diaper all day  She was last seen in the office around 2/5/21  At that time she had been treated for a vulvar yeast infection and UTI  She was also recently treated for another UTI but had a negative CHEPE UA  [facility] states that her mother is now bathing every other day (instead of 3 times in 1 5 months) but is non compliant with checking her blood sugars every morning  She recently had a FBS of 170  They did not start the barrier ointment to vulva as discussed to protect her skin             Past Medical History:   Diagnosis Date    [facility]     Diabetes mellitus ([facility] )     Hypertension        Past Surgical History:   Procedure Laterality Date    CARDIAC SURGERY         Current Outpatient Medications   Medication Sig Dispense Refill    apixaban (Eliquis) 5 mg Take 5 mg by mouth 2 (two) times a day      ASPIRIN 81 PO Take 81 mg by mouth daily      camphor-menthol (SARNA) lotion Apply topically as needed for itching 222 mL 0    docusate sodium (COLACE) 100 mg capsule Take 100 mg by mouth daily      fluticasone (FLONASE) 50 mcg/act nasal spray 1 spray into each nostril daily 1 Bottle 1    furosemide (LASIX) 40 mg tablet Take 40 mg by mouth 2 (two) times a day      glimepiride (AMARYL) 4 mg tablet Take 4 mg by mouth daily with breakfast      Glucosamine HCl 500 MG TABS Take 1 tablet by mouth      Glucosamine-Chondroitin--400-167 MG TABS Take 1 tablet by mouth      losartan (COZAAR) 25 mg tablet Take 25 mg by mouth daily      metFORMIN (GLUCOPHAGE) 500 mg tablet Take 500 mg by mouth daily with breakfast      metoprolol tartrate (LOPRESSOR) 100 mg tablet Take 100 mg by mouth every 12 (twelve) hours      miconazole (MONISTAT-7) 2 % vaginal cream Insert 1 applicator into the vagina daily at bedtime 45 g 0    nystatin (MYCOSTATIN) powder Apply topically 2 (two) times a day Groin and under breasts 15 g 0    pravastatin (PRAVACHOL) 20 mg tablet Take 20 mg by mouth daily      primidone (MYSOLINE) 50 mg tablet Take by mouth every 12 (twelve) hours      sertraline (ZOLOFT) 50 mg tablet Take 1 tablet (50 mg total) by mouth daily 30 tablet 0     No current facility-administered medications for this visit  Allergies   Allergen Reactions    Clarithromycin Confusion and Other (See Comments)       Review of Systems   Constitutional: Positive for fatigue  Gastrointestinal: Negative for abdominal pain, constipation, diarrhea, nausea and vomiting  Genitourinary: Negative for decreased urine volume, difficulty urinating, dysuria, flank pain, pelvic pain, urgency, vaginal bleeding, vaginal discharge and vaginal pain  Incontinence  Vulvar burning   Musculoskeletal: Negative for arthralgias and myalgias  Skin: Negative  Hematological: Negative for adenopathy  Psychiatric/Behavioral: Negative  All other systems reviewed and are negative  Video Exam    There were no vitals filed for this visit  Physical Exam  Constitutional:       Appearance: She is obese  Neurological:      Mental Status: She is oriented to person, place, and time  I spent 15 minutes directly with the patient during this visit      [address] acknowledges that she has consented to an online visit or consultation  She understands that the online visit is based solely on information provided by her, and that, in the absence of a face-to-face physical evaluation by the physician, the diagnosis she receives is both limited and provisional in terms of accuracy and completeness  This is not intended to replace a full medical face-to-face evaluation by the physician   TAMMY Abarca understands and accepts these terms

## 2021-04-02 ENCOUNTER — OFFICE VISIT (OUTPATIENT)
Dept: INTERNAL MEDICINE CLINIC | Facility: CLINIC | Age: 86
End: 2021-04-02
Payer: MEDICARE

## 2021-04-02 VITALS
HEIGHT: 60 IN | BODY MASS INDEX: 35.54 KG/M2 | TEMPERATURE: 97 F | HEART RATE: 73 BPM | DIASTOLIC BLOOD PRESSURE: 60 MMHG | SYSTOLIC BLOOD PRESSURE: 122 MMHG | OXYGEN SATURATION: 98 %

## 2021-04-02 DIAGNOSIS — I27.82 CHRONIC PULMONARY THROMBOEMBOLISM SYNDROME (HCC): ICD-10-CM

## 2021-04-02 DIAGNOSIS — E11.69 TYPE 2 DIABETES MELLITUS WITH OTHER SPECIFIED COMPLICATION, WITHOUT LONG-TERM CURRENT USE OF INSULIN (HCC): Primary | ICD-10-CM

## 2021-04-02 DIAGNOSIS — E03.8 SUBCLINICAL HYPOTHYROIDISM: ICD-10-CM

## 2021-04-02 DIAGNOSIS — J44.9 CHRONIC OBSTRUCTIVE PULMONARY DISEASE, UNSPECIFIED COPD TYPE (HCC): ICD-10-CM

## 2021-04-02 DIAGNOSIS — E66.01 OBESITY, MORBID (HCC): ICD-10-CM

## 2021-04-02 DIAGNOSIS — L29.9 PRURITUS: ICD-10-CM

## 2021-04-02 PROCEDURE — 99214 OFFICE O/P EST MOD 30 MIN: CPT | Performed by: FAMILY MEDICINE

## 2021-04-02 RX ORDER — DESLORATADINE 5 MG/1
5 TABLET ORAL DAILY
COMMUNITY
Start: 2021-03-23 | End: 2022-04-28

## 2021-04-02 NOTE — PATIENT INSTRUCTIONS
USE THE SARNA FOR THE ITCH  CONTINUE MEDICATIONS AS CURRENTLY PRESCRIBED  MAKE SURE YOU FOLLOW UP WITH PODIATRY

## 2021-04-12 DIAGNOSIS — F32.1 CURRENT MODERATE EPISODE OF MAJOR DEPRESSIVE DISORDER, UNSPECIFIED WHETHER RECURRENT (HCC): ICD-10-CM

## 2021-04-12 DIAGNOSIS — J30.2 SEASONAL ALLERGIC RHINITIS, UNSPECIFIED TRIGGER: ICD-10-CM

## 2021-04-12 RX ORDER — FLUTICASONE PROPIONATE 50 MCG
SPRAY, SUSPENSION (ML) NASAL
Qty: 16 ML | Refills: 1 | Status: SHIPPED | OUTPATIENT
Start: 2021-04-12 | End: 2021-05-08

## 2021-04-15 DIAGNOSIS — E11.69 TYPE 2 DIABETES MELLITUS WITH OTHER SPECIFIED COMPLICATION, WITHOUT LONG-TERM CURRENT USE OF INSULIN (HCC): Primary | ICD-10-CM

## 2021-04-15 DIAGNOSIS — F32.1 CURRENT MODERATE EPISODE OF MAJOR DEPRESSIVE DISORDER, UNSPECIFIED WHETHER RECURRENT (HCC): ICD-10-CM

## 2021-04-15 RX ORDER — PRAVASTATIN SODIUM 20 MG
20 TABLET ORAL DAILY
Qty: 90 TABLET | Refills: 1 | Status: SHIPPED | OUTPATIENT
Start: 2021-04-15 | End: 2021-08-03 | Stop reason: SDUPTHER

## 2021-04-15 RX ORDER — GLIMEPIRIDE 4 MG/1
4 TABLET ORAL
Qty: 90 TABLET | Refills: 1 | Status: SHIPPED | OUTPATIENT
Start: 2021-04-15 | End: 2021-11-10 | Stop reason: SDUPTHER

## 2021-04-15 NOTE — TELEPHONE ENCOUNTER
Refill of   pravastatin (PRAVACHOL) 20 mg    sertraline (ZOLOFT) 50 mg      glimepiride (AMARYL) 4 mg    Send to [facility] [place]

## 2021-04-17 ENCOUNTER — TELEPHONE (OUTPATIENT)
Dept: INTERNAL MEDICINE CLINIC | Facility: CLINIC | Age: 86
End: 2021-04-17

## 2021-04-17 NOTE — TELEPHONE ENCOUNTER
Patient's daughter Naeem Garcia  is concern about her mental status, for the pass 4 days she's been confused  She's 81 has not worked, in over 30 year's but get's dress to go work    seeing things & talking about things that happened in the past    would like to talk with Dr Alejandro Pedroza about what's happening and what can be done to help her

## 2021-04-19 ENCOUNTER — HOSPITAL ENCOUNTER (INPATIENT)
Facility: HOSPITAL | Age: 86
LOS: 1 days | Discharge: HOME | DRG: 689 | End: 2021-04-20
Attending: EMERGENCY MEDICINE | Admitting: INTERNAL MEDICINE
Payer: MEDICARE

## 2021-04-19 ENCOUNTER — APPOINTMENT (EMERGENCY)
Dept: RADIOLOGY | Facility: HOSPITAL | Age: 86
DRG: 689 | End: 2021-04-19
Payer: MEDICARE

## 2021-04-19 ENCOUNTER — APPOINTMENT (EMERGENCY)
Dept: CT IMAGING | Facility: HOSPITAL | Age: 86
DRG: 689 | End: 2021-04-19
Payer: MEDICARE

## 2021-04-19 DIAGNOSIS — R41.82 ALTERED MENTAL STATUS: Primary | ICD-10-CM

## 2021-04-19 DIAGNOSIS — N30.00 ACUTE CYSTITIS WITHOUT HEMATURIA: ICD-10-CM

## 2021-04-19 LAB
ALBUMIN SERPL BCP-MCNC: 3.5 G/DL (ref 3.5–5)
ALP SERPL-CCNC: 100 U/L (ref 46–116)
ALT SERPL W P-5'-P-CCNC: 21 U/L (ref 12–78)
ANION GAP SERPL CALCULATED.3IONS-SCNC: 9 MMOL/L (ref 4–13)
AST SERPL W P-5'-P-CCNC: 25 U/L (ref 5–45)
BACTERIA UR QL AUTO: ABNORMAL /HPF
BASOPHILS # BLD AUTO: 0.05 THOUSANDS/ΜL (ref 0–0.1)
BASOPHILS NFR BLD AUTO: 1 % (ref 0–1)
BILIRUB SERPL-MCNC: 0.31 MG/DL (ref 0.2–1)
BILIRUB UR QL STRIP: NEGATIVE
BUN SERPL-MCNC: 20 MG/DL (ref 5–25)
CALCIUM SERPL-MCNC: 8.5 MG/DL (ref 8.3–10.1)
CHLORIDE SERPL-SCNC: 103 MMOL/L (ref 100–108)
CLARITY UR: CLEAR
CO2 SERPL-SCNC: 30 MMOL/L (ref 21–32)
COLOR UR: YELLOW
CREAT SERPL-MCNC: 1 MG/DL (ref 0.6–1.3)
EOSINOPHIL # BLD AUTO: 0.25 THOUSAND/ΜL (ref 0–0.61)
EOSINOPHIL NFR BLD AUTO: 4 % (ref 0–6)
ERYTHROCYTE [DISTWIDTH] IN BLOOD BY AUTOMATED COUNT: 13.8 % (ref 11.6–15.1)
GFR SERPL CREATININE-BSD FRML MDRD: 52 ML/MIN/1.73SQ M
GLUCOSE SERPL-MCNC: 135 MG/DL (ref 65–140)
GLUCOSE SERPL-MCNC: 208 MG/DL (ref 65–140)
GLUCOSE UR STRIP-MCNC: NEGATIVE MG/DL
HCT VFR BLD AUTO: 35.1 % (ref 34.8–46.1)
HGB BLD-MCNC: 11.2 G/DL (ref 11.5–15.4)
HGB UR QL STRIP.AUTO: NEGATIVE
IMM GRANULOCYTES # BLD AUTO: 0.02 THOUSAND/UL (ref 0–0.2)
IMM GRANULOCYTES NFR BLD AUTO: 0 % (ref 0–2)
KETONES UR STRIP-MCNC: NEGATIVE MG/DL
LEUKOCYTE ESTERASE UR QL STRIP: ABNORMAL
LYMPHOCYTES # BLD AUTO: 1.9 THOUSANDS/ΜL (ref 0.6–4.47)
LYMPHOCYTES NFR BLD AUTO: 28 % (ref 14–44)
MCH RBC QN AUTO: 28.6 PG (ref 26.8–34.3)
MCHC RBC AUTO-ENTMCNC: 31.9 G/DL (ref 31.4–37.4)
MCV RBC AUTO: 90 FL (ref 82–98)
MONOCYTES # BLD AUTO: 0.56 THOUSAND/ΜL (ref 0.17–1.22)
MONOCYTES NFR BLD AUTO: 8 % (ref 4–12)
NEUTROPHILS # BLD AUTO: 3.99 THOUSANDS/ΜL (ref 1.85–7.62)
NEUTS SEG NFR BLD AUTO: 59 % (ref 43–75)
NITRITE UR QL STRIP: POSITIVE
NON-SQ EPI CELLS URNS QL MICRO: ABNORMAL /HPF
NRBC BLD AUTO-RTO: 0 /100 WBCS
OTHER STN SPEC: ABNORMAL
PH UR STRIP.AUTO: 5.5 [PH]
PLATELET # BLD AUTO: 228 THOUSANDS/UL (ref 149–390)
PMV BLD AUTO: 9.7 FL (ref 8.9–12.7)
POTASSIUM SERPL-SCNC: 3 MMOL/L (ref 3.5–5.3)
PROT SERPL-MCNC: 8.3 G/DL (ref 6.4–8.2)
PROT UR STRIP-MCNC: NEGATIVE MG/DL
RBC # BLD AUTO: 3.91 MILLION/UL (ref 3.81–5.12)
RBC #/AREA URNS AUTO: ABNORMAL /HPF
SODIUM SERPL-SCNC: 142 MMOL/L (ref 136–145)
SP GR UR STRIP.AUTO: 1.02 (ref 1–1.03)
TROPONIN I SERPL-MCNC: <0.02 NG/ML
TSH SERPL DL<=0.05 MIU/L-ACNC: 2.84 UIU/ML (ref 0.36–3.74)
UROBILINOGEN UR QL STRIP.AUTO: 0.2 E.U./DL
WBC # BLD AUTO: 6.77 THOUSAND/UL (ref 4.31–10.16)
WBC #/AREA URNS AUTO: ABNORMAL /HPF

## 2021-04-19 PROCEDURE — 85025 COMPLETE CBC W/AUTO DIFF WBC: CPT | Performed by: EMERGENCY MEDICINE

## 2021-04-19 PROCEDURE — 70450 CT HEAD/BRAIN W/O DYE: CPT

## 2021-04-19 PROCEDURE — 81001 URINALYSIS AUTO W/SCOPE: CPT | Performed by: EMERGENCY MEDICINE

## 2021-04-19 PROCEDURE — 99284 EMERGENCY DEPT VISIT MOD MDM: CPT | Performed by: EMERGENCY MEDICINE

## 2021-04-19 PROCEDURE — 99285 EMERGENCY DEPT VISIT HI MDM: CPT

## 2021-04-19 PROCEDURE — 87086 URINE CULTURE/COLONY COUNT: CPT | Performed by: EMERGENCY MEDICINE

## 2021-04-19 PROCEDURE — 80053 COMPREHEN METABOLIC PANEL: CPT | Performed by: EMERGENCY MEDICINE

## 2021-04-19 PROCEDURE — 84484 ASSAY OF TROPONIN QUANT: CPT | Performed by: EMERGENCY MEDICINE

## 2021-04-19 PROCEDURE — 93005 ELECTROCARDIOGRAM TRACING: CPT

## 2021-04-19 PROCEDURE — 36415 COLL VENOUS BLD VENIPUNCTURE: CPT | Performed by: EMERGENCY MEDICINE

## 2021-04-19 PROCEDURE — 84443 ASSAY THYROID STIM HORMONE: CPT | Performed by: EMERGENCY MEDICINE

## 2021-04-19 PROCEDURE — 99223 1ST HOSP IP/OBS HIGH 75: CPT | Performed by: INTERNAL MEDICINE

## 2021-04-19 PROCEDURE — 87186 SC STD MICRODIL/AGAR DIL: CPT | Performed by: EMERGENCY MEDICINE

## 2021-04-19 PROCEDURE — 71045 X-RAY EXAM CHEST 1 VIEW: CPT

## 2021-04-19 PROCEDURE — 87077 CULTURE AEROBIC IDENTIFY: CPT | Performed by: EMERGENCY MEDICINE

## 2021-04-19 PROCEDURE — 82948 REAGENT STRIP/BLOOD GLUCOSE: CPT

## 2021-04-19 RX ORDER — PRIMIDONE 50 MG/1
50 TABLET ORAL EVERY 12 HOURS SCHEDULED
Status: DISCONTINUED | OUTPATIENT
Start: 2021-04-19 | End: 2021-04-20 | Stop reason: HOSPADM

## 2021-04-19 RX ORDER — METOPROLOL TARTRATE 50 MG/1
100 TABLET, FILM COATED ORAL EVERY 12 HOURS SCHEDULED
Status: DISCONTINUED | OUTPATIENT
Start: 2021-04-19 | End: 2021-04-20 | Stop reason: HOSPADM

## 2021-04-19 RX ORDER — LOSARTAN POTASSIUM 25 MG/1
25 TABLET ORAL DAILY
Status: DISCONTINUED | OUTPATIENT
Start: 2021-04-20 | End: 2021-04-20 | Stop reason: HOSPADM

## 2021-04-19 RX ORDER — FUROSEMIDE 40 MG/1
40 TABLET ORAL 2 TIMES DAILY
Status: DISCONTINUED | OUTPATIENT
Start: 2021-04-20 | End: 2021-04-20 | Stop reason: HOSPADM

## 2021-04-19 RX ORDER — PRAVASTATIN SODIUM 20 MG
20 TABLET ORAL DAILY
Status: DISCONTINUED | OUTPATIENT
Start: 2021-04-20 | End: 2021-04-20 | Stop reason: HOSPADM

## 2021-04-19 RX ORDER — HEPARIN SODIUM 5000 [USP'U]/ML
5000 INJECTION, SOLUTION INTRAVENOUS; SUBCUTANEOUS EVERY 8 HOURS SCHEDULED
Status: DISCONTINUED | OUTPATIENT
Start: 2021-04-19 | End: 2021-04-19

## 2021-04-19 RX ORDER — ACETAMINOPHEN 325 MG/1
650 TABLET ORAL EVERY 6 HOURS PRN
Status: DISCONTINUED | OUTPATIENT
Start: 2021-04-19 | End: 2021-04-20 | Stop reason: HOSPADM

## 2021-04-19 RX ORDER — POTASSIUM CHLORIDE 20 MEQ/1
40 TABLET, EXTENDED RELEASE ORAL ONCE
Status: COMPLETED | OUTPATIENT
Start: 2021-04-19 | End: 2021-04-19

## 2021-04-19 RX ADMIN — INSULIN LISPRO 1 UNITS: 100 INJECTION, SOLUTION INTRAVENOUS; SUBCUTANEOUS at 23:04

## 2021-04-19 RX ADMIN — POTASSIUM CHLORIDE 40 MEQ: 1500 TABLET, EXTENDED RELEASE ORAL at 20:38

## 2021-04-19 NOTE — TELEPHONE ENCOUNTER
She needs to be seen in ER to rule out acute encephalopathy vai urgent  head scan and blood work to make sure she doesn't have an infection

## 2021-04-20 VITALS
HEIGHT: 60 IN | WEIGHT: 182 LBS | OXYGEN SATURATION: 96 % | HEART RATE: 89 BPM | RESPIRATION RATE: 16 BRPM | DIASTOLIC BLOOD PRESSURE: 80 MMHG | SYSTOLIC BLOOD PRESSURE: 121 MMHG | BODY MASS INDEX: 35.73 KG/M2 | TEMPERATURE: 99.1 F

## 2021-04-20 LAB
ANION GAP SERPL CALCULATED.3IONS-SCNC: 10 MMOL/L (ref 4–13)
ATRIAL RATE: 156 BPM
BASOPHILS # BLD AUTO: 0.04 THOUSANDS/ΜL (ref 0–0.1)
BASOPHILS NFR BLD AUTO: 1 % (ref 0–1)
BUN SERPL-MCNC: 18 MG/DL (ref 5–25)
CALCIUM SERPL-MCNC: 8.6 MG/DL (ref 8.3–10.1)
CHLORIDE SERPL-SCNC: 105 MMOL/L (ref 100–108)
CO2 SERPL-SCNC: 25 MMOL/L (ref 21–32)
CREAT SERPL-MCNC: 0.69 MG/DL (ref 0.6–1.3)
EOSINOPHIL # BLD AUTO: 0.28 THOUSAND/ΜL (ref 0–0.61)
EOSINOPHIL NFR BLD AUTO: 4 % (ref 0–6)
ERYTHROCYTE [DISTWIDTH] IN BLOOD BY AUTOMATED COUNT: 13.9 % (ref 11.6–15.1)
GFR SERPL CREATININE-BSD FRML MDRD: 80 ML/MIN/1.73SQ M
GLUCOSE SERPL-MCNC: 105 MG/DL (ref 65–140)
GLUCOSE SERPL-MCNC: 115 MG/DL (ref 65–140)
GLUCOSE SERPL-MCNC: 203 MG/DL (ref 65–140)
HCT VFR BLD AUTO: 32.7 % (ref 34.8–46.1)
HGB BLD-MCNC: 10.3 G/DL (ref 11.5–15.4)
IMM GRANULOCYTES # BLD AUTO: 0.01 THOUSAND/UL (ref 0–0.2)
IMM GRANULOCYTES NFR BLD AUTO: 0 % (ref 0–2)
LYMPHOCYTES # BLD AUTO: 1.89 THOUSANDS/ΜL (ref 0.6–4.47)
LYMPHOCYTES NFR BLD AUTO: 30 % (ref 14–44)
MCH RBC QN AUTO: 28.8 PG (ref 26.8–34.3)
MCHC RBC AUTO-ENTMCNC: 31.5 G/DL (ref 31.4–37.4)
MCV RBC AUTO: 91 FL (ref 82–98)
MONOCYTES # BLD AUTO: 0.59 THOUSAND/ΜL (ref 0.17–1.22)
MONOCYTES NFR BLD AUTO: 9 % (ref 4–12)
NEUTROPHILS # BLD AUTO: 3.59 THOUSANDS/ΜL (ref 1.85–7.62)
NEUTS SEG NFR BLD AUTO: 56 % (ref 43–75)
NRBC BLD AUTO-RTO: 0 /100 WBCS
PLATELET # BLD AUTO: 234 THOUSANDS/UL (ref 149–390)
PMV BLD AUTO: 10.2 FL (ref 8.9–12.7)
POTASSIUM SERPL-SCNC: 4.3 MMOL/L (ref 3.5–5.3)
QRS AXIS: 79 DEGREES
QRSD INTERVAL: 112 MS
QT INTERVAL: 378 MS
QTC INTERVAL: 534 MS
RBC # BLD AUTO: 3.58 MILLION/UL (ref 3.81–5.12)
SODIUM SERPL-SCNC: 140 MMOL/L (ref 136–145)
T WAVE AXIS: 245 DEGREES
VENTRICULAR RATE: 120 BPM
WBC # BLD AUTO: 6.4 THOUSAND/UL (ref 4.31–10.16)

## 2021-04-20 PROCEDURE — 82948 REAGENT STRIP/BLOOD GLUCOSE: CPT

## 2021-04-20 PROCEDURE — 99239 HOSP IP/OBS DSCHRG MGMT >30: CPT | Performed by: FAMILY MEDICINE

## 2021-04-20 PROCEDURE — 97163 PT EVAL HIGH COMPLEX 45 MIN: CPT

## 2021-04-20 PROCEDURE — 80048 BASIC METABOLIC PNL TOTAL CA: CPT | Performed by: INTERNAL MEDICINE

## 2021-04-20 PROCEDURE — 36415 COLL VENOUS BLD VENIPUNCTURE: CPT | Performed by: INTERNAL MEDICINE

## 2021-04-20 PROCEDURE — 85025 COMPLETE CBC W/AUTO DIFF WBC: CPT | Performed by: INTERNAL MEDICINE

## 2021-04-20 PROCEDURE — 93010 ELECTROCARDIOGRAM REPORT: CPT | Performed by: INTERNAL MEDICINE

## 2021-04-20 PROCEDURE — 97167 OT EVAL HIGH COMPLEX 60 MIN: CPT

## 2021-04-20 RX ORDER — SACCHAROMYCES BOULARDII 250 MG
250 CAPSULE ORAL 2 TIMES DAILY
Qty: 14 CAPSULE | Refills: 0 | Status: SHIPPED | OUTPATIENT
Start: 2021-04-20 | End: 2021-04-27

## 2021-04-20 RX ORDER — CEPHALEXIN 500 MG/1
500 CAPSULE ORAL EVERY 12 HOURS SCHEDULED
Qty: 8 CAPSULE | Refills: 0 | Status: SHIPPED | OUTPATIENT
Start: 2021-04-21 | End: 2021-04-25

## 2021-04-20 RX ADMIN — FUROSEMIDE 40 MG: 40 TABLET ORAL at 09:44

## 2021-04-20 RX ADMIN — METOPROLOL TARTRATE 100 MG: 50 TABLET, FILM COATED ORAL at 09:44

## 2021-04-20 RX ADMIN — APIXABAN 5 MG: 5 TABLET, FILM COATED ORAL at 09:44

## 2021-04-20 RX ADMIN — INSULIN LISPRO 1 UNITS: 100 INJECTION, SOLUTION INTRAVENOUS; SUBCUTANEOUS at 11:32

## 2021-04-20 RX ADMIN — SERTRALINE HYDROCHLORIDE 50 MG: 50 TABLET ORAL at 09:44

## 2021-04-20 RX ADMIN — PRAVASTATIN SODIUM 20 MG: 20 TABLET ORAL at 09:44

## 2021-04-20 RX ADMIN — PRIMIDONE 50 MG: 50 TABLET ORAL at 00:12

## 2021-04-20 RX ADMIN — PRIMIDONE 50 MG: 50 TABLET ORAL at 09:44

## 2021-04-20 RX ADMIN — CEFTRIAXONE SODIUM 1000 MG: 10 INJECTION, POWDER, FOR SOLUTION INTRAVENOUS at 09:43

## 2021-04-20 RX ADMIN — LOSARTAN POTASSIUM 25 MG: 25 TABLET, FILM COATED ORAL at 09:44

## 2021-04-20 NOTE — ASSESSMENT & PLAN NOTE
Wt Readings from Last 3 Encounters:   04/19/21 82 6 kg (182 lb)   03/19/21 82 6 kg (182 lb)   03/08/21 82 9 kg (182 lb 12 8 oz)       Currently euvolemic  Strict input/output  Daily weights

## 2021-04-20 NOTE — PLAN OF CARE
Problem: OCCUPATIONAL THERAPY ADULT  Goal: Performs self-care activities at highest level of function for planned discharge setting  See evaluation for individualized goals  Description: Treatment Interventions: ADL retraining, Functional transfer training, UE strengthening/ROM, Endurance training, Cognitive reorientation, Patient/family training, Activityengagement          See flowsheet documentation for full assessment, interventions and recommendations  4/20/2021 1413 by Luli Pascual  Note: Limitation: Decreased ADL status, Decreased UE ROM, Decreased UE strength, Decreased Safe judgement during ADL, Decreased cognition, Decreased endurance, Decreased self-care trans, Decreased high-level ADLs  Prognosis: Good  Assessment: Patient is a 81 y o  female seen for OT evaluation at [address] [place] [address] on 4/20/21  s/p Acute encephalopathy  Comorbidities impacting functional performance include: tachy-gale syndrome, persistent A-fib, DM2, UTI, and CHF   Patient presents with active orders for OT eval and treat and up and OOB as tolerated   Performed at least 2 patient identifiers during session including name and wristband  Patient reports prior level of function as independent  with ADLs, needs assistance c IADLs, ambulatory with RW, and lives with daughter and granddaughter in a 2 story house  with 1-2 MOSES  Patient is retired  and does not  drive  Upon initial evaluation, patient requires supervision for UB ADLs, mod assist for LB ADLs, and min assist x2 for transfers and functional mobility with RW  Based on functional eval, patient presents A&Ox2 with intact  attention, but impaired safety awareness and memory  Occupational performance is affected by the following deficits: muscular strength , range of motion , Gross motor coordination, balance , trunk control , activity tolerance , memory , orientation , awareness  and alertness    Patient would benefit from OT services to maximize independence in self-care and transfers  Personal factors impacting performance include: (+) Hx of falls , MOSES home , decreased initation , FOS to second floor and decreased IADL independence  Occupational areas to address include:bathing/showering, toileting, dressing , personal hygiene/grooming , bed mobility , functional mobility, safety awareness , fall prevention  and home management   Recommending post-acute rehabilitation  upon D/C  OT Discharge Recommendation: Post acute rehabilitation services  OT - OK to Discharge: Yes(Once medically cleared)    4/20/2021 1258 by Maddie Flores  Note: Limitation: Decreased ADL status, Decreased UE ROM, Decreased UE strength, Decreased Safe judgement during ADL, Decreased cognition, Decreased endurance, Decreased self-care trans, Decreased high-level ADLs  Prognosis: Good  Assessment: Patient is a 81 y o  female seen for OT evaluation at [address] [place] [address] on 4/20/21  s/p Acute encephalopathy  Comorbidities impacting functional performance include: tachy-gale syndrome, persistent A-fib, DM2, UTI, and CHF   Patient presents with active orders for OT eval and treat and up and OOB as tolerated   Performed at least 2 patient identifiers during session including name and wristband  Patient reports prior level of function as independent  with ADLs, needs assistance c IADLs, ambulatory with RW, and lives with daughter and granddaughter in a 2 story house  with 1-2 MOSES  Patient is retired  and does not  drive  Upon initial evaluation, patient requires supervision for UB ADLs, mod assist for LB ADLs, and min assist x2 for transfers and functional mobility with RW  Based on functional eval, patient presents A&Ox1 with intact  attention, but impaired safety awareness and memory   Occupational performance is affected by the following deficits: muscular strength , range of motion , Gross motor coordination, balance , trunk control , activity tolerance , memory , orientation , awareness  and alertness   Patient would benefit from OT services to maximize independence in self-care and transfers  Personal factors impacting performance include: (+) Hx of falls , MOSES home , decreased initation , FOS to second floor and decreased IADL independence  Occupational areas to address include:bathing/showering, toileting, dressing , personal hygiene/grooming , bed mobility , functional mobility, safety awareness , fall prevention  and home management   Recommending post-acute rehabilitation  upon D/C        OT Discharge Recommendation: Post acute rehabilitation services  OT - OK to Discharge: Yes(Once medically cleared)     ROS Self

## 2021-04-20 NOTE — OCCUPATIONAL THERAPY NOTE
Occupational Therapy Evaluation      Jonh Vasquez    4/20/2021    Patient Active Problem List   Diagnosis    Tachy-gale syndrome (HCC)    Persistent atrial fibrillation (HCC)    CAD (coronary artery disease)    DM (diabetes mellitus), type 2 (HCC)    Acute encephalopathy    UTI (urinary tract infection)    Dysphagia    Chest pain    [facility]    Seasonal allergic rhinitis    Primary osteoarthritis of both knees    Chronic fatigue    Pruritus    [facility]    Obesity, morbid ([facility] )       Past Medical History:   Diagnosis Date    CH[facility]congestive heart failure) [facility]     Chronic pulmonary thromboembolism syndrome ([facility] ) 4/2/2021    Diabetes mellitus ([facility]     Hypertension        Past Surgical History:   Procedure Laterality Date    CARDIAC SURGERY          04/20/21 0826   OT Last Visit   OT Visit Date 04/20/21   Note Type   Note type Evaluation   Restrictions/Precautions   Weight Bearing Precautions Per Order No   Braces or Orthoses   (none per patient)   Other Precautions   (2L O2 via NC  Pt reports O2 worn at baseline to sleep only)   Pain Assessment   Pain Assessment Tool Pain Assessment not indicated - pt denies pain   Pain Score No Pain   Home Living   Type of [address] [place] [address] Two level; Able to live on main level with bedroom/bathroom; Performs ADLs on one level;Stairs to enter with rails;1/2 bath on main level  (1-2 MOSES, first floor set up; pt spongebathes in 1/2 bath)   Bathroom Shower/Tub   (not used; pt reports spongebathing in 1st floor 1/2 bath )   Bathroom Toilet Standard   Bathroom Equipment   (Pt reports no DME at baseline )   Bathroom Accessibility Not accessible  (shower upstairs  1/2 bathroom accessible )   Home Equipment Walker;Life alert  (Pt uses RW at baseline )   Prior Function   Level of Leonardo Independent with ADLs and functional mobility; Needs assistance with IADLs   Lives With Daughter;Family  (daughter and granddaughter)   Casandra Lex Help From Family  (pt reports daughter works full time)   ADL Assistance Independent   IADLs Needs assistance  (family completes laundry, cleaning  Pt makes breakfast daily)   Falls in the last 6 months 1 to 4  (1 per pt )   Vocational Retired   Comments (-) driving    Lifestyle   Autonomy Pt lives c granddaughter/daughter in 2 story home with 1-2 MOSES c HR  Pt reports PLOF as (I) c ADLs, and needs assistance c IADLs  Pt is retired, does not drive and reports use of RW at baseline  Reciprocal Relationships daughter, granddaughter    Service to Others retired    Intrinsic Gratification enjoys crocheting  (pt reports decreased motivation to complete activities )   Subjective   Subjective Pt became tearful during session re: "not doing much" anymore d/t covid  Reported decreased motivation to complete tasks  Provided emotional support /comfort to patient  ADL   Eating Assistance 5  Supervision/Setup   Grooming Assistance 5  Supervision/Setup   UB Bathing Assistance 5  Supervision/Setup   LB Bathing Assistance 3  Moderate Assistance   UB Dressing Assistance 5  Supervision/Setup   LB Dressing Assistance 3  Moderate Assistance   Toileting Assistance  3  Moderate Assistance   Additional Comments Pt limited by confusion, decreased activity tolerance, and generalized weakness    Bed Mobility   Supine to Sit 4  Minimal assistance   Additional items Assist x 2; Increased time required;LE management;Verbal cues; Bedrails;HOB elevated   Sit to Supine 4  Minimal assistance   Additional items Assist x 2; Increased time required;Verbal cues;LE management; Bedrails   Additional Comments Pt reported increased fatigue c bed mobility  Denied lightheaded/dizziness    Transfers   Sit to Stand 4  Minimal assistance   Additional items Assist x 2; Increased time required;Verbal cues  (c RW; x2 trials completed )   Stand to Sit 4  Minimal assistance   Additional items Assist x 2;Increased time required;Verbal cues; Bedrails   Additional Comments Completed 2 sit<>stand transfers d/t patient decreased tolerance to WB through feet during first ambulation  Pt reported her "feet felt too weak"  Successful 2nd trial for sit<>stand with no LOB, but pt grossly unsteady  Functional Mobility   Functional Mobility 4  Minimal assistance  (x2)   Additional Comments Pt completed side steps toward [facility] with decreased activity tolerance throughout ambulation  Additional items Rolling walker   Balance   Static Sitting Fair +   Dynamic Sitting Fair +   Static Standing Fair   Dynamic Standing Fair   Activity Tolerance   Activity Tolerance Patient limited by fatigue;Treatment limited secondary to medical complications (Comment)  (confusion throughout session )   Medical Staff Made Aware PT Vaishnavi present during session    Nurse Made Aware RN Rod Person verbalized pt appropriate to see, made aware of outcomes/recs    RUE Assessment   RUE Assessment WFL  (AROM ~100 degrees  MMT 3/5 )   LUE Assessment   LUE Assessment WFL  (Full AROM, MMT 4-/5)   Hand Function   Gross Motor Coordination Impaired  (resting tremor noted )   Fine Motor Coordination Functional   Sensation   Light Touch No apparent deficits   Vision-Basic Assessment   Current Vision Wears glasses all the time   Vision - Complex Assessment   Acuity Able to read employee name badge without difficulty   Cognition   Overall Cognitive Status Impaired   Arousal/Participation Alert; Responsive; Cooperative   Attention Attends with cues to redirect   Orientation Level Disoriented to time;Oriented to person;Oriented to place  ("May" "2020" )   Memory Decreased recall of precautions;Decreased recall of recent events   Following Commands Follows one step commands without difficulty   Comments Pt agreeable to OT session    Assessment   Limitation Decreased ADL status; Decreased UE ROM; Decreased UE strength;Decreased Safe judgement during ADL;Decreased cognition;Decreased endurance;Decreased self-care trans;Decreased high-level ADLs   Prognosis Good   Assessment Patient is a 81 y o  female seen for OT evaluation at [id number] [place] [address] on 4/20/21  s/p Acute encephalopathy  Comorbidities impacting functional performance include: tachy-gale syndrome, persistent A-fib, DM2, UTI, and CHF   Patient presents with active orders for OT eval and treat and up and OOB as tolerated   Performed at least 2 patient identifiers during session including name and wristband  Patient reports prior level of function as independent  with ADLs, needs assistance c IADLs, ambulatory with RW, and lives with daughter and granddaughter in a 2 story house  with 1-2 MOSES  Patient is retired  and does not  drive  Upon initial evaluation, patient requires supervision for UB ADLs, mod assist for LB ADLs, and min assist x2 for transfers and functional mobility with RW  Based on functional eval, patient presents A&Ox1 with intact  attention, but impaired safety awareness and memory  Occupational performance is affected by the following deficits: muscular strength , range of motion , Gross motor coordination, balance , trunk control , activity tolerance , memory , orientation , awareness  and alertness   Patient would benefit from OT services to maximize independence in self-care and transfers  Personal factors impacting performance include: (+) Hx of falls , MOSES home , decreased initation , FOS to second floor and decreased IADL independence  Occupational areas to address include:bathing/showering, toileting, dressing , personal hygiene/grooming , bed mobility , functional mobility, safety awareness , fall prevention  and home management   Recommending post-acute rehabilitation  upon D/C  Goals   Patient Goals "to go back to doing more things"    Plan   Treatment Interventions ADL retraining;Functional transfer training;UE strengthening/ROM; Endurance training;Cognitive reorientation;Patient/family training; Activityengagement   Goal Expiration Date 04/30/20   OT Treatment Day 0   OT Frequency 3-5x/wk   Recommendation   OT Discharge Recommendation Post acute rehabilitation services   OT - OK to Discharge Yes  (Once medically cleared )   Additional Comments  Pt resting in bed at end of session c breakfast set up; call button in reach    Additional Comments 2 The patient's raw score on the AM-PAC Daily Activity inpatient short form is 15, standardized score is 34 69, less than 39 4  Patients at this level are likely to benefit from discharge to post-acute rehabilitation services  Please refer to the recommendation of the Occupational Therapist for safe discharge planning  AM-PAC Daily Activity Inpatient   Lower Body Dressing 2   Bathing 2   Toileting 2   Upper Body Dressing 3   Grooming 3   Eating 3   Daily Activity Raw Score 15   Daily Activity Standardized Score (Calc for Raw Score >=11) 34 69   AM-PAC Applied Cognition Inpatient   Following a Speech/Presentation 3   Understanding Ordinary Conversation 3   Taking Medications 3   Remembering Where Things Are Placed or Put Away 3   Remembering List of 4-5 Errands 3   Taking Care of Complicated Tasks 2   Applied Cognition Raw Score 17   Applied Cognition Standardized Score 36 52   Barthel Index   Feeding 10   Bathing 0   Grooming Score 5   Dressing Score 5   Bladder Score 10   Bowels Score 10   Toilet Use Score 5   Transfers (Bed/Chair) Score 5   Mobility (Level Surface) Score 0   Stairs Score 0  (DNT)   Barthel Index Score 50     Goals     Pt will complete UB ADLs Supervision with use of AE as needed for increased ADL independence within 10 days  Pt will complete LB ADLs Supervision  with use of AE as needed for increased ADL independence within 10 days  Pt will complete toileting Supervision  with use of DME for increased ADL independence within 10 days       Pt will demonstrate proper body mechanics to complete transfers and functional mobility with Supervision and use of AD for increased safety and functional mobility within 10 days  Pt will demonstrate standing tolerance of 4 min with Supervision and use of AD for increased endurance and activity tolerance within 10 days  Pt will demonstrate OOB sitting tolerance of 2-4 hr/day for increased activity tolerance and engagement within 10 days  Pt will participate in 10 min of BUE therapeutic exercise to increase strength, ROM, and endurance during ADL/IADLs within 10 days  Pt will participate in ongoing cognitive assessments to assist with safe D/C planning and recommendations       Pt will demonstrate proper body mechanics and fall prevention strategies during 100% of tx sessions for increased safety awareness during ADL/IADLs    Maddie Flores OTS

## 2021-04-20 NOTE — PHYSICAL THERAPY NOTE
Physical Therapy Evaluation     Patient's Name: Kd Dukes    Admitting Diagnosis  Altered mental status [R41 [id number]]    Problem List  Patient Active Problem List   Diagnosis    Tachy-gale syndrome (HCC)    Persistent atrial fibrillation (HCC)    CAD (coronary artery disease)    [facility]    Acute encephalopathy    UTI (urinary tract infection)    Dysphagia    Chest pain    [facility]    Seasonal allergic rhinitis    Primary osteoarthritis of both knees    Chronic fatigue    Pruritus    Chronic obstructive pulmonary disease (HCC)    Obesity, morbid ([facility] )       Past Medical History  Past Medical History:   Diagnosis Date    CHF (congestive heart failure) ([facility] )     Chronic pulmonary thromboembolism syndrome ([place] [address] ) 4/2/2021    Diabetes mellitus ([place] [address] )     Hypertension        Past Surgical History  Past Surgical History:   Procedure Laterality Date    CARDIAC SURGERY              04/20/21 0830   PT Last Visit   PT Visit Date 04/20/21   Note Type   Note type Evaluation   Pain Assessment   Pain Assessment Tool Pain Assessment not indicated - pt denies pain   Pain Score No Pain   Home Living   Type of [address] Two level;1/2 bath on main level;Performs ADLs on one level  (1-2 MOSES)   Bathroom Shower/Tub   (shower upstairs; sponge bathes downstairs; 1/2 bath on main level)   Bathroom Toilet Standard   Bathroom Accessibility Not accessible  (shower upstairs)   Home Equipment Walker;Life alert   Prior Function   Level of [place] Independent with ADLs and functional mobility; Needs assistance with IADLs   Lives With Daughter;Family  (daughter and granddaughter)   [address] Help From Family  (patient reports daughter works full time)   ADL Assistance Independent   IADLs Needs assistance   Falls in the last 6 months 1 to 4  (1)   Vocational Retired   Comments patient does not drive   Restrictions/Precautions   [facility] Bearing Precautions Per Order No   Braces or Orthoses   (none per patient)   Other Precautions O2;Multiple lines; Fall Risk  (2L O2 NC)   General   Family/Caregiver Present No   Cognition   Overall Cognitive Status Impaired   Arousal/Participation Cooperative   Attention Attends with cues to redirect   Orientation Level Disoriented to time;Oriented to person;Oriented to place  ("[place]" and "May 2020")   Memory Decreased recall of precautions;Decreased recall of recent events   Following Commands Follows one step commands without difficulty   Comments patient agreeable to PT eval   RUE Assessment   RUE Assessment WFL  (based on functional assessment)   LUE Assessment   LUE Assessment WFL  (based on functional assessment)   RLE Assessment   RLE Assessment WFL  (grossly assessed with functional mobility: at least 3+/5)   LLE Assessment   LLE Assessment WFL  (grossly assessed with functional mobility: at least 3+/5)   Coordination   Movements are Fluid and Coordinated 0   Coordination and Movement Description grossly tremulous movements - chronic per chart review   Sensation WFL   Bed Mobility   Supine to Sit 4  Minimal assistance   Additional items Assist x 2; Increased time required;LE management;Verbal cues; Bedrails;HOB elevated   Sit to Supine 4  Minimal assistance   Additional items Assist x 2; Increased time required;Verbal cues;LE management; Bedrails   Transfers   Sit to Stand 4  Minimal assistance   Additional items Assist x 2; Increased time required;Verbal cues   Stand to Sit 4  Minimal assistance   Additional items Assist x 2; Increased time required;Verbal cues; Bedrails   Ambulation/Elevation   Gait pattern Excessively slow; Short stride;Decreased foot clearance   Gait Assistance 4  Minimal assist   Additional items Assist x 2;Verbal cues   Assistive Device Rolling walker   Distance 3 lateral steps at bedside  (limited by fatigue and generalized weakness)   Stair Management Assistance Not tested   Balance   Static Sitting Fair + Dynamic Sitting Fair   Static Standing Fair -   Dynamic Standing Poor +   Ambulatory Poor   Endurance Deficit   Endurance Deficit Yes   Activity Tolerance   Activity Tolerance Patient limited by fatigue   Medical Staff Made Aware BRAD Oseguera   Nurse Made Aware RN Josue Deleon confirmed patient appropriate for therapy   Assessment   Prognosis Fair   Problem List Decreased strength;Decreased endurance; Impaired balance;Decreased mobility; Decreased cognition;Decreased coordination   Assessment Pt is 81 y o  female seen for PT evaluation s/p admit to [facility] on 2021 w/ Acute encephalopathy  PT consulted to assess pt's functional mobility and d/c needs  Order placed for PT eval and tx, w/ up and OOB as tolerated order  Performed at least 2 patient identifiers during session: Name and   Comorbidities affecting pt's physical performance at time of assessment include: CHF (congestive heart failure), UTI (urinary tract infection), DM (diabetes mellitus), type 2, Persistent atrial fibrillation, Tachy-gale syndrome  PTA, pt was independent w/ all functional mobility w/ RW, ambulates household distances, has 1-2 MOSES, lives w/ family in two level house and retired  Personal factors affecting pt at time of IE include: inaccessible home environment, ambulating w/ assistive device, stairs to enter home, inability to ambulate household distances, inability to navigate level surfaces w/o external assistance, limited home support, positive fall history, inability to perform IADLs and inability to perform ADLs  Please find objective findings from PT assessment regarding body systems outlined above with impairments and limitations including weakness, impaired balance, decreased endurance, impaired coordination, gait deviations, decreased activity tolerance, decreased functional mobility tolerance, fall risk and decreased cognition   The following objective measures performed on IE also reveal limitations: Barthel Index: 50/100 and Modified Bailey: 4 (moderate/severe disability)  Pt's clinical presentation is currently unstable/unpredictable seen in pt's presentation of ongoing medical management/monitoring, evident in need for assist w/ all phases of mobility when usually mobilizing independently, fatigue impacting overall mobility status and need for input for mobility technique/safety  Pt to benefit from continued PT tx to address deficits as defined above and maximize level of functional independent mobility and consistency  From PT/mobility standpoint, recommendation at time of d/c would be STR pending progress in order to facilitate return to PLOF  Barriers to Discharge Decreased caregiver support   Goals   Patient Goals "to go back to doing more things"    STG Expiration Date 04/30/21   Short Term Goal #1 In 7-10 days: Increase bilateral LE strength 1/2 grade to facilitate independent mobility, Perform all bed mobility tasks modified independent to decrease caregiver burden, Perform all transfers with SBA to improve independence, Ambulate > 50 ft  with RW with SBA w/o LOB and w/ normalized gait pattern 100% of the time, Navigate 2 stairs with SBA with unilateral handrail to facilitate return to previous living environment and Increase all balance 1 grade to decrease risk for falls   PT Treatment Day 0   Plan   Treatment/Interventions Functional transfer training;LE strengthening/ROM; Elevations; Therapeutic exercise; Endurance training;Equipment eval/education;Patient/family training;Gait training;Bed mobility;Spoke to nursing;OT   PT Frequency Other (Comment)  (3-5x/wk)   Recommendation   PT Discharge Recommendation Post acute rehabilitation services   PT - OK to Discharge Yes  (when medically cleared; if to STR)   AM-PAC Basic Mobility Inpatient   Turning in Bed Without Bedrails 3   Lying on Back to Sitting on Edge of Flat Bed 2   Moving Bed to Chair 2   Standing Up From Chair 2   Walk in Room 2   Climb 3-5 Stairs 2   Basic Mobility Inpatient Raw Score 13   Basic Mobility Standardized Score 33 99   Modified Mitchel Scale   Modified Vernon Scale 4   Barthel Index   Feeding 10   Bathing 0   Grooming Score 5   Dressing Score 5   Bladder Score 10   Bowels Score 10   Toilet Use Score 5   Transfers (Bed/Chair) Score 5   Mobility (Level Surface) Score 0   Stairs Score 0   Barthel Index Score 50       Dang Brady, PT, DPT

## 2021-04-20 NOTE — PLAN OF CARE
Problem: OCCUPATIONAL THERAPY ADULT  Goal: Performs self-care activities at highest level of function for planned discharge setting  See evaluation for individualized goals  Description: Treatment Interventions: ADL retraining, Functional transfer training, UE strengthening/ROM, Endurance training, Cognitive reorientation, Patient/family training, Activityengagement          See flowsheet documentation for full assessment, interventions and recommendations  Note: Limitation: Decreased ADL status, Decreased UE ROM, Decreased UE strength, Decreased Safe judgement during ADL, Decreased cognition, Decreased endurance, Decreased self-care trans, Decreased high-level ADLs  Prognosis: Good  Assessment: Patient is a 81 y o  female seen for OT evaluation at [address] [place] [address] on 4/20/21  s/p Acute encephalopathy  Comorbidities impacting functional performance include: tachy-gale syndrome, persistent A-fib, DM2, UTI, and CHF   Patient presents with active orders for OT eval and treat and up and OOB as tolerated   Performed at least 2 patient identifiers during session including name and wristband  Patient reports prior level of function as independent  with ADLs, needs assistance c IADLs, ambulatory with RW, and lives with daughter and granddaughter in a 2 story house  with 1-2 MOSES  Patient is retired  and does not  drive  Upon initial evaluation, patient requires supervision for UB ADLs, mod assist for LB ADLs, and min assist x2 for transfers and functional mobility with RW  Based on functional eval, patient presents A&Ox1 with intact  attention, but impaired safety awareness and memory  Occupational performance is affected by the following deficits: muscular strength , range of motion , Gross motor coordination, balance , trunk control , activity tolerance , memory , orientation , awareness  and alertness   Patient would benefit from OT services to maximize independence in self-care and transfers   Personal factors impacting performance include: (+) Hx of falls , MOSES home , decreased initation , FOS to second floor and decreased IADL independence  Occupational areas to address include:bathing/showering, toileting, dressing , personal hygiene/grooming , bed mobility , functional mobility, safety awareness , fall prevention  and home management   Recommending post-acute rehabilitation  upon D/C        OT Discharge Recommendation: Post acute rehabilitation services  OT - OK to Discharge: Yes(Once medically cleared)     Jagdeep Camarillo OTS

## 2021-04-20 NOTE — ASSESSMENT & PLAN NOTE
Resolved now with antibiotic dose    Presented with acute confusion  Likely secondary to UTI  Delirium precautions  Antibiotics outlined above

## 2021-04-20 NOTE — DISCHARGE SUMMARY
[facility]  Discharge- Rella Given 1935, 81 y o  female MRN: [id number]  Unit/Bed#: ED 12 Encounter: [id number]  Primary Care Provider: Tierra Mason DO   Date and time admitted to hospital: 4/19/2021  7:33 PM    * Acute encephalopathy  Assessment & Plan  Resolved now with antibiotic dose    Presented with acute confusion  Likely secondary to UTI  Delirium precautions  Antibiotics outlined above    CHF (congestive heart failure) ([facility] [address] )  Assessment & Plan  Wt Readings from Last 3 Encounters:   04/19/21 82 6 kg (182 lb)   03/19/21 82 6 kg (182 lb)   03/08/21 82 9 kg (182 lb 12 8 oz)       Currently euvolemic  Strict input/output  Daily weights      UTI (urinary tract infection)  Assessment & Plan  Presented with acute confusion  Urinalysis revealed leukocytes, nitrates  Received ceftriaxone  Switch to PO keflex at discharge to finish a total of 5 days  Previous history of ecoli    DM (diabetes mellitus), type 2 ([facility] [address] )  Assessment & Plan    Lab Results   Component Value Date    HGBA1C 6 7 (H) 03/19/2021   Sliding scale insulin    Persistent atrial fibrillation ([facility] [address] )  Assessment & Plan  Continue home beta-blocker  Currently rate controlled    Tachy-gale syndrome ([facility] [address] )  Assessment & Plan  Status post pacemaker        Acute metabolic encephalopathy - POA, 2/2 UTI, as evidenced by confusion, requiring IV Rocephin, monitoring of mental status, and supportive care  Chronic systolic CHF, as evidenced by EF 40-45% noted on echo 12/21/2021, with patient currently noted to be euvolemic, requiring strict intake/output and daily weights        Resolved Problems  Date Reviewed: 4/20/2021    None          Admission Date:   Admission Orders (From admission, onward)     Ordered        04/19/21 2223  Inpatient Admission  Once                     Admitting Diagnosis: Altered mental status [R41 82]    HPI: Rella Given is a 81 y o  female with past medical history significant of atrial fibrillation, tachy-gale syndrome status post pacemaker, type 2 diabetes who presented with acute confusion  Has been ongoing issue for the past several months  Daughter noticed earlier today she appeared to be acutely confused  Otherwise has no acute complaints    Procedures Performed: No orders of the defined types were placed in this encounter  Summary of Hospital Course: pt received iv ceftriaxone  Did well  Encephalopathy resolved with it  No more dysuria or frequency  Pt wants to go home on oral antibiotics  Safe to DC home on PO meds under the care of her family  Discussed with patients daughter in detail and they are in agreement with the plan    Significant Findings, Care, Treatment and Services Provided: as above    Complications: none    Condition at Discharge: stable     General- Awake, alert and oriented x 3, looks comfortable  HEENT- Normocephalic, atraumatic, oral mucosa- moist  Neck- Supple, No carotid bruit, no JVD  CVS- Normal S1/ S2, Regular rate and rhythm, No murmur, No edema  Respiratory system- B/L clear breath sounds, no wheezing  Abdomen- Soft, Non distended, no tenderness, Bowel sound- present 4 quads  Genitourinary- No suprapubic tenderness, No CVA tenderness  Skin- No new bruise or rash  Musculoskeletal- No gross deformity  Psych- No acute psychosis  CNS- CN II- XII grossly intact, No acute focal neurologic deficit noted      Discharge instructions/Information to patient and family:   See after visit summary for information provided to patient and family  Will need review of urine cultures with PCP in next 4-5 days    Provisions for Follow-Up Care:  See after visit summary for information related to follow-up care and any pertinent home health orders  PCP: Alie Betts DO    Disposition: Home    Planned Readmission: No    Discharge Statement   I spent 45 minutes discharging the patient  This time was spent on the day of discharge   I had direct contact with the patient on the day of discharge  Additional documentation is required if more than 30 minutes were spent on discharge  Discharge Medications:  See after visit summary for reconciled discharge medications provided to patient and family

## 2021-04-20 NOTE — ASSESSMENT & PLAN NOTE
Presented with acute confusion  Likely secondary to UTI  Delirium precautions  Antibiotics outlined above

## 2021-04-20 NOTE — DISCHARGE INSTR - AVS FIRST PAGE
- take keflex 500mg orally twice a day for 4 more days  - followup with your pcp and discuss your urine cultures with her  - take probiotics while you are on antbiotics

## 2021-04-20 NOTE — H&P
Rick Grewal 1935, 81 y o  female MRN: [id number]  Unit/Bed#: U2N7 Encounter: [id number]  Primary Care Provider: Rica Ramirez DO   Date and time admitted to hospital: 4/19/2021  7:33 PM    * Acute encephalopathy  Assessment & Plan  Presented with acute confusion  Likely secondary to UTI  Delirium precautions  Antibiotics outlined above    CHF (congestive heart failure) ([address] )  Assessment & Plan  Wt Readings from Last 3 Encounters:   04/19/21 82 6 kg (182 lb)   03/19/21 82 6 kg (182 lb)   03/08/21 82 9 kg (182 lb 12 8 oz)       Currently euvolemic  Strict input/output  Daily weights      UTI (urinary tract infection)  Assessment & Plan  Presented with acute confusion  Urinalysis revealed leukocytes, nitrates  Received ceftriaxone  Will continue ceftriaxone  Follow-up urine culture    DM (diabetes mellitus), type 2 (HCC)  Assessment & Plan    Lab Results   Component Value Date    HGBA1C 6 7 (H) 03/19/2021   Sliding scale insulin    Persistent atrial fibrillation (HCC)  Assessment & Plan  Continue home beta-blocker  Currently rate controlled    Tachy-gale syndrome (HCC)  Assessment & Plan  Status post pacemaker      VTE Prophylaxis: Heparin  / sequential compression device   Code Status: full code  POLST: There is no POLST form on file for this patient (pre-hospital)  Discussion with family: pt    Anticipated Length of Stay:  Patient will be admitted on an Emergency basis with an anticipated length of stay of  > 2 midnights  Justification for Hospital Stay:  Acute encephalopathy    Total Time for Visit, including Counseling / Coordination of Care: 1 hour  Greater than 50% of this total time spent on direct patient counseling and coordination of care      Chief Complaint:   Confusion    History of Present Illness:    Jonnathan Portillo is a 81 y o  female with past medical history significant of atrial fibrillation, tachy-gale syndrome status post pacemaker, type 2 diabetes who presented with acute confusion  Has been ongoing issue for the past several months  Daughter noticed earlier today she appeared to be acutely confused  Otherwise has no acute complaints    Review of Systems:    Review of Systems   Constitutional: Negative for activity change, appetite change, chills, diaphoresis, fever and unexpected weight change  HENT: Negative for congestion, facial swelling and rhinorrhea  Eyes: Negative for photophobia and visual disturbance  Respiratory: Negative for cough, shortness of breath and wheezing  Cardiovascular: Negative for chest pain and palpitations  Gastrointestinal: Negative for abdominal pain, blood in stool, constipation, diarrhea, nausea and vomiting  Genitourinary: Negative for decreased urine volume, difficulty urinating, dysuria, flank pain, frequency, hematuria and urgency  Musculoskeletal: Negative for arthralgias, back pain, joint swelling and myalgias  Neurological: Negative for dizziness, syncope, facial asymmetry, light-headedness, numbness and headaches  Psychiatric/Behavioral: Positive for confusion  Negative for decreased concentration  The patient is not nervous/anxious  Past Medical and Surgical History:     Past Medical History:   Diagnosis Date    CHF (congestive heart failure) ([facility] )     Chronic pulmonary thromboembolism syndrome ([place] [id number] ) 4/2/2021    Diabetes mellitus ([place] [address] )     Hypertension        Past Surgical History:   Procedure Laterality Date    CARDIAC SURGERY         Meds/Allergies:    Prior to Admission medications    Medication Sig Start Date End Date Taking?  Authorizing Provider   apixaban (Eliquis) 5 mg Take 5 mg by mouth 2 (two) times a day    Historical Provider, MD lizama-mentcassie Mckee) lotion Apply topically as needed for itching 4/2/21   Lukas Inman DO   desloratadine (CLARINEX) 5 MG tablet Take 5 mg by mouth daily 3/23/21   Historical Provider, MD   docusate sodium (COLACE) 100 mg capsule Take 100 mg by mouth daily    Historical Provider, MD   fluticasone (FLONASE) 50 mcg/act nasal spray SPRAY 1 SPRAY INTO EACH NOSTRIL EVERY DAY 4/12/21   Ada Yoon DO   furosemide (LASIX) 40 mg tablet Take 40 mg by mouth 2 (two) times a day    Historical Provider, MD   glimepiride (AMARYL) 4 mg tablet Take 1 tablet (4 mg total) by mouth daily with breakfast 4/15/21   Ada Yoon DO   Glucosamine HCl 500 MG TABS Take 1 tablet by mouth    Historical Provider, MD   Glucosamine-Chondroitin--882-426 MG TABS Take 1 tablet by mouth    Historical Provider, MD   losartan (COZAAR) 25 mg tablet Take 25 mg by mouth daily    Historical Provider, MD   metFORMIN (GLUCOPHAGE) 500 mg tablet Take 500 mg by mouth daily with breakfast    Historical Provider, MD   metoprolol tartrate (LOPRESSOR) 100 mg tablet Take 100 mg by mouth every 12 (twelve) hours    Historical Provider, MD   miconazole (MONISTAT-7) 2 % vaginal cream Insert 1 applicator into the vagina daily at bedtime 1/26/21   Sarita Giraldo MD   nystatin (MYCOSTATIN) powder Apply topically 2 (two) times a day Groin and under breasts 9/27/20   Eulalio Parents, LAURYN   pravastatin (PRAVACHOL) 20 mg tablet Take 1 tablet (20 mg total) by mouth daily 4/15/21   Ada Yoon DO   primidone (MYSOLINE) 50 mg tablet Take by mouth every 12 (twelve) hours    Historical Provider, MD   sertraline (ZOLOFT) 50 mg tablet Take 1 tablet (50 mg total) by mouth daily 4/15/21   Ada oYon DO     I have reviewed home medications with patient personally  Allergies:    Allergies   Allergen Reactions    Clarithromycin Confusion and Other (See Comments)       Social History:     Marital Status: Single     Patient Pre-hospital Living Situation: home  Patient Pre-hospital Level of Mobility: independent  Patient Pre-hospital Diet Restrictions: none  Substance Use History:   Social History     Substance and Sexual Activity   Alcohol Use Not Currently     Social History Tobacco Use   Smoking Status Never Smoker   Smokeless Tobacco Never Used     Social History     Substance and Sexual Activity   Drug Use Never       Family History:    History reviewed  No pertinent family history  Physical Exam:     Vitals:   Blood Pressure: 145/69 (04/19/21 2017)  Pulse: 88 (04/19/21 2017)  Temperature: 99 1 °F (37 3 °C) (04/19/21 2017)  Temp Source: Oral (04/19/21 2017)  Respirations: 18 (04/19/21 2017)  Height: 5' (152 4 cm) (04/19/21 1513)  Weight - Scale: 82 6 kg (182 lb) (04/19/21 1513)  SpO2: 100 % (04/19/21 2017)    Physical Exam  Constitutional:       General: She is not in acute distress  Appearance: She is well-developed  She is not diaphoretic  HENT:      Head: Normocephalic and atraumatic  Nose: Nose normal       Mouth/Throat:      Pharynx: No oropharyngeal exudate  Eyes:      General: No scleral icterus  Right eye: No discharge  Left eye: No discharge  Conjunctiva/sclera: Conjunctivae normal    Neck:      Musculoskeletal: Normal range of motion and neck supple  Thyroid: No thyromegaly  Vascular: No JVD  Cardiovascular:      Rate and Rhythm: Normal rate and regular rhythm  Heart sounds: Normal heart sounds  No murmur  No friction rub  No gallop  Pulmonary:      Effort: Pulmonary effort is normal  No respiratory distress  Breath sounds: Normal breath sounds  No wheezing or rales  Chest:      Chest wall: No tenderness  Abdominal:      General: Bowel sounds are normal  There is no distension  Palpations: Abdomen is soft  Tenderness: There is no abdominal tenderness  There is no guarding or rebound  Musculoskeletal: Normal range of motion  General: No tenderness or deformity  Skin:     General: Skin is warm and dry  Findings: No erythema or rash  Neurological:      Mental Status: She is alert  She is disoriented  Cranial Nerves: No cranial nerve deficit  Sensory: No sensory deficit  Motor: No abnormal muscle tone  Coordination: Coordination normal              Additional Data:     Lab Results: I have personally reviewed pertinent reports  Results from last 7 days   Lab Units 04/19/21 2002   WBC Thousand/uL 6 77   HEMOGLOBIN g/dL 11 2*   HEMATOCRIT % 35 1   PLATELETS Thousands/uL 228   NEUTROS PCT % 59   LYMPHS PCT % 28   MONOS PCT % 8   EOS PCT % 4     Results from last 7 days   Lab Units 04/19/21 2002   SODIUM mmol/L 142   POTASSIUM mmol/L 3 0*   CHLORIDE mmol/L 103   CO2 mmol/L 30   BUN mg/dL 20   CREATININE mg/dL 1 00   ANION GAP mmol/L 9   CALCIUM mg/dL 8 5   ALBUMIN g/dL 3 5   TOTAL BILIRUBIN mg/dL 0 31   ALK PHOS U/L 100   ALT U/L 21   AST U/L 25   GLUCOSE RANDOM mg/dL 135                       Imaging: I have personally reviewed pertinent reports  CT head without contrast   Final Result by Sharon Vazquez MD (04/19 2050)      No acute intracranial abnormality  Workstation performed: [id number]         XR chest 1 view portable    (Results Pending)       EKG, Pathology, and Other Studies Reviewed on Admission:   · EKG: reviewed    Allscripts / Epic Records Reviewed: Yes     ** Please Note: This note has been constructed using a voice recognition system   **

## 2021-04-20 NOTE — ASSESSMENT & PLAN NOTE
Presented with acute confusion  Urinalysis revealed leukocytes, nitrates  Received ceftriaxone  Will continue ceftriaxone  Follow-up urine culture

## 2021-04-20 NOTE — ASSESSMENT & PLAN NOTE
Presented with acute confusion  Urinalysis revealed leukocytes, nitrates  Received ceftriaxone  Switch to PO keflex at discharge to finish a total of 5 days  Previous history of ecoli

## 2021-04-20 NOTE — PLAN OF CARE
Problem: PHYSICAL THERAPY ADULT  Goal: Performs mobility at highest level of function for planned discharge setting  See evaluation for individualized goals  Description: Treatment/Interventions: Functional transfer training, LE strengthening/ROM, Elevations, Therapeutic exercise, Endurance training, Equipment eval/education, Patient/family training, Gait training, Bed mobility, Spoke to nursing, OT          See flowsheet documentation for full assessment, interventions and recommendations  Note: Prognosis: Fair  Problem List: Decreased strength, Decreased endurance, Impaired balance, Decreased mobility, Decreased cognition, Decreased coordination  Assessment: Pt is 81 y o  female seen for PT evaluation s/p admit to [facility] on 2021 w/ Acute encephalopathy  PT consulted to assess pt's functional mobility and d/c needs  Order placed for PT eval and tx, w/ up and OOB as tolerated order  Performed at least 2 patient identifiers during session: Name and   Comorbidities affecting pt's physical performance at time of assessment include: CHF (congestive heart failure), UTI (urinary tract infection), DM (diabetes mellitus), type 2, Persistent atrial fibrillation, Tachy-gale syndrome  PTA, pt was independent w/ all functional mobility w/ RW, ambulates household distances, has 1-2 MOSES, lives w/ family in two level house and retired  Personal factors affecting pt at time of IE include: inaccessible home environment, ambulating w/ assistive device, stairs to enter home, inability to ambulate household distances, inability to navigate level surfaces w/o external assistance, limited home support, positive fall history, inability to perform IADLs and inability to perform ADLs   Please find objective findings from PT assessment regarding body systems outlined above with impairments and limitations including weakness, impaired balance, decreased endurance, impaired coordination, gait deviations, decreased activity tolerance, decreased functional mobility tolerance, fall risk and decreased cognition  The following objective measures performed on IE also reveal limitations: Barthel Index: 50/100 and Modified Mitchel: 4 (moderate/severe disability)  Pt's clinical presentation is currently unstable/unpredictable seen in pt's presentation of ongoing medical management/monitoring, evident in need for assist w/ all phases of mobility when usually mobilizing independently, fatigue impacting overall mobility status and need for input for mobility technique/safety  Pt to benefit from continued PT tx to address deficits as defined above and maximize level of functional independent mobility and consistency  From PT/mobility standpoint, recommendation at time of d/c would be STR pending progress in order to facilitate return to PLOF  Barriers to Discharge: Decreased caregiver support        PT Discharge Recommendation: Post acute rehabilitation services     PT - OK to Discharge: Yes(when medically cleared; if to STR)    See flowsheet documentation for full assessment

## 2021-04-22 LAB — BACTERIA UR CULT: ABNORMAL

## 2021-04-28 NOTE — ED PROVIDER NOTES
History  Chief Complaint   Patient presents with    Altered Mental Status     daughter states pt has been hallucinating for about a week  also c/o generilized weakness  80 yo f presenting to the emergency department for eval of ams  Pt is here with daughter who states she has been hallucinating people who weren't there worsening for about 1 week now  Also with generalized fatigue, weakness and poor po intake          Prior to Admission Medications   Prescriptions Last Dose Informant Patient Reported? Taking?    Glucosamine HCl 500 MG TABS  Self Yes No   Sig: Take 1 tablet by mouth   Glucosamine-Chondroitin--400-167 MG TABS   Yes No   Sig: Take 1 tablet by mouth   apixaban (Eliquis) 5 mg  Self Yes No   Sig: Take 5 mg by mouth 2 (two) times a day   camphor-menthol (SARNA) lotion   No No   Sig: Apply topically as needed for itching   desloratadine (CLARINEX) 5 MG tablet   Yes No   Sig: Take 5 mg by mouth daily   docusate sodium (COLACE) 100 mg capsule  Self Yes No   Sig: Take 100 mg by mouth daily   fluticasone (FLONASE) 50 mcg/act nasal spray   No No   Sig: SPRAY 1 SPRAY INTO EACH NOSTRIL EVERY DAY   furosemide (LASIX) 40 mg tablet  Self Yes No   Sig: Take 40 mg by mouth 2 (two) times a day   glimepiride (AMARYL) 4 mg tablet   No No   Sig: Take 1 tablet (4 mg total) by mouth daily with breakfast   losartan (COZAAR) 25 mg tablet  Self Yes No   Sig: Take 25 mg by mouth daily   metFORMIN (GLUCOPHAGE) 500 mg tablet  Self Yes No   Sig: Take 500 mg by mouth daily with breakfast   metoprolol tartrate (LOPRESSOR) 100 mg tablet  Self Yes No   Sig: Take 100 mg by mouth every 12 (twelve) hours   miconazole (MONISTAT-7) 2 % vaginal cream  Self No No   Sig: Insert 1 applicator into the vagina daily at bedtime   nystatin (MYCOSTATIN) powder  Self No No   Sig: Apply topically 2 (two) times a day Groin and under breasts   pravastatin (PRAVACHOL) 20 mg tablet   No No   Sig: Take 1 tablet (20 mg total) by mouth daily primidone (MYSOLINE) 50 mg tablet  Self Yes No   Sig: Take by mouth every 12 (twelve) hours   sertraline (ZOLOFT) 50 mg tablet   No No   Sig: Take 1 tablet (50 mg total) by mouth daily      Facility-Administered Medications: None       Past Medical History:   Diagnosis Date    CHF (congestive heart failure) (HCC)     Chronic pulmonary thromboembolism syndrome ([facility] [address] ) 4/2/2021    Diabetes mellitus ([facility] )     Hypertension        Past Surgical History:   Procedure Laterality Date    CARDIAC SURGERY      IR PICC PLACEMENT SINGLE LUMEN  11/7/2019       History reviewed  No pertinent family history  I have reviewed and agree with the history as documented  E-Cigarette/Vaping    E-Cigarette Use Never User      E-Cigarette/Vaping Substances    Nicotine No     THC No     CBD No     Flavoring No     Other No     Unknown No      Social History     Tobacco Use    Smoking status: Never Smoker    Smokeless tobacco: Never Used   Substance Use Topics    Alcohol use: Not Currently    Drug use: Never       Review of Systems   Constitutional: Positive for appetite change and fatigue  Negative for chills and fever  HENT: Negative for sneezing and sore throat  Eyes: Negative for visual disturbance  Respiratory: Negative for cough, choking, chest tightness, shortness of breath and wheezing  Cardiovascular: Negative for chest pain and palpitations  Gastrointestinal: Negative for abdominal pain, constipation, diarrhea, nausea and vomiting  Genitourinary: Negative for difficulty urinating and dysuria  Neurological: Negative for dizziness, weakness, light-headedness, numbness and headaches  Psychiatric/Behavioral: Positive for confusion and hallucinations  All other systems reviewed and are negative  Physical Exam  Physical Exam  Vitals signs and nursing note reviewed  Constitutional:       General: She is not in acute distress  Appearance: She is well-developed  She is not diaphoretic  HENT:      Head: Normocephalic and atraumatic  Eyes:      Pupils: Pupils are equal, round, and reactive to light  Neck:      Vascular: No JVD  Trachea: No tracheal deviation  Cardiovascular:      Rate and Rhythm: Normal rate and regular rhythm  Heart sounds: Normal heart sounds  No murmur  No friction rub  No gallop  Pulmonary:      Effort: Pulmonary effort is normal  No respiratory distress  Breath sounds: Normal breath sounds  No wheezing or rales  Abdominal:      General: Bowel sounds are normal  There is no distension  Palpations: Abdomen is soft  Tenderness: There is no abdominal tenderness  There is no guarding or rebound  Skin:     General: Skin is warm and dry  Coloration: Skin is not pale  Neurological:      Mental Status: She is alert and oriented to person, place, and time  GCS: GCS eye subscore is 4  GCS verbal subscore is 4  GCS motor subscore is 6  Cranial Nerves: No cranial nerve deficit  Motor: No abnormal muscle tone     Psychiatric:         Behavior: Behavior normal          Vital Signs  ED Triage Vitals   Temperature Pulse Respirations Blood Pressure SpO2   04/19/21 1513 04/19/21 1513 04/19/21 1513 04/19/21 1513 04/19/21 1513   98 1 °F (36 7 °C) 102 20 128/91 93 %      Temp Source Heart Rate Source Patient Position - Orthostatic VS BP Location FiO2 (%)   04/19/21 1513 04/19/21 1513 04/19/21 1513 04/19/21 1513 --   Oral Monitor Sitting Left arm       Pain Score       04/19/21 2017       No Pain           Vitals:    04/20/21 0507 04/20/21 0600 04/20/21 1100 04/20/21 1300   BP: 139/73 150/67 151/71 121/80   Pulse: 62 104 92 89   Patient Position - Orthostatic VS: Lying Lying Lying Lying         Visual Acuity  Visual Acuity      Most Recent Value   L Pupil Size (mm)  3   R Pupil Size (mm)  3          ED Medications  Medications   potassium chloride (K-DUR,KLOR-CON) CR tablet 40 mEq (40 mEq Oral Given 4/19/21 2038)       Diagnostic Studies  Results Reviewed     Procedure Component Value Units Date/Time    Urine culture [[id number]]  (Abnormal)  (Susceptibility) Collected: 04/19/21 2002    Lab Status: Final result Specimen: Urine, Clean Catch Updated: 04/22/21 1016     Urine Culture >100,000 cfu/ml Klebsiella variicola    Susceptibility     Klebsiella variicola (1)     Antibiotic Interpretation Microscan Method Status    ZID Performed  Yes  CAMACHO Final    Amoxicillin + Clavulanate Susceptible <=4/2 ug/ml CAMACHO Final    Ampicillin ($$) Resistant 16 00 ug/ml CAMACHO Final    Ampicillin + Sulbactam ($) Susceptible 4/2 ug/ml CAMACHO Final    Aztreonam ($$$)  Susceptible <=4 ug/ml CAMACHO Final    Cefazolin ($) Susceptible <=2 00 ug/ml CAMACHO Final    Ciprofloxacin ($)  Susceptible <=1 00 ug/ml CAMACHO Final    Gentamicin ($$) Susceptible <=1 ug/ml CAMACHO Final    Levofloxacin ($) Susceptible <=0 25 ug/ml CAMACHO Final    Nitrofurantoin Susceptible <=32 ug/ml CAMACHO Final    Tetracycline Susceptible <=4 ug/ml CAMACHO Final    Tobramycin ($) Susceptible <=1 ug/ml CAMACHO Final    Trimethoprim + Sulfamethoxazole ($$$) Susceptible <=2/38 ug/ml CAMACHO Final                   Fingerstick Glucose (POCT) [[id number]]  (Abnormal) Collected: 04/20/21 1040    Lab Status: Final result Updated: 04/20/21 1059     POC Glucose 203 mg/dl     Fingerstick Glucose (POCT) [[id number]]  (Normal) Collected: 04/20/21 0711    Lab Status: Final result Updated: 04/20/21 0725     POC Glucose 115 mg/dl     Basic metabolic panel [[id number]] Collected: 04/20/21 0506    Lab Status: Final result Specimen: Blood from Arm, Left Updated: 04/20/21 0600     Sodium 140 mmol/L      Potassium 4 3 mmol/L      Chloride 105 mmol/L      CO2 25 mmol/L      ANION GAP 10 mmol/L      BUN 18 mg/dL      Creatinine 0 69 mg/dL      Glucose 105 mg/dL      Calcium 8 6 mg/dL      eGFR 80 ml/min/1 73sq m     Narrative:      [place] guidelines for Chronic Kidney Disease (CKD):     Stage 1 with normal or high GFR (GFR > 90 mL/min/1 73 square meters)    Stage 2 Mild CKD (GFR = 60-89 mL/min/1 73 square meters)    Stage 3A Moderate CKD (GFR = 45-59 mL/min/1 73 square meters)    Stage 3B Moderate CKD (GFR = 30-44 mL/min/1 73 square meters)    Stage 4 Severe CKD (GFR = 15-29 mL/min/1 73 square meters)    Stage 5 End Stage CKD (GFR <15 mL/min/1 73 square meters)  Note: GFR calculation is accurate only with a steady state creatinine    CBC and differential [[id number]]  (Abnormal) Collected: 04/20/21 0506    Lab Status: Final result Specimen: Blood from Arm, Left Updated: 04/20/21 0520     WBC 6 40 Thousand/uL      RBC 3 58 Million/uL      Hemoglobin 10 3 g/dL      Hematocrit 32 7 %      MCV 91 fL      MCH 28 8 pg      MCHC 31 5 g/dL      RDW 13 9 %      MPV 10 2 fL      Platelets 229 Thousands/uL      nRBC 0 /100 WBCs      Neutrophils Relative 56 %      Immat GRANS % 0 %      Lymphocytes Relative 30 %      Monocytes Relative 9 %      Eosinophils Relative 4 %      Basophils Relative 1 %      Neutrophils Absolute 3 59 Thousands/µL      Immature Grans Absolute 0 01 Thousand/uL      Lymphocytes Absolute 1 89 Thousands/µL      Monocytes Absolute 0 59 Thousand/µL      Eosinophils Absolute 0 28 Thousand/µL      Basophils Absolute 0 04 Thousands/µL     Fingerstick Glucose (POCT) [[id number]]  (Abnormal) Collected: 04/19/21 2303    Lab Status: Final result Updated: 04/19/21 2304     POC Glucose 208 mg/dl     TSH [[id number]]  (Normal) Collected: 04/19/21 2002    Lab Status: Final result Specimen: Blood from Arm, Left Updated: 04/19/21 2034     TSH 3RD GENERATON 2 842 uIU/mL     Narrative:      Patients undergoing fluorescein dye angiography may retain small amounts of fluorescein in the body for 48-72 hours post procedure  Samples containing fluorescein can produce falsely depressed TSH values  If the patient had this procedure,a specimen should be resubmitted post fluorescein clearance        Troponin I [[id number]]  (Normal) Collected: 04/19/21 2002    Lab Status: Final result Specimen: Blood from Arm, Left Updated: 04/19/21 2028     Troponin I <0 02 ng/mL     Comprehensive metabolic panel [[id number]]  (Abnormal) Collected: 04/19/21 2002    Lab Status: Final result Specimen: Blood from Arm, Left Updated: 04/19/21 2025     Sodium 142 mmol/L      Potassium 3 0 mmol/L      Chloride 103 mmol/L      CO2 30 mmol/L      ANION GAP 9 mmol/L      BUN 20 mg/dL      Creatinine 1 00 mg/dL      Glucose 135 mg/dL      Calcium 8 5 mg/dL      AST 25 U/L      ALT 21 U/L      Alkaline Phosphatase 100 U/L      Total Protein 8 3 g/dL      Albumin 3 5 g/dL      Total Bilirubin 0 31 mg/dL      eGFR 52 ml/min/1 73sq m     Narrative:      [place] guidelines for Chronic Kidney Disease (CKD):     Stage 1 with normal or high GFR (GFR > 90 mL/min/1 73 square meters)    Stage 2 Mild CKD (GFR = 60-89 mL/min/1 73 square meters)    Stage 3A Moderate CKD (GFR = 45-59 mL/min/1 73 square meters)    Stage 3B Moderate CKD (GFR = 30-44 mL/min/1 73 square meters)    Stage 4 Severe CKD (GFR = 15-29 mL/min/1 73 square meters)    Stage 5 End Stage CKD (GFR <15 mL/min/1 73 square meters)  Note: GFR calculation is accurate only with a steady state creatinine    Urine Microscopic [[id number]]  (Abnormal) Collected: 04/19/21 2002    Lab Status: Final result Specimen: Urine, Clean Catch Updated: 04/19/21 2023     RBC, UA None Seen /hpf      WBC, UA 10-20 /hpf      Epithelial Cells Occasional /hpf      Bacteria, UA Moderate /hpf      OTHER OBSERVATIONS WBCs Clumped    UA w Reflex to Microscopic w Reflex to Culture [[id number]]  (Abnormal) Collected: 04/19/21 2002    Lab Status: Final result Specimen: Urine, Clean Catch Updated: 04/19/21 2012     Color, UA Yellow     Clarity, UA Clear     Specific [place], UA 1 020     pH, UA 5 5     Leukocytes, UA Trace     Nitrite, UA Positive     Protein, UA Negative mg/dl      Glucose, UA Negative mg/dl      Ketones, UA Negative mg/dl Urobilinogen, UA 0 2 E U /dl      Bilirubin, UA Negative     Blood, UA Negative    CBC and differential [[id number]]  (Abnormal) Collected: 04/19/21 2002    Lab Status: Final result Specimen: Blood from Arm, Left Updated: 04/19/21 2012     WBC 6 77 Thousand/uL      RBC 3 91 Million/uL      Hemoglobin 11 2 g/dL      Hematocrit 35 1 %      MCV 90 fL      MCH 28 6 pg      MCHC 31 9 g/dL      RDW 13 8 %      MPV 9 7 fL      Platelets 535 Thousands/uL      nRBC 0 /100 WBCs      Neutrophils Relative 59 %      Immat GRANS % 0 %      Lymphocytes Relative 28 %      Monocytes Relative 8 %      Eosinophils Relative 4 %      Basophils Relative 1 %      Neutrophils Absolute 3 99 Thousands/µL      Immature Grans Absolute 0 02 Thousand/uL      Lymphocytes Absolute 1 90 Thousands/µL      Monocytes Absolute 0 56 Thousand/µL      Eosinophils Absolute 0 25 Thousand/µL      Basophils Absolute 0 05 Thousands/µL                  XR chest 1 view portable   Final Result by Dianna Platt MD (04/20 1952)      Mild pulmonary vascular congestion  Workstation performed: [id number]         CT head without contrast   Final Result by Kvng Contreras MD (04/19 2050)      No acute intracranial abnormality  Workstation performed: [id number]                    Procedures  Procedures         ED Course                             SBIRT 22yo+      Most Recent Value   SBIRT (25 yo +)   In order to provide better care to our patients, we are screening all of our patients for alcohol and drug use  Would it be okay to ask you these screening questions? Yes Filed at: 04/19/2021 2101   Initial Alcohol Screen: US AUDIT-C    1  How often do you have a drink containing alcohol?  0 Filed at: 04/19/2021 2101   2  How many drinks containing alcohol do you have on a typical day you are drinking? 0 Filed at: 04/19/2021 2101   3a  Male UNDER 65: How often do you have five or more drinks on one occasion?   0 Filed at: 04/19/2021 2101   3b  FEMALE Any Age, or MALE 65+: How often do you have 4 or more drinks on one occassion? 0 Filed at: 04/19/2021 2101   Audit-C Score  0 Filed at: 04/19/2021 2101   JAMES: How many times in the past year have you    Used an illegal drug or used a prescription medication for non-medical reasons? Never Filed at: 04/19/2021 2101                    [facility]  Number of Diagnoses or Management Options  Altered mental status:   Diagnosis management comments: 82 yo f with ams, will check lab, urine, ct head, reassess      Disposition  Final diagnoses: Altered mental status     Time reflects when diagnosis was documented in both MDM as applicable and the Disposition within this note     Time User Action Codes Description Comment    4/19/2021 10:40 PM Leila [address] [place] [address] [R41 82] Altered mental status     4/20/2021 12:36 PM Karie Alamin Add [N30 00] Acute cystitis without hematuria       ED Disposition     ED Disposition Condition Date/Time Comment    Admit Stable Mon Apr 19, 2021 10:40 PM Case was discussed with AUGUST and the patient's admission status was agreed to be Admission Status: observation status to the service of Dr Xochitl Rincon           Follow-up Information     Follow up With Specialties Details Why Contact Endy Medrano DO Family Medicine Follow up in 1 week(s)  [address] [place] [address]  [phone]            Discharge Medication List as of 4/20/2021  1:02 PM      START taking these medications    Details   cephalexin (KEFLEX) 500 mg capsule Take 1 capsule (500 mg total) by mouth every 12 (twelve) hours for 4 days, Starting Wed 4/21/2021, Until Sun 4/25/2021, Normal      saccharomyces boulardii (FLORASTOR) 250 mg capsule Take 1 capsule (250 mg total) by mouth 2 (two) times a day for 7 days, Starting Tue 4/20/2021, Until Tue 4/27/2021, Normal         CONTINUE these medications which have NOT CHANGED    Details   apixaban (Eliquis) 5 mg Take 5 mg by mouth 2 (two) times a day, Historical Med      camphor-menthol (SARNA) lotion Apply topically as needed for itching, Starting Fri 4/2/2021, Normal      desloratadine (CLARINEX) 5 MG tablet Take 5 mg by mouth daily, Starting Tue 3/23/2021, Historical Med      docusate sodium (COLACE) 100 mg capsule Take 100 mg by mouth daily, Historical Med      fluticasone (FLONASE) 50 mcg/act nasal spray SPRAY 1 SPRAY INTO EACH NOSTRIL EVERY DAY, Normal      furosemide (LASIX) 40 mg tablet Take 40 mg by mouth 2 (two) times a day, Historical Med      glimepiride (AMARYL) 4 mg tablet Take 1 tablet (4 mg total) by mouth daily with breakfast, Starting Thu 4/15/2021, Normal      Glucosamine HCl 500 MG TABS Take 1 tablet by mouth, Historical Med      Glucosamine-Chondroitin--400-167 MG TABS Take 1 tablet by mouth, Historical Med      losartan (COZAAR) 25 mg tablet Take 25 mg by mouth daily, Historical Med      metFORMIN (GLUCOPHAGE) 500 mg tablet Take 500 mg by mouth daily with breakfast, Historical Med      metoprolol tartrate (LOPRESSOR) 100 mg tablet Take 100 mg by mouth every 12 (twelve) hours, Historical Med      miconazole (MONISTAT-7) 2 % vaginal cream Insert 1 applicator into the vagina daily at bedtime, Starting Tue 1/26/2021, Normal      nystatin (MYCOSTATIN) powder Apply topically 2 (two) times a day Groin and under breasts, Starting Sun 9/27/2020, Normal      pravastatin (PRAVACHOL) 20 mg tablet Take 1 tablet (20 mg total) by mouth daily, Starting Thu 4/15/2021, Normal      primidone (MYSOLINE) 50 mg tablet Take by mouth every 12 (twelve) hours, Historical Med      sertraline (ZOLOFT) 50 mg tablet Take 1 tablet (50 mg total) by mouth daily, Starting Thu 4/15/2021, Normal           Outpatient Discharge Orders   Discharge Diet     Activity as tolerated       PDMP Review     None          ED Provider  Electronically Signed by           Yasmeen Alves MD  04/27/21 2511

## 2021-04-29 ENCOUNTER — APPOINTMENT (EMERGENCY)
Dept: RADIOLOGY | Facility: HOSPITAL | Age: 86
DRG: 291 | End: 2021-04-29
Payer: MEDICARE

## 2021-04-29 ENCOUNTER — HOSPITAL ENCOUNTER (INPATIENT)
Facility: HOSPITAL | Age: 86
LOS: 4 days | Discharge: HOME | DRG: 291 | End: 2021-05-03
Attending: EMERGENCY MEDICINE | Admitting: INTERNAL MEDICINE
Payer: MEDICARE

## 2021-04-29 DIAGNOSIS — I50.9 CHF (CONGESTIVE HEART FAILURE) (HCC): Primary | ICD-10-CM

## 2021-04-29 DIAGNOSIS — I48.91 ATRIAL FIBRILLATION WITH RVR (HCC): ICD-10-CM

## 2021-04-29 PROBLEM — I50.31 ACUTE DIASTOLIC CHF (CONGESTIVE HEART FAILURE) (HCC): Status: ACTIVE | Noted: 2021-04-29

## 2021-04-29 PROBLEM — G93.41 ACUTE METABOLIC ENCEPHALOPATHY: Status: ACTIVE | Noted: 2020-09-24

## 2021-04-29 PROBLEM — I27.82 CHRONIC PULMONARY THROMBOEMBOLISM SYNDROME (HCC): Status: ACTIVE | Noted: 2021-04-02

## 2021-04-29 LAB
ALBUMIN SERPL BCP-MCNC: 3.1 G/DL (ref 3.5–5)
ALP SERPL-CCNC: 81 U/L (ref 46–116)
ALT SERPL W P-5'-P-CCNC: 21 U/L (ref 12–78)
ANION GAP SERPL CALCULATED.3IONS-SCNC: 8 MMOL/L (ref 4–13)
APTT PPP: 28 SECONDS (ref 23–37)
AST SERPL W P-5'-P-CCNC: 21 U/L (ref 5–45)
ATRIAL RATE: 102 BPM
BACTERIA UR QL AUTO: NORMAL /HPF
BASOPHILS # BLD AUTO: 0.04 THOUSANDS/ΜL (ref 0–0.1)
BASOPHILS NFR BLD AUTO: 1 % (ref 0–1)
BILIRUB SERPL-MCNC: 0.39 MG/DL (ref 0.2–1)
BILIRUB UR QL STRIP: NEGATIVE
BUN SERPL-MCNC: 15 MG/DL (ref 5–25)
CALCIUM ALBUM COR SERPL-MCNC: 9.3 MG/DL (ref 8.3–10.1)
CALCIUM SERPL-MCNC: 8.6 MG/DL (ref 8.3–10.1)
CHLORIDE SERPL-SCNC: 106 MMOL/L (ref 100–108)
CLARITY UR: CLEAR
CO2 SERPL-SCNC: 28 MMOL/L (ref 21–32)
COLOR UR: YELLOW
CREAT SERPL-MCNC: 0.83 MG/DL (ref 0.6–1.3)
EOSINOPHIL # BLD AUTO: 0.22 THOUSAND/ΜL (ref 0–0.61)
EOSINOPHIL NFR BLD AUTO: 4 % (ref 0–6)
ERYTHROCYTE [DISTWIDTH] IN BLOOD BY AUTOMATED COUNT: 13.8 % (ref 11.6–15.1)
GFR SERPL CREATININE-BSD FRML MDRD: 65 ML/MIN/1.73SQ M
GLUCOSE SERPL-MCNC: 190 MG/DL (ref 65–140)
GLUCOSE SERPL-MCNC: 82 MG/DL (ref 65–140)
GLUCOSE SERPL-MCNC: 98 MG/DL (ref 65–140)
GLUCOSE UR STRIP-MCNC: NEGATIVE MG/DL
HCT VFR BLD AUTO: 33.2 % (ref 34.8–46.1)
HGB BLD-MCNC: 10.5 G/DL (ref 11.5–15.4)
HGB UR QL STRIP.AUTO: ABNORMAL
IMM GRANULOCYTES # BLD AUTO: 0.02 THOUSAND/UL (ref 0–0.2)
IMM GRANULOCYTES NFR BLD AUTO: 0 % (ref 0–2)
INR PPP: 1.13 (ref 0.84–1.19)
KETONES UR STRIP-MCNC: NEGATIVE MG/DL
LACTATE SERPL-SCNC: 0.9 MMOL/L (ref 0.5–2)
LEUKOCYTE ESTERASE UR QL STRIP: ABNORMAL
LYMPHOCYTES # BLD AUTO: 1.63 THOUSANDS/ΜL (ref 0.6–4.47)
LYMPHOCYTES NFR BLD AUTO: 29 % (ref 14–44)
MAGNESIUM SERPL-MCNC: 2.1 MG/DL (ref 1.6–2.6)
MCH RBC QN AUTO: 28.8 PG (ref 26.8–34.3)
MCHC RBC AUTO-ENTMCNC: 31.6 G/DL (ref 31.4–37.4)
MCV RBC AUTO: 91 FL (ref 82–98)
MONOCYTES # BLD AUTO: 0.48 THOUSAND/ΜL (ref 0.17–1.22)
MONOCYTES NFR BLD AUTO: 9 % (ref 4–12)
NEUTROPHILS # BLD AUTO: 3.19 THOUSANDS/ΜL (ref 1.85–7.62)
NEUTS SEG NFR BLD AUTO: 57 % (ref 43–75)
NITRITE UR QL STRIP: NEGATIVE
NON-SQ EPI CELLS URNS QL MICRO: NORMAL /HPF
NRBC BLD AUTO-RTO: 0 /100 WBCS
NT-PROBNP SERPL-MCNC: 3118 PG/ML
PH UR STRIP.AUTO: 7 [PH]
PHOSPHATE SERPL-MCNC: 3.8 MG/DL (ref 2.3–4.1)
PLATELET # BLD AUTO: 225 THOUSANDS/UL (ref 149–390)
PMV BLD AUTO: 9.8 FL (ref 8.9–12.7)
POTASSIUM SERPL-SCNC: 3.8 MMOL/L (ref 3.5–5.3)
PROCALCITONIN SERPL-MCNC: <0.05 NG/ML
PROT SERPL-MCNC: 7.5 G/DL (ref 6.4–8.2)
PROT UR STRIP-MCNC: NEGATIVE MG/DL
PROTHROMBIN TIME: 13.9 SECONDS (ref 11.6–14.5)
QRS AXIS: 79 DEGREES
QRSD INTERVAL: 150 MS
QT INTERVAL: 410 MS
QTC INTERVAL: 534 MS
RBC # BLD AUTO: 3.65 MILLION/UL (ref 3.81–5.12)
RBC #/AREA URNS AUTO: NORMAL /HPF
SODIUM SERPL-SCNC: 142 MMOL/L (ref 136–145)
SP GR UR STRIP.AUTO: 1.01 (ref 1–1.03)
T WAVE AXIS: 245 DEGREES
TROPONIN I SERPL-MCNC: <0.02 NG/ML
UROBILINOGEN UR QL STRIP.AUTO: 0.2 E.U./DL
VENTRICULAR RATE: 102 BPM
WBC # BLD AUTO: 5.58 THOUSAND/UL (ref 4.31–10.16)
WBC #/AREA URNS AUTO: NORMAL /HPF

## 2021-04-29 PROCEDURE — 82948 REAGENT STRIP/BLOOD GLUCOSE: CPT

## 2021-04-29 PROCEDURE — 99291 CRITICAL CARE FIRST HOUR: CPT | Performed by: EMERGENCY MEDICINE

## 2021-04-29 PROCEDURE — 85025 COMPLETE CBC W/AUTO DIFF WBC: CPT | Performed by: EMERGENCY MEDICINE

## 2021-04-29 PROCEDURE — 83880 ASSAY OF NATRIURETIC PEPTIDE: CPT | Performed by: EMERGENCY MEDICINE

## 2021-04-29 PROCEDURE — 71046 X-RAY EXAM CHEST 2 VIEWS: CPT

## 2021-04-29 PROCEDURE — 81001 URINALYSIS AUTO W/SCOPE: CPT | Performed by: EMERGENCY MEDICINE

## 2021-04-29 PROCEDURE — 96374 THER/PROPH/DIAG INJ IV PUSH: CPT

## 2021-04-29 PROCEDURE — 87040 BLOOD CULTURE FOR BACTERIA: CPT | Performed by: EMERGENCY MEDICINE

## 2021-04-29 PROCEDURE — 85730 THROMBOPLASTIN TIME PARTIAL: CPT | Performed by: EMERGENCY MEDICINE

## 2021-04-29 PROCEDURE — 85610 PROTHROMBIN TIME: CPT | Performed by: EMERGENCY MEDICINE

## 2021-04-29 PROCEDURE — 84145 PROCALCITONIN (PCT): CPT | Performed by: EMERGENCY MEDICINE

## 2021-04-29 PROCEDURE — 83605 ASSAY OF LACTIC ACID: CPT | Performed by: EMERGENCY MEDICINE

## 2021-04-29 PROCEDURE — 36415 COLL VENOUS BLD VENIPUNCTURE: CPT | Performed by: EMERGENCY MEDICINE

## 2021-04-29 PROCEDURE — 93005 ELECTROCARDIOGRAM TRACING: CPT

## 2021-04-29 PROCEDURE — 96361 HYDRATE IV INFUSION ADD-ON: CPT

## 2021-04-29 PROCEDURE — 83735 ASSAY OF MAGNESIUM: CPT | Performed by: EMERGENCY MEDICINE

## 2021-04-29 PROCEDURE — 84100 ASSAY OF PHOSPHORUS: CPT | Performed by: EMERGENCY MEDICINE

## 2021-04-29 PROCEDURE — 80053 COMPREHEN METABOLIC PANEL: CPT | Performed by: EMERGENCY MEDICINE

## 2021-04-29 PROCEDURE — 84484 ASSAY OF TROPONIN QUANT: CPT | Performed by: EMERGENCY MEDICINE

## 2021-04-29 PROCEDURE — 93010 ELECTROCARDIOGRAM REPORT: CPT | Performed by: INTERNAL MEDICINE

## 2021-04-29 PROCEDURE — 99285 EMERGENCY DEPT VISIT HI MDM: CPT

## 2021-04-29 PROCEDURE — 87086 URINE CULTURE/COLONY COUNT: CPT | Performed by: EMERGENCY MEDICINE

## 2021-04-29 PROCEDURE — 99223 1ST HOSP IP/OBS HIGH 75: CPT | Performed by: INTERNAL MEDICINE

## 2021-04-29 RX ORDER — FUROSEMIDE 10 MG/ML
40 INJECTION INTRAMUSCULAR; INTRAVENOUS ONCE
Status: COMPLETED | OUTPATIENT
Start: 2021-04-29 | End: 2021-04-29

## 2021-04-29 RX ORDER — ACETAMINOPHEN 325 MG/1
650 TABLET ORAL EVERY 6 HOURS PRN
Status: DISCONTINUED | OUTPATIENT
Start: 2021-04-29 | End: 2021-05-03 | Stop reason: HOSPADM

## 2021-04-29 RX ORDER — PRAVASTATIN SODIUM 20 MG
20 TABLET ORAL DAILY
Status: DISCONTINUED | OUTPATIENT
Start: 2021-04-30 | End: 2021-05-03 | Stop reason: HOSPADM

## 2021-04-29 RX ORDER — METOPROLOL TARTRATE 5 MG/5ML
5 INJECTION INTRAVENOUS EVERY 6 HOURS PRN
Status: DISCONTINUED | OUTPATIENT
Start: 2021-04-29 | End: 2021-05-03 | Stop reason: HOSPADM

## 2021-04-29 RX ORDER — ONDANSETRON 2 MG/ML
4 INJECTION INTRAMUSCULAR; INTRAVENOUS EVERY 4 HOURS PRN
Status: DISCONTINUED | OUTPATIENT
Start: 2021-04-29 | End: 2021-05-03 | Stop reason: HOSPADM

## 2021-04-29 RX ORDER — PRIMIDONE 50 MG/1
50 TABLET ORAL EVERY 12 HOURS SCHEDULED
Status: DISCONTINUED | OUTPATIENT
Start: 2021-04-29 | End: 2021-05-03 | Stop reason: HOSPADM

## 2021-04-29 RX ORDER — METOPROLOL TARTRATE 50 MG/1
100 TABLET, FILM COATED ORAL EVERY 12 HOURS SCHEDULED
Status: DISCONTINUED | OUTPATIENT
Start: 2021-04-29 | End: 2021-05-01

## 2021-04-29 RX ORDER — PETROLATUM,WHITE/LANOLIN
1 OINTMENT (GRAM) TOPICAL DAILY
Status: DISCONTINUED | OUTPATIENT
Start: 2021-04-30 | End: 2021-04-29

## 2021-04-29 RX ORDER — DOCUSATE SODIUM 100 MG/1
100 CAPSULE, LIQUID FILLED ORAL DAILY
Status: DISCONTINUED | OUTPATIENT
Start: 2021-04-30 | End: 2021-05-03 | Stop reason: HOSPADM

## 2021-04-29 RX ORDER — LOSARTAN POTASSIUM 25 MG/1
25 TABLET ORAL DAILY
Status: DISCONTINUED | OUTPATIENT
Start: 2021-04-30 | End: 2021-05-03 | Stop reason: HOSPADM

## 2021-04-29 RX ORDER — FUROSEMIDE 10 MG/ML
40 INJECTION INTRAMUSCULAR; INTRAVENOUS
Status: DISCONTINUED | OUTPATIENT
Start: 2021-04-30 | End: 2021-05-01

## 2021-04-29 RX ORDER — FLUTICASONE PROPIONATE 50 MCG
1 SPRAY, SUSPENSION (ML) NASAL DAILY
Status: DISCONTINUED | OUTPATIENT
Start: 2021-04-30 | End: 2021-05-03 | Stop reason: HOSPADM

## 2021-04-29 RX ORDER — DIPHENHYDRAMINE HYDROCHLORIDE 50 MG/ML
25 INJECTION INTRAMUSCULAR; INTRAVENOUS ONCE
Status: COMPLETED | OUTPATIENT
Start: 2021-04-29 | End: 2021-04-29

## 2021-04-29 RX ADMIN — APIXABAN 5 MG: 5 TABLET, FILM COATED ORAL at 18:57

## 2021-04-29 RX ADMIN — METOPROLOL TARTRATE 100 MG: 50 TABLET, FILM COATED ORAL at 20:41

## 2021-04-29 RX ADMIN — SODIUM CHLORIDE 500 ML: 0.9 INJECTION, SOLUTION INTRAVENOUS at 14:06

## 2021-04-29 RX ADMIN — DILTIAZEM HYDROCHLORIDE 60 MG: 30 TABLET, FILM COATED ORAL at 15:35

## 2021-04-29 RX ADMIN — FUROSEMIDE 40 MG: 10 INJECTION, SOLUTION INTRAMUSCULAR; INTRAVENOUS at 15:35

## 2021-04-29 RX ADMIN — PRIMIDONE 50 MG: 50 TABLET ORAL at 20:41

## 2021-04-29 RX ADMIN — CEFTRIAXONE SODIUM 1000 MG: 10 INJECTION, POWDER, FOR SOLUTION INTRAVENOUS at 18:45

## 2021-04-29 RX ADMIN — DIPHENHYDRAMINE HYDROCHLORIDE 25 MG: 50 INJECTION, SOLUTION INTRAMUSCULAR; INTRAVENOUS at 14:05

## 2021-04-29 RX ADMIN — INSULIN LISPRO 2 UNITS: 100 INJECTION, SOLUTION INTRAVENOUS; SUBCUTANEOUS at 23:01

## 2021-04-29 NOTE — ASSESSMENT & PLAN NOTE
Wt Readings from Last 3 Encounters:   04/29/21 89 kg (196 lb 3 4 oz)   04/19/21 82 6 kg (182 lb)   03/19/21 82 6 kg (182 lb)     · Acute on chronic diastolic CHF likely secondary to tachycardia  15 lb weight gain above baseline  Continue IV furosemide    · Have cardiology evaluate

## 2021-04-29 NOTE — ASSESSMENT & PLAN NOTE
Lab Results   Component Value Date    HGBA1C 6 7 (H) 03/19/2021       No results for input(s): POCGLU in the last 72 hours      · Hold glimepiride and metformin in favor of sliding scale insulin

## 2021-04-29 NOTE — PHYSICIAN ADVISOR
Current patient class: Inpatient  The patient is currently on Hospital Day: 2 at [address]      The patient was admitted to the hospital  on 4/19/21 at [phone] [id number] for the following diagnosis:  Altered mental status [R41 82]     After review of the relevant documentation, labs, vital signs and test results, the patient is a provider liable case and is most appropriate for OBSERVATION STATUS  In this particular case the patient was admitted to the hospital as an inpatient  The patient however fails to satisfy the 2 midnight benchmark and closer scrutiny of the case is warranted  After review of the patient presentation and relevant labs the patient was most appropriate for observation status on admission  Given that this patient has already been discharged prior to this review they become a provider liable case  Rationale is as follows: The patient is a 81 yrs   Female who presented to the ED at 4/19/2021  7:33 PM with a chief complaint of Altered Mental Status (daughter states pt has been hallucinating for about a week  also c/o generilized weakness  )   UA on admission suspicious for uti and UC later grew klebsiella sensitive to ancef  Pt received iv ceftriaxone and encephalopathy resolved  He remained afebrile without leukocytosis  Patient was discharged home on hospital day 2       The patients vitals on arrival were   ED Triage Vitals   Temperature Pulse Respirations Blood Pressure SpO2   04/19/21 1513 04/19/21 1513 04/19/21 1513 04/19/21 1513 04/19/21 1513   98 1 °F (36 7 °C) 102 20 128/91 93 %      Temp Source Heart Rate Source Patient Position - Orthostatic VS BP Location FiO2 (%)   04/19/21 1513 04/19/21 1513 04/19/21 1513 04/19/21 1513 --   Oral Monitor Sitting Left arm       Pain Score       04/19/21 2017       No Pain           Past Medical History:   Diagnosis Date    CHF (congestive heart failure) (HCC)     Chronic pulmonary thromboembolism syndrome ([facility] [address] ) 4/2/2021  Diabetes mellitus ([facility] [address] )     Hypertension      Past Surgical History:   Procedure Laterality Date    CARDIAC SURGERY      IR PICC PLACEMENT SINGLE LUMEN  11/7/2019           Consults have been placed to:   None    Vitals:    04/20/21 0507 04/20/21 0600 04/20/21 1100 04/20/21 1300   BP: 139/73 150/67 151/71 121/80   BP Location: Right arm Right arm Right arm Right arm   Pulse: 62 104 92 89   Resp: 18 18 14 16   Temp:       TempSrc:       SpO2: 98% 91% 98% 96%   Weight:       Height:           Most recent labs:    No results for input(s): WBC, HGB, HCT, PLT, K, NA, CALCIUM, BUN, CREATININE, LIPASE, AMYLASE, INR, TROPONINI, CKTOTAL, AST, ALT, ALKPHOS, BILITOT in the last 72 hours  Scheduled Meds:  Continuous Infusions:No current facility-administered medications for this encounter  PRN Meds:      Surgical procedures (if appropriate):

## 2021-04-29 NOTE — ASSESSMENT & PLAN NOTE
· Recurrent UTI recently discharged with cephalexin    · Start ceftriaxone and follow up on cultures obtained today  · Patient did have pelvic discomfort and was due to see gynecology however was admitted during last admission

## 2021-04-29 NOTE — ASSESSMENT & PLAN NOTE
· Atrial fibrillation with RVR on admission may be secondary to decompensated CHF + UTI  · Given oral diltiazem    Will resume patient's metoprolol 100 mg b i d  and have IV p r n  for tachycardia  · Continue eliquis for anticoagulation

## 2021-04-29 NOTE — CASE MANAGEMENT
RECENT READMISSION: 4/19/2021 - 4/20/2021 (18 hours)  [address] [facility] Emergency Department    AGE:85  SEX: female  DX:altered mental status  OUTCOME: home with family  RESOURCES ON D/C (previous admission):   Patient has a pcp  Patient has Medicare and secondary insurance    CURRENT F/U APPTS:   None listed  REASON FOR READMISSION: Altered mental status itching    INTERVENTIONS:    communicated with the ED provider re needs  Provider admitting for Acute Metabolic encephalopathy     CM assessment of readmission

## 2021-04-29 NOTE — ASSESSMENT & PLAN NOTE
· Recent admission for same secondary to UTI  Now may be secondary to UTI+ decompensated CHF  · Appears to have recurrent UTI    Start ceftriaxone and follow up on cultures collected today

## 2021-04-29 NOTE — ED PROVIDER NOTES
History  Chief Complaint   Patient presents with    Altered Mental Status     per ems pt is altered starting today  Pt is unaware of date   Itching     per ems pt is complaining of itching all over  No rash seen and no new detergents or soaps  Patient is an 81-year-old female past medical history of atrial fibrillation, CAD, tachy-gale syndrome, diabetes, CHF, COPD, hypertension, recurrent UTIs presenting for complaint of altered mental status  Per EMS patient became altered as of today and is complaining of itching all over however her states that she spoke to her daughter who states that she hit her Life Alert by accident is currently at her mental baseline  She states she has a history of UTIs would like her tested  She does states that the itching is new  Patient has no complaints other than itching behind her ears  Prior to Admission Medications   Prescriptions Last Dose Informant Patient Reported? Taking?    Glucosamine-Chondroitin--133-579 MG TABS   Yes No   Sig: Take 1 tablet by mouth   apixaban (Eliquis) 5 mg  Self Yes No   Sig: Take 5 mg by mouth 2 (two) times a day   camphor-menthol (SARNA) lotion   No No   Sig: Apply topically as needed for itching   desloratadine (CLARINEX) 5 MG tablet   Yes No   Sig: Take 5 mg by mouth daily   docusate sodium (COLACE) 100 mg capsule  Self Yes No   Sig: Take 100 mg by mouth daily   fluticasone (FLONASE) 50 mcg/act nasal spray   No No   Sig: SPRAY 1 SPRAY INTO EACH NOSTRIL EVERY DAY   furosemide (LASIX) 40 mg tablet  Self Yes No   Sig: Take 40 mg by mouth 2 (two) times a day   glimepiride (AMARYL) 4 mg tablet   No No   Sig: Take 1 tablet (4 mg total) by mouth daily with breakfast   losartan (COZAAR) 25 mg tablet  Self Yes No   Sig: Take 25 mg by mouth daily   metFORMIN (GLUCOPHAGE) 500 mg tablet  Self Yes No   Sig: Take 500 mg by mouth daily with breakfast   metoprolol tartrate (LOPRESSOR) 100 mg tablet  Self Yes No   Sig: Take 100 mg by mouth every 12 (twelve) hours   nystatin (MYCOSTATIN) powder  Self No No   Sig: Apply topically 2 (two) times a day Groin and under breasts   pravastatin (PRAVACHOL) 20 mg tablet   No No   Sig: Take 1 tablet (20 mg total) by mouth daily   primidone (MYSOLINE) 50 mg tablet  Self Yes No   Sig: Take by mouth every 12 (twelve) hours   sertraline (ZOLOFT) 50 mg tablet   No No   Sig: Take 1 tablet (50 mg total) by mouth daily      Facility-Administered Medications: None       Past Medical History:   Diagnosis Date    A-fib ([place] [address] )     Benign essential tremor     CAD (coronary artery disease)     [facility]     Chronic pulmonary thromboembolism syndrome ([place] [address] ) 4/2/2021    Diabetes mellitus ([place] [address] )     Frequent UTI     Hypertension        Past Surgical History:   Procedure Laterality Date    CARDIAC SURGERY      IR PICC PLACEMENT SINGLE LUMEN  11/7/2019       History reviewed  No pertinent family history  I have reviewed and agree with the history as documented  E-Cigarette/Vaping    E-Cigarette Use Never User      E-Cigarette/Vaping Substances    Nicotine No     THC No     CBD No     Flavoring No     Other No     Unknown No      Social History     Tobacco Use    Smoking status: Never Smoker    Smokeless tobacco: Never Used   Substance Use Topics    Alcohol use: Not Currently    Drug use: Never       Review of Systems   Unable to perform ROS: Dementia   All other systems reviewed and are negative  Physical Exam  Physical Exam  Vitals signs reviewed  Constitutional:       General: She is not in acute distress  Appearance: Normal appearance  She is not ill-appearing  HENT:      Mouth/Throat:      Mouth: Mucous membranes are moist    Eyes:      Conjunctiva/sclera: Conjunctivae normal       Comments: Normal conjunctiva   Neck:      Musculoskeletal: Neck supple  Cardiovascular:      Rate and Rhythm: Tachycardia present  Rhythm irregular        Heart sounds: Normal heart sounds  Pulmonary:      Effort: Pulmonary effort is normal       Breath sounds: Normal breath sounds  Abdominal:      General: Abdomen is flat  Palpations: Abdomen is soft  Tenderness: There is no abdominal tenderness  Musculoskeletal: Normal range of motion  General: No swelling  Skin:     General: Skin is warm and dry  Neurological:      General: No focal deficit present  Mental Status: She is alert  GCS: GCS eye subscore is 4  GCS verbal subscore is 4  GCS motor subscore is 6  Cranial Nerves: No facial asymmetry        Comments: AAO x2   Psychiatric:         Mood and Affect: Mood normal          Speech: Speech normal          Vital Signs  ED Triage Vitals [04/29/21 1327]   Temperature Pulse Respirations Blood Pressure SpO2   98 3 °F (36 8 °C) 97 18 147/77 98 %      Temp Source Heart Rate Source Patient Position - Orthostatic VS BP Location FiO2 (%)   Oral Monitor Lying Right arm --      Pain Score       --           Vitals:    04/29/21 1630 04/29/21 1700 04/29/21 1822 04/29/21 2041   BP: (!) 157/114 144/69 137/94 123/70   Pulse: (!) 111 90 89 100   Patient Position - Orthostatic VS: Sitting Sitting           Visual Acuity      ED Medications  Medications   apixaban (ELIQUIS) tablet 5 mg (5 mg Oral Given 4/29/21 1857)   camphor-menthol (SARNA) 0 5-0 5 % lotion (has no administration in time range)   fluticasone (FLONASE) 50 mcg/act nasal spray 1 spray (has no administration in time range)   docusate sodium (COLACE) capsule 100 mg (has no administration in time range)   Glucosamine-Chondroitin--400-167 MG TABS 1 tablet (has no administration in time range)   losartan (COZAAR) tablet 25 mg (has no administration in time range)   metoprolol tartrate (LOPRESSOR) tablet 100 mg (100 mg Oral Given 4/29/21 2041)   pravastatin (PRAVACHOL) tablet 20 mg (has no administration in time range)   primidone (MYSOLINE) tablet 50 mg (50 mg Oral Given 4/29/21 2041)   sertraline (ZOLOFT) tablet 50 mg (has no administration in time range)   furosemide (LASIX) injection 40 mg (has no administration in time range)   insulin lispro (HumaLOG) 100 units/mL subcutaneous injection 1-6 Units (1 Units Subcutaneous Not Given 4/29/21 1853)   ceftriaxone (ROCEPHIN) 1 g/50 mL in dextrose IVPB (1,000 mg Intravenous New Bag 4/29/21 1845)   acetaminophen (TYLENOL) tablet 650 mg (has no administration in time range)   ondansetron (ZOFRAN) injection 4 mg (has no administration in time range)   magnesium hydroxide (MILK OF MAGNESIA) oral suspension 30 mL (has no administration in time range)   metoprolol (LOPRESSOR) injection 5 mg (has no administration in time range)   sodium chloride 0 9 % bolus 500 mL (0 mL Intravenous Stopped 4/29/21 1645)   diphenhydrAMINE (BENADRYL) injection 25 mg (25 mg Intravenous Given 4/29/21 1405)   diltiazem (CARDIZEM) tablet 60 mg (60 mg Oral Given 4/29/21 1535)   furosemide (LASIX) injection 40 mg (40 mg Intravenous Given 4/29/21 1535)       Diagnostic Studies  Results Reviewed     Procedure Component Value Units Date/Time    Procalcitonin with AM Reflex [[id number]]  (Normal) Collected: 04/29/21 1404    Lab Status: Final result Specimen: Blood from Line, Venous Updated: 04/29/21 2026     Procalcitonin <0 05 ng/ml     Urine Microscopic [[id number]]  (Normal) Collected: 04/29/21 1601    Lab Status: Final result Specimen: Urine, Clean Catch Updated: 04/29/21 1627     RBC, UA 0-1 /hpf      WBC, UA 1-2 /hpf      Epithelial Cells Occasional /hpf      Bacteria, UA Occasional /hpf     UA w Reflex to Microscopic w Reflex to Culture [[id number]]  (Abnormal) Collected: 04/29/21 1601    Lab Status: Final result Specimen: Urine, Clean Catch Updated: 04/29/21 1611     Color, UA Yellow     Clarity, UA Clear     Specific Gravity, UA 1 015     pH, UA 7 0     Leukocytes, UA Trace     Nitrite, UA Negative     Protein, UA Negative mg/dl      Glucose, UA Negative mg/dl      Ketones, UA Negative mg/dl Urobilinogen, UA 0 2 E U /dl      Bilirubin, UA Negative     Blood, UA Trace-Intact    Urine culture [[id number]] Collected: 04/29/21 1601    Lab Status: In process Specimen: Urine, Clean Catch Updated: 04/29/21 1603    Lactic acid [[id number]]  (Normal) Collected: 04/29/21 1404    Lab Status: Final result Specimen: Blood from Line, Venous Updated: 04/29/21 1437     LACTIC ACID 0 9 mmol/L     Narrative:      Result may be elevated if tourniquet was used during collection      NT-BNP PRO [[id number]]  (Abnormal) Collected: 04/29/21 1404    Lab Status: Final result Specimen: Blood from Line, Venous Updated: 04/29/21 1437     NT-proBNP 3,118 pg/mL     Magnesium [[id number]]  (Normal) Collected: 04/29/21 1404    Lab Status: Final result Specimen: Blood from Line, Venous Updated: 04/29/21 1437     Magnesium 2 1 mg/dL     Phosphorus [[id number]]  (Normal) Collected: 04/29/21 1404    Lab Status: Final result Specimen: Blood from Line, Venous Updated: 04/29/21 1437     Phosphorus 3 8 mg/dL     Troponin I [[id number]]  (Normal) Collected: 04/29/21 1404    Lab Status: Final result Specimen: Blood from Line, Venous Updated: 04/29/21 1435     Troponin I <0 02 ng/mL     Comprehensive metabolic panel [[id number]]  (Abnormal) Collected: 04/29/21 1404    Lab Status: Final result Specimen: Blood from Line, Venous Updated: 04/29/21 1430     Sodium 142 mmol/L      Potassium 3 8 mmol/L      Chloride 106 mmol/L      CO2 28 mmol/L      ANION GAP 8 mmol/L      BUN 15 mg/dL      Creatinine 0 83 mg/dL      Glucose 98 mg/dL      Calcium 8 6 mg/dL      Corrected Calcium 9 3 mg/dL      AST 21 U/L      ALT 21 U/L      Alkaline Phosphatase 81 U/L      Total Protein 7 5 g/dL      Albumin 3 1 g/dL      Total Bilirubin 0 39 mg/dL      eGFR 65 ml/min/1 73sq m     Narrative:      [place] guidelines for Chronic Kidney Disease (CKD):     Stage 1 with normal or high GFR (GFR > 90 mL/min/1 73 square meters)    Stage 2 Mild CKD (GFR = 60-89 mL/min/1 73 square meters)    Stage 3A Moderate CKD (GFR = 45-59 mL/min/1 73 square meters)    Stage 3B Moderate CKD (GFR = 30-44 mL/min/1 73 square meters)    Stage 4 Severe CKD (GFR = 15-29 mL/min/1 73 square meters)    Stage 5 End Stage CKD (GFR <15 mL/min/1 73 square meters)  Note: GFR calculation is accurate only with a steady state creatinine    Protime-INR [[id number]]  (Normal) Collected: 04/29/21 1404    Lab Status: Final result Specimen: Blood from Line, Venous Updated: 04/29/21 1423     Protime 13 9 seconds      INR 1 13    APTT [[id number]]  (Normal) Collected: 04/29/21 1404    Lab Status: Final result Specimen: Blood from Line, Venous Updated: 04/29/21 1423     PTT 28 seconds     CBC and differential [[id number]]  (Abnormal) Collected: 04/29/21 1404    Lab Status: Final result Specimen: Blood from Line, Venous Updated: 04/29/21 1413     WBC 5 58 Thousand/uL      RBC 3 65 Million/uL      Hemoglobin 10 5 g/dL      Hematocrit 33 2 %      MCV 91 fL      MCH 28 8 pg      MCHC 31 6 g/dL      RDW 13 8 %      MPV 9 8 fL      Platelets 997 Thousands/uL      nRBC 0 /100 WBCs      Neutrophils Relative 57 %      Immat GRANS % 0 %      Lymphocytes Relative 29 %      Monocytes Relative 9 %      Eosinophils Relative 4 %      Basophils Relative 1 %      Neutrophils Absolute 3 19 Thousands/µL      Immature Grans Absolute 0 02 Thousand/uL      Lymphocytes Absolute 1 63 Thousands/µL      Monocytes Absolute 0 48 Thousand/µL      Eosinophils Absolute 0 22 Thousand/µL      Basophils Absolute 0 04 Thousands/µL     Blood culture #1 [[id number]] Collected: 04/29/21 1404    Lab Status: In process Specimen: Blood from Line, Venous Updated: 04/29/21 1409    Blood culture #2 [[id number]] Collected: 04/29/21 1405    Lab Status:  In process Specimen: Blood from Line, Venous Updated: 04/29/21 1409                 XR chest 2 views   ED Interpretation by Jonah Richard DO (04/29 1443)   unchanged      Final Result by Melia De Leon MD (04/29 9522)         1  Cardiomegaly redemonstrated   2  Emphysema                  Workstation performed: [id number]                    Procedures  ECG 12 Lead Documentation Only    Date/Time: 4/29/2021 3:34 PM  Performed by: Priya Doe DO  Authorized by: Priya Doe DO     ECG reviewed by me, the ED Provider: yes    Patient location:  ED  Previous ECG:     Previous ECG:  Compared to current    Comparison ECG info:   Increasing QRS    Similarity:  Changes noted  Interpretation:     Interpretation: non-specific    Rate:     ECG rate assessment: tachycardic    Rhythm:     Rhythm: atrial fibrillation    Ectopy:     Ectopy: PVCs      PVCs:  Frequent  QRS:     QRS axis:  Normal  Conduction:     Conduction: abnormal      Abnormal conduction: non-specific intraventricular conduction delay    ST segments:     ST segments:  Normal  T waves:     T waves: normal      CriticalCare Time  Performed by: Priya Doe DO  Authorized by: Priya Doe DO     Critical care provider statement:     Critical care time (minutes):  30    Critical care was necessary to treat or prevent imminent or life-threatening deterioration of the following conditions:  Cardiac failure and circulatory failure    Critical care was time spent personally by me on the following activities:  Obtaining history from patient or surrogate, development of treatment plan with patient or surrogate, discussions with consultants, evaluation of patient's response to treatment, examination of patient, interpretation of cardiac output measurements, ordering and performing treatments and interventions, ordering and review of laboratory studies, ordering and review of radiographic studies, re-evaluation of patient's condition and review of old charts             ED Course  ED Course as of Apr 29 2120   Thu Apr 29, 2021   1418 Patient now with heart rate 90s to 100s consistently after Benadryl, possibly secondary to anxiety      1418 Have spoken with daughter who states that her primary concern was UTI, patient recently admitted in completed course of antibiotics for UTI however when asked about her mental status today she states that she did not see her today and that her daughter who was with her when the ambulance was called not speak to her therefore we cannot verify that she does not have altered mental status today however daughter states she is on her way  1526 Patient with persistent low-grade AFib with RVR, will admit for CHF exacerbation and atrial fibrillation  SBIRT 22yo+      Most Recent Value   SBIRT (24 yo +)   In order to provide better care to our patients, we are screening all of our patients for alcohol and drug use  Would it be okay to ask you these screening questions? Yes Filed at: 04/29/2021 1524   Initial Alcohol Screen: US AUDIT-C    1  How often do you have a drink containing alcohol?  0 Filed at: 04/29/2021 1524   2  How many drinks containing alcohol do you have on a typical day you are drinking? 0 Filed at: 04/29/2021 1524   3b  FEMALE Any Age, or MALE 65+: How often do you have 4 or more drinks on one occassion? 0 Filed at: 04/29/2021 1524   Audit-C Score  0 Filed at: 04/29/2021 1524   JAMES: How many times in the past year have you    Used an illegal drug or used a prescription medication for non-medical reasons? Never Filed at: 04/29/2021 1524                    MDM  Number of Diagnoses or Management Options  Diagnosis management comments: Patient is an 52-year-old female past medical history of tachy-gale syndrome, atrial fibrillation, CHF, CAD, diabetes, UTI, COPD, hypertension presenting with tachycardia, concern for UTI  Patient is well-appearing bedside with stable vitals though with intermittent tachycardia, AFib with RVR, but appropriate blood pressure, saturating appropriately, following commands and with no significant physical exam findings  Will obtain broad spectrum labs including urinalysis and urine culture, cardiac workup as patient has AFib with RVR unknown cause  Disposition  Final diagnoses:   CHF (congestive heart failure) ([facility])   Atrial fibrillation with RVR ([facility] )     Time reflects when diagnosis was documented in both MDM as applicable and the Disposition within this note     Time User Action Codes Description Comment    4/29/2021  3:32 PM Lavone Beau Add [I50 9] CHF (congestive heart failure) ([facility] [address] )     4/29/2021  3:32 PM Lavone Beau Add [I48 91] Atrial fibrillation with RVR [facility])       ED Disposition     ED Disposition Condition Date/Time Comment    Admit Stable Thu Apr 29, 2021  3:32 PM Case was discussed with Dr Apollo Sewell and the patient's admission status was agreed to be Admission Status: inpatient status to the service of Dr Apollo Sewell           Follow-up Information    None         Current Discharge Medication List      CONTINUE these medications which have NOT CHANGED    Details   apixaban (Eliquis) 5 mg Take 5 mg by mouth 2 (two) times a day      camphor-menthol (SARNA) lotion Apply topically as needed for itching  Qty: 222 mL, Refills: 0    Associated Diagnoses: Pruritus      desloratadine (CLARINEX) 5 MG tablet Take 5 mg by mouth daily      docusate sodium (COLACE) 100 mg capsule Take 100 mg by mouth daily      fluticasone (FLONASE) 50 mcg/act nasal spray SPRAY 1 SPRAY INTO EACH NOSTRIL EVERY DAY  Qty: 16 mL, Refills: 1    Associated Diagnoses: Seasonal allergic rhinitis, unspecified trigger      furosemide (LASIX) 40 mg tablet Take 40 mg by mouth 2 (two) times a day      glimepiride (AMARYL) 4 mg tablet Take 1 tablet (4 mg total) by mouth daily with breakfast  Qty: 90 tablet, Refills: 1    Associated Diagnoses: Type 2 diabetes mellitus with other specified complication, without long-term current use of insulin ([facility])      Glucosamine-Chondroitin--400-167 MG TABS Take 1 tablet by mouth      losartan (COZAAR) 25 mg tablet Take 25 mg by mouth daily      metFORMIN (GLUCOPHAGE) 500 mg tablet Take 500 mg by mouth daily with breakfast      metoprolol tartrate (LOPRESSOR) 100 mg tablet Take 100 mg by mouth every 12 (twelve) hours      nystatin (MYCOSTATIN) powder Apply topically 2 (two) times a day Groin and under breasts  Qty: 15 g, Refills: 0    Associated Diagnoses: Urinary tract infection      pravastatin (PRAVACHOL) 20 mg tablet Take 1 tablet (20 mg total) by mouth daily  Qty: 90 tablet, Refills: 1    Associated Diagnoses: Type 2 diabetes mellitus with other specified complication, without long-term current use of insulin ([facility])      primidone (MYSOLINE) 50 mg tablet Take by mouth every 12 (twelve) hours      sertraline (ZOLOFT) 50 mg tablet Take 1 tablet (50 mg total) by mouth daily  Qty: 90 tablet, Refills: 1    Associated Diagnoses: Current moderate episode of major depressive disorder, unspecified whether recurrent ([facility] )           No discharge procedures on file      PDMP Review     None          ED Provider  Electronically Signed by           Yoko Finley DO  04/29/21 5266

## 2021-04-29 NOTE — H&P
Rick Grewal 1935, 81 y o  female MRN: [id number]  Unit/Bed#: ED 26 Encounter: [id number]  Primary Care Provider: Ryan Turcios DO   Date and time admitted to hospital: 4/29/2021  1:24 PM    Assessment and Plan  * Acute encephalopathy  Assessment & Plan  · Recent admission for same secondary to UTI  Now may be secondary to UTI+ decompensated CHF  · Appears to have recurrent UTI  Start ceftriaxone and follow up on cultures collected today    Acute diastolic CHF (congestive heart failure) ([facility] )  Assessment & Plan  Wt Readings from Last 3 Encounters:   04/29/21 89 kg (196 lb 3 4 oz)   04/19/21 82 6 kg (182 lb)   03/19/21 82 6 kg (182 lb)     · Acute on chronic diastolic CHF likely secondary to tachycardia  15 lb weight gain above baseline  Continue IV furosemide  · Have cardiology evaluate    Persistent atrial fibrillation [facility])  Assessment & Plan  · Atrial fibrillation with RVR on admission may be secondary to decompensated CHF + UTI  · Given oral diltiazem  Will resume patient's metoprolol 100 mg b i d  and have IV p r n  for tachycardia  · Continue eliquis for anticoagulation    Benign essential tremor  Assessment & Plan  · Continue primidone    Hypertension  Assessment & Plan  · Continue losartan and metoprolol    UTI (urinary tract infection)  Assessment & Plan  · Recurrent UTI recently discharged with cephalexin  · Start ceftriaxone and follow up on cultures obtained today  · Patient did have pelvic discomfort and was due to see gynecology however was admitted during last admission    DM (diabetes mellitus), type 2 ([facility] )  Assessment & Plan  Lab Results   Component Value Date    HGBA1C 6 7 (H) 03/19/2021       No results for input(s): POCGLU in the last 72 hours  · Hold glimepiride and metformin in favor of sliding scale insulin    CAD (coronary artery disease)  Assessment & Plan  · History of CABG 2008   No active chest pain       VTE Prophylaxis: Apixaban (Eliquis)  Code Status: Level 3 - DNAR and DNI  Anticipated Length of Stay:  Patient will be admitted on an Inpatient basis with an anticipated length of stay of  greater than 2 midnights  Justification for Hospital Stay: Acute encephalopathy  Total Time for Visit, including Counseling / Coordination of Care: xx mins  Greater than 50% of this total time spent on direct patient counseling and coordination of care  Chief Complaint:     Chief Complaint   Patient presents with    Altered Mental Status     per ems pt is altered starting today  Pt is unaware of date   Itching     per ems pt is complaining of itching all over  No rash seen and no new detergents or soaps  History of Present Illness:    Kai Castillo is a 81 y o  female with a past medical history of CAD atrial fibrillation CHF and recurrent UTI who presents with confusion and itching  The patient was recently hospitalized here 4/19/2021 and discharged with cephalexin for UTI  She was doing well last night  Daughter at bedside to help provide history whom the patient lives with  This afternoon the patient started to be more confused and granddaughter found her on the floor  EMS was summoned and the patient was brought here where she was found to have rapid atrial fibrillation and evidence of decompensated CHF  She denies any chest pain but is complaining of shortness of breath  She had pelvic discomfort which has improved in the emergency department  Review of Systems:  Review of Systems   Constitutional: Positive for fatigue  Negative for chills, diaphoresis and fever  HENT: Negative for dental problem and facial swelling  Eyes: Negative for photophobia, pain and visual disturbance  Respiratory: Positive for shortness of breath  Negative for wheezing and stridor  Cardiovascular: Negative for chest pain and palpitations     Gastrointestinal: Negative for abdominal distention, abdominal pain, diarrhea, nausea and vomiting  Genitourinary: Positive for pelvic pain  Negative for dysuria, hematuria and urgency  Musculoskeletal: Positive for back pain  Negative for myalgias  Skin: Negative for rash  Neurological: Negative for dizziness, seizures, speech difficulty, numbness and headaches  Psychiatric/Behavioral: Positive for agitation and confusion  Negative for suicidal ideas  The patient is not nervous/anxious  All other systems reviewed and are negative  Past Medical and Surgical History:   Past Medical History:   Diagnosis Date    A-fib [facility])     Benign essential tremor     CAD (coronary artery disease)     [facility]congest[facility]) ([facility])     Chronic pulmonary thromboembolism syndrome ([facility] ) 4/2/2021    Diabetes mellitus ([facility] )     Frequent UTI     Hypertension      Past Surgical History:   Procedure Laterality Date    CARDIAC SURGERY      IR PICC PLACEMENT SINGLE LUMEN  11/7/2019     Meds/Allergies: Allergies: Allergies   Allergen Reactions    Clarithromycin Confusion and Other (See Comments)     Prior to Admission Medications   Prescriptions Last Dose Informant Patient Reported? Taking?    Glucosamine-Chondroitin--505-652 MG TABS   Yes No   Sig: Take 1 tablet by mouth   apixaban (Eliquis) 5 mg  Self Yes No   Sig: Take 5 mg by mouth 2 (two) times a day   camphor-menthol (SARNA) lotion   No No   Sig: Apply topically as needed for itching   desloratadine (CLARINEX) 5 MG tablet   Yes No   Sig: Take 5 mg by mouth daily   docusate sodium (COLACE) 100 mg capsule  Self Yes No   Sig: Take 100 mg by mouth daily   fluticasone (FLONASE) 50 mcg/act nasal spray   No No   Sig: SPRAY 1 SPRAY INTO EACH NOSTRIL EVERY DAY   furosemide (LASIX) 40 mg tablet  Self Yes No   Sig: Take 40 mg by mouth 2 (two) times a day   glimepiride (AMARYL) 4 mg tablet   No No   Sig: Take 1 tablet (4 mg total) by mouth daily with breakfast   losartan (COZAAR) 25 mg tablet  Self Yes No   Sig: Take 25 mg by mouth daily metFORMIN (GLUCOPHAGE) 500 mg tablet  Self Yes No   Sig: Take 500 mg by mouth daily with breakfast   metoprolol tartrate (LOPRESSOR) 100 mg tablet  Self Yes No   Sig: Take 100 mg by mouth every 12 (twelve) hours   nystatin (MYCOSTATIN) powder  Self No No   Sig: Apply topically 2 (two) times a day Groin and under breasts   pravastatin (PRAVACHOL) 20 mg tablet   No No   Sig: Take 1 tablet (20 mg total) by mouth daily   primidone (MYSOLINE) 50 mg tablet  Self Yes No   Sig: Take by mouth every 12 (twelve) hours   sertraline (ZOLOFT) 50 mg tablet   No No   Sig: Take 1 tablet (50 mg total) by mouth daily      Facility-Administered Medications: None     Social History:     Social History     Socioeconomic History    Marital status: Single     Spouse name: Not on file    Number of children: Not on file    Years of education: Not on file    Highest education level: Not on file   Occupational History    Not on file   Social Needs    Financial resource strain: Not on file    Food insecurity     Worry: Not on file     Inability: Not on file   walkby needs     Medical: Not on file     Non-medical: Not on file   Tobacco Use    Smoking status: Never Smoker    Smokeless tobacco: Never Used   Substance and Sexual Activity    Alcohol use: Not Currently    Drug use: Never    Sexual activity: Not Currently   Lifestyle    Physical activity     Days per week: Not on file     Minutes per session: Not on file    Stress: Not on file   Relationships    Social connections     Talks on phone: Not on file     Gets together: Not on file     Attends Quaker service: Not on file     Active member of club or organization: Not on file     Attends meetings of clubs or organizations: Not on file     Relationship status: Not on file    Intimate partner violence     Fear of current or ex partner: Not on file     Emotionally abused: Not on file     Physically abused: Not on file     Forced sexual activity: Not on file Other Topics Concern    Not on file   Social History Narrative    Not on file     Patient Pre-hospital Living Situation:  Lives with daughter  Patient Pre-hospital Level of Mobility:   Patient Pre-hospital Diet Restrictions:     Family History:  History reviewed  No pertinent family history  Physical Exam:   Vitals:   Blood Pressure: 144/69 (04/29/21 1700)  Pulse: 90 (04/29/21 1700)  Temperature: 98 3 °F (36 8 °C) (04/29/21 1327)  Temp Source: Oral (04/29/21 1327)  Respirations: 14 (04/29/21 1700)  Weight - Scale: 89 kg (196 lb 3 4 oz) (04/29/21 1327)  SpO2: 98 % (04/29/21 1700)    Physical Exam  Vitals signs reviewed  Constitutional:       General: She is not in acute distress  Appearance: Normal appearance  HENT:      Head: Atraumatic  Mouth/Throat:      Mouth: Mucous membranes are dry  Comments: Bottom teeth not present  Eyes:      General: No scleral icterus  Extraocular Movements: Extraocular movements intact  Pupils: Pupils are equal, round, and reactive to light  Cardiovascular:      Rate and Rhythm: Tachycardia present  Rhythm irregular  Heart sounds: Normal heart sounds  Pulmonary:      Breath sounds: Decreased breath sounds present  No wheezing  Abdominal:      General: Bowel sounds are normal       Palpations: Abdomen is soft  Tenderness: There is no guarding or rebound  Musculoskeletal:         General: No swelling or tenderness  Skin:     General: Skin is warm  Neurological:      Mental Status: She is alert and oriented to person, place, and time  Mental status is at baseline  Psychiatric:         Mood and Affect: Mood normal        Lab Results: I have personally reviewed pertinent reports      Results from last 7 days   Lab Units 04/29/21  1404   WBC Thousand/uL 5 58   HEMOGLOBIN g/dL 10 5*   HEMATOCRIT % 33 2*   PLATELETS Thousands/uL 225   NEUTROS PCT % 57   LYMPHS PCT % 29   MONOS PCT % 9   EOS PCT % 4     Results from last 7 days   Lab Units 04/29/21  1404   SODIUM mmol/L 142   POTASSIUM mmol/L 3 8   CHLORIDE mmol/L 106   CO2 mmol/L 28   ANION GAP mmol/L 8   BUN mg/dL 15   CREATININE mg/dL 0 83   CALCIUM mg/dL 8 6   ALBUMIN g/dL 3 1*   TOTAL BILIRUBIN mg/dL 0 39   ALK PHOS U/L 81   ALT U/L 21   AST U/L 21   EGFR ml/min/1 73sq m 65   GLUCOSE RANDOM mg/dL 98     Results from last 7 days   Lab Units 04/29/21  1404   INR  1 13     Results from last 7 days   Lab Units 04/29/21  1404   TROPONIN I ng/mL <0 02     Results from last 7 days   Lab Units 04/29/21  1404   LACTIC ACID mmol/L 0 9         Results from last 7 days   Lab Units 04/29/21  1404   NT-PRO BNP pg/mL 3,118*      Results from last 7 days   Lab Units 04/29/21  1601   COLOR UA  Yellow   CLARITY UA  Clear   SPEC GRAV UA  1 015   PH UA  7 0   LEUKOCYTES UA  Trace*   NITRITE UA  Negative   GLUCOSE UA mg/dl Negative   KETONES UA mg/dl Negative   BILIRUBIN UA  Negative   BLOOD UA  Trace-Intact*      Results from last 7 days   Lab Units 04/29/21  1601   RBC UA /hpf 0-1   WBC UA /hpf 1-2   EPITHELIAL CELLS WET PREP /hpf Occasional   BACTERIA UA /hpf Occasional          Imaging: I have personally reviewed pertinent films in PACS  Xr Chest 2 Views    Result Date: 4/29/2021  Impression: 1  Cardiomegaly redemonstrated 2  Emphysema Workstation performed: [id number]       EKG, Pathology, and Other Studies Reviewed on Admission:   EKG  Result Date: 04/29/21  Personally reviewed strips with impression of:  Atrial fibrillation 102 bpm    Allscripts/ Epic Records Reviewed: Yes    ** Please Note: This note has been constructed using a voice recognition system   **

## 2021-04-30 ENCOUNTER — APPOINTMENT (INPATIENT)
Dept: VASCULAR ULTRASOUND | Facility: HOSPITAL | Age: 86
DRG: 291 | End: 2021-04-30
Payer: MEDICARE

## 2021-04-30 LAB
ALBUMIN SERPL BCP-MCNC: 2.8 G/DL (ref 3.5–5)
ALP SERPL-CCNC: 73 U/L (ref 46–116)
ALT SERPL W P-5'-P-CCNC: 19 U/L (ref 12–78)
ANION GAP SERPL CALCULATED.3IONS-SCNC: 7 MMOL/L (ref 4–13)
AST SERPL W P-5'-P-CCNC: 21 U/L (ref 5–45)
BACTERIA UR CULT: NORMAL
BILIRUB SERPL-MCNC: 0.31 MG/DL (ref 0.2–1)
BUN SERPL-MCNC: 15 MG/DL (ref 5–25)
CALCIUM ALBUM COR SERPL-MCNC: 9.2 MG/DL (ref 8.3–10.1)
CALCIUM SERPL-MCNC: 8.2 MG/DL (ref 8.3–10.1)
CHLORIDE SERPL-SCNC: 106 MMOL/L (ref 100–108)
CO2 SERPL-SCNC: 29 MMOL/L (ref 21–32)
CREAT SERPL-MCNC: 0.78 MG/DL (ref 0.6–1.3)
ERYTHROCYTE [DISTWIDTH] IN BLOOD BY AUTOMATED COUNT: 13.8 % (ref 11.6–15.1)
GFR SERPL CREATININE-BSD FRML MDRD: 70 ML/MIN/1.73SQ M
GLUCOSE SERPL-MCNC: 109 MG/DL (ref 65–140)
GLUCOSE SERPL-MCNC: 152 MG/DL (ref 65–140)
GLUCOSE SERPL-MCNC: 168 MG/DL (ref 65–140)
GLUCOSE SERPL-MCNC: 89 MG/DL (ref 65–140)
GLUCOSE SERPL-MCNC: 99 MG/DL (ref 65–140)
HCT VFR BLD AUTO: 31.6 % (ref 34.8–46.1)
HGB BLD-MCNC: 9.9 G/DL (ref 11.5–15.4)
MCH RBC QN AUTO: 29 PG (ref 26.8–34.3)
MCHC RBC AUTO-ENTMCNC: 31.3 G/DL (ref 31.4–37.4)
MCV RBC AUTO: 93 FL (ref 82–98)
PLATELET # BLD AUTO: 216 THOUSANDS/UL (ref 149–390)
PMV BLD AUTO: 10.4 FL (ref 8.9–12.7)
POTASSIUM SERPL-SCNC: 3.7 MMOL/L (ref 3.5–5.3)
PROCALCITONIN SERPL-MCNC: <0.05 NG/ML
PROT SERPL-MCNC: 6.9 G/DL (ref 6.4–8.2)
RBC # BLD AUTO: 3.41 MILLION/UL (ref 3.81–5.12)
SODIUM SERPL-SCNC: 142 MMOL/L (ref 136–145)
WBC # BLD AUTO: 5.76 THOUSAND/UL (ref 4.31–10.16)

## 2021-04-30 PROCEDURE — 93971 EXTREMITY STUDY: CPT | Performed by: SURGERY

## 2021-04-30 PROCEDURE — 82948 REAGENT STRIP/BLOOD GLUCOSE: CPT

## 2021-04-30 PROCEDURE — 84145 PROCALCITONIN (PCT): CPT | Performed by: EMERGENCY MEDICINE

## 2021-04-30 PROCEDURE — 93971 EXTREMITY STUDY: CPT

## 2021-04-30 PROCEDURE — 97167 OT EVAL HIGH COMPLEX 60 MIN: CPT

## 2021-04-30 PROCEDURE — 85027 COMPLETE CBC AUTOMATED: CPT | Performed by: INTERNAL MEDICINE

## 2021-04-30 PROCEDURE — 80053 COMPREHEN METABOLIC PANEL: CPT | Performed by: INTERNAL MEDICINE

## 2021-04-30 PROCEDURE — 99232 SBSQ HOSP IP/OBS MODERATE 35: CPT | Performed by: INTERNAL MEDICINE

## 2021-04-30 PROCEDURE — 99233 SBSQ HOSP IP/OBS HIGH 50: CPT | Performed by: INTERNAL MEDICINE

## 2021-04-30 PROCEDURE — 97163 PT EVAL HIGH COMPLEX 45 MIN: CPT

## 2021-04-30 RX ORDER — HYDROXYZINE HYDROCHLORIDE 25 MG/1
25 TABLET, FILM COATED ORAL EVERY 6 HOURS PRN
Status: DISCONTINUED | OUTPATIENT
Start: 2021-04-30 | End: 2021-05-01

## 2021-04-30 RX ADMIN — METOPROLOL TARTRATE 100 MG: 50 TABLET, FILM COATED ORAL at 09:14

## 2021-04-30 RX ADMIN — CEFTRIAXONE SODIUM 1000 MG: 10 INJECTION, POWDER, FOR SOLUTION INTRAVENOUS at 17:55

## 2021-04-30 RX ADMIN — PRAVASTATIN SODIUM 20 MG: 20 TABLET ORAL at 09:14

## 2021-04-30 RX ADMIN — APIXABAN 5 MG: 5 TABLET, FILM COATED ORAL at 17:55

## 2021-04-30 RX ADMIN — LOSARTAN POTASSIUM 25 MG: 25 TABLET, FILM COATED ORAL at 09:14

## 2021-04-30 RX ADMIN — SERTRALINE HYDROCHLORIDE 50 MG: 50 TABLET ORAL at 09:14

## 2021-04-30 RX ADMIN — FUROSEMIDE 40 MG: 10 INJECTION, SOLUTION INTRAMUSCULAR; INTRAVENOUS at 17:54

## 2021-04-30 RX ADMIN — FLUTICASONE PROPIONATE 1 SPRAY: 50 SPRAY, METERED NASAL at 09:15

## 2021-04-30 RX ADMIN — APIXABAN 5 MG: 5 TABLET, FILM COATED ORAL at 09:14

## 2021-04-30 RX ADMIN — PRIMIDONE 50 MG: 50 TABLET ORAL at 09:14

## 2021-04-30 RX ADMIN — INSULIN LISPRO 1 UNITS: 100 INJECTION, SOLUTION INTRAVENOUS; SUBCUTANEOUS at 12:51

## 2021-04-30 RX ADMIN — DOCUSATE SODIUM 100 MG: 100 CAPSULE, LIQUID FILLED ORAL at 09:14

## 2021-04-30 RX ADMIN — FUROSEMIDE 40 MG: 10 INJECTION, SOLUTION INTRAMUSCULAR; INTRAVENOUS at 09:14

## 2021-04-30 NOTE — OCCUPATIONAL THERAPY NOTE
Occupational Therapy Evaluation Note        Patient Name: Delois Collet  CTWYE'Z Date: 4/30/2021 04/30/21 0736   OT Last Visit   OT Visit Date 04/30/21   Note Type   Note type Evaluation   Restrictions/Precautions   Weight Bearing Precautions Per Order No   Braces or Orthoses Other (Comment)  (none per pt)   Other Precautions Chair Alarm; Bed Alarm;Multiple lines;Telemetry;O2;Fall Risk   Pain Assessment   Pain Assessment Tool 0-10   Pain Score 5   Pain Location/Orientation Orientation: Right;Location: Shoulder  (patient reports pain with mobility)   Home Living   Type of [address] [place] [address] One level;Performs ADLs on one level; Other (Comment); Stairs to enter without rails  (2 MOSES)   Bathroom Shower/Tub Tub/shower unit  (pt sponge bathes)   Bathroom Toilet Standard   Bathroom Equipment Other (Comment)  (none per pt)   Bathroom Accessibility Not accessible   [address] [facility]  ("I use the walker when I first get up")   Additional Comments home O2 at night, uses daytime prn   Prior Function   Level of [place] Independent with ADLs and functional mobility   Lives With Daughter; Other (Comment)  (dtr works during the day; granddaughter)   Receives Help [facility] [address]   IADLs Needs assistance   Falls in the last 6 months 1 to 4  (pt unsure of amount of falls; 1 per chart review)   Vocational Retired   Comments Pt reports ambulating with RW at baseline   [address] [facility] Patient lives in a one level home with 2 MOSES without rails  Patient lives with her daughter and granddaughter and reports that she's primarily home alone while daughter is working  Patient reports independence in ADLs and is currently sponge bathing  Her daughter and granddaughter assist her with IADLs  Patient reports independence in functional mobility using RW     Reciprocal Relationships supportive family   Service to Others Retired    Intrinsic Gratification [address] Psychosocial (WDL) WDL   Subjective   Subjective "my back is so itchy"   ADL   Eating Assistance 5  Supervision/Setup   Grooming Assistance 5  Supervision/Setup   UB Bathing Assistance 4  Minimal Assistance   LB Bathing Assistance 3  Moderate Assistance   UB Dressing Assistance 4  Minimal [address] 3  Moderate [address]  3  Moderate Assistance   Functional Assistance 3  Moderate Assistance   Additional Comments ADL asssist levels based on patient functional performance during OT evaluation  Bed Mobility   Rolling L 4  Minimal assistance   Additional items Assist x 2;HOB elevated; Increased time required;Verbal cues;LE management; Bedrails   Supine to Sit 4  Minimal assistance   Additional items Assist x 2;HOB elevated; Bedrails; Increased time required;Verbal cues;LE management   Additional Comments Patient recieved lying suping in bed  Post session patient left seated OOB in recliner chair with call bell and all needs within reach and chair alarm activated  Transfers   Sit to Stand 4  Minimal assistance   Additional items Assist x 2; Increased time required;Verbal cues   Stand to Sit 4  Minimal assistance   Additional items Assist x 2;Armrests; Increased time required;Verbal cues   Additional Comments Patient performed functional transfers using RW  Functional Mobility   Functional Mobility 4  Minimal assistance   Additional Comments Assist x2  Patient ambulated ~10 feet then required a seated break due to fatigue  SpO2 and HR remained stable  Ambulated another ~5 feet to recliner chair     Additional items Rolling walker   Balance   Static Sitting Fair   Dynamic Sitting Fair -   Static Standing Poor +   Dynamic Standing Poor +   Ambulatory Poor   Activity Tolerance   Activity Tolerance Patient limited by fatigue   Medical Staff Made Aware PT Molly Bowden, OT student Shane   Nurse Made Aware RN Gi confirmed patient appropriate for therapy and made aware of therapy outcomes including patient reports of itchiness  RUE Assessment   RUE Assessment X  (distal strength WFL, proximal not tested; c/o shoulder pain)   RUE Overall AROM   R Shoulder Flexion ~80 degrees  (limited due to shoulder pain)   R Shoulder ABduction ~80 degrees  (limited due to shoulder pain)   LUE Assessment   LUE Assessment WFL  (AROM and strength formally tested)   Hand Function   Gross Motor Coordination Impaired  (resting tremors; increased time to complete)   Fine Motor Coordination Impaired  (resting tremors; increased time to complete)   Sensation   Light Touch No apparent deficits  (BUEs)   Vision-Basic Assessment   Current Vision Wears glasses all the time   Cognition   Overall Cognitive Status Impaired   Arousal/Participation Alert; Responsive; Cooperative   Attention Within functional limits   Orientation Level Oriented to person;Disoriented to time;Oriented to place; Disoriented to situation   Memory Decreased recall of recent events   Following Commands Follows one step commands with increased time or repetition   Comments Pt agreeable to OT evaluation   Cognition Assessment Tools Other (Comment)  (Short Blessed Test)   Score 2  (score indicates normal cognition)   Assessment   Limitation Decreased ADL status; Decreased UE ROM; Decreased endurance;Decreased fine motor control;Decreased self-care trans;Decreased high-level ADLs; Decreased cognition  (Decreased RUE proximal ROM)   Prognosis Good   Assessment Patient is an 82 y/o female who was admitted to [facility] on 4/29/2021 with symptoms including altered mental status and itching with a diagnosis of acute encephalopathy  PMH is significant for acute diastolic CHF, persistent atrial fibrillation, benign, essential tremor, UTI, DM, CAD, HTN, tachy-gale syndrome, dysphagia, chronic fatigue, COPD, obesity, and chronic pulmonary thromboembolism syndrome  Two patient identifiers were used to confirm patient ID  Orders placed for OT evaluation and treatment  Patient lives in a one level home with 2 MOSES without rails  Patient lives with her daughter and granddaughter and reports that she's primarily home alone while daughter is working  Patient reports independence in ADLs and is currently sponge bathing  Her daughter and granddaughter assist her with IADLs  Patient reports independence in functional mobility using RW, prior to admission  Currently, patient requires Min A for UB ADLs and Mod A for LB ADLs  Patient currently requires Min A x2 for functional mobility using a RW  Patient is alert and oriented to person and place only  Personal factors that impact functional performance include steps to enter, difficulty performing IADLs, difficulty performing ADLs, and a lack of environmental and social support  Occupational performance is affected by the following deficits: decreased RUE ROM, decreased cognition, decreased activity tolerance, decreased fine and gross motor control, decreased functional mobility, postural control and postural alignment deficit, requiring external assistance to complete transitional movements, and decreased static and dynamic sitting/standing balance  Patient to benefit from continued skilled OT services during hospital stay 3-5 x/week to address before mentioned deficits and improve functional performance in bathing, dressing, toileting, grooming, functional transfers, functional mobility, and leisure activities  From an OT standpoint, it is recommended that this patient be discharged with post acute rehabilitation services at this time, pending medical clearance  Goals   Patient Goals "to get home"   Plan   Treatment Interventions ADL retraining;Functional transfer training;UE strengthening/ROM; Endurance training;Patient/family training;Equipment evaluation/education; Fine motor coordination activities; Compensatory technique education;Continued evaluation; Energy conservation; Activityengagement   Goal Expiration Date 05/14/21   OT Treatment Day 0   OT Frequency 3-5x/wk   Recommendation   OT Discharge Recommendation Post acute rehabilitation services   OT - OK to Discharge Yes  (pending medical clearance)   Additional Comments  The patient's raw score on the AM-PAC Daily Activity inpatient short form is 15, standardized score is 34 69, less than 39 4  Patients at this level are likely to benefit from discharge to post-acute rehabilitation services  Please refer to the recommendation of the Occupational Therapist for safe discharge planning  AM-PAC Daily Activity Inpatient   Lower Body Dressing 2   Bathing 2   Toileting 2   Upper Body Dressing 3   Grooming 3   Eating 3   Daily Activity Raw Score 15   Daily Activity Standardized Score (Calc for Raw Score >=11) 34 69   AM-[facility] Applied Cognition Inpatient   Following a Speech/Presentation 3   Understanding Ordinary Conversation 4   Taking Medications 3   Remembering Where Things Are Placed or Put Away 3   Remembering List of 4-5 Errands 2   Taking Care of Complicated Tasks 2   Applied Cognition Raw Score 17   Applied Cognition Standardized Score 36 52   Barthel Index   Feeding 5   Bathing 0   Grooming Score 0   Dressing Score 5   Bladder Score 10   Bowels Score 10   Toilet Use Score 5   Transfers (Bed/Chair) Score 5   Mobility (Level Surface) Score 0   Stairs Score 0   Barthel Index Score 40   Modified Bertie Scale   Modified Bertie Scale 4     Occupational Therapy Goals to be completed in 7-14 Days:     1 - Patient will verbalize and demonstrate use of energy conservation/ deep breathing technique and work simplification skills during functional activity with no verbal cues  2 - Patient will verbalize and demonstrate good body mechanics and joint protection techniques during  ADLs/ IADLs with no verbal cues  3 - Patient will increase OOB/ sitting tolerance to 2-4 hours per day for increased participation in self care and leisure tasks with no s/s of exertion      4 - Patient will increase standing tolerance time to 5 minutes with unilateral UE support to complete sink level ADLs@ supervision/setup level  5 - Patient will increase sitting tolerance at edge of bed to 20 minutes to complete UB ADLs @ set up assist level  6 - Patient will transfer bed to Chair / toilet at Set up assist level with AD as indicated  7 - Patient will complete UB ADLs with set up assist with use of compensatory techniques as indicated  8 - Patient will complete LB ADLs with min assist with the use of adaptive equipment  9 - Patient will complete toileting hygiene with set up assist/ supervision for thoroughness  8 - Patient/ Family will demonstrate competency with UE Home Exercise Program       11- Pt will attend to continued cognitive assessment 100% of the time in order to provide most appropriate recommendations for d/c plans      Kristy Winter OTR/L

## 2021-04-30 NOTE — CASE MANAGEMENT
LOS 1 DAY  PATIENT CLASS IP  PATIENT IS A 30 DAY READMISSION  PATIENT IS NOT A BUNDLE    CM met with patient and daughter at bedside to complete Cm open and discuss readmission and discharge needs  CM explained the goal of the interview was to identify current resource and potential needs for help at home as well as patient  preference for providers    Patient recently moved in  with her daughter in a ranch home with 2 MOSES  The daughters address is: [address]  Patient has a walker and oxygen @ 2 Liters from [address]  She has some difficulty with steps  CM discussed last admission PT rec for STR  Patient mobility was assessed as min assist of 2  Daughter states that they did not want STR or HHC at that time  They still are not interested in STR if recommended, by reluctantly agreed to [facility] to give it a try  Pt has no HHC or STR hx  Dtr states that she is working with [facility] for longterm services, but is behind in submitting  the paperwork  Patient has no [address] admissions or SA Hx  She uses [facility] and Her PCP is Keily Zambrano  Patient is retired, does not drive and family can provide transportation home  Son also lives close by and can assist  CM reviewed discharge planning process including the following: identifying caregivers at home, preference for d/c planning needs, availability of treatment team to discuss questions or concerns patient and/or family may have regarding diagnosis, plan of care, old or new medications and discharge planning   CM will continue to follow for care coordination and update assessment as appropriate      Patient is an 80 yo female admitted with KAYLYNN, IV lasix, 500 cc NS bolus, st cath, labs, tele, PT/OT PENDING, cards cx, dietary cx PLOF: needs assist uses walker, DCP: Home w/ [facility] Transportation: family will provide

## 2021-04-30 NOTE — PHYSICAL THERAPY NOTE
Physical Therapy Evaluation     Patient's Name: Elenita Sharp    Admitting Diagnosis  CHF (congestive heart failure) ([place] [address] ) [I50 9]  Altered mental status [R41 82]  Atrial fibrillation with RVR ([facility] ) [I48 91]    Problem List  Patient Active Problem List   Diagnosis    Tachy-gale syndrome ([facility] )    Persistent [facility] fibrillation ([facility] )    CAD (coronary artery disease)    DM (diabetes mellitus), type 2 ([place] [address] )    Acute metabolic encephalopathy    UTI (urinary tract infection)    Dysphagia    Chest pain    [facility]congest[facility] failure) ([facility]    Seasonal allergic rhinitis    Primary osteoarthritis of both knees    Chronic fatigue    Pruritus    Chronic obstructive pulmonary disease ([place] [address] )    Obesity, morbid ([place] [address] )    Hypertension    Frequent UTI    Diabetes mellitus ([place] [address] )    Chronic pulmonary thromboembolism syndrome (HCC)    A-fib ([facility])    Benign essential tremor    Acute diastolic CHF (congestive heart failure) ([facility])       Past Medical History  Past Medical History:   Diagnosis Date    A-fib ([place] [address] )     Benign essential tremor     CAD (coronary artery disease)     CH[facility]congestive heart failure) ([facility])     Chronic pulmonary thromboembolism syndrome ([facility] ) 4/2/2021    Diabetes mellitus ([place] [address] )     Frequent UTI     Hypertension        Past Surgical History  Past Surgical History:   Procedure Laterality Date    CARDIAC SURGERY      IR PICC PLACEMENT SINGLE LUMEN  11/7/2019 04/30/21 0902   PT Last Visit   PT Visit Date 04/30/21   Note Type   Note type Evaluation   Pain Assessment   Pain Assessment Tool 0-10   Pain Score 5   Pain Location/Orientation Orientation: Right;Location: Shoulder   Home Living   Type of [address] One level;Performs ADLs on one level; Other (Comment); Stairs to enter without rails  (2 MOSES)   Bathroom Shower/Tub Tub/shower unit  (pt sponge bathes)   Bathroom Toilet Standard   Bathroom Equipment   (none per pt)   Bathroom Accessibility Not accessible   Home Equipment Justina Ring  ("I use the walker when I first get up")   Additional Comments home O2 nocturnal, uses daytime prn   Prior Function   Level of [place] Independent with ADLs and functional mobility   Lives With Daughter; Other (Comment)  (dtr works during the day; granddaughter)   Receives Help From [facility] [address] in the last 6 months 1 to 4  (pt unsure of amount of falls; 1 per chart review)   Vocational Retired   Comments Pt reports ambulating with RW at baseline   Restrictions/Precautions   Weight Bearing Precautions Per Order No   Braces or Orthoses Other (Comment)  (none per pt)   Other Precautions Chair Alarm; Bed Alarm;Multiple lines;Telemetry;O2;Fall Risk   General   Family/Caregiver Present No   Cognition   Overall Cognitive Status Impaired   Arousal/Participation Alert   Attention Within functional limits   Orientation Level Oriented to person;Disoriented to time;Oriented to place; Disoriented to situation   Memory Decreased recall of recent events   Following Commands Follows one step commands with increased time or repetition   Comments pt agreeable to PT evaluation   RUE Assessment   RUE Assessment X  (defer to OT Eval for comments)   LUE Assessment   LUE Assessment WFL  (defer to OT Eval for comments)   RLE Assessment   RLE Assessment   (grossly assessed c functional mobility: 3+/5)   LLE Assessment   LLE Assessment   (grossly assessed c functional mobility: 3+/5)   Coordination   Movements are Fluid and Coordinated 1   Sensation WFL   Bed Mobility   Rolling L 4  Minimal assistance   Additional items Assist x 2;HOB elevated; Increased time required;Verbal cues;LE management; Bedrails   Supine to Sit 4  Minimal assistance   Additional items Assist x 2;HOB elevated; Bedrails; Increased time required;Verbal cues;LE management   Transfers   Sit to Stand 4  Minimal assistance   Additional items Assist x 2   Stand to Sit 4  Minimal assistance   Additional items Assist x 2   Ambulation/Elevation   Gait pattern Decreased foot clearance; Short stride; Excessively slow; Step to   Gait Assistance 4  Minimal assist   Additional items Assist x 2;Verbal cues   Assistive Device Rolling walker   Distance 10' + 5'   Balance   Static Sitting Fair   Dynamic Sitting Fair -   Static Standing Poor +   Dynamic Standing Poor +   Ambulatory Poor   Endurance Deficit   Endurance Deficit Yes   Activity Tolerance   Activity Tolerance Patient limited by fatigue   Medical Staff Made Aware OT Kaylee Edwards & student Neal Elam   Nurse Made Aware RN Gi confirmed patient appropriate for therapy and made aware of therapy outcomes including patient reports of itchiness  Assessment   Prognosis Good   Problem List Decreased strength;Decreased endurance; Impaired balance;Decreased mobility;Pain;Decreased cognition   Assessment Pt is 81 y o  female seen for PT evaluation on 4/30/2021 s/p admit to [facility] on 6/86/4600 w/ Acute metabolic encephalopathy  PT consulted to assess pt's functional mobility and d/c needs  Order placed for PT eval and tx, w/ up as tolerated order  Performed at least 2 patient identifiers during session: Name and wristband  Comorbidities affecting pt's physical performance at time of assessment include:  has a past medical history of A-fib ([facility] ), Benign essential tremor, CAD (coronary artery disease), CHF (congestive heart failure) ([facility] ), Chronic pulmonary thromboembolism syndrome ([facility] ) (4/2/2021), Diabetes mellitus ([facility] ), Frequent UTI, and Hypertension  PTA, pt was independent w/ all functional mobility w/ walker, ambulates community distances and elevations, ambulates household distances, has 2 MOSES and lives w/ daughter and family in 1 level home   Personal factors affecting pt at time of IE include: inaccessible home environment, ambulating w/ assistive device, inability to ambulate household distances, decreased cognition, positive fall history, decreased initiation and engagement and inability to perform IADLs  Please find objective findings from PT assessment regarding body systems outlined above with impairments and limitations including weakness, impaired balance, decreased endurance, gait deviations, decreased activity tolerance, fall risk and decreased cognition, as well as mobility assessment (need for cueing for mobility technique)  The following objective measures performed on IE also reveal limitations: Barthel Index: 40/100 and Modified [place]: 4 (moderate/severe disability)  Pt's clinical presentation is currently unstable/unpredictable seen in pt's presentation of abnormal lab value(s), need for input for task focus and mobility technique and ongoing medical assessment  Pt to benefit from continued PT tx to address deficits as defined above and maximize level of functional independent mobility and consistency  From PT/mobility standpoint, recommendation at time of d/c would be post acute rehabilitation services pending progress in order to facilitate return to PLOF     Barriers to Discharge Inaccessible home environment;Decreased caregiver support   Goals   Patient Goals to get home   STG Expiration Date 05/10/21   Short Term Goal #1 In 7-10 days: Increase bilateral LE strength 1/2 grade to facilitate independent mobility, Perform all bed mobility tasks modified independent to decrease caregiver burden, Perform all transfers modified independent to improve independence, Ambulate > 50 ft  with least restrictive assistive device with distant S w/o LOB and w/ normalized gait pattern 100% of the time, Navigate 2 stairs with SBA with unilateral handrail to facilitate return to previous living environment, Increase all balance 1/2 grade to decrease risk for falls, Tolerate 4 hr OOB to faciliate upright tolerance, Improve Barthel Index score to 55 or greater to facilitate independence and PT provider will perform functional balance assessment to determine fall risk   PT Treatment Day 0 Plan   Treatment/Interventions Functional transfer training;LE strengthening/ROM; Therapeutic exercise; Endurance training;Patient/family training;Equipment eval/education; Bed mobility;Gait training;Spoke to nursing   PT Frequency   (3-5x/wk)   Recommendation   PT Discharge Recommendation Post acute rehabilitation services   Equipment Recommended Jeana Innocent  (RW)   Walker Package Recommended Wheeled walker   Change/add to [email or web address]?  No   PT - OK to Discharge Yes   Additional Comments when medically cleared if to [address] Mobility Inpatient   Turning in Bed Without Bedrails 3   Lying on Back to Sitting on Edge of Flat Bed 3   Moving Bed to Chair 2   Standing Up From Chair 2   Walk in Room 2   Climb 3-5 Stairs 1   Basic Mobility Inpatient Raw Score 13   Basic Mobility Standardized Score 33 99   Modified [place] Scale   Modified [place] Scale 4   Barthel Index   Feeding 5   Bathing 0   Grooming Score 0   Dressing Score 5   Bladder Score 10   Bowels Score 10   Toilet Use Score 5   Transfers (Bed/Chair) Score 5   Mobility (Level Surface) Score 0   Stairs Score 0   Barthel Index Score 40           Ijeoma Concepcion, PT

## 2021-04-30 NOTE — PROGRESS NOTES
[address] [facility]  Progress Note [place] 1935, 81 y o  female MRN: [id number]  Unit/Bed#: -01 Encounter: [id number]  Primary Care Provider: Danielle Walsh DO   Date and time admitted to hospital: 4/29/2021  1:24 PM    Acute diastolic CHF (congestive heart failure) [facility])  Assessment & Plan  Wt Readings from Last 3 Encounters:   04/30/21 82 1 kg (181 lb)   04/19/21 82 6 kg (182 lb)   03/19/21 82 6 kg (182 lb)     · Acute on chronic diastolic CHF  15 lb weight gain above baseline  · Continue IV furosemide  · Monitor intake and output  · Cardiology input appreciated  Benign essential tremor  Assessment & Plan  · Continue primidone    Hypertension  Assessment & Plan  · Continue losartan and metoprolol    UTI (urinary tract infection)  Assessment & Plan  · Has hx of recurrent UTI recently discharged with cephalexin  · Now came with encephalopathy, now resolved, denies overt urinary symptoms  On ceftriaxone, continue for now, follow final results of cultures  DM (diabetes mellitus), type 2 [facility])  Assessment & Plan  Lab Results   Component Value Date    HGBA1C 6 7 (H) 03/19/2021       Recent Labs     04/29/21  1838 04/29/21  2124 04/30/21  0805 04/30/21  1214   POCGLU 82 190* 109 152*       · Hold glimepiride and metformin in favor of sliding scale insulin    CAD (coronary artery disease)  Assessment & Plan  · History of CABG 2008  No active chest pain     Persistent atrial fibrillation (HCC)  Assessment & Plan  · Atrial fibrillation with RVR was noted on admission may be secondary to decompensated CHF + UTI  · She was Given oral diltiazem  · Now better  · Continue patient's metoprolol 100 mg b i d  and have IV p r n  for tachycardia  · Continue eliquis for anticoagulation    * Acute metabolic encephalopathy  Assessment & Plan  · Recent admission for same secondary to UTI  Now may be secondary to UTI + decompensated CHF  · Has now improved AAOx4    · Continue to monitor, frequent reorientation    Asymmetric leg edema - Not completely sure how compliant she is with eliquis  Will check a duplex US  VTE Pharmacologic Prophylaxis:   Pharmacologic: Apixaban (Eliquis)  Mechanical VTE Prophylaxis in Place: Yes    Patient Centered Rounds: I have performed bedside rounds with nursing staff today  Discussions with Specialists or Other Care Team Provider: Discussed with Care Management Team     Education and Discussions with Family / Patient:  Patient    Time Spent for Care: 30 minutes  More than 50% of total time spent on counseling and coordination of care as described above  Current Length of Stay: 1 day(s)    Current Patient Status: Inpatient   Certification Statement: The patient will continue to require additional inpatient hospital stay due to Need for IV diuretics    Discharge Plan:  Once stable    Code Status: Level 3 - DNAR and DNI      Subjective:     Patient evaluated this morning  Still mentions some shortness of breath although states that this is slightly improved compared to when she came, currently diuresing well  Otherwise denies any chest pain nausea vomiting diarrhea constipation  Mentions some pain in the left lower extremity particularly in the calf area  Objective:     Vitals:   Temp (24hrs), Av 9 °F (36 6 °C), Min:97 2 °F (36 2 °C), Max:98 4 °F (36 9 °C)    Temp:  [97 2 °F (36 2 °C)-98 4 °F (36 9 °C)] 97 2 °F (36 2 °C)  HR:  [] 86  Resp:  [14-20] 17  BP: (123-166)/() 127/76  SpO2:  [95 %-100 %] 100 %  Body mass index is 35 35 kg/m²  Input and Output Summary (last 24 hours): Intake/Output Summary (Last 24 hours) at 2021 1441  Last data filed at 2021 1249  Gross per 24 hour   Intake 240 ml   Output 600 ml   Net -360 ml       Physical Exam:     Physical Exam  Vitals signs and nursing note reviewed  Constitutional:       Appearance: Normal appearance  She is normal weight        Comments: Elderly female in bed, awake   HENT:      Head: Normocephalic and atraumatic  Right Ear: External ear normal       Left Ear: External ear normal       Nose: Nose normal  No congestion  Mouth/Throat:      Mouth: Mucous membranes are moist       Pharynx: Oropharynx is clear  No oropharyngeal exudate or posterior oropharyngeal erythema  Eyes:      General: No scleral icterus  Right eye: No discharge  Left eye: No discharge  Extraocular Movements: Extraocular movements intact  Conjunctiva/sclera: Conjunctivae normal       Pupils: Pupils are equal, round, and reactive to light  Neck:      Musculoskeletal: Normal range of motion and neck supple  No neck rigidity or muscular tenderness  Cardiovascular:      Rate and Rhythm: Normal rate and regular rhythm  Pulses: Normal pulses  Heart sounds: Normal heart sounds  No murmur  No friction rub  No gallop  Pulmonary:      Effort: Pulmonary effort is normal  No respiratory distress  Breath sounds: No stridor  Rales present  No wheezing or rhonchi  Chest:      Chest wall: No tenderness  Abdominal:      General: Abdomen is flat  Bowel sounds are normal  There is no distension  Palpations: Abdomen is soft  There is no mass  Tenderness: There is no abdominal tenderness  There is no guarding or rebound  Musculoskeletal: Normal range of motion  General: No swelling, tenderness, deformity or signs of injury  Right lower leg: Edema present  Left lower leg: Edema present  Comments: Edema 2+/4+ RLE and 3+/4+ LLE   Skin:     General: Skin is warm and dry  Capillary Refill: Capillary refill takes less than 2 seconds  Coloration: Skin is not jaundiced or pale  Findings: No bruising, erythema, lesion or rash  Neurological:      General: No focal deficit present  Mental Status: She is alert and oriented to person, place, and time  Mental status is at baseline        Cranial Nerves: No cranial nerve deficit  Sensory: No sensory deficit  Motor: No weakness  Coordination: Coordination normal    Psychiatric:         Mood and Affect: Mood normal          Behavior: Behavior normal          Thought Content: Thought content normal          Judgment: Judgment normal            Additional Data:     Labs:    Results from last 7 days   Lab Units 04/30/21  0437 04/29/21  1404   WBC Thousand/uL 5 76 5 58   HEMOGLOBIN g/dL 9 9* 10 5*   HEMATOCRIT % 31 6* 33 2*   PLATELETS Thousands/uL 216 225   NEUTROS PCT %  --  57   LYMPHS PCT %  --  29   MONOS PCT %  --  9   EOS PCT %  --  4     Results from last 7 days   Lab Units 04/30/21  0437   SODIUM mmol/L 142   POTASSIUM mmol/L 3 7   CHLORIDE mmol/L 106   CO2 mmol/L 29   BUN mg/dL 15   CREATININE mg/dL 0 78   ANION GAP mmol/L 7   CALCIUM mg/dL 8 2*   ALBUMIN g/dL 2 8*   TOTAL BILIRUBIN mg/dL 0 31   ALK PHOS U/L 73   ALT U/L 19   AST U/L 21   GLUCOSE RANDOM mg/dL 99     Results from last 7 days   Lab Units 04/29/21  1404   INR  1 13     Results from last 7 days   Lab Units 04/30/21  1214 04/30/21  0805 04/29/21  2124 04/29/21  1838   POC GLUCOSE mg/dl 152* 109 190* 82         Results from last 7 days   Lab Units 04/30/21  0437 04/29/21  1404   LACTIC ACID mmol/L  --  0 9   PROCALCITONIN ng/ml <0 05 <0 05           * I Have Reviewed All Lab Data Listed Above  * Additional Pertinent Lab Tests Reviewed: All [facility] Admission Reviewed      Recent Cultures (last 7 days):     Results from last 7 days   Lab Units 04/29/21  1405 04/29/21  1404   BLOOD CULTURE  Received in Microbiology Lab  Culture in Progress  Received in Microbiology Lab  Culture in Progress         Last 24 Hours Medication List:   Current Facility-Administered Medications   Medication Dose Route Frequency Provider Last Rate    acetaminophen  650 mg Oral Q6H PRN Jameson Sand, DO      apixaban  5 mg Oral BID Jameson Sand, DO      camphor-menthol   Topical TID PRN Jameson Sand, DO  cefTRIAXone  1,000 mg Intravenous Q24H Ashly Jung, DO 1,000 mg (04/29/21 1845)    docusate sodium  100 mg Oral Daily Jason Solis, DO      fluticasone  1 spray Each Nare Daily Ashly Jung, DO      furosemide  40 mg Intravenous BID (diuretic) Ashly Jung, DO      insulin lispro  1-6 Units Subcutaneous 4x Daily (AC & HS) Ashly Jung, DO      losartan  25 mg Oral Daily Jason Solis, DO      magnesium hydroxide  30 mL Oral BID PRN Ashly Jung, DO      metoprolol  5 mg Intravenous Q6H PRN Ashly Jung, DO      metoprolol tartrate  100 mg Oral Q12H [facility] Jason Solis, DO      ondansetron  4 mg Intravenous Q4H PRN Ashly Jung, DO      pravastatin  20 mg Oral Daily Jason Solis, DO      primidone  50 mg Oral Q12H [facility] Jason Will, DO      sertraline  50 mg Oral Daily Ashly Jung,           Today, Patient Was Seen By: Venkata Duran MD    ** Please Note: Dictation voice to text software may have been used in the creation of this document   **

## 2021-04-30 NOTE — ASSESSMENT & PLAN NOTE
Lab Results   Component Value Date    HGBA1C 6 7 (H) 03/19/2021       Recent Labs     04/29/21  1838 04/29/21  2124 04/30/21  0805 04/30/21  1214   POCGLU 82 190* 109 152*       · Hold glimepiride and metformin in favor of sliding scale insulin

## 2021-04-30 NOTE — PLAN OF CARE
Problem: PHYSICAL THERAPY ADULT  Goal: Performs mobility at highest level of function for planned discharge setting  See evaluation for individualized goals  Description: Treatment/Interventions: Functional transfer training, LE strengthening/ROM, Therapeutic exercise, Endurance training, Patient/family training, Equipment eval/education, Bed mobility, Gait training, Spoke to nursing  Equipment Recommended: Walker(RW)       See flowsheet documentation for full assessment, interventions and recommendations  Note: Prognosis: Good  Problem List: Decreased strength, Decreased endurance, Impaired balance, Decreased mobility, Pain, Decreased cognition  Assessment: Pt is 81 y o  female seen for PT evaluation on 4/30/2021 s/p admit to [facility] on 9/68/9706 w/ Acute metabolic encephalopathy  PT consulted to assess pt's functional mobility and d/c needs  Order placed for PT eval and tx, w/ up as tolerated order  Performed at least 2 patient identifiers during session: Name and wristband  Comorbidities affecting pt's physical performance at time of assessment include:  has a past medical history of [facility] ), Benign essential tremor, CAD (coronary artery disease), CHF (congestive heart failure) ([facility] ), Chronic pulmonary thromboembolism syndrome ([facility] ) (4/2/2021), Diabetes mellitus ([facility] ), Frequent UTI, and Hypertension  PTA, pt was independent w/ all functional mobility w/ walker, ambulates community distances and elevations, ambulates household distances, has 2 MOSES and lives w/ daughter and family in 1 level home  Personal factors affecting pt at time of IE include: inaccessible home environment, ambulating w/ assistive device, inability to ambulate household distances, decreased cognition, positive fall history, decreased initiation and engagement and inability to perform IADLs   Please find objective findings from PT assessment regarding body systems outlined above with impairments and limitations including weakness, impaired balance, decreased endurance, gait deviations, decreased activity tolerance, fall risk and decreased cognition, as well as mobility assessment (need for cueing for mobility technique)  The following objective measures performed on IE also reveal limitations: Barthel Index: 40/100 and Modified [place]: 4 (moderate/severe disability)  Pt's clinical presentation is currently unstable/unpredictable seen in pt's presentation of abnormal lab value(s), need for input for task focus and mobility technique and ongoing medical assessment  Pt to benefit from continued PT tx to address deficits as defined above and maximize level of functional independent mobility and consistency  From PT/mobility standpoint, recommendation at time of d/c would be post acute rehabilitation services pending progress in order to facilitate return to PLOF  Barriers to Discharge: Inaccessible home environment, Decreased caregiver support        PT Discharge Recommendation: Post acute rehabilitation services     PT - OK to Discharge: Yes    See flowsheet documentation for full assessment

## 2021-04-30 NOTE — PLAN OF CARE
Problem: Potential for Falls  Goal: Patient will remain free of falls  Description: INTERVENTIONS:  - Assess patient frequently for physical needs  -  Identify cognitive and physical deficits and behaviors that affect risk of falls    -  [place] fall precautions as indicated by assessment   - Educate patient/family on patient safety including physical limitations  - Instruct patient to call for assistance with activity based on assessment  - Modify environment to reduce risk of injury  - Consider OT/PT consult to assist with strengthening/mobility  Outcome: Progressing     Problem: PAIN - ADULT  Goal: Verbalizes/displays adequate comfort level or baseline comfort level  Description: Interventions:  - Encourage patient to monitor pain and request assistance  - Assess pain using appropriate pain scale  - Administer analgesics based on type and severity of pain and evaluate response  - Implement non-pharmacological measures as appropriate and evaluate response  - Consider cultural and social influences on pain and pain management  - Notify physician/advanced practitioner if interventions unsuccessful or patient reports new pain  Outcome: Progressing     Problem: INFECTION - ADULT  Goal: Absence or prevention of progression during hospitalization  Description: INTERVENTIONS:  - Assess and monitor for signs and symptoms of infection  - Monitor lab/diagnostic results  - Monitor all insertion sites, i e  indwelling lines, tubes, and drains  - Monitor endotracheal if appropriate and nasal secretions for changes in amount and color  - [place] appropriate cooling/warming therapies per order  - Administer medications as ordered  - Instruct and encourage patient and family to use good hand hygiene technique  - Identify and instruct in appropriate isolation precautions for identified infection/condition  Outcome: Progressing  Goal: Absence of fever/infection during neutropenic period  Description: INTERVENTIONS:  - Monitor WBC    Outcome: Progressing     Problem: SAFETY ADULT  Goal: Patient will remain free of falls  Description: INTERVENTIONS:  - Assess patient frequently for physical needs  -  Identify cognitive and physical deficits and behaviors that affect risk of falls    -  [place] fall precautions as indicated by assessment   - Educate patient/family on patient safety including physical limitations  - Instruct patient to call for assistance with activity based on assessment  - Modify environment to reduce risk of injury  - Consider OT/PT consult to assist with strengthening/mobility  Outcome: Progressing  Goal: Maintain or return to baseline ADL function  Description: INTERVENTIONS:  -  Assess patient's ability to carry out ADLs; assess patient's baseline for ADL function and identify physical deficits which impact ability to perform ADLs (bathing, care of mouth/teeth, toileting, grooming, dressing, etc )  - Assess/evaluate cause of self-care deficits   - Assess range of motion  - Assess patient's mobility; develop plan if impaired  - Assess patient's need for assistive devices and provide as appropriate  - Encourage maximum independence but intervene and supervise when necessary  - Involve family in performance of ADLs  - Assess for home care needs following discharge   - Consider OT consult to assist with ADL evaluation and planning for discharge  - Provide patient education as appropriate  Outcome: Progressing  Goal: Maintain or return mobility status to optimal level  Description: INTERVENTIONS:  - Assess patient's baseline mobility status (ambulation, transfers, stairs, etc )    - Identify cognitive and physical deficits and behaviors that affect mobility  - Identify mobility aids required to assist with transfers and/or ambulation (gait belt, sit-to-stand, lift, walker, cane, etc )  - [place] fall precautions as indicated by assessment  - Record patient progress and toleration of activity level on Mobility SBAR; progress patient to next Phase/Stage  - Instruct patient to call for assistance with activity based on assessment  - Consider rehabilitation consult to assist with strengthening/weightbearing, etc   Outcome: Progressing     Problem: DISCHARGE PLANNING  Goal: Discharge to home or other facility with appropriate resources  Description: INTERVENTIONS:  - Identify barriers to discharge w/patient and caregiver  - Arrange for needed discharge resources and transportation as appropriate  - Identify discharge learning needs (meds, wound care, etc )  - Arrange for interpretive services to assist at discharge as needed  - Refer to Case Management Department for coordinating discharge planning if the patient needs post-hospital services based on physician/advanced practitioner order or complex needs related to functional status, cognitive ability, or social support system  Outcome: Progressing     Problem: Knowledge Deficit  Goal: Patient/family/caregiver demonstrates understanding of disease process, treatment plan, medications, and discharge instructions  Description: Complete learning assessment and assess knowledge base    Interventions:  - Provide teaching at level of understanding  - Provide teaching via preferred learning methods  Outcome: Progressing

## 2021-04-30 NOTE — ASSESSMENT & PLAN NOTE
· Recent admission for same secondary to UTI  Now may be secondary to UTI + decompensated CHF  · Has now improved AAOx4    · Continue to monitor, frequent reorientation

## 2021-04-30 NOTE — PROGRESS NOTES
Consultation - Cardiology   Marguerite Cunningham 81 y o  female MRN: [id number]  Unit/Bed#: -01 Encounter: [id number]  04/30/21  1:11 PM    Assessment/ Plan:  1  Acute on chronic HFrEF  - BNP 3,118  - chest x-ray - cardiomegaly, pulmonary edema  - EKG - atrial fibrillation, intraventricular conduction block, nonspecific ST/T-wave abnormality  - echo 12/20 - EF 40-45%, mild diffuse hypokinesis, mild TR  - weight on admission 190 lb > 181 lb this morning  - currently net -480 cc/24 hours  - continue Lasix 40 mg IV b i d  and losartan  - daily weights  - strict I&Os  - salt and fluid restriction    2  Persistent atrial fibrillation  - EKG as noted above  - telemetry review - atrial fibrillation, heart rates in the 100s  - continue lopressor 100 mg daily and eliquis 5mg BID  - continue to monitor on telemetry    3  CAD s/p CABG x4  - Sx performed at [facility] in 2008 per the patient  - continue lopressor, losartan, and pravastatin    4  Hypertension  - last documented /76; well controlled  - continue lasix, losartan, and lopressor    5  Type 2 diabetes  - last hemoglobin A1c 6 7  - management per primary team    6  Tachy-gale syndrome s/p PPM  - PPM placed in October 2019 at [facility]  - last device check 3/29 in office at [facility] - normal device function  - continue outpatient surveillance    History of Present Illness   Physician Requesting Consult: Angely Echavarria MD    Reason for Consult / Principal Problem:  Acute on chronic systolic heart failure    HPI: Marguerite Cunningham is a 81y o  year old female with past medical history significant for atrial fibrillation on Eliquis, CAD status post CABG x4 in 3680, chronic systolic heart failure, chronic pulmonary thromboembolism syndrome, type 2 diabetes, hypertension, tachy-gale syndrome status post permanent pacemaker placement in October of 2019, and recurring UTIs who presents to the ED via EMS after the granddaughter found patient on the floor    Per the daughter, whom the patient resides with, she states the patient has been more confused lately  Upon arrival to the ED, patient was noted to be in AFib with RVR, as well as decompensated heart failure  Of note, patient was recently hospitalized here on 04/19 for UTI for which she was discharged on cephalexin  Upon examination, patient is only alert to self and situation  She appears to be a poor historian due to confusion  She does state, however, that she may be short of breath," but denies chest pain, edema, palpitations, diaphoresis, lightheadedness, dizziness, or syncope  Patient is currently on room air, but notes that she does wear oxygen when she sleeps  Also states that her daughter helps her with her medications and that she is compliant, although there is documentation of medication noncompliance in the past     Inpatient consult to Cardiology  Consult performed by: JAMES Covarrubias  Consult ordered by: Trinidad Ramachandran DO      EKG:  Atrial fibrillation    Review of Systems   Constitutional: Negative for chills, diaphoresis and fatigue  HENT: Negative  Eyes: Negative  Respiratory: Positive for shortness of breath  Negative for cough, chest tightness and wheezing  Cardiovascular: Negative for chest pain, palpitations and leg swelling  Gastrointestinal: Negative  Endocrine: Negative  Genitourinary: Negative  Musculoskeletal: Negative  Skin: Negative  Allergic/Immunologic: Negative  Neurological: Negative for dizziness, speech difficulty, weakness and light-headedness  Hematological: Negative  Psychiatric/Behavioral: Positive for confusion         Historical Information   Past Medical History:   Diagnosis Date    A-fib [facility])     Benign essential tremor     CAD (coronary artery disease)     CHF (congestive heart failure) (HCC)     Chronic pulmonary thromboembolism syndrome ([facility] [address] ) 4/2/2021    [facility]     Frequent UTI     Hypertension      Past Surgical History:   Procedure Laterality Date    CARDIAC SURGERY      IR PICC PLACEMENT SINGLE LUMEN  11/7/2019     Social History     Substance and Sexual Activity   Alcohol Use Not Currently     Social History     Substance and Sexual Activity   Drug Use Never     Social History     Tobacco Use   Smoking Status Never Smoker   Smokeless Tobacco Never Used     Family History: History reviewed  No pertinent family history  Meds/Allergies   all current active meds have been reviewed  Allergies   Allergen Reactions    Clarithromycin Confusion and Other (See Comments)     Objective   Vitals: Blood pressure 127/76, pulse 86, temperature (!) 97 2 °F (36 2 °C), resp  rate 17, height 5' (1 524 m), weight 82 1 kg (181 lb), SpO2 100 %  , Body mass index is 35 35 kg/m² ,   Orthostatic Blood Pressures      Most Recent Value   Blood Pressure  127/76 filed at 04/30/2021 0758   Patient Position - Orthostatic VS  Sitting filed at 04/29/2021 0218        Systolic (96DEC), JOF:612 , Min:123 , WMR:900     Diastolic (40HWW), TQT:92, Min:68, Max:114      Intake/Output Summary (Last 24 hours) at 4/30/2021 1311  Last data filed at 4/30/2021 1249  Gross per 24 hour   Intake 120 ml   Output 600 ml   Net -480 ml     Invasive Devices     Peripheral Intravenous Line            Peripheral IV 04/29/21 Left Forearm less than 1 day    Peripheral IV 04/29/21 Right Hand less than 1 day                Physical Exam:  GEN: Alert and oriented to self and situation, in no acute distress  Well appearing and well nourished  HEENT: Sclera anicteric, conjunctivae pink, mucous membranes moist  Oropharynx clear  NECK: Supple, no carotid bruits, no significant JVD  Trachea midline, no thyromegaly  HEART: Iregular rhythm, normal S1 and S2, no murmurs, clicks, gallops or rubs  PMI nondisplaced, no thrills  LUNGS: diminished lung sounds in bases bilaterally; no wheezes, rales, or rhonchi   No increased work of breathing or signs of respiratory distress  ABDOMEN: Soft, nontender, nondistended, normoactive bowel sounds  EXTREMITIES: Skin warm and well perfused, no clubbing, cyanosis, +1 b/l leg edema  NEURO: No focal findings  Normal speech  Mood and affect normal, head tremor  SKIN: Normal without suspicious lesions on exposed skin      Lab Results:   Troponins:   Results from last 7 days   Lab Units 04/29/21  1404   TROPONIN I ng/mL <0 02     CBC with diff:   Results from last 7 days   Lab Units 04/30/21  0437 04/29/21  1404   WBC Thousand/uL 5 76 5 58   HEMOGLOBIN g/dL 9 9* 10 5*   HEMATOCRIT % 31 6* 33 2*   MCV fL 93 91   PLATELETS Thousands/uL 216 225   MCH pg 29 0 28 8   MCHC g/dL 31 3* 31 6   RDW % 13 8 13 8   MPV fL 10 4 9 8   NRBC AUTO /100 WBCs  --  0     CMP:   Results from last 7 days   Lab Units 04/30/21  0437 04/29/21  1404   POTASSIUM mmol/L 3 7 3 8   CHLORIDE mmol/L 106 106   CO2 mmol/L 29 28   BUN mg/dL 15 15   CREATININE mg/dL 0 78 0 83   CALCIUM mg/dL 8 2* 8 6   AST U/L 21 21   ALT U/L 19 21   ALK PHOS U/L 73 81   EGFR ml/min/1 73sq m 70 65

## 2021-04-30 NOTE — PLAN OF CARE
Problem: OCCUPATIONAL THERAPY ADULT  Goal: Performs self-care activities at highest level of function for planned discharge setting  See evaluation for individualized goals  Description: Treatment Interventions: ADL retraining, Functional transfer training, UE strengthening/ROM, Endurance training, Patient/family training, Equipment evaluation/education, Fine motor coordination activities, Compensatory technique education, Continued evaluation, Energy conservation, Activityengagement          See flowsheet documentation for full assessment, interventions and recommendations  Note: Limitation: Decreased ADL status, Decreased UE ROM, Decreased endurance, Decreased fine motor control, Decreased self-care trans, Decreased high-level ADLs, Decreased cognition(Decreased RUE proximal ROM)  Prognosis: Good  Assessment: Patient is an 80 y/o female who was admitted to [facility] on 4/29/2021 with symptoms including altered mental status and itching with a diagnosis of acute encephalopathy  PMH is significant for acute diastolic CHF, persistent atrial fibrillation, benign, essential tremor, UTI, DM, CAD, HTN, tachy-gale syndrome, dysphagia, chronic fatigue, COPD, obesity, and chronic pulmonary thromboembolism syndrome  Two patient identifiers were used to confirm patient ID  Orders placed for OT evaluation and treatment  Patient lives in a one level home with 2 MOSES without rails  Patient lives with her daughter and granddaughter and reports that she's primarily home alone while daughter is working  Patient reports independence in ADLs and is currently sponge bathing  Her daughter and granddaughter assist her with IADLs  Patient reports independence in functional mobility using RW, prior to admission  Currently, patient requires Min A for UB ADLs and Mod A for LB ADLs  Patient currently requires Min A x2 for functional mobility using a RW  Patient is alert and oriented to person and place only   Personal factors that impact functional performance include steps to enter, difficulty performing IADLs, difficulty performing ADLs, and a lack of environmental and social support  Occupational performance is affected by the following deficits: decreased RUE ROM, decreased cognition, decreased activity tolerance, decreased fine and gross motor control, decreased functional mobility, postural control and postural alignment deficit, requiring external assistance to complete transitional movements, and decreased static and dynamic sitting/standing balance  Patient to benefit from continued skilled OT services during hospital stay 3-5 x/week to address before mentioned deficits and improve functional performance in bathing, dressing, toileting, grooming, functional transfers, functional mobility, and leisure activities  From an OT standpoint, it is recommended that this patient be discharged with post acute rehabilitation services at this time, pending medical clearance        OT Discharge Recommendation: Post acute rehabilitation services  OT - OK to Discharge: Yes(pending medical clearance)

## 2021-04-30 NOTE — ASSESSMENT & PLAN NOTE
· Has hx of recurrent UTI recently discharged with cephalexin  · Now came with encephalopathy, now resolved, denies overt urinary symptoms  On ceftriaxone, continue for now, follow final results of cultures

## 2021-04-30 NOTE — ASSESSMENT & PLAN NOTE
Wt Readings from Last 3 Encounters:   04/30/21 82 1 kg (181 lb)   04/19/21 82 6 kg (182 lb)   03/19/21 82 6 kg (182 lb)     · Acute on chronic diastolic CHF  15 lb weight gain above baseline  · Continue IV furosemide  · Monitor intake and output  · Cardiology input appreciated

## 2021-04-30 NOTE — ASSESSMENT & PLAN NOTE
· Atrial fibrillation with RVR was noted on admission may be secondary to decompensated CHF + UTI  · She was Given oral diltiazem  · Now better    · Continue patient's metoprolol 100 mg b i d  and have IV p r n  for tachycardia  · Continue eliquis for anticoagulation

## 2021-05-01 LAB
ANION GAP SERPL CALCULATED.3IONS-SCNC: 8 MMOL/L (ref 4–13)
BASOPHILS # BLD AUTO: 0.05 THOUSANDS/ΜL (ref 0–0.1)
BASOPHILS NFR BLD AUTO: 1 % (ref 0–1)
BUN SERPL-MCNC: 17 MG/DL (ref 5–25)
CALCIUM SERPL-MCNC: 8.5 MG/DL (ref 8.3–10.1)
CHLORIDE SERPL-SCNC: 102 MMOL/L (ref 100–108)
CO2 SERPL-SCNC: 28 MMOL/L (ref 21–32)
CREAT SERPL-MCNC: 0.81 MG/DL (ref 0.6–1.3)
EOSINOPHIL # BLD AUTO: 0.33 THOUSAND/ΜL (ref 0–0.61)
EOSINOPHIL NFR BLD AUTO: 5 % (ref 0–6)
ERYTHROCYTE [DISTWIDTH] IN BLOOD BY AUTOMATED COUNT: 13.5 % (ref 11.6–15.1)
GFR SERPL CREATININE-BSD FRML MDRD: 67 ML/MIN/1.73SQ M
GLUCOSE SERPL-MCNC: 121 MG/DL (ref 65–140)
GLUCOSE SERPL-MCNC: 128 MG/DL (ref 65–140)
GLUCOSE SERPL-MCNC: 133 MG/DL (ref 65–140)
GLUCOSE SERPL-MCNC: 155 MG/DL (ref 65–140)
GLUCOSE SERPL-MCNC: 166 MG/DL (ref 65–140)
HCT VFR BLD AUTO: 32.8 % (ref 34.8–46.1)
HGB BLD-MCNC: 10.3 G/DL (ref 11.5–15.4)
IMM GRANULOCYTES # BLD AUTO: 0.03 THOUSAND/UL (ref 0–0.2)
IMM GRANULOCYTES NFR BLD AUTO: 1 % (ref 0–2)
LYMPHOCYTES # BLD AUTO: 1.84 THOUSANDS/ΜL (ref 0.6–4.47)
LYMPHOCYTES NFR BLD AUTO: 28 % (ref 14–44)
MCH RBC QN AUTO: 28.7 PG (ref 26.8–34.3)
MCHC RBC AUTO-ENTMCNC: 31.4 G/DL (ref 31.4–37.4)
MCV RBC AUTO: 91 FL (ref 82–98)
MONOCYTES # BLD AUTO: 0.54 THOUSAND/ΜL (ref 0.17–1.22)
MONOCYTES NFR BLD AUTO: 8 % (ref 4–12)
NEUTROPHILS # BLD AUTO: 3.83 THOUSANDS/ΜL (ref 1.85–7.62)
NEUTS SEG NFR BLD AUTO: 57 % (ref 43–75)
NRBC BLD AUTO-RTO: 0 /100 WBCS
PLATELET # BLD AUTO: 225 THOUSANDS/UL (ref 149–390)
PMV BLD AUTO: 9.6 FL (ref 8.9–12.7)
POTASSIUM SERPL-SCNC: 3.3 MMOL/L (ref 3.5–5.3)
RBC # BLD AUTO: 3.59 MILLION/UL (ref 3.81–5.12)
SODIUM SERPL-SCNC: 138 MMOL/L (ref 136–145)
WBC # BLD AUTO: 6.62 THOUSAND/UL (ref 4.31–10.16)

## 2021-05-01 PROCEDURE — 82948 REAGENT STRIP/BLOOD GLUCOSE: CPT

## 2021-05-01 PROCEDURE — 80048 BASIC METABOLIC PNL TOTAL CA: CPT | Performed by: INTERNAL MEDICINE

## 2021-05-01 PROCEDURE — 99232 SBSQ HOSP IP/OBS MODERATE 35: CPT | Performed by: INTERNAL MEDICINE

## 2021-05-01 PROCEDURE — 85025 COMPLETE CBC W/AUTO DIFF WBC: CPT | Performed by: INTERNAL MEDICINE

## 2021-05-01 RX ORDER — ALPRAZOLAM 0.25 MG/1
0.25 TABLET ORAL ONCE
Status: COMPLETED | OUTPATIENT
Start: 2021-05-01 | End: 2021-05-01

## 2021-05-01 RX ORDER — FUROSEMIDE 40 MG/1
40 TABLET ORAL
Status: DISCONTINUED | OUTPATIENT
Start: 2021-05-01 | End: 2021-05-03 | Stop reason: HOSPADM

## 2021-05-01 RX ORDER — POTASSIUM CHLORIDE 20 MEQ/1
40 TABLET, EXTENDED RELEASE ORAL ONCE
Status: COMPLETED | OUTPATIENT
Start: 2021-05-01 | End: 2021-05-01

## 2021-05-01 RX ORDER — METOPROLOL SUCCINATE 100 MG/1
100 TABLET, EXTENDED RELEASE ORAL 2 TIMES DAILY
Status: DISCONTINUED | OUTPATIENT
Start: 2021-05-01 | End: 2021-05-03 | Stop reason: HOSPADM

## 2021-05-01 RX ORDER — HYDROXYZINE HYDROCHLORIDE 25 MG/1
25 TABLET, FILM COATED ORAL EVERY 6 HOURS PRN
Status: DISCONTINUED | OUTPATIENT
Start: 2021-05-01 | End: 2021-05-02

## 2021-05-01 RX ORDER — POTASSIUM CHLORIDE 20 MEQ/1
20 TABLET, EXTENDED RELEASE ORAL 2 TIMES DAILY
Status: DISCONTINUED | OUTPATIENT
Start: 2021-05-01 | End: 2021-05-03 | Stop reason: HOSPADM

## 2021-05-01 RX ADMIN — METOPROLOL SUCCINATE 100 MG: 100 TABLET, EXTENDED RELEASE ORAL at 21:54

## 2021-05-01 RX ADMIN — HYDROXYZINE HYDROCHLORIDE 25 MG: 25 TABLET ORAL at 03:16

## 2021-05-01 RX ADMIN — PRIMIDONE 50 MG: 50 TABLET ORAL at 21:54

## 2021-05-01 RX ADMIN — SERTRALINE HYDROCHLORIDE 50 MG: 50 TABLET ORAL at 08:09

## 2021-05-01 RX ADMIN — APIXABAN 5 MG: 5 TABLET, FILM COATED ORAL at 08:09

## 2021-05-01 RX ADMIN — LOSARTAN POTASSIUM 25 MG: 25 TABLET, FILM COATED ORAL at 08:09

## 2021-05-01 RX ADMIN — APIXABAN 5 MG: 5 TABLET, FILM COATED ORAL at 18:23

## 2021-05-01 RX ADMIN — INSULIN LISPRO 1 UNITS: 100 INJECTION, SOLUTION INTRAVENOUS; SUBCUTANEOUS at 09:36

## 2021-05-01 RX ADMIN — METOPROLOL TARTRATE 100 MG: 50 TABLET, FILM COATED ORAL at 00:39

## 2021-05-01 RX ADMIN — FUROSEMIDE 40 MG: 40 TABLET ORAL at 18:24

## 2021-05-01 RX ADMIN — POTASSIUM CHLORIDE 20 MEQ: 1500 TABLET, EXTENDED RELEASE ORAL at 18:23

## 2021-05-01 RX ADMIN — INSULIN LISPRO 1 UNITS: 100 INJECTION, SOLUTION INTRAVENOUS; SUBCUTANEOUS at 18:23

## 2021-05-01 RX ADMIN — PRIMIDONE 50 MG: 50 TABLET ORAL at 08:09

## 2021-05-01 RX ADMIN — POTASSIUM CHLORIDE 40 MEQ: 1500 TABLET, EXTENDED RELEASE ORAL at 10:16

## 2021-05-01 RX ADMIN — POTASSIUM CHLORIDE 20 MEQ: 1500 TABLET, EXTENDED RELEASE ORAL at 12:19

## 2021-05-01 RX ADMIN — METOPROLOL TARTRATE 100 MG: 50 TABLET, FILM COATED ORAL at 08:09

## 2021-05-01 RX ADMIN — CEFTRIAXONE SODIUM 1000 MG: 10 INJECTION, POWDER, FOR SOLUTION INTRAVENOUS at 18:24

## 2021-05-01 RX ADMIN — PRAVASTATIN SODIUM 20 MG: 20 TABLET ORAL at 08:09

## 2021-05-01 RX ADMIN — PRIMIDONE 50 MG: 50 TABLET ORAL at 00:34

## 2021-05-01 RX ADMIN — DOCUSATE SODIUM 100 MG: 100 CAPSULE, LIQUID FILLED ORAL at 08:09

## 2021-05-01 RX ADMIN — INSULIN LISPRO 1 UNITS: 100 INJECTION, SOLUTION INTRAVENOUS; SUBCUTANEOUS at 00:33

## 2021-05-01 RX ADMIN — FUROSEMIDE 40 MG: 10 INJECTION, SOLUTION INTRAMUSCULAR; INTRAVENOUS at 08:09

## 2021-05-01 RX ADMIN — ALPRAZOLAM 0.25 MG: 0.25 TABLET ORAL at 03:45

## 2021-05-01 NOTE — PROGRESS NOTES
[address]  Progress Note Estefani Cueva 1935, 81 y o  female MRN: [id number]  Unit/Bed#: -01 Encounter: [id number]  Primary Care Provider: Rupesh Moon DO   Date and time admitted to hospital: 4/29/2021  1:24 PM    Acute diastolic CHF (congestive heart failure) [facility])  Assessment & Plan  Wt Readings from Last 3 Encounters:   04/30/21 82 1 kg (181 lb)   04/19/21 82 6 kg (182 lb)   03/19/21 82 6 kg (182 lb)     · Acute on chronic diastolic CHF  15 lb weight gain above baseline on presentation    So far responding well to IV diuresis  Continue IV furosemide  Monitor weights, intake and output    Benign essential tremor  Assessment & Plan  · Continue primidone, stable    Hypertension  Assessment & Plan  · Continue losartan and metoprolol    UTI (urinary tract infection)  Assessment & Plan  · Has hx of recurrent UTI recently discharged with cephalexin  · Now came with encephalopathy, now resolved, denies overt urinary symptoms  On ceftriaxone, continue for now, follow final results of cultures  DM (diabetes mellitus), type 2 [facility])  Assessment & Plan  Lab Results   Component Value Date    HGBA1C 6 7 (H) 03/19/2021       Recent Labs     04/30/21  1652 04/30/21  2122 05/01/21  0820 05/01/21  1132   POCGLU 89 168* 166* 128       · Hold glimepiride and metformin in favor of sliding scale insulin  · Monitor blood sugars    CAD (coronary artery disease)  Assessment & Plan  · History of CABG 2008  No active chest pain   · Continue metoprolol    Persistent atrial fibrillation (HCC)  Assessment & Plan  · Atrial fibrillation with RVR was noted on admission may be secondary to decompensated CHF + UTI  · She was Given oral diltiazem  · Now better  · Continue patient's metoprolol 100 mg b i d  and have IV p r n  for tachycardia  · Continue eliquis for anticoagulation    * Acute metabolic encephalopathy  Assessment & Plan  · Recent admission for same secondary to UTI    Now may be secondary to UTI + decompensated CHF  · This has completely improved, currently AAO x4     · Continue to monitor, frequent reorientation      VTE Pharmacologic Prophylaxis:   Pharmacologic: Apixaban (Eliquis)  Mechanical VTE Prophylaxis in Place: Yes    Patient Centered Rounds: I have performed bedside rounds with nursing staff today  Discussions with Specialists or Other Care Team Provider:  Discussed with care team    Education and Discussions with Family / Patient:   I have called the son he did not     Time Spent for Care: 30 minutes  More than 50% of total time spent on counseling and coordination of care as described above  Current Length of Stay: 2 day(s)    Current Patient Status: Inpatient   Certification Statement: The patient will continue to require additional inpatient hospital stay due to need for IV diuretics    Discharge Plan:  Once stable    Code Status: Level 3 - DNAR and DNI      Subjective:     Patient evaluated this morning  Currently mental status back to baseline  She is reading a book at bedside  She mentions the breathing is much better although not completely back to baseline yet  Denies nausea vomiting diarrhea or constipation  No other events reported    Objective:     Vitals:   Temp (24hrs), Av 6 °F (36 4 °C), Min:97 1 °F (36 2 °C), Max:98 °F (36 7 °C)    Temp:  [97 1 °F (36 2 °C)-98 °F (36 7 °C)] 98 °F (36 7 °C)  HR:  [67-86] 86  Resp:  [14-20] 18  BP: (108-124)/(65-72) 115/71  SpO2:  [96 %-98 %] 97 %  Body mass index is 35 35 kg/m²  Input and Output Summary (last 24 hours): Intake/Output Summary (Last 24 hours) at 2021 1348  Last data filed at 2021 1300  Gross per 24 hour   Intake 1020 ml   Output --   Net 1020 ml       Physical Exam:     Physical Exam  Vitals signs and nursing note reviewed  Constitutional:       Appearance: Normal appearance  She is normal weight        Comments: Elderly female in bed, awake   HENT:      Head: Normocephalic and atraumatic  Right Ear: External ear normal       Left Ear: External ear normal       Nose: Nose normal  No congestion  Mouth/Throat:      Mouth: Mucous membranes are moist       Pharynx: Oropharynx is clear  No oropharyngeal exudate or posterior oropharyngeal erythema  Eyes:      General: No scleral icterus  Right eye: No discharge  Left eye: No discharge  Extraocular Movements: Extraocular movements intact  Conjunctiva/sclera: Conjunctivae normal       Pupils: Pupils are equal, round, and reactive to light  Neck:      Musculoskeletal: Normal range of motion and neck supple  No neck rigidity or muscular tenderness  Cardiovascular:      Rate and Rhythm: Normal rate and regular rhythm  Pulses: Normal pulses  Heart sounds: Normal heart sounds  No murmur  No friction rub  No gallop  Pulmonary:      Effort: Pulmonary effort is normal  No respiratory distress  Breath sounds: No stridor  Rales present  No wheezing or rhonchi  Comments: Breathing sounds present bilaterally bilateral crackles  Chest:      Chest wall: No tenderness  Abdominal:      General: Abdomen is flat  Bowel sounds are normal  There is no distension  Palpations: Abdomen is soft  There is no mass  Tenderness: There is no abdominal tenderness  There is no guarding or rebound  Musculoskeletal: Normal range of motion  General: No swelling, tenderness, deformity or signs of injury  Right lower leg: Edema present  Left lower leg: Edema present  Skin:     General: Skin is warm and dry  Capillary Refill: Capillary refill takes less than 2 seconds  Coloration: Skin is not jaundiced or pale  Findings: No bruising, erythema, lesion or rash  Neurological:      General: No focal deficit present  Mental Status: She is alert and oriented to person, place, and time  Mental status is at baseline        Cranial Nerves: No cranial nerve deficit  Sensory: No sensory deficit  Motor: No weakness  Coordination: Coordination normal    Psychiatric:         Mood and Affect: Mood normal          Behavior: Behavior normal          Thought Content: Thought content normal          Judgment: Judgment normal            Additional Data:     Labs:    Results from last 7 days   Lab Units 05/01/21  0717   WBC Thousand/uL 6 62   HEMOGLOBIN g/dL 10 3*   HEMATOCRIT % 32 8*   PLATELETS Thousands/uL 225   NEUTROS PCT % 57   LYMPHS PCT % 28   MONOS PCT % 8   EOS PCT % 5     Results from last 7 days   Lab Units 05/01/21  0717 04/30/21  0437   SODIUM mmol/L 138 142   POTASSIUM mmol/L 3 3* 3 7   CHLORIDE mmol/L 102 106   CO2 mmol/L 28 29   BUN mg/dL 17 15   CREATININE mg/dL 0 81 0 78   ANION GAP mmol/L 8 7   CALCIUM mg/dL 8 5 8 2*   ALBUMIN g/dL  --  2 8*   TOTAL BILIRUBIN mg/dL  --  0 31   ALK PHOS U/L  --  73   ALT U/L  --  19   AST U/L  --  21   GLUCOSE RANDOM mg/dL 121 99     Results from last 7 days   Lab Units 04/29/21  1404   INR  1 13     Results from last 7 days   Lab Units 05/01/21  1132 05/01/21  0820 04/30/21  2122 04/30/21  1652 04/30/21  1214 04/30/21  0805 04/29/21  2124 04/29/21  1838   POC GLUCOSE mg/dl 128 166* 168* 89 152* 109 190* 82         Results from last 7 days   Lab Units 04/30/21  0437 04/29/21  1404   LACTIC ACID mmol/L  --  0 9   PROCALCITONIN ng/ml <0 05 <0 05           * I Have Reviewed All Lab Data Listed Above  * Additional Pertinent Lab Tests Reviewed: [place] Admission Reviewed        Recent Cultures (last 7 days):     Results from last 7 days   Lab Units 04/29/21  1601 04/29/21  1405 04/29/21  1404   BLOOD CULTURE   --  No Growth at 24 hrs  No Growth at 24 hrs     URINE CULTURE  10,000-19,000 cfu/ml   --   --        Last 24 Hours Medication List:   Current Facility-Administered Medications   Medication Dose Route Frequency Provider Last Rate    acetaminophen  650 mg Oral Q6H PRN Flynn Simon DO  apixaban  5 mg Oral BID Brain Elvia, DO      camphor-menthol   Topical TID PRN Brain Elvia, DO      cefTRIAXone  1,000 mg Intravenous Q24H Brain Elvia, DO 1,000 mg (04/30/21 1755)    docusate sodium  100 mg Oral Daily Jason Will, DO      fluticasone  1 spray Each Nare Daily Brain Elvia, DO      furosemide  40 mg Oral BID (diuretic) Mariann Gomez PA-C      hydrOXYzine HCL  25 mg Oral Q6H PRN JAMES Mccracken      insulin lispro  1-6 Units Subcutaneous 4x Daily (AC & HS) Brain Elvia, DO      losartan  25 mg Oral Daily Jason Solis, DO      magnesium hydroxide  30 mL Oral BID PRN Brain Elvia, DO      metoprolol  5 mg Intravenous Q6H PRN Brain Elvia, DO      metoprolol succinate  100 mg Oral BID Mariann Gomez PA-C      ondansetron  4 mg Intravenous Q4H PRN Brain Elvia, DO      potassium chloride  20 mEq Oral BID Mariann Gomez PA-C      pravastatin  20 mg Oral Daily Jason Solis, DO      primidone  50 mg Oral Q12H [facility] Jason Solis, DO      sertraline  50 mg Oral Daily Brain Barness, DO          Today, Patient Was Seen By: Ag Mary MD    ** Please Note: Dictation voice to text software may have been used in the creation of this document   **

## 2021-05-01 NOTE — PLAN OF CARE
Problem: Potential for Falls  Goal: Patient will remain free of falls  Description: INTERVENTIONS:  - Assess patient frequently for physical needs  -  Identify cognitive and physical deficits and behaviors that affect risk of falls    -  [place] fall precautions as indicated by assessment   - Educate patient/family on patient safety including physical limitations  - Instruct patient to call for assistance with activity based on assessment  - Modify environment to reduce risk of injury  - Consider OT/PT consult to assist with strengthening/mobility  Outcome: Progressing     Problem: PAIN - ADULT  Goal: Verbalizes/displays adequate comfort level or baseline comfort level  Description: Interventions:  - Encourage patient to monitor pain and request assistance  - Assess pain using appropriate pain scale  - Administer analgesics based on type and severity of pain and evaluate response  - Implement non-pharmacological measures as appropriate and evaluate response  - Consider cultural and social influences on pain and pain management  - Notify physician/advanced practitioner if interventions unsuccessful or patient reports new pain  Outcome: Progressing     Problem: INFECTION - ADULT  Goal: Absence or prevention of progression during hospitalization  Description: INTERVENTIONS:  - Assess and monitor for signs and symptoms of infection  - Monitor lab/diagnostic results  - Monitor all insertion sites, i e  indwelling lines, tubes, and drains  - Monitor endotracheal if appropriate and nasal secretions for changes in amount and color  - [place] appropriate cooling/warming therapies per order  - Administer medications as ordered  - Instruct and encourage patient and family to use good hand hygiene technique  - Identify and instruct in appropriate isolation precautions for identified infection/condition  Outcome: Progressing  Goal: Absence of fever/infection during neutropenic period  Description: INTERVENTIONS:  - Monitor WBC    Outcome: Progressing     Problem: SAFETY ADULT  Goal: Patient will remain free of falls  Description: INTERVENTIONS:  - Assess patient frequently for physical needs  -  Identify cognitive and physical deficits and behaviors that affect risk of falls    -  [place] fall precautions as indicated by assessment   - Educate patient/family on patient safety including physical limitations  - Instruct patient to call for assistance with activity based on assessment  - Modify environment to reduce risk of injury  - Consider OT/PT consult to assist with strengthening/mobility  Outcome: Progressing  Goal: Maintain or return to baseline ADL function  Description: INTERVENTIONS:  -  Assess patient's ability to carry out ADLs; assess patient's baseline for ADL function and identify physical deficits which impact ability to perform ADLs (bathing, care of mouth/teeth, toileting, grooming, dressing, etc )  - Assess/evaluate cause of self-care deficits   - Assess range of motion  - Assess patient's mobility; develop plan if impaired  - Assess patient's need for assistive devices and provide as appropriate  - Encourage maximum independence but intervene and supervise when necessary  - Involve family in performance of ADLs  - Assess for home care needs following discharge   - Consider OT consult to assist with ADL evaluation and planning for discharge  - Provide patient education as appropriate  Outcome: Progressing  Goal: Maintain or return mobility status to optimal level  Description: INTERVENTIONS:  - Assess patient's baseline mobility status (ambulation, transfers, stairs, etc )    - Identify cognitive and physical deficits and behaviors that affect mobility  - Identify mobility aids required to assist with transfers and/or ambulation (gait belt, sit-to-stand, lift, walker, cane, etc )  - [place] fall precautions as indicated by assessment  - Record patient progress and toleration of activity level on Mobility SBAR; progress patient to next Phase/Stage  - Instruct patient to call for assistance with activity based on assessment  - Consider rehabilitation consult to assist with strengthening/weightbearing, etc   Outcome: Progressing     Problem: DISCHARGE PLANNING  Goal: Discharge to home or other facility with appropriate resources  Description: INTERVENTIONS:  - Identify barriers to discharge w/patient and caregiver  - Arrange for needed discharge resources and transportation as appropriate  - Identify discharge learning needs (meds, wound care, etc )  - Arrange for interpretive services to assist at discharge as needed  - Refer to Case Management Department for coordinating discharge planning if the patient needs post-hospital services based on physician/advanced practitioner order or complex needs related to functional status, cognitive ability, or social support system  Outcome: Progressing     Problem: Knowledge Deficit  Goal: Patient/family/caregiver demonstrates understanding of disease process, treatment plan, medications, and discharge instructions  Description: Complete learning assessment and assess knowledge base    Interventions:  - Provide teaching at level of understanding  - Provide teaching via preferred learning methods  Outcome: Progressing     Problem: Prexisting or High Potential for Compromised Skin Integrity  Goal: Skin integrity is maintained or improved  Description: INTERVENTIONS:  - Identify patients at risk for skin breakdown  - Assess and monitor skin integrity  - Assess and monitor nutrition and hydration status  - Monitor labs   - Assess for incontinence   - Turn and reposition patient  - Assist with mobility/ambulation  - Relieve pressure over bony prominences  - Avoid friction and shearing  - Provide appropriate hygiene as needed including keeping skin clean and dry  - Evaluate need for skin moisturizer/barrier cream  - Collaborate with interdisciplinary team   - Patient/family teaching  - Consider wound care consult   Outcome: Progressing     Problem: Nutrition/Hydration-ADULT  Goal: Nutrient/Hydration intake appropriate for improving, restoring or maintaining nutritional needs  Description: Monitor and assess patient's nutrition/hydration status for malnutrition  Collaborate with interdisciplinary team and initiate plan and interventions as ordered  Monitor patient's weight and dietary intake as ordered or per policy  Utilize nutrition screening tool and intervene as necessary  Determine patient's food preferences and provide high-protein, high-caloric foods as appropriate       INTERVENTIONS:  - Monitor oral intake, urinary output, labs, and treatment plans  - Assess nutrition and hydration status and recommend course of action  - Evaluate amount of meals eaten  - Assist patient with eating if necessary   - Allow adequate time for meals  - Recommend/ encourage appropriate diets, oral nutritional supplements, and vitamin/mineral supplements  - Order, calculate, and assess calorie counts as needed  - Recommend, monitor, and adjust tube feedings and TPN/PPN based on assessed needs  - Assess need for intravenous fluids  - Provide specific nutrition/hydration education as appropriate  - Include patient/family/caregiver in decisions related to nutrition  Outcome: Progressing

## 2021-05-01 NOTE — ASSESSMENT & PLAN NOTE
· Recent admission for same secondary to UTI    Now may be secondary to UTI + decompensated CHF  · This has completely improved, currently AAO x4     · Continue to monitor, frequent reorientation

## 2021-05-01 NOTE — ASSESSMENT & PLAN NOTE
Lab Results   Component Value Date    HGBA1C 6 7 (H) 03/19/2021       Recent Labs     04/30/21  1652 04/30/21  2122 05/01/21  0820 05/01/21  1132   POCGLU 89 168* 166* 128       · Hold glimepiride and metformin in favor of sliding scale insulin  · Monitor blood sugars

## 2021-05-01 NOTE — PLAN OF CARE
Problem: Potential for Falls  Goal: Patient will remain free of falls  Description: INTERVENTIONS:  - Assess patient frequently for physical needs  -  Identify cognitive and physical deficits and behaviors that affect risk of falls    -  [place] fall precautions as indicated by assessment   - Educate patient/family on patient safety including physical limitations  - Instruct patient to call for assistance with activity based on assessment  - Modify environment to reduce risk of injury  - Consider OT/PT consult to assist with strengthening/mobility  Outcome: Progressing     Problem: PAIN - ADULT  Goal: Verbalizes/displays adequate comfort level or baseline comfort level  Description: Interventions:  - Encourage patient to monitor pain and request assistance  - Assess pain using appropriate pain scale  - Administer analgesics based on type and severity of pain and evaluate response  - Implement non-pharmacological measures as appropriate and evaluate response  - Consider cultural and social influences on pain and pain management  - Notify physician/advanced practitioner if interventions unsuccessful or patient reports new pain  Outcome: Progressing     Problem: INFECTION - ADULT  Goal: Absence or prevention of progression during hospitalization  Description: INTERVENTIONS:  - Assess and monitor for signs and symptoms of infection  - Monitor lab/diagnostic results  -- [place] appropriate cooling/warming therapies per order  - Administer medications as ordered  - Instruct and encourage patient and family to use good hand hygiene technique  - Identify and instruct in appropriate isolation precautions for identified infection/condition  Outcome: Progressing

## 2021-05-01 NOTE — PROGRESS NOTES
Progress Note - Cardiology     Moffett Frames 81 y o  female MRN: [id number]  Unit/Bed#: -01 Encounter: [id number]      Assessment and Plan:  1  Acute on chronic HFrEF  - volume status improved   - continue Lasix, potassium, BB, ACE-I  - daily weights  Strict I&Os  Salt and fluid restriction    2  Ischemic Cardiomyopathy  - echo 12/20 - EF 40-45%, mild diffuse hypokinesis, mild TR  - continue ACE-inhibitor and beta-blocker    3  CAD s/p CABG x4 in 2008  - CABG at [facility] in 2008 per the patient  - continue lopressor, losartan, and pravastatin     -Not on aspirin due to high bleeding risk along with Eliquis     4  Persistent atrial fibrillation  - continue BB for rate control and eliquis for [facility]  - continue to monitor on telemetry     5  Hypertension  -BP stable  - continue lasix, losartan  Lopressor changed to Toprol XL due to CMP     6  Mixed hyperlipidemia  - continue statin and diet control     7  Tachy-gale syndrome s/p PPM  - PPM placed in October 2019 at [facility]  - last device check 3/29 in office at [facility] - normal device function  - continue outpatient surveillance    Doing well cardiac wise  Stable to be discharged from cardiac standpoint  Will follow up in outpatient Cardiology Clinic      Subjective:   SOB resolved  Patient denies chest pain, palpitations, lightheadedness        REVIEW OF SYSTEMS:  Constitutional: Denies fever or chills  Eyes: Denies eye discharge or change in visual acuity   HENT: Denies sneezing, nasal congestion or sore throat   Respiratory: Denies cough, expectoration or shortness of breath   Cardiovascular: Denies chest pain, palpitations or lower extremity swelling   GI: Denies abdominal pain, nausea, vomiting, bloody stools or diarrhea   : Denies dysuria, frequency, difficulty in micturition or nocturia  Musculoskeletal: Denies back pain or joint pain   Neurologic: Denies lightheadedness, syncope, headache, focal weakness or sensory changes   Endocrine: Denies polyuria or polydipsia   Lymphatic: Denies swollen glands   Psychiatric: Denies depression, anxiety or panic       Objective:   Vitals:  Vitals:    05/01/21 1134   BP: 115/71   Pulse: 86   Resp: 18   Temp: 98   SpO2: 97%       Body mass index is 35 35 kg/m²  Systolic (28CLL), MCP:002 , Min:108 , LRP:081     Diastolic (08EVS), TMW:86, Min:65, Max:72      Intake/Output Summary (Last 24 hours) at 5/1/2021 1243  Last data filed at 5/1/2021 0701  Gross per 24 hour   Intake 900 ml   Output --   Net 900 ml     Weight (last 2 days)     Date/Time   Weight    04/30/21 1156   82 1 (181)    04/30/21 0600   82 1 (181)    04/29/21 1804   86 2 (190 04)    04/29/21 1327   89 (196 21)              PHYSICAL EXAM:  General: Patient is not in acute distress  Awake, alert, responding to commands  Head: Normocephalic  Atraumatic  HEENT: Both pupils normal sized, round and reactive to light  Nonicteric  Neck: JVP not raised  Trachea central  No thyromegaly  Respiratory: Bilateral bronchovascular breath sounds with no crackles or rhonchi  Cardiovascular: RRR  S1 and S2 normal  No murmur, rub or gallop  GI: Abdomen soft, nontender  No guarding or rigidity  Liver and spleen not palpable  Bowel sounds present  Neurologic: Patient is awake, alert, responding to command   Moving all extremities  Integumentary:  No skin rash  Lymphatic: No cervical lymphadenopathy  Extremities: No clubbing, cyanosis or edema      LABORATORY RESULTS:  Results from last 7 days   Lab Units 04/29/21  1404   TROPONIN I ng/mL <0 02     CBC with diff:   Results from last 7 days   Lab Units 05/01/21  0717 04/30/21  0437 04/29/21  1404   WBC Thousand/uL 6 62 5 76 5 58   HEMOGLOBIN g/dL 10 3* 9 9* 10 5*   HEMATOCRIT % 32 8* 31 6* 33 2*   MCV fL 91 93 91   PLATELETS Thousands/uL 225 216 225   MCH pg 28 7 29 0 28 8   MCHC g/dL 31 4 31 3* 31 6   RDW % 13 5 13 8 13 8   MPV fL 9 6 10 4 9 8   NRBC AUTO /100 WBCs 0  --  0       CMP:  Results from last 7 days   Lab Units 21  0717 21  0437 21  1404   POTASSIUM mmol/L 3 3* 3 7 3 8   CHLORIDE mmol/L 102 106 106   CO2 mmol/L 28 29 28   BUN mg/dL 17 15 15   CREATININE mg/dL 0 81 0 78 0 83   CALCIUM mg/dL 8 5 8 2* 8 6   AST U/L  --  21 21   ALT U/L  --  19 21   ALK PHOS U/L  --  73 81   EGFR ml/min/1 73sq m 67 70 65       BMP:  Results from last 7 days   Lab Units 21  0717 21  0437 21  1404   POTASSIUM mmol/L 3 3* 3 7 3 8   CHLORIDE mmol/L 102 106 106   CO2 mmol/L 28 29 28   BUN mg/dL 17 15 15   CREATININE mg/dL 0 81 0 78 0 83   CALCIUM mg/dL 8 5 8 2* 8 6       Results from last 7 days   Lab Units 21  1404   NT-PRO BNP pg/mL 3,118*        Results from last 7 days   Lab Units 21  1404   MAGNESIUM mg/dL 2 1             Results from last 7 days   Lab Units 21  1404   INR  1 13       Cardiac testing:   Results for orders placed during the hospital encounter of 20   Echo complete with contrast if indicated    Narrative [address] [place] [id number] [phone]    Transthoracic Echocardiogram  2D, M-mode, Doppler, and Color Doppler    Study date:  21-Dec-2020    Patient: Kusum Paredes  MR number: [id number]  Account number: [de-identified]  : [id number]  Age: 81 years  Gender: Female  Status: Inpatient  Location: Bedside  Height: 60 in  Weight: 175 lb  BP: 127/ 67 mmHg    Indications: Assess left ventricular function  Diagnoses: I42 9 - Cardiomyopathy, unspecified    Sonographer:  Radha Kim RDCS  Referring Physician:  JAMES Melgar  Group:  [facility]  Interpreting Physician:  Damaris Lozada MD    SUMMARY    LEFT VENTRICLE:  Systolic function was mildly reduced  LVEF estimated 40-45%  There was mild diffuse hypokinesis  Wall thickness was at the upper limits of normal     LEFT ATRIUM:  The atrium was mildly dilated  RIGHT ATRIUM:  The atrium was mildly dilated      MITRAL VALVE:  There was mild annular calcification  There was moderate diffuse thickening  TRICUSPID VALVE:  There was mild regurgitation  PULMONIC VALVE:  There was trace regurgitation  HISTORY: PRIOR HISTORY: CHF, hypertension, DM, AFIB, tachy-gale syndrome, CAD, CP    PROCEDURE: The procedure was performed at the bedside  This was a routine study  The transthoracic approach was used  The study included complete 2D imaging, M-mode, complete spectral Doppler, and color Doppler  The heart rate was 93 bpm,  at the start of the study  Images were obtained from the parasternal, apical, subcostal, and suprasternal notch acoustic windows  Image quality was adequate  LEFT VENTRICLE: Size was normal  Systolic function was mildly reduced  LVEF estimated 40-45% There was mild diffuse hypokinesis  Wall thickness was at the upper limits of normal  DOPPLER: Transmitral flow pattern: atrial fibrillation  RIGHT VENTRICLE: The size was normal  Systolic function was normal     LEFT ATRIUM: The atrium was mildly dilated  RIGHT ATRIUM: The atrium was mildly dilated  MITRAL VALVE: There was mild annular calcification  There was moderate diffuse thickening  AORTIC VALVE: The valve was trileaflet  DOPPLER: There was no evidence for stenosis  There was no regurgitation  TRICUSPID VALVE: DOPPLER: There was mild regurgitation  PULMONIC VALVE: Not well visualized  DOPPLER: There was trace regurgitation  PERICARDIUM: There was no pericardial effusion  AORTA: The root exhibited normal size  SYSTEMIC VEINS: IVC: The inferior vena cava was not well visualized      SYSTEM MEASUREMENT TABLES    2D mode  AoR Diam (2D): 31 mm  AoR Diam; Mean (2D): 31 mm  LA Dimension (2D): 43 mm  LA Dimension; Mean (2D): 43 mm  LA/Ao (2D): 1 4  EDV (2D-Cubed): 59556 mm3  EDV (BP): 752292 mm3  EF (2D-Cubed): 55 3 %  ESV (2D-Cubed): 24228 mm3  ESV (BP): 48574 mm3  FS (2D-Cubed): 23 5 %  FS (2D-Teich): 23 5 %  IVS/LVPW (2D): 0 9  IVSd (2D): 10 3 mm  IVSd; Mean chosen (2D): 10 3 mm  LVIDd (2D): 46 3 mm  LVIDd; Mean (2D): 46 3 mm  LVIDs (2D): 35 4 mm  LVIDs; Mean (2D): 35 4 mm  LVPWd (2D): 11 mm  LVPWd; Mean (2D): 11 mm  Left Ventricular Ejection Fraction; Method of Disks, Biplane; 2D mode;: 46 3 %  Left Ventricular Ejection Fraction; Teichholz; 2D mode;: 47 1 %  Left Ventricular End Diastolic Volume; Teichholz; 2D mode;: 69297 mm3  Left Ventricular End Systolic Volume; Teichholz; 2D mode;: 94594 mm3  SV (2D-Cubed): 92011 mm3  SV (BP): 72534 mm3  Stroke Volume; Teichholz; 2D mode;: 19549 mm3    Apical four chamber  EF (A4C): 50 2 %  LV MOD Diam; Recent value; End Diastole (A4C): 57 7 mm  LV MOD Diam; Recent value; End Diastole (A4C): 58 6 mm  LV MOD Diam; Recent value; End Diastole (A4C): 58 3 mm  LV MOD Diam; Recent value; End Diastole (A4C): 58 mm  LV MOD Diam; Recent value; End Diastole (A4C): 57 1 mm  LV MOD Diam; Recent value; End Diastole (A4C): 55 8 mm  LV MOD Diam; Recent value; End Diastole (A4C): 53 8 mm  LV MOD Diam; Recent value; End Diastole (A4C): 52 2 mm  LV MOD Diam; Recent value; End Diastole (A4C): 50 9 mm  LV MOD Diam; Recent value; End Diastole (A4C): 50 mm  LV MOD Diam; Recent value; End Diastole (A4C): 48 1 mm  LV MOD Diam; Recent value; End Diastole (A4C): 45 7 mm  LV MOD Diam; Recent value; End Diastole (A4C): 42 6 mm  LV MOD Diam; Recent value; End Diastole (A4C): 39 3 mm  LV MOD Diam; Recent value; End Diastole (A4C): 35 mm  LV MOD Diam; Recent value; End Diastole (A4C): 28 6 mm  LV MOD Diam; Recent value; End Diastole (A4C): 20 9 mm  LV MOD Diam; Recent value; End Diastole (A4C): 27 3 mm  LV MOD Diam; Recent value; End Diastole (A4C): 52 2 mm  LV MOD Diam; Recent value; End Diastole (A4C): 56 4 mm  LV MOD Diam; Recent value; End Systole (A4C): 35 1 mm  LV MOD Diam; Recent value; End Systole (A4C): 33 3 mm  LV MOD Diam; Recent value; End Systole (A4C): 31 5 mm  LV MOD Diam; Recent value;  End Systole (A4C): 28 4 mm  LV MOD Diam; Recent value; End Systole (A4C): 25 7 mm  LV MOD Diam; Recent value; End Systole (A4C): 22 1 mm  LV MOD Diam; Recent value; End Systole (A4C): 17 7 mm  LV MOD Diam; Recent value; End Systole (A4C): 12 8 mm  LV MOD Diam; Recent value; End Systole (A4C): 24 2 mm  LV MOD Diam; Recent value; End Systole (A4C): 45 3 mm  LV MOD Diam; Recent value; End Systole (A4C): 45 9 mm  LV MOD Diam; Recent value; End Systole (A4C): 46 5 mm  LV MOD Diam; Recent value; End Systole (A4C): 46 2 mm  LV MOD Diam; Recent value; End Systole (A4C): 45 3 mm  LV MOD Diam; Recent value; End Systole (A4C): 44 4 mm  LV MOD Diam; Recent value; End Systole (A4C): 41 6 mm  LV MOD Diam; Recent value; End Systole (A4C): 39 8 mm  LV MOD Diam; Recent value; End Systole (A4C): 38 6 mm  LV MOD Diam; Recent value; End Systole (A4C): 37 6 mm  LV MOD Diam; Recent value; End Systole (A4C): 36 4 mm  Left Ventricle diastolic major axis; Most recent value chosen; Method of Disks, Single Plane; 2D mode; Apical four chamber;: 79 8 mm  Left Ventricle systolic major axis; Most recent value chosen; Method of Disks, Single Plane; 2D mode; Apical four chamber;: 71 6 mm  Left Ventricular Diastolic Area; Most recent value chosen; Method of Disks, Single Plane; 2D mode; Apical four chamber;: 3820 mm2  Left Ventricular End Diastolic Volume; Most recent value chosen; Method of Disks, Single Plane; 2D mode; Apical four chamber;: 037678 mm3  Left Ventricular End Systolic Volume; Most recent value chosen; Method of Disks, Single Plane; 2D mode; Apical four chamber;: 19998 mm3  Left Ventricular Systolic Area; Most recent value chosen; Method of Disks, Single Plane; 2D mode; Apical four chamber;: 2510 mm2  SV (A4C): 65624 mm3  Right Ventricle Basal Diameter; 2D mode; Apical four chamber;: 35 mm  Right Ventricle Basal Diameter; Mean; Mean value chosen; 2D mode; Apical four chamber;: 35 mm    Apical two chamber  EF (A2C): 45 6 %  LV MOD Diam; Recent value;  End Diastole (A2C): 46 3 mm  LV MOD Diam; Recent value; End Diastole (A2C): 48 8 mm  LV MOD Diam; Recent value; End Diastole (A2C): 51 2 mm  LV MOD Diam; Recent value; End Diastole (A2C): 53 6 mm  LV MOD Diam; Recent value; End Diastole (A2C): 56 mm  LV MOD Diam; Recent value; End Diastole (A2C): 58 4 mm  LV MOD Diam; Recent value; End Diastole (A2C): 60 5 mm  LV MOD Diam; Recent value; End Diastole (A2C): 60 8 mm  LV MOD Diam; Recent value; End Diastole (A2C): 60 5 mm  LV MOD Diam; Recent value; End Diastole (A2C): 59 9 mm  LV MOD Diam; Recent value; End Diastole (A2C): 43 9 mm  LV MOD Diam; Recent value; End Diastole (A2C): 41 8 mm  LV MOD Diam; Recent value; End Diastole (A2C): 39 mm  LV MOD Diam; Recent value; End Diastole (A2C): 57 8 mm  LV MOD Diam; Recent value; End Diastole (A2C): 30 6 mm  LV MOD Diam; Recent value; End Diastole (A2C): 24 5 mm  LV MOD Diam; Recent value; End Diastole (A2C): 16 7 mm  LV MOD Diam; Recent value; End Diastole (A2C): 54 5 mm  LV MOD Diam; Recent value; End Diastole (A2C): 27 8 mm  LV MOD Diam; Recent value; End Diastole (A2C): 35 4 mm  LV MOD Diam; Recent value; End Systole (A2C): 10 mm  LV MOD Diam; Recent value; End Systole (A2C): 23 3 mm  LV MOD Diam; Recent value; End Systole (A2C): 47 1 mm  LV MOD Diam; Recent value; End Systole (A2C): 50 1 mm  LV MOD Diam; Recent value; End Systole (A2C): 51 6 mm  LV MOD Diam; Recent value; End Systole (A2C): 51 9 mm  LV MOD Diam; Recent value; End Systole (A2C): 51 3 mm  LV MOD Diam; Recent value; End Systole (A2C): 49 8 mm  LV MOD Diam; Recent value; End Systole (A2C): 48 mm  LV MOD Diam; Recent value; End Systole (A2C): 46 2 mm  LV MOD Diam; Recent value; End Systole (A2C): 43 5 mm  LV MOD Diam; Recent value; End Systole (A2C): 39 9 mm  LV MOD Diam; Recent value; End Systole (A2C): 36 8 mm  LV MOD Diam; Recent value; End Systole (A2C): 33 8 mm  LV MOD Diam; Recent value; End Systole (A2C): 31 4 mm  LV MOD Diam; Recent value;  End Systole (A2C): 15 1 mm  LV MOD Diam; Recent value; End Systole (A2C): 20 5 mm  LV MOD Diam; Recent value; End Systole (A2C): 24 8 mm  LV MOD Diam; Recent value; End Systole (A2C): 27 5 mm  LV MOD Diam; Recent value; End Systole (A2C): 29 6 mm  Left Ventricle diastolic major axis; Most recent value chosen; Method of Disks, Single Plane; 2D mode; Apical two chamber;: 76 6 mm  Left Ventricle systolic major axis; Most recent value chosen; Method of Disks, Single Plane; 2D mode; Apical two chamber;: 64 3 mm  Left Ventricular Diastolic Area; Most recent value chosen; Method of Disks, Single Plane; 2D mode; Apical two chamber;: 3570 mm2  Left Ventricular End Diastolic Volume; Most recent value chosen; Method of Disks, Single Plane; 2D mode; Apical two chamber;: 264196 mm3  Left Ventricular End Systolic Volume; Most recent value chosen; Method of Disks, Single Plane; 2D mode; Apical two chamber;: 52827 mm3  Left Ventricular Systolic Area; Most recent value chosen; Method of Disks, Single Plane; 2D mode; Apical two chamber;: 2370 mm2  SV (A2C): 51951 mm3    M mode  Tricuspid Annular Plane Systolic Excursion; Mean; Mean value chosen; Tricuspid Annulus; M mode;: 10 7 mm  Tricuspid Annular Plane Systolic Excursion; Tricuspid Annulus; M mode;: 10 7 mm    Unspecified Scan Mode  Peak Grad; Mean; Regurgitant Flow: 20 mm[Hg]  Vmax; Mean; Regurgitant Flow: 2250 mm/s  Vmax; Regurgitant Flow: 2460 mm/s  Vmax; Regurgitant Flow: 2100 mm/s  Vmax; Regurgitant Flow: 2110 mm/s  Vmax; Regurgitant Flow: 2310 mm/s    Inters\[facility]\"" Commission Accredited Echocardiography Laboratory    Prepared and electronically signed by    John Melendrez MD  Signed [id number] 07:56:14           Imaging: I have personally reviewed pertinent reports  Procedure: Xr Chest 2 Views    Result Date: 4/29/2021  Narrative: CHEST INDICATION:   tachycardia   COMPARISON:  4/19/2021; 12/19/2020 EXAM PERFORMED/VIEWS:  XR CHEST PA & LATERAL FINDINGS:  Left-sided chest wall intracardiac device is identified  Leads are intact  Sternotomy wires and mediastinal clips are noted, compatible with prior CABG  Heart shadow is enlarged but unchanged from prior exam  Atherosclerotic calcifications noted  Emphysematous changes are noted consistent with chronic obstructive pulmonary disease  No airspace opacity to suggest focal pneumonia  No pneumothorax or pleural effusion  Right axillary clips redemonstrated  Osseous structures stable On the lateral view there are surgical clips projecting over the upper abdomen  Impression: 1  Cardiomegaly redemonstrated 2  Emphysema Workstation performed: [id number]     Procedure: Vas Lower Limb Venous Duplex Study, Unilateral/limited    Result Date: 5/1/2021  Narrative:  [facility] REPORT CLINICAL: Indications: Patient presents with left lower extremity edema x few days  Operative History: CABG with left saphenous harvest 2007 Lumpectomy 2012 Risk Factors The patient has history of HTN, Diabetes (NIDDM (oral meds)) and CAD  She has no history of DVT  FINDINGS:  Segment  Right            Left              Impression       Impression       CFV      Normal (Patent)  Normal (Patent)     CONCLUSION:  Impression:  RIGHT LOWER LIMB LIMITED: Evaluation shows no evidence of thrombus in the common femoral vein  Doppler evaluation shows a normal response to augmentation maneuvers  LEFT LOWER LIMB: No evidence of gross acute deep vein thrombosis No evidence of superficial thrombophlebitis noted  Doppler evaluation shows a normal response to augmentation maneuvers  Popliteal, posterior tibial and anterior tibial arterial Doppler waveforms are biphasic  Technically limited study due to pitting edema and poor visualization of calf veins    SIGNATURE: Electronically Signed by: Jessica Deng on 2021-05-01 08:14:42 AM      Meds/Allergies   current meds:   Current Facility-Administered Medications   Medication Dose Route Frequency    acetaminophen (TYLENOL) tablet 650 mg  650 mg Oral Q6H PRN    apixaban (ELIQUIS) tablet 5 mg  5 mg Oral BID    camphor-menthol (SARNA) 0 5-0 5 % lotion   Topical TID PRN    ceftriaxone (ROCEPHIN) 1 g/50 mL in dextrose IVPB  1,000 mg Intravenous Q24H    docusate sodium (COLACE) capsule 100 mg  100 mg Oral Daily    fluticasone (FLONASE) 50 mcg/act nasal spray 1 spray  1 spray Each Nare Daily    furosemide (LASIX) tablet 40 mg  40 mg Oral BID (diuretic)    hydrOXYzine HCL (ATARAX) tablet 25 mg  25 mg Oral Q6H PRN    insulin lispro (HumaLOG) 100 units/mL subcutaneous injection 1-6 Units  1-6 Units Subcutaneous 4x Daily (AC & HS)    losartan (COZAAR) tablet 25 mg  25 mg Oral Daily    magnesium hydroxide (MILK OF MAGNESIA) oral suspension 30 mL  30 mL Oral BID PRN    metoprolol (LOPRESSOR) injection 5 mg  5 mg Intravenous Q6H PRN    metoprolol succinate (TOPROL-XL) 24 hr tablet 100 mg  100 mg Oral BID    ondansetron (ZOFRAN) injection 4 mg  4 mg Intravenous Q4H PRN    potassium chloride (K-DUR,KLOR-CON) CR tablet 20 mEq  20 mEq Oral BID    pravastatin (PRAVACHOL) tablet 20 mg  20 mg Oral Daily    primidone (MYSOLINE) tablet 50 mg  50 mg Oral Q12H BRENT    sertraline (ZOLOFT) tablet 50 mg  50 mg Oral Daily     Medications Prior to Admission   Medication    apixaban (Eliquis) 5 mg    camphor-menthol (SARNA) lotion    desloratadine (CLARINEX) 5 MG tablet    docusate sodium (COLACE) 100 mg capsule    fluticasone (FLONASE) 50 mcg/act nasal spray    furosemide (LASIX) 40 mg tablet    glimepiride (AMARYL) 4 mg tablet    Glucosamine-Chondroitin--400-167 MG TABS    losartan (COZAAR) 25 mg tablet    metFORMIN (GLUCOPHAGE) 500 mg tablet    metoprolol tartrate (LOPRESSOR) 100 mg tablet    nystatin (MYCOSTATIN) powder    pravastatin (PRAVACHOL) 20 mg tablet    primidone (MYSOLINE) 50 mg tablet    sertraline (ZOLOFT) 50 mg tablet           Darryl Summers MD  4/8/0230,22:15 PM      Portions of the record may have been created with voice recognition software  Occasional wrong words or sound alike substitutions may have occurred due to the inherent limitations of voice recognition software  Please read the chart carefully and recognize, using context, where substitutions may have occurred

## 2021-05-01 NOTE — ASSESSMENT & PLAN NOTE
Wt Readings from Last 3 Encounters:   04/30/21 82 1 kg (181 lb)   04/19/21 82 6 kg (182 lb)   03/19/21 82 6 kg (182 lb)     · Acute on chronic diastolic CHF  15 lb weight gain above baseline on presentation    So far responding well to IV diuresis  Continue IV furosemide    Monitor weights, intake and output

## 2021-05-02 LAB
ANION GAP SERPL CALCULATED.3IONS-SCNC: 7 MMOL/L (ref 4–13)
BASOPHILS # BLD AUTO: 0.04 THOUSANDS/ΜL (ref 0–0.1)
BASOPHILS NFR BLD AUTO: 1 % (ref 0–1)
BUN SERPL-MCNC: 21 MG/DL (ref 5–25)
CALCIUM SERPL-MCNC: 8.4 MG/DL (ref 8.3–10.1)
CHLORIDE SERPL-SCNC: 103 MMOL/L (ref 100–108)
CO2 SERPL-SCNC: 27 MMOL/L (ref 21–32)
CREAT SERPL-MCNC: 0.9 MG/DL (ref 0.6–1.3)
EOSINOPHIL # BLD AUTO: 0.31 THOUSAND/ΜL (ref 0–0.61)
EOSINOPHIL NFR BLD AUTO: 5 % (ref 0–6)
ERYTHROCYTE [DISTWIDTH] IN BLOOD BY AUTOMATED COUNT: 13.6 % (ref 11.6–15.1)
GFR SERPL CREATININE-BSD FRML MDRD: 59 ML/MIN/1.73SQ M
GLUCOSE SERPL-MCNC: 121 MG/DL (ref 65–140)
GLUCOSE SERPL-MCNC: 134 MG/DL (ref 65–140)
GLUCOSE SERPL-MCNC: 136 MG/DL (ref 65–140)
GLUCOSE SERPL-MCNC: 202 MG/DL (ref 65–140)
GLUCOSE SERPL-MCNC: 95 MG/DL (ref 65–140)
HCT VFR BLD AUTO: 33.5 % (ref 34.8–46.1)
HGB BLD-MCNC: 10.6 G/DL (ref 11.5–15.4)
IMM GRANULOCYTES # BLD AUTO: 0.04 THOUSAND/UL (ref 0–0.2)
IMM GRANULOCYTES NFR BLD AUTO: 1 % (ref 0–2)
LYMPHOCYTES # BLD AUTO: 1.84 THOUSANDS/ΜL (ref 0.6–4.47)
LYMPHOCYTES NFR BLD AUTO: 29 % (ref 14–44)
MCH RBC QN AUTO: 28.8 PG (ref 26.8–34.3)
MCHC RBC AUTO-ENTMCNC: 31.6 G/DL (ref 31.4–37.4)
MCV RBC AUTO: 91 FL (ref 82–98)
MONOCYTES # BLD AUTO: 0.48 THOUSAND/ΜL (ref 0.17–1.22)
MONOCYTES NFR BLD AUTO: 8 % (ref 4–12)
NEUTROPHILS # BLD AUTO: 3.73 THOUSANDS/ΜL (ref 1.85–7.62)
NEUTS SEG NFR BLD AUTO: 56 % (ref 43–75)
NRBC BLD AUTO-RTO: 0 /100 WBCS
PLATELET # BLD AUTO: 217 THOUSANDS/UL (ref 149–390)
PMV BLD AUTO: 10.9 FL (ref 8.9–12.7)
POTASSIUM SERPL-SCNC: 3.8 MMOL/L (ref 3.5–5.3)
RBC # BLD AUTO: 3.68 MILLION/UL (ref 3.81–5.12)
SODIUM SERPL-SCNC: 137 MMOL/L (ref 136–145)
WBC # BLD AUTO: 6.44 THOUSAND/UL (ref 4.31–10.16)

## 2021-05-02 PROCEDURE — 82948 REAGENT STRIP/BLOOD GLUCOSE: CPT

## 2021-05-02 PROCEDURE — 99232 SBSQ HOSP IP/OBS MODERATE 35: CPT | Performed by: INTERNAL MEDICINE

## 2021-05-02 PROCEDURE — 80048 BASIC METABOLIC PNL TOTAL CA: CPT | Performed by: INTERNAL MEDICINE

## 2021-05-02 PROCEDURE — 85025 COMPLETE CBC W/AUTO DIFF WBC: CPT | Performed by: INTERNAL MEDICINE

## 2021-05-02 RX ORDER — ALPRAZOLAM 0.25 MG/1
0.25 TABLET ORAL
Status: DISCONTINUED | OUTPATIENT
Start: 2021-05-02 | End: 2021-05-02

## 2021-05-02 RX ADMIN — POTASSIUM CHLORIDE 20 MEQ: 1500 TABLET, EXTENDED RELEASE ORAL at 17:49

## 2021-05-02 RX ADMIN — METOPROLOL SUCCINATE 100 MG: 100 TABLET, EXTENDED RELEASE ORAL at 09:11

## 2021-05-02 RX ADMIN — METOPROLOL SUCCINATE 100 MG: 100 TABLET, EXTENDED RELEASE ORAL at 21:16

## 2021-05-02 RX ADMIN — FUROSEMIDE 40 MG: 40 TABLET ORAL at 09:11

## 2021-05-02 RX ADMIN — ALPRAZOLAM 0.25 MG: 0.25 TABLET ORAL at 01:00

## 2021-05-02 RX ADMIN — INSULIN LISPRO 2 UNITS: 100 INJECTION, SOLUTION INTRAVENOUS; SUBCUTANEOUS at 17:48

## 2021-05-02 RX ADMIN — SERTRALINE HYDROCHLORIDE 50 MG: 50 TABLET ORAL at 09:11

## 2021-05-02 RX ADMIN — PRAVASTATIN SODIUM 20 MG: 20 TABLET ORAL at 09:11

## 2021-05-02 RX ADMIN — DOCUSATE SODIUM 100 MG: 100 CAPSULE, LIQUID FILLED ORAL at 09:11

## 2021-05-02 RX ADMIN — FUROSEMIDE 40 MG: 40 TABLET ORAL at 17:48

## 2021-05-02 RX ADMIN — HYDROXYZINE HYDROCHLORIDE 25 MG: 25 TABLET, FILM COATED ORAL at 00:59

## 2021-05-02 RX ADMIN — PRIMIDONE 50 MG: 50 TABLET ORAL at 09:11

## 2021-05-02 RX ADMIN — LOSARTAN POTASSIUM 25 MG: 25 TABLET, FILM COATED ORAL at 09:11

## 2021-05-02 RX ADMIN — FLUTICASONE PROPIONATE 1 SPRAY: 50 SPRAY, METERED NASAL at 09:11

## 2021-05-02 RX ADMIN — APIXABAN 5 MG: 5 TABLET, FILM COATED ORAL at 09:11

## 2021-05-02 RX ADMIN — POTASSIUM CHLORIDE 20 MEQ: 1500 TABLET, EXTENDED RELEASE ORAL at 09:11

## 2021-05-02 RX ADMIN — PRIMIDONE 50 MG: 50 TABLET ORAL at 21:16

## 2021-05-02 RX ADMIN — APIXABAN 5 MG: 5 TABLET, FILM COATED ORAL at 17:49

## 2021-05-02 NOTE — ASSESSMENT & PLAN NOTE
· Has hx of recurrent UTI recently discharged with cephalexin  · Now came with encephalopathy, now resolved, denies overt urinary symptoms      Was started on ceftriaxone, discontinued after urine culture grew mixed contaminants

## 2021-05-02 NOTE — ASSESSMENT & PLAN NOTE
· Recent admission for same secondary to UTI  Now may be secondary decompensated CHF  · UC grew mixed contaminants   Unlikely UTI is cause   · This has completely improved, currently AAO x4     · Continue to monitor, frequent reorientation  · Patient still appears somewhat sedated  · PRN Hydroxyzine discontinued

## 2021-05-02 NOTE — PLAN OF CARE
Problem: Potential for Falls  Goal: Patient will remain free of falls  Description: INTERVENTIONS:  - Assess patient frequently for physical needs  -  Identify cognitive and physical deficits and behaviors that affect risk of falls    -  [place] fall precautions as indicated by assessment   - Educate patient/family on patient safety including physical limitations  - Instruct patient to call for assistance with activity based on assessment  - Modify environment to reduce risk of injury  - Consider OT/PT consult to assist with strengthening/mobility  Outcome: Progressing     Problem: PAIN - ADULT  Goal: Verbalizes/displays adequate comfort level or baseline comfort level  Description: Interventions:  - Encourage patient to monitor pain and request assistance  - Assess pain using appropriate pain scale  - Administer analgesics based on type and severity of pain and evaluate response  - Implement non-pharmacological measures as appropriate and evaluate response  - Consider cultural and social influences on pain and pain management  - Notify physician/advanced practitioner if interventions unsuccessful or patient reports new pain  Outcome: Progressing     Problem: INFECTION - ADULT  Goal: Absence or prevention of progression during hospitalization  Description: INTERVENTIONS:  - Assess and monitor for signs and symptoms of infection  - Monitor lab/diagnostic results  - [place] appropriate cooling/warming therapies per order  - Administer medications as ordered  - Instruct and encourage patient and family to use good hand hygiene technique  - Identify and instruct in appropriate isolation precautions for identified infection/condition  Outcome: Progressing

## 2021-05-02 NOTE — PROGRESS NOTES
[address] [facility]  Progress Note Orion Moseley 1935, 81 y o  female MRN: [id number]  Unit/Bed#: -01 Encounter: [id number]  Primary Care Provider: Estella Galeano DO   Date and time admitted to hospital: 4/29/2021  1:24 PM    Acute diastolic CHF (congestive heart failure) [facility])  Assessment & Plan  Wt Readings from Last 3 Encounters:   05/02/21 85 4 kg (188 lb 4 4 oz)   04/19/21 82 6 kg (182 lb)   03/19/21 82 6 kg (182 lb)     · Acute on chronic diastolic CHF  15 lb weight gain above baseline on presentation    IV Lasix changed to PO 40mg BID  Monitor weights, intake and output    Benign essential tremor  Assessment & Plan  · Continue primidone, stable    Hypertension  Assessment & Plan  · Continue losartan and metoprolol    UTI (urinary tract infection)  Assessment & Plan  · Has hx of recurrent UTI recently discharged with cephalexin  · Now came with encephalopathy, now resolved, denies overt urinary symptoms  Was started on ceftriaxone, discontinued after urine culture grew mixed contaminants    DM (diabetes mellitus), type 2 [facility])  Assessment & Plan  Lab Results   Component Value Date    HGBA1C 6 7 (H) 03/19/2021       Recent Labs     05/01/21  1605 05/01/21  2112 05/02/21  0747 05/02/21  1112   POCGLU 155* 133 121 134       · Hold glimepiride and metformin in favor of sliding scale insulin  · Monitor blood sugars    CAD (coronary artery disease)  Assessment & Plan  · History of CABG 2008  No active chest pain   · Continue metoprolol    Persistent atrial fibrillation (HCC)  Assessment & Plan  · Atrial fibrillation with RVR was noted on admission may be secondary to decompensated CHF + UTI  · She was Given oral diltiazem  · Now better  · Continue patient's metoprolol 100 mg b i d  and have IV p r n  for tachycardia  · Continue eliquis for anticoagulation    * Acute metabolic encephalopathy  Assessment & Plan  · Recent admission for same secondary to UTI    Now may be secondary decompensated CHF  · UC grew mixed contaminants  Unlikely UTI is cause   · This has completely improved, currently AAO x4     · Continue to monitor, frequent reorientation  · Patient still appears somewhat sedated  · PRN Hydroxyzine discontinued      VTE Pharmacologic Prophylaxis:   Pharmacologic: Apixaban (Eliquis)  Mechanical VTE Prophylaxis in Place: Yes    Discussions with Specialists or Other Care Team Provider: Cardiology    Current Length of Stay: 3 day(s)    Current Patient Status: Inpatient     Discharge Plan / Estimated Discharge Date: 5/3/21    Code Status: Level 3 - DNAR and DNI      Subjective:   Patient states she is doing well and would like to go home  She is currently on 4 L of oxygen and uses oxygen at home  She is somewhat sedated  She was counseled she can likely go home tomorrow issues improving  Objective:     Vitals:   Temp (24hrs), Av 6 °F (36 4 °C), Min:97 3 °F (36 3 °C), Max:98 °F (36 7 °C)    Temp:  [97 3 °F (36 3 °C)-98 °F (36 7 °C)] 97 3 °F (36 3 °C)  HR:  [73-90] 73  Resp:  [16-18] 18  BP: (118-131)/(72-85) 131/85  SpO2:  [99 %] 99 %  Body mass index is 36 77 kg/m²  Input and Output Summary (last 24 hours): Intake/Output Summary (Last 24 hours) at 2021 1436  Last data filed at 2021 0501  Gross per 24 hour   Intake 660 ml   Output 1475 ml   Net -815 ml       Physical Exam:     Physical Exam  HENT:      Head: Normocephalic and atraumatic  Nose: Nose normal       Mouth/Throat:      Mouth: Mucous membranes are moist    Eyes:      Extraocular Movements: Extraocular movements intact  Pupils: Pupils are equal, round, and reactive to light  Neck:      Musculoskeletal: Normal range of motion  Cardiovascular:      Rate and Rhythm: Normal rate and regular rhythm  Pulses: Normal pulses  Pulmonary:      Breath sounds: Rales present  Comments: Bibasilar crackles    Abdominal:      General: Abdomen is flat   Bowel sounds are normal  Palpations: Abdomen is soft  Musculoskeletal:      Comments: B/l lower extremity edema mild   Skin:     General: Skin is warm  Neurological:      General: No focal deficit present  Mental Status: She is alert and oriented to person, place, and time  Mental status is at baseline  Psychiatric:      Comments: Somewhat sedated             Additional Data:     Labs:    Results from last 7 days   Lab Units 05/02/21  0509   WBC Thousand/uL 6 44   HEMOGLOBIN g/dL 10 6*   HEMATOCRIT % 33 5*   PLATELETS Thousands/uL 217   NEUTROS PCT % 56   LYMPHS PCT % 29   MONOS PCT % 8   EOS PCT % 5     Results from last 7 days   Lab Units 05/02/21  0509  04/30/21  0437   POTASSIUM mmol/L 3 8   < > 3 7   CHLORIDE mmol/L 103   < > 106   CO2 mmol/L 27   < > 29   BUN mg/dL 21   < > 15   CREATININE mg/dL 0 90   < > 0 78   CALCIUM mg/dL 8 4   < > 8 2*   ALK PHOS U/L  --   --  73   ALT U/L  --   --  19   AST U/L  --   --  21    < > = values in this interval not displayed  Results from last 7 days   Lab Units 04/29/21  1404   INR  1 13       * I Have Reviewed All Lab Data Listed Above  * Additional Pertinent Lab Tests Reviewed: Kenna 67 Admission Reviewed    Imaging:    Imaging Reports Reviewed Today Include: VAS duplex  Imaging Personally Reviewed by Myself Includes:  CXR    Recent Cultures (last 7 days):     Results from last 7 days   Lab Units 04/29/21  1601 04/29/21  1405 04/29/21  1404   BLOOD CULTURE   --  No Growth at 48 hrs  No Growth at 48 hrs     URINE CULTURE  10,000-19,000 cfu/ml   --   --        Last 24 Hours Medication List:   Current Facility-Administered Medications   Medication Dose Route Frequency Provider Last Rate    acetaminophen  650 mg Oral Q6H PRN Kinza Lan DO      apixaban  5 mg Oral BID Kinza Lan DO      camphor-menthol   Topical TID PRN Kinza Lan DO      docusate sodium  100 mg Oral Daily Jason Solis, DO      fluticasone  1 spray Each Nare Daily Kinza Lan DO  furosemide  40 mg Oral BID (diuretic) Halley Deluna PA-C      insulin lispro  1-6 Units Subcutaneous 4x Daily (AC & HS) Dianne Pittsing, DO      losartan  25 mg Oral Daily Jason Solis, DO      magnesium hydroxide  30 mL Oral BID PRN Diannechetan Pittsing, DO      metoprolol  5 mg Intravenous Q6H PRN Dianne Richey, DO      metoprolol succinate  100 mg Oral BID Halley Deluna PA-C      ondansetron  4 mg Intravenous Q4H PRN Dianne Richey, DO      potassium chloride  20 mEq Oral BID Halley Deluna PA-C      pravastatin  20 mg Oral Daily Jason Solis, DO      primidone  50 mg Oral Q12H [address] Jason Solis, DO      sertraline  50 mg Oral Daily Dianne Richey,           Today, Patient Was Seen By: Isabel Lu MD    ** Please Note: This note has been constructed using a voice recognition system   **

## 2021-05-02 NOTE — ASSESSMENT & PLAN NOTE
Lab Results   Component Value Date    HGBA1C 6 7 (H) 03/19/2021       Recent Labs     05/01/21  1605 05/01/21  2112 05/02/21  0747 05/02/21  1112   POCGLU 155* 133 121 134       · Hold glimepiride and metformin in favor of sliding scale insulin  · Monitor blood sugars

## 2021-05-02 NOTE — ASSESSMENT & PLAN NOTE
Wt Readings from Last 3 Encounters:   05/02/21 85 4 kg (188 lb 4 4 oz)   04/19/21 82 6 kg (182 lb)   03/19/21 82 6 kg (182 lb)     · Acute on chronic diastolic CHF    15 lb weight gain above baseline on presentation    IV Lasix changed to PO 40mg BID  Monitor weights, intake and output

## 2021-05-02 NOTE — PLAN OF CARE
Problem: Potential for Falls  Goal: Patient will remain free of falls  Description: INTERVENTIONS:  - Assess patient frequently for physical needs  -  Identify cognitive and physical deficits and behaviors that affect risk of falls    -  [place] fall precautions as indicated by assessment   - Educate patient/family on patient safety including physical limitations  - Instruct patient to call for assistance with activity based on assessment  - Modify environment to reduce risk of injury  - Consider OT/PT consult to assist with strengthening/mobility  Outcome: Progressing     Problem: PAIN - ADULT  Goal: Verbalizes/displays adequate comfort level or baseline comfort level  Description: Interventions:  - Encourage patient to monitor pain and request assistance  - Assess pain using appropriate pain scale  - Administer analgesics based on type and severity of pain and evaluate response  - Implement non-pharmacological measures as appropriate and evaluate response  - Consider cultural and social influences on pain and pain management  - Notify physician/advanced practitioner if interventions unsuccessful or patient reports new pain  Outcome: Progressing     Problem: INFECTION - ADULT  Goal: Absence or prevention of progression during hospitalization  Description: INTERVENTIONS:  - Assess and monitor for signs and symptoms of infection  - Monitor lab/diagnostic results  - Monitor all insertion sites, i e  indwelling lines, tubes, and drains  - Monitor endotracheal if appropriate and nasal secretions for changes in amount and color  - [place] appropriate cooling/warming therapies per order  - Administer medications as ordered  - Instruct and encourage patient and family to use good hand hygiene technique  - Identify and instruct in appropriate isolation precautions for identified infection/condition  Outcome: Progressing  Goal: Absence of fever/infection during neutropenic period  Description: INTERVENTIONS:  - Monitor WBC    Outcome: Progressing     Problem: SAFETY ADULT  Goal: Patient will remain free of falls  Description: INTERVENTIONS:  - Assess patient frequently for physical needs  -  Identify cognitive and physical deficits and behaviors that affect risk of falls    -  [place] fall precautions as indicated by assessment   - Educate patient/family on patient safety including physical limitations  - Instruct patient to call for assistance with activity based on assessment  - Modify environment to reduce risk of injury  - Consider OT/PT consult to assist with strengthening/mobility  Outcome: Progressing  Goal: Maintain or return to baseline ADL function  Description: INTERVENTIONS:  -  Assess patient's ability to carry out ADLs; assess patient's baseline for ADL function and identify physical deficits which impact ability to perform ADLs (bathing, care of mouth/teeth, toileting, grooming, dressing, etc )  - Assess/evaluate cause of self-care deficits   - Assess range of motion  - Assess patient's mobility; develop plan if impaired  - Assess patient's need for assistive devices and provide as appropriate  - Encourage maximum independence but intervene and supervise when necessary  - Involve family in performance of ADLs  - Assess for home care needs following discharge   - Consider OT consult to assist with ADL evaluation and planning for discharge  - Provide patient education as appropriate  Outcome: Progressing  Goal: Maintain or return mobility status to optimal level  Description: INTERVENTIONS:  - Assess patient's baseline mobility status (ambulation, transfers, stairs, etc )    - Identify cognitive and physical deficits and behaviors that affect mobility  - Identify mobility aids required to assist with transfers and/or ambulation (gait belt, sit-to-stand, lift, walker, cane, etc )  - [place] fall precautions as indicated by assessment  - Record patient progress and toleration of activity level on Mobility SBAR; progress patient to next Phase/Stage  - Instruct patient to call for assistance with activity based on assessment  - Consider rehabilitation consult to assist with strengthening/weightbearing, etc   Outcome: Progressing     Problem: DISCHARGE PLANNING  Goal: Discharge to home or other facility with appropriate resources  Description: INTERVENTIONS:  - Identify barriers to discharge w/patient and caregiver  - Arrange for needed discharge resources and transportation as appropriate  - Identify discharge learning needs (meds, wound care, etc )  - Arrange for interpretive services to assist at discharge as needed  - Refer to Case Management Department for coordinating discharge planning if the patient needs post-hospital services based on physician/advanced practitioner order or complex needs related to functional status, cognitive ability, or social support system  Outcome: Progressing     Problem: Knowledge Deficit  Goal: Patient/family/caregiver demonstrates understanding of disease process, treatment plan, medications, and discharge instructions  Description: Complete learning assessment and assess knowledge base    Interventions:  - Provide teaching at level of understanding  - Provide teaching via preferred learning methods  Outcome: Progressing     Problem: Prexisting or High Potential for Compromised Skin Integrity  Goal: Skin integrity is maintained or improved  Description: INTERVENTIONS:  - Identify patients at risk for skin breakdown  - Assess and monitor skin integrity  - Assess and monitor nutrition and hydration status  - Monitor labs   - Assess for incontinence   - Turn and reposition patient  - Assist with mobility/ambulation  - Relieve pressure over bony prominences  - Avoid friction and shearing  - Provide appropriate hygiene as needed including keeping skin clean and dry  - Evaluate need for skin moisturizer/barrier cream  - Collaborate with interdisciplinary team   - Patient/family teaching  - Consider wound care consult   Outcome: Progressing     Problem: Nutrition/Hydration-ADULT  Goal: Nutrient/Hydration intake appropriate for improving, restoring or maintaining nutritional needs  Description: Monitor and assess patient's nutrition/hydration status for malnutrition  Collaborate with interdisciplinary team and initiate plan and interventions as ordered  Monitor patient's weight and dietary intake as ordered or per policy  Utilize nutrition screening tool and intervene as necessary  Determine patient's food preferences and provide high-protein, high-caloric foods as appropriate       INTERVENTIONS:  - Monitor oral intake, urinary output, labs, and treatment plans  - Assess nutrition and hydration status and recommend course of action  - Evaluate amount of meals eaten  - Assist patient with eating if necessary   - Allow adequate time for meals  - Recommend/ encourage appropriate diets, oral nutritional supplements, and vitamin/mineral supplements  - Order, calculate, and assess calorie counts as needed  - Recommend, monitor, and adjust tube feedings and TPN/PPN based on assessed needs  - Assess need for intravenous fluids  - Provide specific nutrition/hydration education as appropriate  - Include patient/family/caregiver in decisions related to nutrition  Outcome: Progressing

## 2021-05-03 VITALS
TEMPERATURE: 97.5 F | SYSTOLIC BLOOD PRESSURE: 128 MMHG | RESPIRATION RATE: 20 BRPM | OXYGEN SATURATION: 100 % | HEIGHT: 60 IN | HEART RATE: 78 BPM | WEIGHT: 188.27 LBS | BODY MASS INDEX: 36.96 KG/M2 | DIASTOLIC BLOOD PRESSURE: 73 MMHG

## 2021-05-03 LAB
ANION GAP SERPL CALCULATED.3IONS-SCNC: 7 MMOL/L (ref 4–13)
BASOPHILS # BLD AUTO: 0.03 THOUSANDS/ΜL (ref 0–0.1)
BASOPHILS NFR BLD AUTO: 0 % (ref 0–1)
BUN SERPL-MCNC: 20 MG/DL (ref 5–25)
CALCIUM SERPL-MCNC: 8.2 MG/DL (ref 8.3–10.1)
CHLORIDE SERPL-SCNC: 104 MMOL/L (ref 100–108)
CO2 SERPL-SCNC: 27 MMOL/L (ref 21–32)
CREAT SERPL-MCNC: 0.8 MG/DL (ref 0.6–1.3)
EOSINOPHIL # BLD AUTO: 0.27 THOUSAND/ΜL (ref 0–0.61)
EOSINOPHIL NFR BLD AUTO: 4 % (ref 0–6)
ERYTHROCYTE [DISTWIDTH] IN BLOOD BY AUTOMATED COUNT: 13.6 % (ref 11.6–15.1)
GFR SERPL CREATININE-BSD FRML MDRD: 68 ML/MIN/1.73SQ M
GLUCOSE SERPL-MCNC: 115 MG/DL (ref 65–140)
GLUCOSE SERPL-MCNC: 117 MG/DL (ref 65–140)
GLUCOSE SERPL-MCNC: 171 MG/DL (ref 65–140)
HCT VFR BLD AUTO: 31.6 % (ref 34.8–46.1)
HGB BLD-MCNC: 10.2 G/DL (ref 11.5–15.4)
IMM GRANULOCYTES # BLD AUTO: 0.01 THOUSAND/UL (ref 0–0.2)
IMM GRANULOCYTES NFR BLD AUTO: 0 % (ref 0–2)
LYMPHOCYTES # BLD AUTO: 1.48 THOUSANDS/ΜL (ref 0.6–4.47)
LYMPHOCYTES NFR BLD AUTO: 22 % (ref 14–44)
MCH RBC QN AUTO: 29.5 PG (ref 26.8–34.3)
MCHC RBC AUTO-ENTMCNC: 32.3 G/DL (ref 31.4–37.4)
MCV RBC AUTO: 91 FL (ref 82–98)
MONOCYTES # BLD AUTO: 0.57 THOUSAND/ΜL (ref 0.17–1.22)
MONOCYTES NFR BLD AUTO: 9 % (ref 4–12)
NEUTROPHILS # BLD AUTO: 4.37 THOUSANDS/ΜL (ref 1.85–7.62)
NEUTS SEG NFR BLD AUTO: 65 % (ref 43–75)
NRBC BLD AUTO-RTO: 0 /100 WBCS
PLATELET # BLD AUTO: 226 THOUSANDS/UL (ref 149–390)
PMV BLD AUTO: 10.1 FL (ref 8.9–12.7)
POTASSIUM SERPL-SCNC: 4.5 MMOL/L (ref 3.5–5.3)
RBC # BLD AUTO: 3.46 MILLION/UL (ref 3.81–5.12)
SODIUM SERPL-SCNC: 138 MMOL/L (ref 136–145)
WBC # BLD AUTO: 6.73 THOUSAND/UL (ref 4.31–10.16)

## 2021-05-03 PROCEDURE — 99239 HOSP IP/OBS DSCHRG MGMT >30: CPT | Performed by: INTERNAL MEDICINE

## 2021-05-03 PROCEDURE — 80048 BASIC METABOLIC PNL TOTAL CA: CPT | Performed by: INTERNAL MEDICINE

## 2021-05-03 PROCEDURE — 82948 REAGENT STRIP/BLOOD GLUCOSE: CPT

## 2021-05-03 PROCEDURE — 85025 COMPLETE CBC W/AUTO DIFF WBC: CPT | Performed by: INTERNAL MEDICINE

## 2021-05-03 RX ORDER — POTASSIUM CHLORIDE 750 MG/1
10 CAPSULE, EXTENDED RELEASE ORAL 2 TIMES DAILY
Qty: 60 CAPSULE | Refills: 0 | Status: SHIPPED | OUTPATIENT
Start: 2021-05-03 | End: 2021-08-31 | Stop reason: HOSPADM

## 2021-05-03 RX ADMIN — LOSARTAN POTASSIUM 25 MG: 25 TABLET, FILM COATED ORAL at 08:13

## 2021-05-03 RX ADMIN — APIXABAN 5 MG: 5 TABLET, FILM COATED ORAL at 08:13

## 2021-05-03 RX ADMIN — PRIMIDONE 50 MG: 50 TABLET ORAL at 08:13

## 2021-05-03 RX ADMIN — POTASSIUM CHLORIDE 20 MEQ: 1500 TABLET, EXTENDED RELEASE ORAL at 08:13

## 2021-05-03 RX ADMIN — FUROSEMIDE 40 MG: 40 TABLET ORAL at 08:13

## 2021-05-03 RX ADMIN — FLUTICASONE PROPIONATE 1 SPRAY: 50 SPRAY, METERED NASAL at 08:13

## 2021-05-03 RX ADMIN — PRAVASTATIN SODIUM 20 MG: 20 TABLET ORAL at 08:13

## 2021-05-03 RX ADMIN — INSULIN LISPRO 1 UNITS: 100 INJECTION, SOLUTION INTRAVENOUS; SUBCUTANEOUS at 11:59

## 2021-05-03 RX ADMIN — SERTRALINE HYDROCHLORIDE 50 MG: 50 TABLET ORAL at 08:13

## 2021-05-03 RX ADMIN — METOPROLOL SUCCINATE 100 MG: 100 TABLET, EXTENDED RELEASE ORAL at 08:13

## 2021-05-03 NOTE — CASE MANAGEMENT
Patient discharge home  TC to patient's daughter, Gerhardt Ralph to confirm [facility] f/u  Daughter declined any services presently  Referral cancelled in [employer]  This CM informed daughter that if she changes her mind about [facility]  services she would need to call patient's PCP to f/u

## 2021-05-03 NOTE — ASSESSMENT & PLAN NOTE
Wt Readings from Last 3 Encounters:   05/02/21 85 4 kg (188 lb 4 4 oz)   04/19/21 82 6 kg (182 lb)   03/19/21 82 6 kg (182 lb)     · Acute on chronic diastolic CHF  15 lb weight gain above baseline on presentation    Monitor weights, intake and output  Discharged on home dose of Lasix

## 2021-05-03 NOTE — ASSESSMENT & PLAN NOTE
· Recent admission for same secondary to UTI  Now may be secondary decompensated CHF  · UC grew mixed contaminants  Unlikely UTI is cause   · This has completely improved, currently AAO x4   · Physical therapy recommended [facility] however patient and family not agreeable to that at this time

## 2021-05-03 NOTE — DISCHARGE INSTR - AVS FIRST PAGE
DISCHARGE INSTRUCTIONS   1  Follow up with Dr Estella Galeano, DO in one week  in regards to recent hospitalization  2  Continue all your medications    3  Come back to the ER if symptoms recur or worsen   4  Activity as tolerated  5   Diet : Diabetic

## 2021-05-03 NOTE — PLAN OF CARE
Problem: Potential for Falls  Goal: Patient will remain free of falls  Description: INTERVENTIONS:  - Assess patient frequently for physical needs  -  Identify cognitive and physical deficits and behaviors that affect risk of falls    -  [facility] fall precautions as indicated by assessment   - Educate patient/family on patient safety including physical limitations  - Instruct patient to call for assistance with activity based on assessment  - Modify environment to reduce risk of injury  - Consider OT/PT consult to assist with strengthening/mobility  Outcome: Progressing     Problem: PAIN - ADULT  Goal: Verbalizes/displays adequate comfort level or baseline comfort level  Description: Interventions:  - Encourage patient to monitor pain and request assistance  - Assess pain using appropriate pain scale  - Administer analgesics based on type and severity of pain and evaluate response  - Implement non-pharmacological measures as appropriate and evaluate response  - Consider cultural and social influences on pain and pain management  - Notify physician/advanced practitioner if interventions unsuccessful or patient reports new pain  Outcome: Progressing     Problem: INFECTION - ADULT  Goal: Absence or prevention of progression during hospitalization  Description: INTERVENTIONS:  - Assess and monitor for signs and symptoms of infection  - Monitor lab/diagnostic results  - Monitor all insertion sites, i e  indwelling lines, tubes, and drains  - Monitor endotracheal if appropriate and nasal secretions for changes in amount and color  - [facility] appropriate cooling/warming therapies per order  - Administer medications as ordered  - Instruct and encourage patient and family to use good hand hygiene technique  - Identify and instruct in appropriate isolation precautions for identified infection/condition  Outcome: Progressing  Goal: Absence of fever/infection during neutropenic period  Description: INTERVENTIONS:  - Monitor WBC    Outcome: Progressing     Problem: SAFETY ADULT  Goal: Patient will remain free of falls  Description: INTERVENTIONS:  - Assess patient frequently for physical needs  -  Identify cognitive and physical deficits and behaviors that affect risk of falls    -  [place] fall precautions as indicated by assessment   - Educate patient/family on patient safety including physical limitations  - Instruct patient to call for assistance with activity based on assessment  - Modify environment to reduce risk of injury  - Consider OT/PT consult to assist with strengthening/mobility  Outcome: Progressing  Goal: Maintain or return to baseline ADL function  Description: INTERVENTIONS:  -  Assess patient's ability to carry out ADLs; assess patient's baseline for ADL function and identify physical deficits which impact ability to perform ADLs (bathing, care of mouth/teeth, toileting, grooming, dressing, etc )  - Assess/evaluate cause of self-care deficits   - Assess range of motion  - Assess patient's mobility; develop plan if impaired  - Assess patient's need for assistive devices and provide as appropriate  - Encourage maximum independence but intervene and supervise when necessary  - Involve family in performance of ADLs  - Assess for home care needs following discharge   - Consider OT consult to assist with ADL evaluation and planning for discharge  - Provide patient education as appropriate  Outcome: Progressing  Goal: Maintain or return mobility status to optimal level  Description: INTERVENTIONS:  - Assess patient's baseline mobility status (ambulation, transfers, stairs, etc )    - Identify cognitive and physical deficits and behaviors that affect mobility  - Identify mobility aids required to assist with transfers and/or ambulation (gait belt, sit-to-stand, lift, walker, cane, etc )  - [place] fall precautions as indicated by assessment  - Record patient progress and toleration of activity level on Mobility SBAR; progress patient to next Phase/Stage  - Instruct patient to call for assistance with activity based on assessment  - Consider rehabilitation consult to assist with strengthening/weightbearing, etc   Outcome: Progressing     Problem: DISCHARGE PLANNING  Goal: Discharge to home or other facility with appropriate resources  Description: INTERVENTIONS:  - Identify barriers to discharge w/patient and caregiver  - Arrange for needed discharge resources and transportation as appropriate  - Identify discharge learning needs (meds, wound care, etc )  - Arrange for interpretive services to assist at discharge as needed  - Refer to Case Management Department for coordinating discharge planning if the patient needs post-hospital services based on physician/advanced practitioner order or complex needs related to functional status, cognitive ability, or social support system  Outcome: Progressing     Problem: Knowledge Deficit  Goal: Patient/family/caregiver demonstrates understanding of disease process, treatment plan, medications, and discharge instructions  Description: Complete learning assessment and assess knowledge base    Interventions:  - Provide teaching at level of understanding  - Provide teaching via preferred learning methods  Outcome: Progressing     Problem: Prexisting or High Potential for Compromised Skin Integrity  Goal: Skin integrity is maintained or improved  Description: INTERVENTIONS:  - Identify patients at risk for skin breakdown  - Assess and monitor skin integrity  - Assess and monitor nutrition and hydration status  - Monitor labs   - Assess for incontinence   - Turn and reposition patient  - Assist with mobility/ambulation  - Relieve pressure over bony prominences  - Avoid friction and shearing  - Provide appropriate hygiene as needed including keeping skin clean and dry  - Evaluate need for skin moisturizer/barrier cream  - Collaborate with interdisciplinary team   - Patient/family teaching  - Consider wound care consult   Outcome: Progressing     Problem: Nutrition/Hydration-ADULT  Goal: Nutrient/Hydration intake appropriate for improving, restoring or maintaining nutritional needs  Description: Monitor and assess patient's nutrition/hydration status for malnutrition  Collaborate with interdisciplinary team and initiate plan and interventions as ordered  Monitor patient's weight and dietary intake as ordered or per policy  Utilize nutrition screening tool and intervene as necessary  Determine patient's food preferences and provide high-protein, high-caloric foods as appropriate       INTERVENTIONS:  - Monitor oral intake, urinary output, labs, and treatment plans  - Assess nutrition and hydration status and recommend course of action  - Evaluate amount of meals eaten  - Assist patient with eating if necessary   - Allow adequate time for meals  - Recommend/ encourage appropriate diets, oral nutritional supplements, and vitamin/mineral supplements  - Order, calculate, and assess calorie counts as needed  - Recommend, monitor, and adjust tube feedings and TPN/PPN based on assessed needs  - Assess need for intravenous fluids  - Provide specific nutrition/hydration education as appropriate  - Include patient/family/caregiver in decisions related to nutrition  Outcome: Progressing

## 2021-05-03 NOTE — PLAN OF CARE
Problem: Potential for Falls  Goal: Patient will remain free of falls  Description: INTERVENTIONS:  - Assess patient frequently for physical needs  -  Identify cognitive and physical deficits and behaviors that affect risk of falls    -  [place] fall precautions as indicated by assessment   - Educate patient/family on patient safety including physical limitations  - Instruct patient to call for assistance with activity based on assessment  - Modify environment to reduce risk of injury  - Consider OT/PT consult to assist with strengthening/mobility  5/3/2021 1248 by Pretty Villavicencio RN  Outcome: Adequate for Discharge  5/3/2021 0902 by Pretty Villavicencio RN  Outcome: Progressing     Problem: PAIN - ADULT  Goal: Verbalizes/displays adequate comfort level or baseline comfort level  Description: Interventions:  - Encourage patient to monitor pain and request assistance  - Assess pain using appropriate pain scale  - Administer analgesics based on type and severity of pain and evaluate response  - Implement non-pharmacological measures as appropriate and evaluate response  - Consider cultural and social influences on pain and pain management  - Notify physician/advanced practitioner if interventions unsuccessful or patient reports new pain  5/3/2021 1248 by Pretty Villavicencio RN  Outcome: Adequate for Discharge  5/3/2021 0902 by Pretty Villavicencio RN  Outcome: Progressing     Problem: INFECTION - ADULT  Goal: Absence or prevention of progression during hospitalization  Description: INTERVENTIONS:  - Assess and monitor for signs and symptoms of infection  - Monitor lab/diagnostic results  - Monitor all insertion sites, i e  indwelling lines, tubes, and drains  - Monitor endotracheal if appropriate and nasal secretions for changes in amount and color  - [place] appropriate cooling/warming therapies per order  - Administer medications as ordered  - Instruct and encourage patient and family to use good hand hygiene technique  - Identify and instruct in appropriate isolation precautions for identified infection/condition  5/3/2021 1248 by Nasreen Hernandez RN  Outcome: Adequate for Discharge  5/3/2021 0902 by Nasreen Hernandez RN  Outcome: Progressing  Goal: Absence of fever/infection during neutropenic period  Description: INTERVENTIONS:  - Monitor WBC    5/3/2021 1248 by Nasreen Hernandez RN  Outcome: Adequate for Discharge  5/3/2021 0902 by Nasreen Hernandez RN  Outcome: Progressing     Problem: SAFETY ADULT  Goal: Patient will remain free of falls  Description: INTERVENTIONS:  - Assess patient frequently for physical needs  -  Identify cognitive and physical deficits and behaviors that affect risk of falls    -  [place] fall precautions as indicated by assessment   - Educate patient/family on patient safety including physical limitations  - Instruct patient to call for assistance with activity based on assessment  - Modify environment to reduce risk of injury  - Consider OT/PT consult to assist with strengthening/mobility  5/3/2021 1248 by Nasreen Hernandez RN  Outcome: Adequate for Discharge  5/3/2021 0902 by Nasreen Hernandez RN  Outcome: Progressing  Goal: Maintain or return to baseline ADL function  Description: INTERVENTIONS:  -  Assess patient's ability to carry out ADLs; assess patient's baseline for ADL function and identify physical deficits which impact ability to perform ADLs (bathing, care of mouth/teeth, toileting, grooming, dressing, etc )  - Assess/evaluate cause of self-care deficits   - Assess range of motion  - Assess patient's mobility; develop plan if impaired  - Assess patient's need for assistive devices and provide as appropriate  - Encourage maximum independence but intervene and supervise when necessary  - Involve family in performance of ADLs  - Assess for home care needs following discharge   - Consider OT consult to assist with ADL evaluation and planning for discharge  - Provide patient education as appropriate  5/3/2021 1248 by Elsy Lindsay Yulia Ferguson RN  Outcome: Adequate for Discharge  5/3/2021 0902 by Beti Lamas RN  Outcome: Progressing  Goal: Maintain or return mobility status to optimal level  Description: INTERVENTIONS:  - Assess patient's baseline mobility status (ambulation, transfers, stairs, etc )    - Identify cognitive and physical deficits and behaviors that affect mobility  - Identify mobility aids required to assist with transfers and/or ambulation (gait belt, sit-to-stand, lift, walker, cane, etc )  - [place] fall precautions as indicated by assessment  - Record patient progress and toleration of activity level on Mobility SBAR; progress patient to next Phase/Stage  - Instruct patient to call for assistance with activity based on assessment  - Consider rehabilitation consult to assist with strengthening/weightbearing, etc   5/3/2021 1248 by Beti Lamas RN  Outcome: Adequate for Discharge  5/3/2021 0902 by Beti Lamas RN  Outcome: Progressing     Problem: DISCHARGE PLANNING  Goal: Discharge to home or other facility with appropriate resources  Description: INTERVENTIONS:  - Identify barriers to discharge w/patient and caregiver  - Arrange for needed discharge resources and transportation as appropriate  - Identify discharge learning needs (meds, wound care, etc )  - Arrange for interpretive services to assist at discharge as needed  - Refer to Case Management Department for coordinating discharge planning if the patient needs post-hospital services based on physician/advanced practitioner order or complex needs related to functional status, cognitive ability, or social support system  5/3/2021 1248 by Beti Lamas RN  Outcome: Adequate for Discharge  5/3/2021 0902 by Beti Lamas RN  Outcome: Progressing     Problem: Knowledge Deficit  Goal: Patient/family/caregiver demonstrates understanding of disease process, treatment plan, medications, and discharge instructions  Description: Complete learning assessment and assess knowledge base   Interventions:  - Provide teaching at level of understanding  - Provide teaching via preferred learning methods  5/3/2021 1248 by Manasa Mandujano RN  Outcome: Adequate for Discharge  5/3/2021 0902 by Manasa Mandujano RN  Outcome: Progressing     Problem: Prexisting or High Potential for Compromised Skin Integrity  Goal: Skin integrity is maintained or improved  Description: INTERVENTIONS:  - Identify patients at risk for skin breakdown  - Assess and monitor skin integrity  - Assess and monitor nutrition and hydration status  - Monitor labs   - Assess for incontinence   - Turn and reposition patient  - Assist with mobility/ambulation  - Relieve pressure over bony prominences  - Avoid friction and shearing  - Provide appropriate hygiene as needed including keeping skin clean and dry  - Evaluate need for skin moisturizer/barrier cream  - Collaborate with interdisciplinary team   - Patient/family teaching  - Consider wound care consult   5/3/2021 1248 by Manasa Mandujano RN  Outcome: Adequate for Discharge  5/3/2021 0902 by Manasa Mandujano RN  Outcome: Progressing     Problem: Nutrition/Hydration-ADULT  Goal: Nutrient/Hydration intake appropriate for improving, restoring or maintaining nutritional needs  Description: Monitor and assess patient's nutrition/hydration status for malnutrition  Collaborate with interdisciplinary team and initiate plan and interventions as ordered  Monitor patient's weight and dietary intake as ordered or per policy  Utilize nutrition screening tool and intervene as necessary  Determine patient's food preferences and provide high-protein, high-caloric foods as appropriate       INTERVENTIONS:  - Monitor oral intake, urinary output, labs, and treatment plans  - Assess nutrition and hydration status and recommend course of action  - Evaluate amount of meals eaten  - Assist patient with eating if necessary   - Allow adequate time for meals  - Recommend/ encourage appropriate diets, oral nutritional supplements, and vitamin/mineral supplements  - Order, calculate, and assess calorie counts as needed  - Recommend, monitor, and adjust tube feedings and TPN/PPN based on assessed needs  - Assess need for intravenous fluids  - Provide specific nutrition/hydration education as appropriate  - Include patient/family/caregiver in decisions related to nutrition  5/3/2021 1248 by Polo Patient, RN  Outcome: Adequate for Discharge  5/3/2021 0902 by Polo Patient, RN  Outcome: Progressing

## 2021-05-03 NOTE — DISCHARGE SUMMARY
[facility]  Discharge- Omayra Escalante 1935, 81 y o  female MRN: [id number]  Unit/Bed#: -01 Encounter: [id number]  Primary Care Provider: Danielle Walsh DO   Date and time admitted to hospital: 4/29/2021  1:24 PM    * Acute metabolic encephalopathy  Assessment & Plan  · Recent admission for same secondary to UTI  Now may be secondary decompensated CHF  · UC grew mixed contaminants  Unlikely UTI is cause   · This has completely improved, currently AAO x4   · Physical therapy recommended STR however patient and family not agreeable to that at this time  Acute diastolic CHF (congestive heart failure) (HCC)  Assessment & Plan  Wt Readings from Last 3 Encounters:   05/02/21 85 4 kg (188 lb 4 4 oz)   04/19/21 82 6 kg (182 lb)   03/19/21 82 6 kg (182 lb)     · Acute on chronic diastolic CHF  15 lb weight gain above baseline on presentation    Monitor weights, intake and output  Discharged home dose of Lasix  Benign essential tremor  Assessment & Plan  · Continue primidone, stable    Hypertension  Assessment & Plan  · Continue losartan and metoprolol    UTI (urinary tract infection)  Assessment & Plan  · Has hx of recurrent UTI recently discharged with cephalexin  · Now came with encephalopathy, now resolved, denies overt urinary symptoms  Was started on ceftriaxone, discontinued after urine culture grew mixed contaminants    DM (diabetes mellitus), type 2 [facility])  Assessment & Plan  Lab Results   Component Value Date    HGBA1C 6 7 (H) 03/19/2021       Recent Labs     05/02/21  1112 05/02/21  1606 05/02/21  2114 05/03/21  0736   POCGLU 134 202* 95 115       · On sliding scale insulin coverage while on admission  · Resume home antidiabetic medication on discharge  CAD (coronary artery disease)  Assessment & Plan  · History of CABG 2008   No active chest pain   · Continue metoprolol    Persistent atrial fibrillation (HCC)  Assessment & Plan  · Atrial fibrillation with RVR was noted on admission may be secondary to decompensated CHF + UTI  · She was Given oral diltiazem  · Now better  · Continue patient's metoprolol 100 mg b i d  and have IV p r n  for tachycardia  · Continue eliquis for anticoagulation    Discharging Resident Physician: Ana Maria Garza MD  Attending: Kristie Madrigal MD  PCP: Jaxon Purdy DO  Admission Date: 4/29/2021  Discharge Date: 05/03/21    Disposition:     Home    Reason for Admission:  Acute Metabolic encephalopathy    Consultations During Hospital Stay:  · Cardiology  · Physical therapy  · Occupational therapy    Procedures Performed:     · Chest x-ray  · VAS lower limb venous Doppler study    Complications:  None    History of Present Illness:     Manolo Jones is a 81 y o  female with a past medical history of CAD atrial fibrillation CHF and recurrent UTI who presents with confusion and itching  The patient was recently hospitalized here 4/19/2021 and discharged with cephalexin for UTI  She was doing well last night  Daughter at bedside to help provide history whom the patient lives with  This afternoon the patient started to be more confused and granddaughter found her on the floor  EMS was summoned and the patient was brought here where she was found to have rapid atrial fibrillation and evidence of decompensated CHF  She denies any chest pain but is complaining of shortness of breath  She had pelvic discomfort which has improved in the emergency department  Hospital Course:     Manolo Jones is a 81 y o  female patient who originally presented to the hospital on 4/29/2021 due to acute metabolic encephalopathy  Impressions for UTI vs acute decompensated heart failure was made  There was initial concern for UTI and patient started on ceftriaxone however UA was negative  Also noted to be 15 lb above baseline  Started on IV Lasix subsequently transitioned to oral   Cardiology was consulted    Recommended continuation diuresis with potassium supplementation  Was stable to discharge from a cardiology standpoint  Mental status subsequently improved with patient being alert and awake and oriented to time, place and person  Physical therapy and occupational therapy was consulted and recommended patient was discharged to short-term rehab however patient and family not agreeable to STR at this time  Condition at Discharge: stable     Discharge Day Visit / Exam:     Subjective:  Patient seen and examined by me at bedside  Reports no new complaints  Feels much better when compared to presentation status  Denies cough, nausea, vomiting, dysuria and leg swelling or pain  Was notified by nursing staff about bloody stain on tissue paper when patient was wiped  Perineal exam done  Noted to have finger nail marks in her gluteal cleft and right labia majora  No evidence of vaginal discharge  Vitals: Blood Pressure: 128/73 (05/03/21 0750)  Pulse: 78 (05/03/21 0750)  Temperature: 97 5 °F (36 4 °C) (05/03/21 0750)  Temp Source: Oral (04/29/21 1327)  Respirations: 20 (05/03/21 0750)  Height: 5' (152 4 cm) (04/30/21 1156)  Weight - Scale: 85 4 kg (188 lb 4 4 oz) (05/02/21 0600)  SpO2: 100 % (05/03/21 0750)  Exam:   Physical Exam  Vitals signs reviewed  Constitutional:       General: She is not in acute distress  Appearance: She is not toxic-appearing  HENT:      Head: Normocephalic and atraumatic  Cardiovascular:      Rate and Rhythm: Normal rate and regular rhythm  Pulses: Normal pulses  Pulmonary:      Effort: No respiratory distress  Breath sounds: Normal breath sounds  No wheezing  Abdominal:      General: Bowel sounds are normal  There is no distension  Palpations: Abdomen is soft  Tenderness: There is no abdominal tenderness  Musculoskeletal:      Right lower leg: No edema  Left lower leg: No edema  Neurological:      General: No focal deficit present        Mental Status: She is alert and oriented to person, place, and time  Mental status is at baseline  Discussion with Family:  Discussed with daughter - Rylan Lee discussed recommendation for STR however not agreeable at this time  Discharge instructions/Information to patient and family:   See after visit summary for information provided to patient and family  Provisions for Follow-Up Care:  See after visit summary for information related to follow-up care and any pertinent home health orders  Planned Readmission: No     Discharge Medications:  See after visit summary for reconciled discharge medications provided to patient and family        ** Please Note: This note has been constructed using a voice recognition system **

## 2021-05-03 NOTE — ASSESSMENT & PLAN NOTE
Lab Results   Component Value Date    HGBA1C 6 7 (H) 03/19/2021       Recent Labs     05/02/21  1112 05/02/21  1606 05/02/21  2114 05/03/21  0736   POCGLU 134 202* 95 115       · On sliding scale insulin coverage while on admission  · Resume home antidiabetic medication on discharge

## 2021-05-03 NOTE — PLAN OF CARE
Problem: Potential for Falls  Goal: Patient will remain free of falls  Description: INTERVENTIONS:  - Assess patient frequently for physical needs  -  Identify cognitive and physical deficits and behaviors that affect risk of falls    -  [place] fall precautions as indicated by assessment   - Educate patient/family on patient safety including physical limitations  - Instruct patient to call for assistance with activity based on assessment  - Modify environment to reduce risk of injury  - Consider OT/PT consult to assist with strengthening/mobility  Outcome: Progressing     Problem: PAIN - ADULT  Goal: Verbalizes/displays adequate comfort level or baseline comfort level  Description: Interventions:  - Encourage patient to monitor pain and request assistance  - Assess pain using appropriate pain scale  - Administer analgesics based on type and severity of pain and evaluate response  - Implement non-pharmacological measures as appropriate and evaluate response  - Consider cultural and social influences on pain and pain management  - Notify physician/advanced practitioner if interventions unsuccessful or patient reports new pain  Outcome: Progressing     Problem: INFECTION - ADULT  Goal: Absence or prevention of progression during hospitalization  Description: INTERVENTIONS:  - Assess and monitor for signs and symptoms of infection  - Monitor lab/diagnostic results  - Monitor all insertion sites, i e  indwelling lines, tubes, and drains  - Monitor endotracheal if appropriate and nasal secretions for changes in amount and color  - [place] appropriate cooling/warming therapies per order  - Administer medications as ordered  - Instruct and encourage patient and family to use good hand hygiene technique  - Identify and instruct in appropriate isolation precautions for identified infection/condition  Outcome: Progressing  Goal: Absence of fever/infection during neutropenic period  Description: INTERVENTIONS:  - Monitor WBC    Outcome: Progressing     Problem: SAFETY ADULT  Goal: Patient will remain free of falls  Description: INTERVENTIONS:  - Assess patient frequently for physical needs  -  Identify cognitive and physical deficits and behaviors that affect risk of falls    -  [place] fall precautions as indicated by assessment   - Educate patient/family on patient safety including physical limitations  - Instruct patient to call for assistance with activity based on assessment  - Modify environment to reduce risk of injury  - Consider OT/PT consult to assist with strengthening/mobility  Outcome: Progressing  Goal: Maintain or return to baseline ADL function  Description: INTERVENTIONS:  -  Assess patient's ability to carry out ADLs; assess patient's baseline for ADL function and identify physical deficits which impact ability to perform ADLs (bathing, care of mouth/teeth, toileting, grooming, dressing, etc )  - Assess/evaluate cause of self-care deficits   - Assess range of motion  - Assess patient's mobility; develop plan if impaired  - Assess patient's need for assistive devices and provide as appropriate  - Encourage maximum independence but intervene and supervise when necessary  - Involve family in performance of ADLs  - Assess for home care needs following discharge   - Consider OT consult to assist with ADL evaluation and planning for discharge  - Provide patient education as appropriate  Outcome: Progressing  Goal: Maintain or return mobility status to optimal level  Description: INTERVENTIONS:  - Assess patient's baseline mobility status (ambulation, transfers, stairs, etc )    - Identify cognitive and physical deficits and behaviors that affect mobility  - Identify mobility aids required to assist with transfers and/or ambulation (gait belt, sit-to-stand, lift, walker, cane, etc )  - [place] fall precautions as indicated by assessment  - Record patient progress and toleration of activity level on Mobility SBAR; progress patient to next Phase/Stage  - Instruct patient to call for assistance with activity based on assessment  - Consider rehabilitation consult to assist with strengthening/weightbearing, etc   Outcome: Progressing     Problem: DISCHARGE PLANNING  Goal: Discharge to home or other facility with appropriate resources  Description: INTERVENTIONS:  - Identify barriers to discharge w/patient and caregiver  - Arrange for needed discharge resources and transportation as appropriate  - Identify discharge learning needs (meds, wound care, etc )  - Arrange for interpretive services to assist at discharge as needed  - Refer to Case Management Department for coordinating discharge planning if the patient needs post-hospital services based on physician/advanced practitioner order or complex needs related to functional status, cognitive ability, or social support system  Outcome: Progressing     Problem: Knowledge Deficit  Goal: Patient/family/caregiver demonstrates understanding of disease process, treatment plan, medications, and discharge instructions  Description: Complete learning assessment and assess knowledge base    Interventions:  - Provide teaching at level of understanding  - Provide teaching via preferred learning methods  Outcome: Progressing     Problem: Prexisting or High Potential for Compromised Skin Integrity  Goal: Skin integrity is maintained or improved  Description: INTERVENTIONS:  - Identify patients at risk for skin breakdown  - Assess and monitor skin integrity  - Assess and monitor nutrition and hydration status  - Monitor labs   - Assess for incontinence   - Turn and reposition patient  - Assist with mobility/ambulation  - Relieve pressure over bony prominences  - Avoid friction and shearing  - Provide appropriate hygiene as needed including keeping skin clean and dry  - Evaluate need for skin moisturizer/barrier cream  - Collaborate with interdisciplinary team   - Patient/family teaching  - Consider wound care consult   Outcome: Progressing     Problem: Nutrition/Hydration-ADULT  Goal: Nutrient/Hydration intake appropriate for improving, restoring or maintaining nutritional needs  Description: Monitor and assess patient's nutrition/hydration status for malnutrition  Collaborate with interdisciplinary team and initiate plan and interventions as ordered  Monitor patient's weight and dietary intake as ordered or per policy  Utilize nutrition screening tool and intervene as necessary  Determine patient's food preferences and provide high-protein, high-caloric foods as appropriate       INTERVENTIONS:  - Monitor oral intake, urinary output, labs, and treatment plans  - Assess nutrition and hydration status and recommend course of action  - Evaluate amount of meals eaten  - Assist patient with eating if necessary   - Allow adequate time for meals  - Recommend/ encourage appropriate diets, oral nutritional supplements, and vitamin/mineral supplements  - Order, calculate, and assess calorie counts as needed  - Recommend, monitor, and adjust tube feedings and TPN/PPN based on assessed needs  - Assess need for intravenous fluids  - Provide specific nutrition/hydration education as appropriate  - Include patient/family/caregiver in decisions related to nutrition  Outcome: Progressing

## 2021-05-04 ENCOUNTER — TRANSITIONAL CARE MANAGEMENT (OUTPATIENT)
Dept: INTERNAL MEDICINE CLINIC | Facility: CLINIC | Age: 86
End: 2021-05-04

## 2021-05-04 LAB
BACTERIA BLD CULT: NORMAL
BACTERIA BLD CULT: NORMAL

## 2021-05-05 ENCOUNTER — PATIENT OUTREACH (OUTPATIENT)
Dept: CASE MANAGEMENT | Facility: HOSPITAL | Age: 86
End: 2021-05-05

## 2021-05-05 NOTE — PROGRESS NOTES
Spoke with daughter, Robbin Pedroza  briefly and discussed OP CM role  States mom is doing OK  They have all medication and no worsening symptoms  States her daughter who is home all day for online school also helping  Stares patient ambulating with assistance and RW  I did briefly discuss watching sodium in diet and possibility of daily weights  Daughter is expecting another phone call and we agreed to follow up call tomorrow    Carleen Lynne MS, RN Heart Failure Outpatient Care Manager

## 2021-05-08 DIAGNOSIS — J30.2 SEASONAL ALLERGIC RHINITIS, UNSPECIFIED TRIGGER: ICD-10-CM

## 2021-05-08 RX ORDER — FLUTICASONE PROPIONATE 50 MCG
SPRAY, SUSPENSION (ML) NASAL
Qty: 16 ML | Refills: 2 | Status: SHIPPED | OUTPATIENT
Start: 2021-05-08 | End: 2021-08-03 | Stop reason: ALTCHOICE

## 2021-05-12 ENCOUNTER — PATIENT OUTREACH (OUTPATIENT)
Dept: CASE MANAGEMENT | Facility: HOSPITAL | Age: 86
End: 2021-05-12

## 2021-05-12 NOTE — PROGRESS NOTES
Initial Consideration  Primary learner    Condition Knowledge  1  What causes heart failure? · Heart attack  · Heart valve disease  · High blood pressure (hypertension)  2  Rate the patient's understanding of the causes of heart failure  · 50% to 74%     3  What are the symptoms of heart failure? · Coughing or wheezing  · Feeling weak  · Feet and ankles swollen  · Shortness of breath with activity or when lying down  ·   4  Rate the patient's understanding of the symptoms of heart failure  · 75% to 89%     5  How can you care for your heart failure to keep it from getting worse and causing problems? · Follow an eating (diet) plan  · Get all follow-up lab tests when due  · Keep all doctor appointments  · Know when to contact the doctor  · Manage heart failure  · Manage other diseases  · Take medicines as ordered  ·   6  Rate the patient's understanding of how to care for heart failure  · 50% to 74%     Q7  Patient: What activities would you be able to do if you didn't have problems with your heart failure? · Activities of daily living (ADL) without assistance   · Makayla Robert   · Shop     Provider Information    · What doctor manages your heart failure? Cardiologist (heart doctor), name and phone number: ___Dr Alvarez___________________________________      2  When was your last visit to your doctor who manages your heart failure? · Within the past 3 months    3  When was your last visit to your primary doctor? · Within the past 3 months    4  What other specialists have you seen within the last 12 months? · Gynecologist  · Orthopedist    5  How many heart failure-related emergency department visits or hospital admissions have you had in the past 12 months? · 2 or more     6  Do you have a written self-care plan from your doctor on how to care for your heart failure? · No   ·   7  What are the components of your heart failure self-care plan?    · Knowing what medicines you take and why and how to take them  · Managing side effects of medicines  · Medicine use  · Nutrition plan  · Recognizing the symptoms of gradual worsening or problems with condition  · Weigh daily  · When to call the doctor and when to get emergency help    8  Is the patient able to understand and adhere to the self-care plan? · Yes, with frequent reminders or caregiver support  · Have not started weighing yet, but will try and see if mom can do  Medication Information    1  Has any medicine for heart failure been prescribed by the doctor? If so, is it taken as ordered? · Yes, and taken as ordered  2  What medicines do you take for heart failure? Did not review all medication, just cardiac  · Angiotensin II receptor blocker (ARB)   · Beta-blocker   · Diuretics, such as loop diuretics, potassium-sparing diuretics, or thiazide diuretics   3  Are you having side effects from the heart failure medicine? · None    4  Rate the patient's understanding of the purpose and side effects of the medications for heart failure  · 75% to 89%     Monitoring and Management    1  How often are you supposed to weigh? · Has not been weighing, but knows her mom is supposed to  2  Do you weigh every day? · No, because haven't started:   3  Do you keep a diary of weights? · No   4  Do you know how much weight gain or loss should trigger an action on your part? · Yes    5  Are you supposed to restrict the amount of fluids you get? · Unknown   6  Do you restrict the amount of fluids you take in? · Unknown   7  What is your fluid limit each day? 8  Why do you restrict the amount of fluid you take in each day? · Unknown   9  Do you follow a special diet? · (low sodium)   10  Do you get rgular exercise? · No, but walking more around the  11  Do you use oxygen? · Yes     19  Do you use tobacco, alcohol, or drugs? · No    20  Have additional treatments, procedures, or therapy been considered? · None  21   Are you having any problems with your heart failure self-care needs? · Exercising and staying active   · Fatigue or weakness   · Less than 50%   · Unknown     Other Health Considerations    1  Has your mood changed in the last 4 weeks? · No    6  Do you have other conditions that might make your heart failure worse? · Atrial fibrillation   · Coronary artery disease   · Depression   · Diabetes   · 9  Rate the patient's understanding of the potential complications of heart failure  · 75% to 89%     Patient Understanding Evaluation    1  Does the patient understand  causes of her mom's heart failure? · Yes, after reinforced  2  Does the patient understand all the symptoms of heart failure? · Yes  ·   3  Does the patient understand how to care for heart failure? · Yes, after teaching    4  Does the patient know when and where to get help for problems? · Yes, after reinforced  5  Does the patient/caregiver  have the ability to understand and follow the self-care plan? · Yes  6  Does the patient/caregiver understand the purpose and side effects of his or her heart failure medication? · Yes  7  Is the patient having trouble coping with his or her heart failure? · No  8  Does the patient agree to education?    · Yes  HF education reinforced and HF video sent to daughter via My Chart  Ap Henderson MS, RN Heart Failure Outpatient Care Manager

## 2021-05-13 ENCOUNTER — TELEPHONE (OUTPATIENT)
Dept: INTERNAL MEDICINE CLINIC | Facility: CLINIC | Age: 86
End: 2021-05-13

## 2021-05-13 DIAGNOSIS — R30.0 DYSURIA: Primary | ICD-10-CM

## 2021-05-13 NOTE — TELEPHONE ENCOUNTER
Order is placed  However acute change in mental status may be other than an UTI   If persist and/or worsen she should present to emergency department

## 2021-05-13 NOTE — TELEPHONE ENCOUNTER
Patient has been having chronic UTIs and today she is having symptoms of UTI again  She is also hallucinating a lot today  Patient's daughter would like an order put in for Urine and would like to  a cup from [address] [place] [address] in [facility] to get a test done for [facility]  Call [facility] to let her know when the order is ready   [phone]

## 2021-05-14 ENCOUNTER — APPOINTMENT (OUTPATIENT)
Dept: LAB | Facility: MEDICAL CENTER | Age: 86
End: 2021-05-14
Payer: MEDICARE

## 2021-05-14 ENCOUNTER — TELEPHONE (OUTPATIENT)
Dept: INTERNAL MEDICINE CLINIC | Facility: CLINIC | Age: 86
End: 2021-05-14

## 2021-05-14 DIAGNOSIS — R31.9 HEMATURIA, UNSPECIFIED TYPE: Primary | ICD-10-CM

## 2021-05-14 LAB
BACTERIA UR QL AUTO: ABNORMAL /HPF
BILIRUB UR QL STRIP: NEGATIVE
CLARITY UR: CLEAR
COLOR UR: YELLOW
GLUCOSE UR STRIP-MCNC: NEGATIVE MG/DL
HGB UR QL STRIP.AUTO: ABNORMAL
HYALINE CASTS #/AREA URNS LPF: ABNORMAL /LPF
KETONES UR STRIP-MCNC: NEGATIVE MG/DL
LEUKOCYTE ESTERASE UR QL STRIP: NEGATIVE
NITRITE UR QL STRIP: NEGATIVE
NON-SQ EPI CELLS URNS QL MICRO: ABNORMAL /HPF
PH UR STRIP.AUTO: 7.5 [PH]
PROT UR STRIP-MCNC: NEGATIVE MG/DL
RBC #/AREA URNS AUTO: ABNORMAL /HPF
SP GR UR STRIP.AUTO: 1.01 (ref 1–1.03)
UROBILINOGEN UR QL STRIP.AUTO: 0.2 E.U./DL
WBC #/AREA URNS AUTO: ABNORMAL /HPF

## 2021-05-14 PROCEDURE — 81001 URINALYSIS AUTO W/SCOPE: CPT | Performed by: FAMILY MEDICINE

## 2021-05-14 NOTE — TELEPHONE ENCOUNTER
----- Message from Roro Mancini, DO sent at 5/14/2021  5:02 PM EDT -----  Urine doesn't show infection  Does show some blood  Pt need repeat UA in one week to see if blood is persistent

## 2021-05-17 NOTE — TELEPHONE ENCOUNTER
DAUGHTER  CALLED  BACK  WANTS  TO MAKE  SURE  URINE  SLIP  IN THE  SYSTEM  FOR  HER  REPEAT   URINE  ON Friday

## 2021-05-19 ENCOUNTER — OFFICE VISIT (OUTPATIENT)
Dept: OBGYN CLINIC | Facility: MEDICAL CENTER | Age: 86
End: 2021-05-19
Payer: MEDICARE

## 2021-05-19 DIAGNOSIS — N90.89 FISSURE OF VULVA: ICD-10-CM

## 2021-05-19 DIAGNOSIS — L90.0 LICHEN SCLEROSUS: Primary | ICD-10-CM

## 2021-05-19 PROCEDURE — 99213 OFFICE O/P EST LOW 20 MIN: CPT | Performed by: STUDENT IN AN ORGANIZED HEALTH CARE EDUCATION/TRAINING PROGRAM

## 2021-05-19 RX ORDER — NYSTATIN 100000 U/G
OINTMENT TOPICAL 2 TIMES DAILY
Qty: 30 G | Refills: 0 | Status: SHIPPED | OUTPATIENT
Start: 2021-05-19 | End: 2021-08-31 | Stop reason: HOSPADM

## 2021-05-19 RX ORDER — CLOBETASOL PROPIONATE 0.5 MG/G
OINTMENT TOPICAL 2 TIMES DAILY
Qty: 30 G | Refills: 0 | Status: SHIPPED | OUTPATIENT
Start: 2021-05-19 | End: 2021-09-03 | Stop reason: SDUPTHER

## 2021-05-19 NOTE — PROGRESS NOTES
Assessment/Plan:   Lichen sclerosus  Prior bx with LS  Advised clobetasol use BID with recheck in 4-6 wks    Fissure of vulva  Fissure consistent with yeast  Will use nystatin ointment and follow up in 4-6 wks       Diagnoses and all orders for this visit:    Lichen sclerosus  -     clobetasol (TEMOVATE) 0 05 % ointment; Apply topically 2 (two) times a day Peas sized amount to affected skin  -     nystatin (MYCOSTATIN) ointment; Apply topically 2 (two) times a day    Fissure of vulva  -     clobetasol (TEMOVATE) 0 05 % ointment; Apply topically 2 (two) times a day Peas sized amount to affected skin  -     nystatin (MYCOSTATIN) ointment; Apply topically 2 (two) times a day          Subjective:     Patient ID: Don Wilburn is a 81 y o  female  80 yo here with her daughter who cares for her  She has a 5 month history of vulvar burning and irritation  Nothing has really helped her symptoms  Largely just used one course of yeast treatment several months ago  She did previously see  for similar and was diagnosed with LS by biopsy  She was not on maintenance therapy  She has mostly itching and burning, not much pain  Review of Systems   Constitutional: Negative for chills and fever  HENT: Negative for ear pain and sore throat  Eyes: Negative for pain and visual disturbance  Respiratory: Negative for cough and shortness of breath  Cardiovascular: Negative for chest pain and palpitations  Gastrointestinal: Negative for abdominal pain and vomiting  Genitourinary: Positive for dysuria and vaginal pain  Negative for hematuria  Musculoskeletal: Negative for arthralgias and back pain  Skin: Negative for color change and rash  Neurological: Negative for seizures and syncope  All other systems reviewed and are negative  Objective:     Physical Exam  Vitals signs reviewed  Constitutional:       General: She is not in acute distress  Appearance: She is well-developed   She is not diaphoretic  HENT:      Head: Normocephalic and atraumatic  Neck:      Musculoskeletal: Normal range of motion  Pulmonary:      Effort: Pulmonary effort is normal  No respiratory distress  Genitourinary:      Neurological:      Mental Status: She is alert and oriented to person, place, and time  Psychiatric:         Behavior: Behavior normal          Thought Content:  Thought content normal          Judgment: Judgment normal

## 2021-05-19 NOTE — PROGRESS NOTES
Pt presents with pain, has chronic bladder infection   Pain is internal, has itching, was previously tested for UTI, came back negative external itching and pain as well   Pt has been using A&D, does not help

## 2021-05-21 ENCOUNTER — APPOINTMENT (OUTPATIENT)
Dept: LAB | Facility: MEDICAL CENTER | Age: 86
End: 2021-05-21
Payer: MEDICARE

## 2021-05-21 LAB
BACTERIA UR QL AUTO: ABNORMAL /HPF
BILIRUB UR QL STRIP: NEGATIVE
CAOX CRY URNS QL MICRO: ABNORMAL /HPF
CLARITY UR: ABNORMAL
COLOR UR: YELLOW
GLUCOSE UR STRIP-MCNC: NEGATIVE MG/DL
HGB UR QL STRIP.AUTO: NEGATIVE
KETONES UR STRIP-MCNC: NEGATIVE MG/DL
LEUKOCYTE ESTERASE UR QL STRIP: NEGATIVE
NITRITE UR QL STRIP: NEGATIVE
NON-SQ EPI CELLS URNS QL MICRO: ABNORMAL /HPF
PH UR STRIP.AUTO: 5.5 [PH]
PROT UR STRIP-MCNC: NEGATIVE MG/DL
RBC #/AREA URNS AUTO: ABNORMAL /HPF
SP GR UR STRIP.AUTO: 1.02 (ref 1–1.03)
UROBILINOGEN UR QL STRIP.AUTO: 0.2 E.U./DL
WBC #/AREA URNS AUTO: ABNORMAL /HPF

## 2021-05-21 PROCEDURE — 81001 URINALYSIS AUTO W/SCOPE: CPT

## 2021-05-27 ENCOUNTER — TELEPHONE (OUTPATIENT)
Dept: OTHER | Facility: OTHER | Age: 86
End: 2021-05-27

## 2021-06-08 DIAGNOSIS — R56.9 SEIZURES (HCC): ICD-10-CM

## 2021-06-08 DIAGNOSIS — I50.9 CHRONIC CONGESTIVE HEART FAILURE, UNSPECIFIED HEART FAILURE TYPE (HCC): Primary | ICD-10-CM

## 2021-06-08 RX ORDER — PRIMIDONE 50 MG/1
50 TABLET ORAL EVERY 12 HOURS SCHEDULED
Qty: 60 TABLET | Refills: 1 | Status: SHIPPED | OUTPATIENT
Start: 2021-06-08 | End: 2021-09-28 | Stop reason: SDUPTHER

## 2021-06-08 RX ORDER — FUROSEMIDE 40 MG/1
40 TABLET ORAL 2 TIMES DAILY
Qty: 60 TABLET | Refills: 5 | Status: SHIPPED | OUTPATIENT
Start: 2021-06-08 | End: 2021-11-01 | Stop reason: SDUPTHER

## 2021-06-08 NOTE — TELEPHONE ENCOUNTER
The following med's were prescribed by Dr Monnie Severe and she needs them ordered now       -lexapro 10 mg qd  -primidone (MYSOLINE) 50 mg tablet   1 tab bid  -furosemide (LASIX) 40 mg tablet    [facility]   [phone]

## 2021-07-07 DIAGNOSIS — I10 HYPERTENSION, UNSPECIFIED TYPE: Primary | ICD-10-CM

## 2021-07-08 RX ORDER — METOPROLOL TARTRATE 100 MG/1
100 TABLET ORAL EVERY 12 HOURS SCHEDULED
Qty: 180 TABLET | Refills: 0 | Status: SHIPPED | OUTPATIENT
Start: 2021-07-08 | End: 2021-09-30

## 2021-07-15 ENCOUNTER — PATIENT OUTREACH (OUTPATIENT)
Dept: CASE MANAGEMENT | Facility: HOSPITAL | Age: 86
End: 2021-07-15

## 2021-07-15 NOTE — PROGRESS NOTES
Spoke briefly with patient's daughter Rodrigo Richards who states is in car driving and request I call back tomorrow

## 2021-07-19 ENCOUNTER — OFFICE VISIT (OUTPATIENT)
Dept: OBGYN CLINIC | Facility: MEDICAL CENTER | Age: 86
End: 2021-07-19
Payer: MEDICARE

## 2021-07-19 VITALS — DIASTOLIC BLOOD PRESSURE: 68 MMHG | SYSTOLIC BLOOD PRESSURE: 124 MMHG

## 2021-07-19 DIAGNOSIS — L90.0 LICHEN SCLEROSUS: Primary | ICD-10-CM

## 2021-07-19 PROCEDURE — 99213 OFFICE O/P EST LOW 20 MIN: CPT | Performed by: STUDENT IN AN ORGANIZED HEALTH CARE EDUCATION/TRAINING PROGRAM

## 2021-07-19 NOTE — PROGRESS NOTES
Assessment/Plan:   Lichen sclerosus  Fissure resolved  Most recent week more regularity  Pain improved on exam  Reviewed application and regularity  Advised follow up in 1-2 months- plans to transfer to [facility] in [address] [place] [address] Road  Diagnoses and all orders for this visit:    Lichen sclerosus          Subjective:     Patient ID: Gladis Hess is a 81 y o  female  80 yo here for follow up of vulvar complaints  Has been more regular about application of clobetasol only within this last week where she is applying it daily  She does feel things are better but still sore  Review of Systems   Constitutional: Negative for chills and fever  Respiratory: Negative for shortness of breath  Cardiovascular: Negative for chest pain  Gastrointestinal: Negative for abdominal pain, constipation, diarrhea and nausea  Genitourinary: Positive for vaginal pain (vulvar)  Negative for dyspareunia, dysuria, frequency, pelvic pain, urgency, vaginal bleeding and vaginal discharge  Objective:     Physical Exam  Vitals reviewed  Constitutional:       General: She is not in acute distress  Appearance: She is well-developed  She is obese  She is not diaphoretic  HENT:      Head: Normocephalic and atraumatic  Pulmonary:      Effort: Pulmonary effort is normal  No respiratory distress  Genitourinary:      Musculoskeletal:      Cervical back: Normal range of motion  Neurological:      Mental Status: She is alert and oriented to person, place, and time  Psychiatric:         Behavior: Behavior normal          Thought Content:  Thought content normal          Judgment: Judgment normal

## 2021-07-19 NOTE — PROGRESS NOTES
Pt still having itching and pain   pts daughter present with her   states that the clobetasol is helping and has not been using the nystatin

## 2021-07-19 NOTE — ASSESSMENT & PLAN NOTE
Fissure resolved  Most recent week more regularity  Pain improved on exam  Reviewed application and regularity  Advised follow up in 1-2 months- plans to transfer to [address] [facility] in [place]

## 2021-07-20 ENCOUNTER — PATIENT OUTREACH (OUTPATIENT)
Dept: CASE MANAGEMENT | Facility: HOSPITAL | Age: 86
End: 2021-07-20

## 2021-07-21 ENCOUNTER — PATIENT OUTREACH (OUTPATIENT)
Dept: CASE MANAGEMENT | Facility: HOSPITAL | Age: 86
End: 2021-07-21

## 2021-07-21 DIAGNOSIS — I50.31 ACUTE DIASTOLIC CHF (CONGESTIVE HEART FAILURE) (HCC): Primary | ICD-10-CM

## 2021-07-21 NOTE — PROGRESS NOTES
Outpatient CM call to patient's daughter, Brandon Duran states patient is now residing in [address] [place] [address] with her  She continues to show depression symptoms- tearful at times and "sleeps a lot"  She states patient also has days where she has visual hallucinations (like seeing cats), but is actually more active and cooperative on these days  Brandon Duran states her own PCP recommended she take patient to see a gerontologist  I did recommend she check [employer]'s web site for gerontologists in/near [place]  Brandon Duran was asking about possibility of getting home aid for several hours a day 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

## 2021-07-22 ENCOUNTER — PATIENT OUTREACH (OUTPATIENT)
Dept: FAMILY MEDICINE CLINIC | Facility: CLINIC | Age: 86
End: 2021-07-22

## 2021-07-22 NOTE — PROGRESS NOTES
DOMONIQUE received request from SHERYL Samuel, to outreach pt's daughter about getting additional help to care for the pt  SW called and spoke with dtr Elvira Arriaga  She and pt moved to [place] recently from [place]  She plans on switching doctors for pt; SW recommended [facility] which is near them, in [address]  Elvira Arriaga is a   She will be returning to work at the end of August  She lives with pt and her daughter and boyfriend  Elvira Arriaga does the cooking, cleaning, chores, helps pt with medications, bathes and clothes pt  Elvira Bart explained that pt is declining cognitively  Pt gets confused and is depressed and sometimes even has hallucinations  Pt uses a walker but sometimes feels off balance, and is fatigued and weak  I asked Elvria Arriaga is she contacted [facility], as suggested by SHERYL Martinez  She has not  I told Elvira Arriaga I can call the program at Aging that can assess pt to see if she is eligible for MLTSS or JACC  Pt's income is $1200 monthly through SSI  I explained to Elvira Arriaga that pt may not qualify for services based on her level of independence, however, the program will assess her  Elvira Arriaga would like someone to be with pt from morning until mid afternoon when she starts working  Pt can be alone for approximately 3 hours a day  Trellisreal Arriaga also asked about getting a geriatrician to see pt  I told her that there are not any in [place] but may be one in [place], in [facility] and I will follow up  Additionally, Elvira Arriaga is concerned mostly about how to get her mother out of the home if and when she has to go to the doctor  There are three steps outside the home and Jennifer fears that pt will eventually get weaker, to the point where she is unable to go down the steps with minimal assistance  I told Elvira Arriaga I will look into seeing what transportation companies can do but perhaps adding a ramp to the outside would help       Elvira Arriaga prefers an aide to come between 10-12am, to [facility] with medications and bathing and dressing pt  I did tell Holley Bower that aides at this time are limited and there is usually a requirement for aide towork at minimum 4 hours a day  Holley Bower provided her email so SW can send her links to private aides as well as caregiver support, along with resources through Aging  [email or web address] com  com    Goals of care are:   Pt will have contact with geriatrician   Pt will be assessed for [facility] programs for aide services  Dtr will receive information about transport companies that can best assist in getting pt out of home  Dtr will receive resources for caregiving and local Aging

## 2021-07-27 ENCOUNTER — PATIENT OUTREACH (OUTPATIENT)
Dept: FAMILY MEDICINE CLINIC | Facility: CLINIC | Age: 86
End: 2021-07-27

## 2021-07-27 NOTE — PROGRESS NOTES
SW located resources for [employer] and caregiver resources  Emailed pt's daughter with links

## 2021-08-02 ENCOUNTER — PATIENT OUTREACH (OUTPATIENT)
Dept: CASE MANAGEMENT | Facility: HOSPITAL | Age: 86
End: 2021-08-02

## 2021-08-03 ENCOUNTER — OFFICE VISIT (OUTPATIENT)
Dept: FAMILY MEDICINE CLINIC | Facility: CLINIC | Age: 86
End: 2021-08-03
Payer: MEDICARE

## 2021-08-03 VITALS
TEMPERATURE: 97.5 F | RESPIRATION RATE: 16 BRPM | BODY MASS INDEX: 38.09 KG/M2 | HEART RATE: 82 BPM | OXYGEN SATURATION: 95 % | WEIGHT: 194 LBS | HEIGHT: 60 IN

## 2021-08-03 DIAGNOSIS — I27.82 CHRONIC PULMONARY THROMBOEMBOLISM SYNDROME (HCC): ICD-10-CM

## 2021-08-03 DIAGNOSIS — I50.31 ACUTE DIASTOLIC CHF (CONGESTIVE HEART FAILURE) (HCC): ICD-10-CM

## 2021-08-03 DIAGNOSIS — E66.01 OBESITY, MORBID (HCC): ICD-10-CM

## 2021-08-03 DIAGNOSIS — Z13.29 SCREENING FOR THYROID DISORDER: ICD-10-CM

## 2021-08-03 DIAGNOSIS — I10 ESSENTIAL HYPERTENSION: ICD-10-CM

## 2021-08-03 DIAGNOSIS — Z13.6 SCREENING FOR CARDIOVASCULAR, RESPIRATORY, AND GENITOURINARY DISEASES: ICD-10-CM

## 2021-08-03 DIAGNOSIS — I49.5 TACHY-BRADY SYNDROME (HCC): ICD-10-CM

## 2021-08-03 DIAGNOSIS — R44.3 HALLUCINATION: Primary | ICD-10-CM

## 2021-08-03 DIAGNOSIS — Z13.89 SCREENING FOR CARDIOVASCULAR, RESPIRATORY, AND GENITOURINARY DISEASES: ICD-10-CM

## 2021-08-03 DIAGNOSIS — I48.91 ATRIAL FIBRILLATION, UNSPECIFIED TYPE (HCC): ICD-10-CM

## 2021-08-03 DIAGNOSIS — I50.9 CHRONIC CONGESTIVE HEART FAILURE, UNSPECIFIED HEART FAILURE TYPE (HCC): ICD-10-CM

## 2021-08-03 DIAGNOSIS — L29.9 PRURITUS: ICD-10-CM

## 2021-08-03 DIAGNOSIS — R53.83 OTHER FATIGUE: ICD-10-CM

## 2021-08-03 DIAGNOSIS — Z13.83 SCREENING FOR CARDIOVASCULAR, RESPIRATORY, AND GENITOURINARY DISEASES: ICD-10-CM

## 2021-08-03 DIAGNOSIS — E11.69 TYPE 2 DIABETES MELLITUS WITH OTHER SPECIFIED COMPLICATION, WITHOUT LONG-TERM CURRENT USE OF INSULIN (HCC): ICD-10-CM

## 2021-08-03 PROCEDURE — 99204 OFFICE O/P NEW MOD 45 MIN: CPT | Performed by: FAMILY MEDICINE

## 2021-08-03 RX ORDER — ASPIRIN 81 MG/1
81 TABLET ORAL DAILY
COMMUNITY
End: 2021-11-10 | Stop reason: ALTCHOICE

## 2021-08-03 RX ORDER — PRAVASTATIN SODIUM 20 MG
20 TABLET ORAL DAILY
Qty: 90 TABLET | Refills: 1 | Status: SHIPPED | OUTPATIENT
Start: 2021-08-03 | End: 2022-03-24

## 2021-08-03 RX ORDER — PYRIDOXINE HCL (VITAMIN B6) 100 MG
100 TABLET ORAL
COMMUNITY

## 2021-08-03 RX ORDER — PANTOPRAZOLE SODIUM 40 MG/1
40 TABLET, DELAYED RELEASE ORAL DAILY
COMMUNITY
Start: 2021-05-30 | End: 2021-08-31 | Stop reason: HOSPADM

## 2021-08-03 NOTE — PROGRESS NOTES
Assessment/Plan:     Diagnoses and all orders for this visit:    Hallucination  Comments: Will refer to psych  R/o UTI and electrolyte abnormalities  Orders:  -     Ambulatory referral to Psychiatry; Future  -     Urine culture  - CMP    Type 2 diabetes mellitus with other specified complication, without long-term current use of insulin ([facility])  Comments:  eye exam attempted, but pt unable to complete  will order a1c  foot exam up to date  continue current medication regimen  Orders:  -     pravastatin (PRAVACHOL) 20 mg tablet; Take 1 tablet (20 mg total) by mouth daily  -     Hemoglobin A1C; Future    Pruritus  -     camphor-menthol (SARNA) lotion; Apply topically as needed for itching  -     Ambulatory referral to Dermatology; Future      Essential hypertension  Comments:  managed by cardiology  pt unable to cooperate for BP check  Orders:  -     Comprehensive metabolic panel; Future  -     Lipid Panel with Direct LDL reflex; Future    Screening for thyroid disorder  -     TSH, 3rd generation; Future  -     T4, free; Future    Atrial fibrillation, unspecified type ([facility])  Acute diastolic CHF (congestive heart failure) ([facility])  Chronic pulmonary thromboembolism syndrome ([facility])  Chronic congestive heart failure, unspecified heart failure type ([facility] )  Tachy-gale syndrome ([facility] )           - Managed by cardiology    Obesity, morbid ([facility] )    BMI Counseling: Body mass index is 37 89 kg/m²  Discussed with patient's BMI with her  The BMI is above normal  Nutrition recommendations include consuming healthier snacks  Exercise recommendations include strength training exercises  Subjective:      Patient ID: Filiberto Wilder is a 81 y o  female  HPI  Opal Jones is a new patient who presents today to establish care and discuss concerns  She has history of type II DM, COPD, a fib, CAD, CHF, hypertension, chronic pulmonary thromboembolism syndrome, CHF, dysphagia, essential tremor osteoarthritis     She follows cardiologist at [facility]  Last visit per record was 3/29/21  Last A1c on file from March 2021 controlled at 6 7  Currently on amaryl and metformin  Denies any acute concerns today  Daughter is concerned about pt having hallucinations  This is chronic and she has been having them for years, but have recently been worsening  She also complains of significant pruritis  Daughter has been giving benadryl with minimal relief  Daughter needs home health aid to help with Erin's care while she is at work  The following portions of the patient's history were reviewed and updated as appropriate: allergies, current medications, past family history, past medical history, past social history, past surgical history and problem list     Review of Systems   Constitutional: Positive for fatigue  HENT: Negative  Eyes: Negative  Respiratory: Negative  Cardiovascular: Negative  Gastrointestinal: Negative  Endocrine: Negative  Genitourinary: Negative  Musculoskeletal: Negative  Skin: Negative  pruritis   Allergic/Immunologic: Negative  Neurological: Positive for weakness  Hematological: Negative  Psychiatric/Behavioral: Positive for hallucinations  Objective:      Pulse 82   Temp 97 5 °F (36 4 °C)   Resp 16   Ht 5' (1 524 m)   Wt 88 kg (194 lb)   SpO2 95%   BMI 37 89 kg/m²          Physical Exam  Constitutional:       General: She is not in acute distress  Appearance: She is well-developed  She is obese  She is not ill-appearing, toxic-appearing or diaphoretic  Comments: In wheelchair   HENT:      Head: Normocephalic and atraumatic  Cardiovascular:      Rate and Rhythm: Normal rate and regular rhythm  Heart sounds: Normal heart sounds  No murmur heard  No friction rub  No gallop  Pulmonary:      Effort: Pulmonary effort is normal  No respiratory distress  Breath sounds: Normal breath sounds  No wheezing or rales     Chest:      Chest wall: No tenderness  Musculoskeletal:         General: No deformity  Normal range of motion  Cervical back: Normal range of motion and neck supple  Skin:     General: Skin is warm and dry  Neurological:      Mental Status: She is alert and oriented to person, place, and time  Psychiatric:         Behavior: Behavior normal          Thought Content:  Thought content normal          Judgment: Judgment normal

## 2021-08-05 ENCOUNTER — CONSULT (OUTPATIENT)
Dept: DERMATOLOGY | Age: 86
End: 2021-08-05
Payer: MEDICARE

## 2021-08-05 VITALS — WEIGHT: 194 LBS | BODY MASS INDEX: 38.09 KG/M2 | HEIGHT: 60 IN

## 2021-08-05 DIAGNOSIS — L29.9 PRURITUS: Primary | ICD-10-CM

## 2021-08-05 DIAGNOSIS — D18.01 CHERRY ANGIOMA: ICD-10-CM

## 2021-08-05 DIAGNOSIS — L29.9 PRURITUS: ICD-10-CM

## 2021-08-05 DIAGNOSIS — L71.9 ROSACEA: ICD-10-CM

## 2021-08-05 PROBLEM — N39.0 UTI (URINARY TRACT INFECTION): Status: RESOLVED | Noted: 2020-09-24 | Resolved: 2021-08-05

## 2021-08-05 PROBLEM — R07.9 CHEST PAIN: Status: RESOLVED | Noted: 2020-12-19 | Resolved: 2021-08-05

## 2021-08-05 PROCEDURE — 17000 DESTRUCT PREMALG LESION: CPT | Performed by: DERMATOLOGY

## 2021-08-05 PROCEDURE — 99204 OFFICE O/P NEW MOD 45 MIN: CPT | Performed by: DERMATOLOGY

## 2021-08-05 RX ORDER — TRIAMCINOLONE ACETONIDE 1 MG/G
CREAM TOPICAL
Qty: 454 G | Refills: 0 | Status: SHIPPED | OUTPATIENT
Start: 2021-08-05 | End: 2022-04-28

## 2021-08-05 RX ORDER — METRONIDAZOLE 10 MG/G
GEL TOPICAL DAILY
Qty: 60 G | Refills: 1 | Status: SHIPPED | OUTPATIENT
Start: 2021-08-05 | End: 2021-08-05

## 2021-08-05 NOTE — PATIENT INSTRUCTIONS
Based on a thorough discussion of this condition and the management approach to it (including a comprehensive discussion of the known risks, side effects and potential benefits of treatment), the patient (family) agrees to implement the following specific plan:     Cryotherapy done today on left hand     Actinic keratoses are very common on sites repeatedly exposed to the sun, especially the backs of the hands and the face, most often affecting the ears, nose, cheeks, upper lip, vermilion of the lower lip, temples, forehead and balding scalp  In severely chronically sun-damaged individuals, they may also be found on the upper trunk, upper and lower limbs, and dorsum of feet  We discussed the theoretical premalignant (pre-cancerous) nature and etiology of these growths  We discussed the prevailing notion that actinic keratoses are a reflection of abnormal skin cell development due to DNA damage by short wavelength UVB  They are more likely to appear if the immune function is poor, due to aging, recent sun exposure, predisposing disease or certain drugs  We discussed that the main concern is that actinic keratoses may predispose to squamous cell carcinoma  It is rare for a solitary actinic keratosis to evolve to squamous cell carcinoma (SCC), but the risk of SCC occurring at some stage in a patient with more than 10 actinic keratoses is thought to be about 10 to 15%  A tender, thickened, ulcerated or enlarging actinic keratosis is suspicious of SCC  Actinic keratoses may be prevented by strict sun protection  If already present, keratoses may improve with a very high sun protection factor (50+) broad-spectrum sunscreen applied at least daily to affected areas, year-round  We recommend that UPF-rated clothing and hats and sunglasses be worn whenever possible and that a sunscreen-moisturizer combination product such as Neutrogena Daily Defense be applied at least three times a day      We performed a thorough discussion of treatment options and specific risk/benefits/alternatives including but not limited to medical field treatment with medications such as the following:     Topical field area medications such as 5-fluorouracil or Aldara (specifically, the trouble with long-term compliance, blistering and local skin reaction versus the convenience of at-home therapy and that field therapy gets what is not yet seen)   Cryotherapy (specifically, local pain, scarring, dyspigmentation, blistering, possible superinfection, and treats only what we see versus directed treatment today)   Photodynamic therapy (specifically, local pain, scarring, dyspigmentation, blistering, possible superinfection, need to schedule for a later date, and time spent in the office versus field therapy that gets what is not yet seen)  Based on a thorough discussion of this condition and the management approach to it (including a comprehensive discussion of the known risks, side effects and potential benefits of treatment), the patient (family) agrees to implement the following specific plan:   Metronidazole gel apply topically twice a day to the areas on the face     Rosacea is a chronic rash affecting the mid-face including the nose, cheeks, chin, forehead, and eyelids  The incidence is usually greatest between the ages of 30-60 years and is more common in people with fair skin  Common characteristics include redness, telangiectasias, papules and pustules over affected areas  Rosacea may look similar to acne, but there is a lack of comedones  Occasionally the eyes may also be involved in ocular rosacea  In advanced disease, enlargement of the sebaceous glands in the nose, termed rhinophyma, may be present  Rosacea results in red spots (papules) and sometimes pustules over the face, but unlike acne there are no blackheads, whiteheads, or cystic nodules   Patients often experience increased facial flushing with prominent blood vessels (erythematotelangiectatic rosacea) and dry, sensitive skin  These symptoms are exacerbated by sun exposure, hot or spicy foods, topical steroids and oil-based facial products  In ocular rosacea, eyelids may be red and sore due to conjunctivitis, keratitis, and episcleritis  If rhinophyma develops due to enlargement of sebaceous glands, the patient may have an enlarged and irregularly shaped nose with prominent pores  In rosacea that is refractory to treatment, patients can develop persistent redness and swelling of the face due to lymphatic obstruction ([place] disease)  Distribution around the cheeks may be confused with the malar or butterfly rash of lupus  However, the rash of lupus spares the nasal creases and lacks papules and pustules  If signs of photosensitivity, oral ulcers, arthritis, and kidney dysfunction are present then consider referral to a rheumatologist      There are many potential causes of rosacea including genetic, environmental, vascular, and inflammatory factors   These include, but are not limited to:   Chronic exposure to ultraviolet radiation    Increased immune responses in the form of cathelicidins that promote vessel dilation and infiltration with white blood cells (neutrophils) into the dermis   Increased matrix metalloproteinases such as collagen and elastase that remodel normal tissue may contribute to inflammation of the skin making it thicker and harder   There is some evidence to suggest that increased numbers of demodex mites on patient skin may contribute to rosacea papules     General Treatment Approach    Avoid exacerbating factors such as heat, spicy foods, and alcohol    Use daily SPF30+ sunscreen and other methods of coverage for sun protection   Use water-based make-up    Avoid applying topical steroids to affected areas as they can cause perioral dermatitis and exacerbate rosacea     Topical Treatment Approach   Metronidazole cream or gel by itself or in combination with oral antibiotics for more severe cases   Azelaic acid cream or lotion is effective for mild inflammatory rosacea when applied twice daily to affected areas   Brimonidine gel and oxymetazoline hydrochloride cream can reduce facial redness temporarily    Ivermectin cream can treat papulopustular rosacea by controlling demodex mites and inflammation    Pimecrolimus cream or tacrolimus ointment twice a day for 2-3 months can help reduce inflammation    Oral Treatment Approach   Antibiotics such as doxycycline, minocycline, or erythromycin for 1-3 months   Clonidine and carvedilol can help reduce facial flushing and are generally well tolerated  Common side effects include low blood pressure, gastrointestinal upset, dry eyes, blurred vision and low heart rate   Isotretinoin at low doses can be effective for long term treatment when antibiotics fail  Side effects may make it unsuitable for some patients   NSAIDs such as diclofenac can help reduce discomfort and redness in the skin  Procedural/Surgical Treatment Approach    Vascular lasers or intense pulsed light treatment may be used to treat persistent telangiectasia and papulopustular rosacea   Plastic surgery and carbon dioxide lasers may be used to treat rhinophyma       Based on a thorough discussion of this condition and the management approach to it (including a comprehensive discussion of the known risks, side effects and potential benefits of treatment), the patient (family) agrees to implement the following specific plan:   Start the triamcinolone ointment apply topically twice a day to itchy areas  Use moisturizer like Eucerin,Cerave or Aveeno Cream 3 times a day for the dry skin        Start using Dove moisturizer bar soap in the shower       What is pruritus? Pruritus is the medical term for itch   Itch is an unpleasant sensation on the skin that provokes the desire to rub or scratch the area to obtain relief  Itch can cause discomfort and frustration; in severe cases it can lead to disturbed sleep, anxiety and depression  Constant scratching to obtain relief can damage the skin (excoriation, lichenification) and reduce its effectiveness as a major protective barrier  Pruritus is often a symptom of an underlying disease process such as a skin problem, a systemic disease, or abnormal nerve impulses  What are skin signs of pruritus? There are no specific skin signs associated with pruritus, apart from scratch marks (excoriations) and signs of the underlying condition  Persistent scratching over a period of time may lead to:   Lichenification (thickened skin, lichen simplex)    Prurigo papules and nodules  Who gets pruritus? The epidemiology of pruritus depends on its underlying cause or causes  However, in general, the incidence of chronic pruritus increases with age, it is more common in women, and in those of  background  Mechanisms underlying pruritus  Itch, like pain, can originate anywhere along the neural itch pathway, from the central nervous system (brain and spinal cord) to the peripheral nervous system and the skin  Mechanisms underlying pruritus are complex   The itch signal is transmitted mainly through small, itch-selective C-fibers in the skin in addition to histamine-triggered and non-histaminergic neurons   These connect with secondary neurons which cross the opposite side of the spinothalamic tract and ascend to parts of the brain involved in sensation, emotion, reward and memory  These areas overlap with those activated by pain      Patients with chronic pruritus usually have both peripheral and central hypersensitization (heightened reaction) which means they tend to overreact to noxious stimuli which normally inhibit itch (such as heat and scratching) and also misinterpret non-noxious stimuli as an itch (eg, light touch)    The way scratching stops itching has been explained by an interaction with pain pathways within the dorsal horn of the spinal cord  Localized pruritus  Localized pruritus is pruritus that is confined to a certain part of the body  It can occur in association with a primary rash (eg dermatitis) or may occur because of hypersensitive nerves in the skin (neuropathic pruritus)  Neuropathic pruritus is due to compression or degeneration of nerves in the skin, on route to the spine or in the spine itself  Neuropathic itch is sometimes associated with reduced or absent sweating in the affected area of skin  Typical causes of localized itchy rashes   Scalp: seborrhoeic dermatitis, head lice    Back: [place] disease    Hands: pompholyx, irritant and/or allergic contact dermatitis    Genitals: vulvovaginal Candida albicans infection, lichen sclerosus    Legs: venous eczema    Feet: tinea pedis    Neuropathic causes of localized pruritus without primary rash   Face: trigeminal trophic syndrome    Hand: cheiralgia paraesthetica    Arm: brachioradial pruritus    Back: notalgia paraesthetica/topics/brachioradial-pruritus/    Genital: pruritus vulvae, pruritus ani    Dermatomal: herpes zoster (shingles) during recovery phase  Scratching a localised itch may lead to lichen simplex, prurigo or prurigo nodularis  Systemic causes of pruritus  Sytemic diseases may cause generalised pruritus  This is sometimes called metabolic itch  There is nothing wrong with the skin itself, at least until it's been scratched  Metabolic disorders include chronic renal failure (dialysis) and liver disease (with or without cholestasis)  Uraemic pruritus arises in patients undergoing dialysis is due to a combination of xerosis (dry skin), secondary hyperparathyroidism, peripheral neuropathy (nerve changes) and inflammation      Secondary hyperparathyroidism which also occurs in dialysis patients leads to microprecipitation (deposition) of calcium and magnesium salts in the skin, triggering mast cell degeneration, releasing serotonin and histamine   Once chronic pruritus has occurred, there may be secondary changes in the nerves in the skin and central nervous system which heighten the sensation of itch  Hepatogenic pruritus is more common in intrahepatic than extrahepatic cholestasis  Examples of intrahepatic cholestasis is associated with chronic viral hepatitis, primary biliary cirrhosis, pregnancy-related cholestasis  Extra-hepatic cholestasis is associated with pressure on the bile ducts, eg from pancreatic tumours or pseudocysts   Cholestasis is thought to release toxic substances from the liver, which stimulates neural itch fibres in the skin   Characteristically, cholestatic pruritus is most severe at night; it tends to affect the hands, feet and areas where clothes are rubbing on the skin  Haematological disorders include iron deficiency anaemia and polycythaemia vera   Generalized pruritus along with glossitis (tongue inflammation) and angular cheilitis (inflammation of mouth corners) are seen in iron deficiency anaemia   In polycythaemia vera, itch is usually precipitated by contact with water (aquagenic pruritus), eg after a shower  This is thought to be mediated by the effect of platelets, serotonin and prostaglandins  Endocrine disorders include thyroid disease and diabetes mellitus   In Graves' disease (thyrotoxicosis), increased blood flow, skin temperature and decreased itch threshold mediated by the increase in thyroid hormones, lead to the itch  Pruritus associated with myxoedema and hypothyroidism is rare, and if present, is more likely the result of xerosis (dry skin)   In diabetes mellitus, localized itch tends to occur in the perianal/genital region usually due to Candida albicans or dermatophyte infections   It is unclear if metabolic abnormalities such as renal impairment, autonomic failure or diabetic neuropathy contribute to this     Paraneoplastic itch is associated with lymphoma, especially Hodgkin lymphoma, leukemia or a solid organ tumor (eg lung, colon, brain)   In Hodgkin lymphoma, pruritus is thought to be caused by histamine release, which may be related to eosinophilia  Infections causing itch include human immunodeficiency virus infection (HIV) and hepatitis C virus   Patients with HIV commonly complain of itch  This may be associated with skin infections/infestations, dry skin, drug reactions, hyperoesinophilia (increased eosinophil levels) and cutaneous T cell lymphoma  There is a possible correlation between intractable pruritus and increased HIV viral load   In chronic hepatitis C infection, the mechanisms responsible for itch remain unclear  In the absence of cholestasis, pruritus may be related to antiviral therapy; it has been noted to occur in patients treated with combination therapy (interferon richard and ribavirin)  Pruritic skin diseases  Pruritus is often a symptom of many skin diseases  Some of these are included in the following list    Allergic contact dermatitis    Dry skin    Urticaria    Psoriasis    Atopic dermatitis    Folliculitis    Dermatitis herpetiformis    Lichen simplex    Lichen planus    Bullous pemphigoid    Lice    Scabies    Miliaria    Sunburn    Pityriasis rosea    Mycosis fungoides    Exposure-related pruritus  Pruritus may arise as a result of exposure to certain external factors   Allergens or irritants    Cold, which can cause 'winter itch'    A physical urticaria, such as dermographism    Aquagenic pruritus (itch on exposure to water)    Insects and infestations, eg scabies    Medications (topical or systemic) eg opioids, aspirin    Hormonal reasons for pruritus  About 2% of pregnant women have pruritus without any obvious dermatological cause  In some cases the itch is due to cholestasis (pooling of bile in the gall bladder and liver)   It usually occurs in the 3rd trimester and is relieved after giving birth  Generalized itch is also a common symptom of menopause  How is pruritus diagnosed? The first steps of evaluation of an itchy patient are medical history and examination  A thorough history can identify constitutional symptoms that may point towards an underlying systemic disease  Drug triggers such as opioids may be identified, especially if the commencement of the drug relates to the itch  A careful examination can identify dermatological causes for the itch (eg scabies, lichen simplex, pemphigoid) or evidence of chronic skin changes related to the itch  In dermatological causes of pruritus, primary skin lesions will usually suggest the diagnosis  Patients without primary skin lesions and little evidence of chronic scratching should be investigated for systemic, neuropathic and psychogenic causes  The panel of investigations could include:   Full/complete blood count    Creatinine and renal function tests    Liver function tests    Thyroid function tests    Erythrocyte sedimentation rate    Chest radiography    HIV serology    What treatment is available for itch? The management of pruritus relies on establishing the cause and then either removing or treating the cause to prevent further itching  In many cases, tests are necessary to determine the cause; while these are in progress, treatment to provide symptomatic relief of pruritus may be given      Topical treatments  In addition to specific therapy for any underlying skin or internal disease, topical treatment may include:   Wet dressings or tepid shower to cool the skin    Calamine lotion (contains phenol, which cools the skin): avoid on dry skin and limit use to a few days    Menthol/camphor lotion: gives a chilling sensation    Local anesthetics, such as pramoxine (also called pramocaine), applied to small itchy spots such as insect bites    Regular use of emollients, especially if skin is dry    Mild topical corticosteroids for short periods    Topical calcineurin inhibitors are also used to reduce itch associated with inflammatory skin conditions    Topical doxepin, a tricyclic antidepressant and antihistamine, is an antipruritic used in eczema  Other measures that can be useful in preventing pruritus include avoiding precipitating factors such as rough clothing or fabrics, overheating, and vasodilators if they provoke itching (eg, caffeine, alcohol, spices)  Fingernails should be kept short and clean  If the urge to scratch is irresistible then rub the area with your palm  Topical antihistamines should not be used for chronic itch, as they may sensitize the skin and result in allergic contact dermatitis  Systemic therapy  If pruritus is severe and sleep is disturbed, then treatment with oral medication may be necessary  Some drugs may help to relieve the itch whilst others are given solely for their sedative effects   Antihistamines are most useful in urticaria, in which histamine is released  Use for other pruritic conditions is not supported by randomized control trials  Sedating antihistamines may be used for their sedative effects   Doxepin and amitriptyline are tricyclic antidepressants have antipruritic action and act on the central and peripheral nervous systems   Tetracyclic antidepressants such as mirtazepine and selective serotonin reuptake inhibitors (paroxetine, sertraline, fluoxetine) may also help some patients with severe itch including when it is caused by cholestasis, T-cell lymphoma, malignancy or a neuropathic condition   Anti-epileptic drugs such as sodium valproate, gabapentin and pregabalin may also be of benefit to some patients, e g , those with itch associated with renal failure or neuropathic itch  The mechanism of action is uncertain      Opioid antagonists such as butorphanol intranasal spray, naltrexone tablets, and naloxone have been effective in patients suffering from intractable pruritus in association with liver disease, atopic eczema and chronic urticaria  Nalfurafine, which is a kappa-opioid agonist has also been studied and shown to reduce itch associated with chronic renal impairment, however it is not widely available   Aspirin is sometimes effective if pruritus is mediated by kinins or prostaglandins and is noted to be effective in patients with pruritus due to polycythaemia vera  Note: aspirin may cause or aggravate itch in some patients   Thalidomide has been successful in treating nodular prurigo and chronic pruritus of various kinds but is rarely used because of serious adverse effects and expense   Rifampicin is effective for patients with pruritus associated with cholestasis (some forms of liver disease)  Isolated case reports in severe itch associated with malignancy have reported success with the NKR1 antagonist, aprepitant (normally used short-term for postoperative or chemotherapy-induced nausea)  This is under investigation for neuropathic itch and nodular prurigo  Phototherapy  Broadband ultraviolet B or narrow-band UVB phototherapy alone, or in conjunction with UVA, has been shown to be helpful for pruritus associated with chronic kidney disease, psoriasis, atopic eczema and cutaneous T-cell lymphoma  Behavioral therapy  Behavioral therapy may be used in conjunction with pharmacotherapy to modify behaviours such as coping mechanisms and stress reduction, which help interrupt the itch-scratch cycle  One randomized controlled trial showed short-term benefits in reduction in itch frequency and scratching as well as improvement in coping mechanisms  What is the outcome for pruritus? The management of chronic severe itch is difficult and often requires the use of combination therapy over a long period of time  Identification and treatment of underlying conditions causing pruritus may help in this process  The symptom may quickly disappear or persist for long periods of time  Based on a thorough discussion of this condition and the management approach to it (including a comprehensive discussion of the known risks, side effects and potential benefits of treatment), the patient (family) agrees to implement the following specific plan:   Monitor for changes     Assessment and Plan:    Cherry angioma, also known as [employer] spots, are benign vascular skin lesions  A "cherry angioma" is a firm red, blue or purple papule, 0 1-1 cm in diameter  When thrombosed, they can appear black in colour until evaluated with a dermatoscope when the red or purple colour is more easily seen  Cherry angioma may develop on any part of the body but most often appear on the scalp, face, lips and trunk  An angioma is due to proliferating endothelial cells; these are the cells that line the inside of a blood vessel  Angiomas can arise in early life or later in life; the most common type of angioma is a cherry angioma  Cherry angiomas are very common in males and females of any age or race  They are more noticeable in white skin than in skin of colour  They markedly increase in number from about the age of 36  There may be a family history of similar lesions  Eruptive cherry angiomas have been rarely reported to be associated with internal malignancy  The cause of angiomas is unknown  Genetic analysis of cherry angiomas has shown that they frequently carry specific somatic missense mutations in the GNAQ and GNA11 (Q209H) genes, which are involved in other vascular and melanocytic proliferations  Cherry angioma is usually diagnosed clinically and no investigations are necessary for the majority of lesions  It has a characteristic red-clod or lobular pattern on dermatoscopy (called lacunar pattern using conventional pattern analysis)  When there is uncertainty about the diagnosis, a biopsy may be performed   The angioma is composed of venules in a thickened papillary dermis  Collagen bundles may be prominent between the lobules  Cherry angiomas are harmless, so they do not usually have to be treated  Occasionally, they are removed to exclude a malignant skin lesion such as a nodular melanoma or because they are irritated or bleeding (and a subsequent risk for infection)  To decrease friction over the lesions, we recommend Neutrogena Daily Defense SPF 50+ at least 3 times a day

## 2021-08-05 NOTE — PROGRESS NOTES
[address] [facility] Note     Patient Name: Jennifer Flanagan  Encounter Date: 08/05/2021      Have you been cared for by a [facility] Dermatologist in the last 3 years and, if so, which one? No    · Have you traveled outside of the [address] [place] [address] in the past 3 months or outside of the [facility] area in the last 2 weeks? No     May we call your Preferred Phone number to discuss your specific medical information? Yes     May we leave a detailed message that includes your specific medical information? Yes      Today's Chief Concerns:   Concern #1:  Skin cancer   Concern #2:  Hx skin ca    Past Medical History:  Have you personally ever had or currently have any of the following? · Skin cancer (such as Melanoma, Basal Cell Carcinoma, Squamous Cell Carcinoma? (If Yes, please provide more detail)- YES, unknown type, left dorsal hand    · Eczema: No  · Psoriasis: No  · HIV/AIDS: No  · Hepatitis B or C: No  · Tuberculosis: No  · Systemic Immunosuppression such as Diabetes, Biologic or Immunotherapy, Chemotherapy, Organ Transplantation, Bone Marrow Transplantation (If YES, please provide more detail): No  · Radiation Treatment (If YES, please provide more detail): No  · Any other major medical conditions/concerns? (If Yes, which types)- YES, breast cancer, COPD, diabetes, hypertension, hyperlipidemia, heart diease     Social History:     What is/was your primary occupation? retired     What are your hobbies/past-times? Family History:  Have any of your "first degree relatives" (parent, brother, sister, or child) had any of the following       · Skin cancer such as Melanoma or Merkel Cell Carcinoma or Pancreatic Cancer? No  · Eczema, Asthma, Hay Fever or Seasonal Allergies: No  · Psoriasis or Psoriatic Arthritis: No  · Do any other medical conditions seem to run in your family? If Yes, what condition and which relatives?   No    Current Medications:       Current Outpatient Medications:     apixaban (Eliquis) 5 mg, Take 5 mg by mouth 2 (two) times a day, Disp: , Rfl:     aspirin (ECOTRIN LOW STRENGTH) 81 mg EC tablet, Take 81 mg by mouth daily (Patient not taking: Reported on 8/3/2021), Disp: , Rfl:     camphor-menthol (SARNA) lotion, Apply topically as needed for itching, Disp: 222 mL, Rfl: 0    clobetasol (TEMOVATE) 0 05 % ointment, Apply topically 2 (two) times a day Peas sized amount to affected skin, Disp: 30 g, Rfl: 0    desloratadine (CLARINEX) 5 MG tablet, Take 5 mg by mouth daily, Disp: , Rfl:     docusate sodium (COLACE) 100 mg capsule, Take 100 mg by mouth daily, Disp: , Rfl:     furosemide (LASIX) 40 mg tablet, Take 1 tablet (40 mg total) by mouth 2 (two) times a day (Patient taking differently: Take 40 mg by mouth 2 (two) times a day Take 1 tablet twice daily), Disp: 60 tablet, Rfl: 5    glimepiride (AMARYL) 4 mg tablet, Take 1 tablet (4 mg total) by mouth daily with breakfast, Disp: 90 tablet, Rfl: 1    Glucosamine-Chondroitin--400-167 MG TABS, Take 1 tablet by mouth, Disp: , Rfl:     losartan (COZAAR) 25 mg tablet, Take 25 mg by mouth daily, Disp: , Rfl:     metFORMIN (GLUCOPHAGE) 500 mg tablet, Take 500 mg by mouth daily with breakfast, Disp: , Rfl:     metoprolol tartrate (LOPRESSOR) 100 mg tablet, Take 1 tablet (100 mg total) by mouth every 12 (twelve) hours, Disp: 180 tablet, Rfl: 0    nystatin (MYCOSTATIN) ointment, Apply topically 2 (two) times a day (Patient not taking: Reported on 7/19/2021), Disp: 30 g, Rfl: 0    nystatin (MYCOSTATIN) powder, Apply topically 2 (two) times a day Groin and under breasts (Patient not taking: Reported on 7/19/2021), Disp: 15 g, Rfl: 0    pantoprazole (PROTONIX) 40 mg tablet, Take 40 mg by mouth daily (Patient not taking: Reported on 8/3/2021), Disp: , Rfl:     potassium chloride (MICRO-K) 10 MEQ CR capsule, Take 1 capsule (10 mEq total) by mouth 2 (two) times a day, Disp: 60 capsule, Rfl: 0   pravastatin (PRAVACHOL) 20 mg tablet, Take 1 tablet (20 mg total) by mouth daily, Disp: 90 tablet, Rfl: 1    primidone (MYSOLINE) 50 mg tablet, Take 1 tablet (50 mg total) by mouth every 12 (twelve) hours, Disp: 60 tablet, Rfl: 1    pyridoxine (B-6) 100 MG tablet, Take 100 mg by mouth daily, Disp: , Rfl:     sertraline (ZOLOFT) 50 mg tablet, Take 1 tablet (50 mg total) by mouth daily, Disp: 90 tablet, Rfl: 1      Review of Systems:  Have you recently had or currently have any of the following? If YES, what are you doing for the problem? · Fever, chills or unintended weight loss: No  · Sudden loss or change in your vision: No  · Nausea, vomiting or blood in your stool: No  · Painful or swollen joints: No  · Wheezing or cough: No  · Changing mole or non-healing wound: No  · Nosebleeds: No  · Excessive sweating: No  · Easy or prolonged bleeding? No  · Over the last 2 weeks, how often have you been bothered by the following problems? · Taking little interest or pleasure in doing things: 1 - Not at All  · Feeling down, depressed, or hopeless: 1 - Not at All  · Rapid heartbeat with epinephrine:  No    · FEMALES ONLY:    · Are you pregnant or planning to become pregnant? No  · Are you currently or planning to be nursing or breast feeding? No    · Any known allergies? Allergies   Allergen Reactions    Clarithromycin Confusion and Other (See Comments)         Physical Exam:     Was a chaperone (Derm Clinical Assistant) present throughout the entire Physical Exam? Yes     Did the Dermatology Team specifically  the patient on the importance of a Full Skin Exam to be sure that nothing is missed clinically?  Yes}  o Did the patient ultimately request or accept a Full Skin Exam?  Yes  o Did the patient specifically refuse to have the areas "under-the-bra" examined by the Dermatologist? No  o Did the patient specifically refuse to have the areas "under-the-underwear" examined by the Dermatologist? YES    CONSTITUTIONAL:   There were no vitals filed for this visit  PSYCH: Normal mood and affect  EYES: Normal conjunctiva  ENT: Normal lips and oral mucosa  CARDIOVASCULAR: No edema  RESPIRATORY: Normal respirations  HEME/LYMPH/IMMUNO:  No regional lymphadenopathy except as noted below in "ASSESSMENT AND PLAN BY DIAGNOSIS"    SKIN:  FULL ORGAN SYSTEM EXAM   Hair, Scalp, Ears, Face Normal except as noted below in Assessment   Neck, Cervical Chain Nodes Normal except as noted below in Assessment   Right Arm/Hand/Fingers Normal except as noted below in Assessment   Left Arm/Hand/Fingers Normal except as noted below in Assessment   Chest/Breasts/Axillae Viewed areas Normal except as noted below in Assessment   Abdomen, Umbilicus Normal except as noted below in Assessment   Back/Spine Normal except as noted below in Assessment   Groin/Genitalia/Buttocks NOT EXAMINED   Right Leg, Foot, Toes Normal except as noted below in Assessment   Left Leg, Foot, Toes Normal except as noted below in Assessment        Assessment and Plan by Diagnosis      1  CHERRY ANGIOMAS    Physical Exam:   Anatomic Location Affected:  chest   Morphological Description:  Scattered cherry red, 1-4 mm papules   Pertinent Positives:   Pertinent Negatives: Additional History of Present Condition:  Patient denies any changes  Assessment and Plan:  Based on a thorough discussion of this condition and the management approach to it (including a comprehensive discussion of the known risks, side effects and potential benefits of treatment), the patient (family) agrees to implement the following specific plan:   Benign, monitor for changes       2  PRURITUS    Physical Exam:   Anatomic Location Affected:  Trunk and extremities    Morphological Description:  No visible rash present   Pertinent Positives:   Pertinent Negatives:     Additional History of Present Condition:  Patient and daughter both states the itching has been going on for years  Patient does use lotion to help with the dryness  Assessment and Plan:  Based on a thorough discussion of this condition and the management approach to it (including a comprehensive discussion of the known risks, side effects and potential benefits of treatment), the patient (family) agrees to implement the following specific plan:   Start the triamcinolone ointment apply topically twice a day to itchy areas, avoiding the face and groin   Follow up in 6-8 weeks  Use moisturizer like Eucerin,Cerave or Aveeno Cream 3 times a day for the dry skin        Start using Dove moisturizer bar soap in the shower         ROSACEA    Physical Exam:   Anatomic Location Affected:  Right cheek    Morphological Description: Erythematous patch with papular component   Pertinent Positives:   Pertinent Negatives: Additional History of Present Condition:  Patient states it is occasionally itchy  Assessment and Plan:  Based on a thorough discussion of this condition and the management approach to it (including a comprehensive discussion of the known risks, side effects and potential benefits of treatment), the patient (family) agrees to implement the following specific plan:   Metronidazole gel apply topically twice a day to the areas on the face    Follow up in 6-8 weeks        ACTINIC KERATOSIS    Physical Exam:   Anatomic Location Affected:  Left dorsum hand    Morphological Description:  Red scaly papule     Additional History of Present Condition:  Patient does have a history of skin cancer       Assessment and Plan:  Based on a thorough discussion of this condition and the management approach to it (including a comprehensive discussion of the known risks, side effects and potential benefits of treatment), the patient (family) agrees to implement the following specific plan:     Cryotherapy done today on left hand     PROCEDURE:  DESTRUCTION OF PRE-MALIGNANT LESIONS  After a thorough discussion of treatment options and risk/benefits/alternatives (including but not limited to local pain, scarring, dyspigmentation, blistering, and possible superinfection), verbal and written consent were obtained and the aforementioned lesions were treated on with cryotherapy using liquid nitrogen x 1 cycle for 5-10 seconds   TOTAL NUMBER of 1 pre-malignant lesions were treated today on the ANATOMIC LOCATION: left dorsum of hand  The patient tolerated the procedure well, and after-care instructions were provided

## 2021-08-09 ENCOUNTER — PATIENT OUTREACH (OUTPATIENT)
Dept: FAMILY MEDICINE CLINIC | Facility: CLINIC | Age: 86
End: 2021-08-09

## 2021-08-09 NOTE — PROGRESS NOTES
Email dtr the number for Corrina Dubin [facility] will have to speak to them directly for pt to get assessed )    JAC: [facility]) [phone]  Www [facility] sent message to care team dept inquiring about geriatrician in [facility], and also sent message to RN BENJAMIN Echavarria about transportation companies  Looked online and located [facility] and [employer], and [employer] with the numbers  They provide stretcher and w/c transport  Awaiting response from care team about geriatrician, will follow up with [address] [place] [address] about this

## 2021-08-18 ENCOUNTER — OFFICE VISIT (OUTPATIENT)
Dept: FAMILY MEDICINE CLINIC | Facility: CLINIC | Age: 86
End: 2021-08-18
Payer: MEDICARE

## 2021-08-18 VITALS
TEMPERATURE: 99.9 F | RESPIRATION RATE: 18 BRPM | DIASTOLIC BLOOD PRESSURE: 80 MMHG | HEART RATE: 89 BPM | SYSTOLIC BLOOD PRESSURE: 130 MMHG | OXYGEN SATURATION: 98 %

## 2021-08-18 DIAGNOSIS — J06.9 UPPER RESPIRATORY TRACT INFECTION, UNSPECIFIED TYPE: Primary | ICD-10-CM

## 2021-08-18 PROCEDURE — 99213 OFFICE O/P EST LOW 20 MIN: CPT | Performed by: FAMILY MEDICINE

## 2021-08-18 RX ORDER — PRAVASTATIN SODIUM 20 MG
20 TABLET ORAL DAILY
COMMUNITY
Start: 2021-08-11 | End: 2021-08-18 | Stop reason: SDUPTHER

## 2021-08-18 RX ORDER — AMOXICILLIN AND CLAVULANATE POTASSIUM 875; 125 MG/1; MG/1
1 TABLET, FILM COATED ORAL EVERY 12 HOURS SCHEDULED
Qty: 14 TABLET | Refills: 0 | Status: SHIPPED | OUTPATIENT
Start: 2021-08-18 | End: 2021-08-25

## 2021-08-18 NOTE — PROGRESS NOTES
Assessment/Plan:       Diagnoses and all orders for this visit:    Upper respiratory tract infection, unspecified type  -     amoxicillin-clavulanate (Augmentin) 875-125 mg per tablet; Take 1 tablet by mouth every 12 (twelve) hours for 7 days        Subjective:      Patient ID: Jennifer Flanagan is a 81 y o  female  Cough  This is a new problem  The current episode started in the past 7 days  The problem has been gradually worsening  The cough is productive of sputum  Associated symptoms include headaches, myalgias, nasal congestion, postnasal drip and rhinorrhea  Pertinent negatives include no chest pain, chills, ear congestion, ear pain, fever, heartburn, hemoptysis, rash, sore throat, shortness of breath, sweats, weight loss or wheezing  She has tried OTC cough suppressant for the symptoms  The following portions of the patient's history were reviewed and updated as appropriate: allergies, current medications, past family history, past medical history, past social history, past surgical history and problem list     Review of Systems   Constitutional: Negative for chills, fever and weight loss  HENT: Positive for postnasal drip and rhinorrhea  Negative for ear pain and sore throat  Respiratory: Positive for cough  Negative for hemoptysis, shortness of breath and wheezing  Cardiovascular: Negative for chest pain  Gastrointestinal: Negative for heartburn  Musculoskeletal: Positive for myalgias  Skin: Negative for rash  Neurological: Positive for headaches  Objective:      /80   Pulse 89   Temp 99 9 °F (37 7 °C)   Resp 18   SpO2 98%          Physical Exam  Constitutional:       General: She is not in acute distress  Appearance: She is well-developed  She is not diaphoretic  Comments: Wheelchair bound   HENT:      Head: Normocephalic and atraumatic  Cardiovascular:      Rate and Rhythm: Normal rate and regular rhythm  Heart sounds: Normal heart sounds   No murmur heard  No friction rub  No gallop  Pulmonary:      Effort: Pulmonary effort is normal  No respiratory distress  Breath sounds: Normal breath sounds  No wheezing or rales  Chest:      Chest wall: No tenderness  Musculoskeletal:         General: No deformity  Normal range of motion  Cervical back: Normal range of motion and neck supple  Skin:     General: Skin is warm and dry  Neurological:      Mental Status: She is alert and oriented to person, place, and time  Psychiatric:         Behavior: Behavior normal          Thought Content:  Thought content normal          Judgment: Judgment normal

## 2021-08-25 ENCOUNTER — PATIENT OUTREACH (OUTPATIENT)
Dept: FAMILY MEDICINE CLINIC | Facility: CLINIC | Age: 86
End: 2021-08-25

## 2021-08-25 NOTE — PROGRESS NOTES
Per last email from pt's daughter Brandon Duran, she will look into resources provided  Goals complete and contact info provided for transportation and waiver services [facility])  no further needs at this time that SW can assist with  SW removed self from care team and closed episode

## 2021-08-28 ENCOUNTER — HOSPITAL ENCOUNTER (INPATIENT)
Facility: HOSPITAL | Age: 86
LOS: 2 days | Discharge: HOME HEALTH | DRG: 292 | End: 2021-08-31
Attending: EMERGENCY MEDICINE | Admitting: FAMILY MEDICINE
Payer: MEDICARE

## 2021-08-28 ENCOUNTER — APPOINTMENT (EMERGENCY)
Dept: RADIOLOGY | Facility: HOSPITAL | Age: 86
DRG: 292 | End: 2021-08-28
Attending: EMERGENCY MEDICINE
Payer: MEDICARE

## 2021-08-28 DIAGNOSIS — I50.40 COMBINED SYSTOLIC AND DIASTOLIC CONGESTIVE HEART FAILURE (HCC): ICD-10-CM

## 2021-08-28 DIAGNOSIS — F03.91 DEMENTIA WITH BEHAVIORAL DISTURBANCE (HCC): ICD-10-CM

## 2021-08-28 DIAGNOSIS — R07.9 CHEST PAIN: Primary | ICD-10-CM

## 2021-08-28 DIAGNOSIS — E11.9 DM (DIABETES MELLITUS), TYPE 2 (HCC): ICD-10-CM

## 2021-08-28 DIAGNOSIS — I48.19 PERSISTENT ATRIAL FIBRILLATION (HCC): ICD-10-CM

## 2021-08-28 LAB
ALBUMIN SERPL BCP-MCNC: 3.3 G/DL (ref 3.5–5)
ALP SERPL-CCNC: 91 U/L (ref 46–116)
ALT SERPL W P-5'-P-CCNC: 26 U/L (ref 12–78)
ANION GAP SERPL CALCULATED.3IONS-SCNC: 7 MMOL/L (ref 4–13)
APTT PPP: 32 SECONDS (ref 23–37)
AST SERPL W P-5'-P-CCNC: 27 U/L (ref 5–45)
BASOPHILS # BLD AUTO: 0.06 THOUSANDS/ΜL (ref 0–0.1)
BASOPHILS NFR BLD AUTO: 1 % (ref 0–1)
BILIRUB SERPL-MCNC: 0.28 MG/DL (ref 0.2–1)
BUN SERPL-MCNC: 23 MG/DL (ref 5–25)
CALCIUM ALBUM COR SERPL-MCNC: 9.1 MG/DL (ref 8.3–10.1)
CALCIUM SERPL-MCNC: 8.5 MG/DL (ref 8.3–10.1)
CHLORIDE SERPL-SCNC: 101 MMOL/L (ref 100–108)
CHOLEST SERPL-MCNC: 139 MG/DL (ref 50–200)
CO2 SERPL-SCNC: 31 MMOL/L (ref 21–32)
CREAT SERPL-MCNC: 1.02 MG/DL (ref 0.6–1.3)
EOSINOPHIL # BLD AUTO: 0.26 THOUSAND/ΜL (ref 0–0.61)
EOSINOPHIL NFR BLD AUTO: 3 % (ref 0–6)
ERYTHROCYTE [DISTWIDTH] IN BLOOD BY AUTOMATED COUNT: 14.6 % (ref 11.6–15.1)
EST. AVERAGE GLUCOSE BLD GHB EST-MCNC: 151 MG/DL
GFR SERPL CREATININE-BSD FRML MDRD: 50 ML/MIN/1.73SQ M
GLUCOSE SERPL-MCNC: 111 MG/DL (ref 65–140)
GLUCOSE SERPL-MCNC: 126 MG/DL (ref 65–140)
GLUCOSE SERPL-MCNC: 132 MG/DL (ref 65–140)
GLUCOSE SERPL-MCNC: 156 MG/DL (ref 65–140)
GLUCOSE SERPL-MCNC: 187 MG/DL (ref 65–140)
HBA1C MFR BLD: 6.9 %
HCT VFR BLD AUTO: 34.4 % (ref 34.8–46.1)
HDLC SERPL-MCNC: 38 MG/DL
HGB BLD-MCNC: 10.7 G/DL (ref 11.5–15.4)
IMM GRANULOCYTES # BLD AUTO: 0.02 THOUSAND/UL (ref 0–0.2)
IMM GRANULOCYTES NFR BLD AUTO: 0 % (ref 0–2)
INR PPP: 1.33 (ref 0.84–1.19)
LDLC SERPL CALC-MCNC: 79 MG/DL (ref 0–100)
LYMPHOCYTES # BLD AUTO: 1.95 THOUSANDS/ΜL (ref 0.6–4.47)
LYMPHOCYTES NFR BLD AUTO: 24 % (ref 14–44)
MCH RBC QN AUTO: 28 PG (ref 26.8–34.3)
MCHC RBC AUTO-ENTMCNC: 31.1 G/DL (ref 31.4–37.4)
MCV RBC AUTO: 90 FL (ref 82–98)
MONOCYTES # BLD AUTO: 0.67 THOUSAND/ΜL (ref 0.17–1.22)
MONOCYTES NFR BLD AUTO: 8 % (ref 4–12)
NEUTROPHILS # BLD AUTO: 5.11 THOUSANDS/ΜL (ref 1.85–7.62)
NEUTS SEG NFR BLD AUTO: 64 % (ref 43–75)
NONHDLC SERPL-MCNC: 101 MG/DL
NRBC BLD AUTO-RTO: 0 /100 WBCS
NT-PROBNP SERPL-MCNC: 2569 PG/ML
PLATELET # BLD AUTO: 265 THOUSANDS/UL (ref 149–390)
PMV BLD AUTO: 10 FL (ref 8.9–12.7)
POTASSIUM SERPL-SCNC: 3.6 MMOL/L (ref 3.5–5.3)
PROT SERPL-MCNC: 7.8 G/DL (ref 6.4–8.2)
PROTHROMBIN TIME: 16.4 SECONDS (ref 11.6–14.5)
RBC # BLD AUTO: 3.82 MILLION/UL (ref 3.81–5.12)
SODIUM SERPL-SCNC: 139 MMOL/L (ref 136–145)
TRIGL SERPL-MCNC: 111 MG/DL
TROPONIN I SERPL-MCNC: <0.02 NG/ML
TROPONIN I SERPL-MCNC: <0.02 NG/ML
WBC # BLD AUTO: 8.07 THOUSAND/UL (ref 4.31–10.16)

## 2021-08-28 PROCEDURE — 84484 ASSAY OF TROPONIN QUANT: CPT | Performed by: NURSE PRACTITIONER

## 2021-08-28 PROCEDURE — 85025 COMPLETE CBC W/AUTO DIFF WBC: CPT | Performed by: EMERGENCY MEDICINE

## 2021-08-28 PROCEDURE — 83036 HEMOGLOBIN GLYCOSYLATED A1C: CPT | Performed by: NURSE PRACTITIONER

## 2021-08-28 PROCEDURE — 85610 PROTHROMBIN TIME: CPT | Performed by: EMERGENCY MEDICINE

## 2021-08-28 PROCEDURE — 93005 ELECTROCARDIOGRAM TRACING: CPT

## 2021-08-28 PROCEDURE — 82948 REAGENT STRIP/BLOOD GLUCOSE: CPT

## 2021-08-28 PROCEDURE — 71045 X-RAY EXAM CHEST 1 VIEW: CPT

## 2021-08-28 PROCEDURE — 85730 THROMBOPLASTIN TIME PARTIAL: CPT | Performed by: EMERGENCY MEDICINE

## 2021-08-28 PROCEDURE — 84484 ASSAY OF TROPONIN QUANT: CPT | Performed by: EMERGENCY MEDICINE

## 2021-08-28 PROCEDURE — 80053 COMPREHEN METABOLIC PANEL: CPT | Performed by: EMERGENCY MEDICINE

## 2021-08-28 PROCEDURE — 99204 OFFICE O/P NEW MOD 45 MIN: CPT | Performed by: INTERNAL MEDICINE

## 2021-08-28 PROCEDURE — 36415 COLL VENOUS BLD VENIPUNCTURE: CPT | Performed by: EMERGENCY MEDICINE

## 2021-08-28 PROCEDURE — 83880 ASSAY OF NATRIURETIC PEPTIDE: CPT | Performed by: EMERGENCY MEDICINE

## 2021-08-28 PROCEDURE — 99285 EMERGENCY DEPT VISIT HI MDM: CPT

## 2021-08-28 PROCEDURE — 99285 EMERGENCY DEPT VISIT HI MDM: CPT | Performed by: EMERGENCY MEDICINE

## 2021-08-28 PROCEDURE — 1123F ACP DISCUSS/DSCN MKR DOCD: CPT | Performed by: INTERNAL MEDICINE

## 2021-08-28 PROCEDURE — 99220 PR INITIAL OBSERVATION CARE/DAY 70 MINUTES: CPT | Performed by: FAMILY MEDICINE

## 2021-08-28 PROCEDURE — 80061 LIPID PANEL: CPT | Performed by: NURSE PRACTITIONER

## 2021-08-28 RX ORDER — ASPIRIN 81 MG/1
81 TABLET ORAL DAILY
Status: DISCONTINUED | OUTPATIENT
Start: 2021-08-28 | End: 2021-08-31 | Stop reason: HOSPADM

## 2021-08-28 RX ORDER — METOPROLOL TARTRATE 100 MG/1
100 TABLET ORAL EVERY 12 HOURS SCHEDULED
Status: DISCONTINUED | OUTPATIENT
Start: 2021-08-28 | End: 2021-08-31 | Stop reason: HOSPADM

## 2021-08-28 RX ORDER — PRAVASTATIN SODIUM 20 MG
20 TABLET ORAL DAILY
Status: DISCONTINUED | OUTPATIENT
Start: 2021-08-28 | End: 2021-08-31 | Stop reason: HOSPADM

## 2021-08-28 RX ORDER — LOSARTAN POTASSIUM 25 MG/1
25 TABLET ORAL DAILY
Status: DISCONTINUED | OUTPATIENT
Start: 2021-08-28 | End: 2021-08-31 | Stop reason: HOSPADM

## 2021-08-28 RX ORDER — PYRIDOXINE HCL (VITAMIN B6) 50 MG
100 TABLET ORAL DAILY
Status: DISCONTINUED | OUTPATIENT
Start: 2021-08-28 | End: 2021-08-31 | Stop reason: HOSPADM

## 2021-08-28 RX ORDER — PRIMIDONE 50 MG/1
50 TABLET ORAL EVERY 12 HOURS SCHEDULED
Status: DISCONTINUED | OUTPATIENT
Start: 2021-08-28 | End: 2021-08-31 | Stop reason: HOSPADM

## 2021-08-28 RX ORDER — FUROSEMIDE 40 MG/1
40 TABLET ORAL 2 TIMES DAILY
Status: DISCONTINUED | OUTPATIENT
Start: 2021-08-28 | End: 2021-08-31 | Stop reason: HOSPADM

## 2021-08-28 RX ADMIN — APIXABAN 5 MG: 5 TABLET, FILM COATED ORAL at 17:20

## 2021-08-28 RX ADMIN — METOPROLOL TARTRATE 100 MG: 100 TABLET, FILM COATED ORAL at 22:37

## 2021-08-28 RX ADMIN — PRAVASTATIN SODIUM 20 MG: 20 TABLET ORAL at 08:26

## 2021-08-28 RX ADMIN — FUROSEMIDE 40 MG: 40 TABLET ORAL at 17:20

## 2021-08-28 RX ADMIN — LOSARTAN POTASSIUM 25 MG: 25 TABLET, FILM COATED ORAL at 08:26

## 2021-08-28 RX ADMIN — FUROSEMIDE 40 MG: 40 TABLET ORAL at 08:26

## 2021-08-28 RX ADMIN — METOPROLOL TARTRATE 100 MG: 100 TABLET, FILM COATED ORAL at 08:26

## 2021-08-28 RX ADMIN — ASPIRIN 81 MG: 81 TABLET, COATED ORAL at 08:26

## 2021-08-28 RX ADMIN — PRIMIDONE 50 MG: 50 TABLET ORAL at 08:25

## 2021-08-28 RX ADMIN — SERTRALINE HYDROCHLORIDE 50 MG: 50 TABLET ORAL at 08:25

## 2021-08-28 RX ADMIN — APIXABAN 5 MG: 5 TABLET, FILM COATED ORAL at 08:26

## 2021-08-28 RX ADMIN — INSULIN LISPRO 1 UNITS: 100 INJECTION, SOLUTION INTRAVENOUS; SUBCUTANEOUS at 11:55

## 2021-08-28 RX ADMIN — PYRIDOXINE HCL TAB 50 MG 100 MG: 50 TAB at 08:25

## 2021-08-28 RX ADMIN — PRIMIDONE 50 MG: 50 TABLET ORAL at 22:35

## 2021-08-28 NOTE — H&P
[address] [facility] 1935, 81 y o  female MRN: [id number]  Unit/Bed#: ED CT1 Encounter: [id number]  Primary Care Provider: Saul Garnica MD   Date and time admitted to hospital: 8/28/2021  2:06 AM    * Chest pain  Assessment & Plan  Patient woke from sleep with left-sided chest pain with radiation to the left arm  No associated nausea, vomiting, diaphoresis, shortness of breath, palpitations  Troponin negative  EKG with no acute ischemic changes  ROGER 5    · Admit to Medicine  · Serial EKGs and troponin  · Aspirin, statin  · Cardiology consultation  · Lipid panel and hemoglobin A1c in the a m  Benign essential tremor  Assessment & Plan  Continue primidone     Hypertension  Assessment & Plan  Continue losartan and metoprolol     CHF (congestive heart failure) ([facility])  Assessment & Plan  Wt Readings from Last 3 Encounters:   08/28/21 88 kg (194 lb)   08/05/21 88 kg (194 lb)   08/03/21 88 kg (194 lb)     Denies weight gain, shortness of breath, mild pedal edema  · Admit to telemetry  · Continue lasix 40mg BID  · Daily Weight  · Intake and output  · Low sodium diet        DM (diabetes mellitus), type 2 ([facility] [address] )  Assessment & Plan  Lab Results   Component Value Date    HGBA1C 6 7 (H) 03/19/2021       No results for input(s): POCGLU in the last 72 hours  Blood Sugar Average: Last 72 hrs:  Accuchecks AC/HS with insulin sliding scale     CAD (coronary artery disease)  Assessment & Plan  Post CABG  Continue aspirin, statin, beta blocker    Persistent atrial fibrillation (HCC)  Assessment & Plan  Continue lopressor and eliquis     Tachy-gale syndrome ([facility] [address] )  Assessment & Plan  Post PPM     VTE Prophylaxis: Enoxaparin (Lovenox)  / sequential compression device   Code Status: Prior    Anticipated Length of Stay:  Patient will be admitted on an Observation basis with an anticipated length of stay of less than 2 midnights     Justification for Hospital Stay: chest pain     Total Time for Visit, including Counseling / Coordination of Care: 15 minutes  Greater than 50% of this total time spent on direct patient counseling and coordination of care  Chief Complaint:   Chest Pain (Patient comes via EMS for cheest pain,mostly left sided)      History of Present Illness:    Safia Vance is a 81 y o  female with a PMH of diabetes, hypertension, CHF, CAD post CABG, atrial fibrillation, benign essential tremor who presents with complaints of chest pain  Patient has been in her usual state of health  She woke up in the middle of the night with left sided chest pain with some radiation to her left arm  Pain resolved in the ER  No associated dizziness, nausea, vomiting, EKG revealed atrial fibrillation with no acute changes  Labs unremarkable  ROGER score of 5  Patient is admitted for observation  Review of Systems:    Review of Systems   Constitutional: Negative  HENT: Negative  Eyes: Negative  Respiratory: Positive for chest tightness  Cardiovascular: Positive for chest pain  Gastrointestinal: Negative  Endocrine: Negative  Genitourinary: Negative  Musculoskeletal: Negative  Skin: Negative  Allergic/Immunologic: Negative  Neurological: Negative  Hematological: Negative  Psychiatric/Behavioral: Negative          Past Medical and Surgical History:     Past Medical History:   Diagnosis Date    A-fib [facility])     Benign essential tremor     CAD (coronary artery disease)     Chest pain 12/19/2020    CHF (congestive heart failure) (HCC)     Chronic pulmonary thromboembolism syndrome ([facility] [address] ) 4/2/2021    Diabetes mellitus ([facility] [address] )     Frequent UTI     Hypertension     UTI (urinary tract infection) 9/24/2020       Past Surgical History:   Procedure Laterality Date    CARDIAC SURGERY      CORONARY ARTERY BYPASS GRAFT      IR PICC PLACEMENT SINGLE LUMEN  11/07/2019       Meds/Allergies:    Prior to Admission medications    Medication Sig Start Date End Date Taking?  Authorizing Provider   apixaban (Eliquis) 5 mg Take 5 mg by mouth 2 (two) times a day     Historical Provider, MD   aspirin (ECOTRIN LOW STRENGTH) 81 mg EC tablet Take 81 mg by mouth daily     Historical Provider, MD   camphopatricia-menthol Murmalvin Miri) lotion Apply topically as needed for itching 8/3/21   Dajuan Adair MD   clobetasol (TEMOVATE) 0 05 % ointment Apply topically 2 (two) times a day Peas sized amount to affected skin 5/19/21   Jamaica Pearson MD   desloratadine (CLARINEX) 5 MG tablet Take 5 mg by mouth daily 3/23/21   Historical Provider, MD   docusate sodium (COLACE) 100 mg capsule Take 100 mg by mouth daily    Historical Provider, MD   furosemide (LASIX) 40 mg tablet Take 1 tablet (40 mg total) by mouth 2 (two) times a day  Patient taking differently: Take 40 mg by mouth 2 (two) times a day Take 1 tablet twice daily 6/8/21   Jaylon Card, DO   glimepiride (AMARYL) 4 mg tablet Take 1 tablet (4 mg total) by mouth daily with breakfast 4/15/21   Jaylon Card, DO   Glucosamine-Chondroitin--542-870 MG TABS Take 1 tablet by mouth    Historical Provider, MD   losartan (COZAAR) 25 mg tablet Take 25 mg by mouth daily    Historical Provider, MD   metFORMIN (GLUCOPHAGE) 500 mg tablet Take 500 mg by mouth daily with breakfast    Historical Provider, MD   metoprolol tartrate (LOPRESSOR) 100 mg tablet Take 1 tablet (100 mg total) by mouth every 12 (twelve) hours 7/8/21 10/6/21  Jaylon Card, DO   metroNIDAZOLE (METROCREAM) 0 75 % cream Apply topically 2 (two) times a day To the face 8/5/21   Felicia Castillo MD   nystatin (MYCOSTATIN) ointment Apply topically 2 (two) times a day  Patient not taking: Reported on 7/19/2021 5/19/21   Jamaica Pearson MD   nystatin (MYCOSTATIN) powder Apply topically 2 (two) times a day Groin and under breasts  Patient not taking: Reported on 7/19/2021 9/27/20   Emmie Tillman PA-C   pantoprazole (PROTONIX) 40 mg tablet Take 40 mg by mouth daily  Patient not taking: Reported on 8/3/2021 5/30/21   Historical Provider, MD   potassium chloride (MICRO-K) 10 MEQ CR capsule Take 1 capsule (10 mEq total) by mouth 2 (two) times a day  Patient not taking: Reported on 8/18/2021 5/3/21 8/3/21  Jose Street MD   pravastatin (PRAVACHOL) 20 mg tablet Take 1 tablet (20 mg total) by mouth daily 8/3/21   Aditya Berg MD   primidone (MYSOLINE) 50 mg tablet Take 1 tablet (50 mg total) by mouth every 12 (twelve) hours 6/8/21   Hedda Generous, DO   pyridoxine (B-6) 100 MG tablet Take 100 mg by mouth daily    Historical Provider, MD   sertraline (ZOLOFT) 50 mg tablet Take 1 tablet (50 mg total) by mouth daily 4/15/21   Hedda Generous, DO   triamcinolone (KENALOG) 0 1 % cream Apply twice a day to areas of itchy skin only  Avoid normal skin, the face and the groin  8/5/21   Gaston Herman MD       Allergies: Allergies   Allergen Reactions    Clarithromycin Confusion and Other (See Comments)       Social History:     Marital Status: Single   Substance Use History:   Social History     Substance and Sexual Activity   Alcohol Use Not Currently     Social History     Tobacco Use   Smoking Status Never Smoker   Smokeless Tobacco Never Used     Social History     Substance and Sexual Activity   Drug Use Never       Family History:    Family History   Problem Relation Age of Onset    Heart disease Father        Physical Exam:     Vitals:   Blood Pressure: 141/67 (08/28/21 0415)  Pulse: 82 (08/28/21 0415)  Temperature: 98 4 °F (36 9 °C) (08/28/21 0209)  Temp Source: Tympanic (08/28/21 0209)  Respirations: 21 (08/28/21 0415)  Height: 5' (152 4 cm) (08/28/21 0209)  Weight - Scale: 88 kg (194 lb) (08/28/21 0209)  SpO2: 97 % (08/28/21 0415)    Physical Exam  Vitals and nursing note reviewed  Constitutional:       Appearance: Normal appearance  HENT:      Head: Normocephalic  Nose: Nose normal    Eyes:      Extraocular Movements: Extraocular movements intact     Cardiovascular:      Rate and Rhythm: Normal rate and regular rhythm  Pulses: Normal pulses  Heart sounds: No murmur heard  Pulmonary:      Effort: Pulmonary effort is normal  No respiratory distress  Breath sounds: Normal breath sounds  No wheezing  Abdominal:      General: Abdomen is flat  Bowel sounds are normal       Palpations: Abdomen is soft  Musculoskeletal:         General: Swelling (trace) present  Normal range of motion  Skin:     General: Skin is warm and dry  Neurological:      General: No focal deficit present  Mental Status: She is alert and oriented to person, place, and time  Psychiatric:         Mood and Affect: Mood normal          Behavior: Behavior normal            Additional Data:     Lab Results: I have personally reviewed pertinent reports  Results from last 7 days   Lab Units 08/28/21  0213   WBC Thousand/uL 8 07   HEMOGLOBIN g/dL 10 7*   HEMATOCRIT % 34 4*   PLATELETS Thousands/uL 265   NEUTROS PCT % 64     Results from last 7 days   Lab Units 08/28/21  0213   SODIUM mmol/L 139   POTASSIUM mmol/L 3 6   CHLORIDE mmol/L 101   CO2 mmol/L 31   BUN mg/dL 23   CREATININE mg/dL 1 02   CALCIUM mg/dL 8 5   TOTAL BILIRUBIN mg/dL 0 28   ALK PHOS U/L 91   ALT U/L 26   AST U/L 27     Results from last 7 days   Lab Units 08/28/21  0214   INR  1 33*     Results from last 7 days   Lab Units 08/28/21  0213   TROPONIN I ng/mL <0 02               Imaging: I have personally reviewed pertinent reports  XR chest 1 view portable    (Results Pending)       XR chest 1 view portable    (Results Pending)       EKG, Pathology, and Other Studies Reviewed on Admission:   · EKG: atrial fibrillation     Allscripts / Epic Records Reviewed: Yes     ** Please Note: This note has been constructed using a voice recognition system   **

## 2021-08-28 NOTE — CASE MANAGEMENT
LOS - 1 day (obs)    SW met briefly with pt and son, Ryder, to assess needs and discuss plans  Discussed goals of making sure pt's needs are met upon discharge and that Freedom of Choice is offered  Also explained Observation Status notice  Pt signed and copy given  Copy also placed in scan bin for chart  Pt recently moved to [place] to live with her daughter  Address updated in demographic section  Prior to that pt was living with son in [place]  Per pt she is relatively independent with ADLs and mobility  Pt has a cane and a walker at home, doesn't use routinely  Pt has had home care in the past but not for long, no agency name given  No STR  No MH or D&A issues  Pt's PCP is Dr Stephan Harmon MD   Preferred pharmacy is [facility]  Discussed discharge plans with pt and son  Pt is planning to have stress test on Monday  Upon discharge pt is planning on returning home with her daughter  No discharge needs expressed or anticipated by pt at this time  Offered support in future if needed  Will follow to monitor progress and assist with planning if needed

## 2021-08-28 NOTE — ASSESSMENT & PLAN NOTE
Wt Readings from Last 3 Encounters:   08/28/21 88 kg (194 lb)   08/05/21 88 kg (194 lb)   08/03/21 88 kg (194 lb)     Denies weight gain, shortness of breath, mild pedal edema  · Admit to telemetry  · Continue lasix 40mg BID  · Daily Weight  · Intake and output  · Low sodium diet

## 2021-08-28 NOTE — CONSULTS
CARDIOLOGY CONSULTATION  Westley Lawrence 81 y o  female MRN: [id number]  Unit/Bed#: [address] [place] [address] Encounter: [id number]      History of Present Illness   PCP: Genaro Brady MD  Date of Admission:  8/28/2021  Date of Consultation: 08/28/21  Physician Requesting Consult: Dilan Pink DO    Reason for Consult / Principal Problem:   Chest pain    Assessment/Plan    chest pain in the setting of prior coronary artery bypass grafting/ coronary artery disease-  Ruled out for acute coronary syndrome  Patient reports having chest discomfort at rest    No recurrence since inpatient  Given multiple risk factors plan for nuclear stress test on Monday to evaluate  If with recurrence of chest pain recommend starting full anticoagulation  Acute on chronic combined heart failure EF 40-45 percent on last echocardiogram with evidence of mild lower extremity edema  Patient states her weight has been stable  Started on a IV diuresis  Her BNP is lower than prior  Recommend transitioning to p o  torsemide 40 milligrams tomorrow the better volume status  Continue metoprolol 100 milligrams twice a day  Continue losartan therapy     chronic atrial fibrillation in the setting of tachy-gale syndrome currently with pacemaker  Continue her Eliquis plus metoprolol  HPI: Westley Lawrence is a 81y o  year old female who presents with   Episode of chest pain waking up from sleep  Patient states having left-sided chest pain with radiation to her arm  She states having severe discomfort which did not relieve till she came to the ER  She has not had recurrence  She was ruled out for acute coronary syndrome  She states using a walker for ambulation  She has chronic lower extremity edema  She is compliant with her diuretic therapy  She denies having major weight change        Historical Information   Past Medical History:   Diagnosis Date    A-fib [facility])     Benign essential tremor     CAD (coronary artery disease)     Chest pain 12/19/2020    CHF (congestive heart failure) (HCC)     Chronic pulmonary thromboembolism syndrome ([facility] ) 4/2/2021    Diabetes mellitus ([facility] )     Frequent UTI     Hypertension     UTI (urinary tract infection) 9/24/2020     Past Surgical History:   Procedure Laterality Date    CARDIAC SURGERY      CORONARY ARTERY BYPASS GRAFT      IR PICC PLACEMENT SINGLE LUMEN  11/07/2019     Social History     Substance and Sexual Activity   Alcohol Use Not Currently     Social History     Substance and Sexual Activity   Drug Use Never     Social History     Tobacco Use   Smoking Status Never Smoker   Smokeless Tobacco Never Used     Family History   Problem Relation Age of Onset    Heart disease Father        Meds/Allergies   Prior to Admission medications    Medication Sig Start Date End Date Taking?  Authorizing Provider   apixaban (Eliquis) 5 mg Take 5 mg by mouth 2 (two) times a day     Historical Provider, MD   aspirin (ECOTRIN LOW STRENGTH) 81 mg EC tablet Take 81 mg by mouth daily     Historical Provider, MD   camphopatricia-menthol Cristinamini Palacios) lotion Apply topically as needed for itching 8/3/21   Adrián Huff MD   clobetasol (TEMOVATE) 0 05 % ointment Apply topically 2 (two) times a day Peas sized amount to affected skin 5/19/21   Bernardo Cordoba MD   desloratadine (CLARINEX) 5 MG tablet Take 5 mg by mouth daily 3/23/21   Historical Provider, MD   docusate sodium (COLACE) 100 mg capsule Take 100 mg by mouth daily    Historical Provider, MD   furosemide (LASIX) 40 mg tablet Take 1 tablet (40 mg total) by mouth 2 (two) times a day  Patient taking differently: Take 40 mg by mouth 2 (two) times a day Take 1 tablet twice daily 6/8/21   Rachel Quezada DO   glimepiride (AMARYL) 4 mg tablet Take 1 tablet (4 mg total) by mouth daily with breakfast 4/15/21   Rachel Quezada DO   Glucosamine-Chondroitin--366-952 MG TABS Take 1 tablet by mouth    Historical Provider, MD   losartan (COZAAR) 25 mg tablet Take 25 mg by mouth daily    Historical Provider, MD   metFORMIN (GLUCOPHAGE) 500 mg tablet Take 500 mg by mouth daily with breakfast    Historical Provider, MD   metoprolol tartrate (LOPRESSOR) 100 mg tablet Take 1 tablet (100 mg total) by mouth every 12 (twelve) hours 7/8/21 10/6/21  Gregor Conley DO   metroNIDAZOLE (METROCREAM) 0 75 % cream Apply topically 2 (two) times a day To the face 8/5/21   Gerson Borrego MD   nystatin (MYCOSTATIN) ointment Apply topically 2 (two) times a day  Patient not taking: Reported on 7/19/2021 5/19/21   Yoan Sandhu MD   nystatin (MYCOSTATIN) powder Apply topically 2 (two) times a day Groin and under breasts  Patient not taking: Reported on 7/19/2021 9/27/20   Rigoberto Aldridge PA-C   pantoprazole (PROTONIX) 40 mg tablet Take 40 mg by mouth daily  Patient not taking: Reported on 8/3/2021 5/30/21   Historical Provider, MD   potassium chloride (MICRO-K) 10 MEQ CR capsule Take 1 capsule (10 mEq total) by mouth 2 (two) times a day  Patient not taking: Reported on 8/18/2021 5/3/21 8/3/21  Karey Hyde MD   pravastatin (PRAVACHOL) 20 mg tablet Take 1 tablet (20 mg total) by mouth daily 8/3/21   Claudia Andre MD   primidone (MYSOLINE) 50 mg tablet Take 1 tablet (50 mg total) by mouth every 12 (twelve) hours 6/8/21   Gregor Conley DO   pyridoxine (B-6) 100 MG tablet Take 100 mg by mouth daily    Historical Provider, MD   sertraline (ZOLOFT) 50 mg tablet Take 1 tablet (50 mg total) by mouth daily 4/15/21   Gregor Conley DO   triamcinolone (KENALOG) 0 1 % cream Apply twice a day to areas of itchy skin only  Avoid normal skin, the face and the groin   8/5/21   Gerson Borrego MD     Current Facility-Administered Medications   Medication Dose Route Frequency Provider Last Rate Last Admin    apixaban (ELIQUIS) tablet 5 mg  5 mg Oral BID JAMES Garcia   5 mg at 08/28/21 0826    aspirin (ECOTRIN LOW STRENGTH) EC tablet 81 mg  81 mg Oral Daily Cristi Mesa, CRNP   81 mg at 08/28/21 0826    furosemide (LASIX) tablet 40 mg  40 mg Oral BID [facility] Joh[facility], CRNP   40 mg at 08/28/21 0151    insulin lispro (HumaLOG) 100 units/mL subcutaneous injection 1-6 Units  1-6 Units Subcutaneous TID AC Claudia Johanna CRNP   1 Units at 08/28/21 1155    insulin lispro (HumaLOG) 100 units/mL subcutaneous injection 1-6 Units  1-6 Units Subcutaneous HS CarterDELMIS PearlNP        [START ON 8/29/2021] insulin lispro (HumaLOG) 100 units/mL subcutaneous injection 1-6 Units  1-6 Units Subcutaneous 0[address] [facility] JAMES Spencer        losartan (COZAAR) tablet 25 mg  25 mg Oral Daily [facility], CRNP   25 mg at 08/28/21 1415    metoprolol tartrate (LOPRESSOR) tablet 100 mg  100 mg Oral Q12H [facility], CRNP   100 mg at 08/28/21 7650    pravastatin (PRAVACHOL) tablet 20 mg  20 mg Oral Daily [facility], CRNP   20 mg at 08/28/21 1327    primidone (MYSOLINE) tablet 50 mg  50 mg Oral Q12H [facility], CRNP   50 mg at 08/28/21 0825    pyridoxine (VITAMIN B6) tablet 100 mg  100 mg Oral Daily [facility], CRNP   100 mg at 08/28/21 0825    sertraline (ZOLOFT) tablet 50 mg  50 mg Oral Daily [facility], CRNP   50 mg at 08/28/21 0825     Allergies   Allergen Reactions    Clarithromycin Confusion and Other (See Comments)       Review of systems  CONSTITUTIONAL:  As per hpi  HEENT:  Eyes:  No visual loss, blurred vision, double vision or yellow sclerae  Ears, Nose, Throat:  No hearing loss, sneezing, congestion, runny nose or sore throat  SKIN:  No rash or itching  CARDIOVASCULAR:  As per hpi  RESPIRATORY:  As per hpi  GASTROINTESTINAL:  No anorexia, nausea, vomiting or diarrhea  No abdominal pain or blood  GENITOURINARY:  Burning on urination  No flank pain  NEUROLOGICAL:  No headache, dizziness, syncope, paralysis, ataxia, numbness or tingling in the extremities  No change in bowel or bladder control    MUSCULOSKELETAL:  No muscle, back pain, joint pain or stiffness  HEMATOLOGIC:  No anemia, bleeding or bruising  LYMPHATICS:  No enlarged nodes  No history of splenectomy  PSYCHIATRIC:  No active suicidal or homicidal ideation  ENDOCRINOLOGIC:  No reports of sweating, cold or heat intolerance  No polyuria or polydipsia  ALLERGIES:  No history of asthma, hives, eczema or rhinitis  More than 10 systems reviewed and otherwise noncontributory  Objective   Vitals: Blood pressure 136/57, pulse 67, temperature 98 5 °F (36 9 °C), resp  rate 17, height 4' 11" (1 499 m), weight 86 8 kg (191 lb 5 8 oz), SpO2 96 %  Blood pressure 136/57, pulse 67, temperature 98 5 °F (36 9 °C), resp  rate 17, height 4' 11" (1 499 m), weight 86 8 kg (191 lb 5 8 oz), SpO2 96 %  Body mass index is 38 65 kg/m²  BP Readings from Last 3 Encounters:   08/28/21 136/57   08/18/21 130/80   07/19/21 124/68     Orthostatic Blood Pressures      Most Recent Value   Blood Pressure  136/57 filed at 08/28/2021 1531   Patient Position - Orthostatic VS  Lying filed at 08/28/2021 0415          Intake/Output Summary (Last 24 hours) at 8/28/2021 1610  Last data filed at 8/28/2021 1401  Gross per 24 hour   Intake 900 ml   Output --   Net 900 ml     Invasive Devices     Peripheral Intravenous Line            Peripheral IV 08/28/21 Right Forearm <1 day                Physical Exam  Constitutional:       General: She is not in acute distress  Appearance: She is well-developed  She is ill-appearing  She is not diaphoretic  HENT:      Head: Normocephalic and atraumatic  Right Ear: External ear normal       Left Ear: External ear normal       Nose: Nose normal       Mouth/Throat:      Pharynx: No oropharyngeal exudate  Eyes:      General: No scleral icterus  Right eye: No discharge  Left eye: No discharge  Conjunctiva/sclera: Conjunctivae normal       Pupils: Pupils are equal, round, and reactive to light  Neck:      Thyroid: No thyromegaly  Vascular: No JVD  Trachea: No tracheal deviation  Cardiovascular:      Rate and Rhythm: Normal rate  Rhythm irregular  Pulses: Intact distal pulses  Heart sounds: Murmur heard  No friction rub  Gallop present  Pulmonary:      Effort: Pulmonary effort is normal  No respiratory distress  Breath sounds: Normal breath sounds  No stridor  No wheezing or rales  Chest:      Chest wall: No tenderness  Abdominal:      General: Bowel sounds are normal  There is no distension  Palpations: Abdomen is soft  There is no mass  Tenderness: There is no abdominal tenderness  There is no guarding or rebound  Genitourinary:     Comments: No CVA tenderness  Musculoskeletal:         General: Swelling and deformity present  No tenderness  Cervical back: Normal range of motion  Skin:     General: Skin is warm and dry  Findings: No erythema or rash  Neurological:      Mental Status: She is alert and oriented to person, place, and time  Motor: No abnormal muscle tone  Deep Tendon Reflexes: Reflexes normal    Psychiatric:         Behavior: Behavior normal          Thought Content: Thought content normal          Judgment: Judgment normal          Lab Results:  I Have Reviewed All Lab Data Below:  Results from last 7 days   Lab Units 08/28/21  0213   WBC Thousand/uL 8 07   HEMOGLOBIN g/dL 10 7*   HEMATOCRIT % 34 4*   PLATELETS Thousands/uL 265   NEUTROS PCT % 64   LYMPHS PCT % 24   MONOS PCT % 8   EOS PCT % 3     Results from last 7 days   Lab Units 08/28/21  0213   POTASSIUM mmol/L 3 6   CHLORIDE mmol/L 101   CO2 mmol/L 31   BUN mg/dL 23   CREATININE mg/dL 1 02   CALCIUM mg/dL 8 5   ALK PHOS U/L 91   ALT U/L 26   AST U/L 27     Troponins:   Results from last 7 days   Lab Units 08/28/21  0500 08/28/21  0213   TROPONIN I ng/mL <0 02 <0 02       CBC with diff:   Results from last 7 days   Lab Units 08/28/21  0213   WBC Thousand/uL 8 07   HEMOGLOBIN g/dL 10 7*   HEMATOCRIT % 34 4*   MCV fL 90   PLATELETS Thousands/uL 265   MCH pg 28 0   MCHC g/dL 31 1*   RDW % 14 6   MPV fL 10 0   NRBC AUTO /100 WBCs 0       CMP:   Results from last 7 days   Lab Units 21  0213   SODIUM mmol/L 139   POTASSIUM mmol/L 3 6   CHLORIDE mmol/L 101   CO2 mmol/L 31   ANION GAP mmol/L 7   BUN mg/dL 23   CREATININE mg/dL 1 02   CALCIUM mg/dL 8 5   AST U/L 27   ALT U/L 26   ALK PHOS U/L 91   TOTAL PROTEIN g/dL 7 8   ALBUMIN g/dL 3 3*   TOTAL BILIRUBIN mg/dL 0 28   EGFR ml/min/1 73sq m 50   GLUCOSE RANDOM mg/dL 187*     Magnesium:       NT-proBNP:   Recent Labs     21  0213   NTBNP 2,569*        Coags:   Results from last 7 days   Lab Units 21  0214   PTT seconds 32   INR  1 33*       Troponins:   Results from last 7 days   Lab Units 21  0500 21  0213   TROPONIN I ng/mL <0 02 <0 02       Lipid Profile:   Results from last 7 days   Lab Units 21  0535   CHOLESTEROL mg/dL 139   TRIGLYCERIDES mg/dL 111   HDL mg/dL 38*   LDL CALC mg/dL 79                              Lab Results   Component Value Date    CHOLESTEROL 139 2021    HDL 38 (L) 2021    LDLCALC 79 2021    TRIG 111 2021       TSH:        Hgb A1c:   Results from last 7 days   Lab Units 21  0535   HEMOGLOBIN A1C % 6 9*       Imaging: I have personally reviewed pertinent films in PACS    Cardiac testing:   Results for orders placed during the hospital encounter of 20    Echo complete with contrast if indicated    Narrative  [address] [place], [place] [address] [phone]    Transthoracic Echocardiogram  2D, M-mode, Doppler, and Color Doppler    Study date:  21-Dec-2020    Patient: Naina Patel  MR number: [id number]  Account number: [de-identified]  : 1935  Age: 81 years  Gender: Female  Status: Inpatient  Location: Bedside  Height: 60 in  Weight: 175 lb  BP: 127/ 67 mmHg    Indications: Assess left ventricular function      Diagnoses: I42 9 - Cardiomyopathy, unspecified    Sonographer:  Jazlyn Medina RDCS  Referring Physician:  JAMES Mejia  Group:  [facility]  Interpreting Physician:  Lester Blanc MD    SUMMARY    LEFT VENTRICLE:  Systolic function was mildly reduced  LVEF estimated 40-45%  There was mild diffuse hypokinesis  Wall thickness was at the upper limits of normal     LEFT ATRIUM:  The atrium was mildly dilated  RIGHT ATRIUM:  The atrium was mildly dilated  MITRAL VALVE:  There was mild annular calcification  There was moderate diffuse thickening  TRICUSPID VALVE:  There was mild regurgitation  PULMONIC VALVE:  There was trace regurgitation  HISTORY: PRIOR HISTORY: CHF, hypertension, DM, AFIB, tachy-gale syndrome, CAD, CP    PROCEDURE: The procedure was performed at the bedside  This was a routine study  The transthoracic approach was used  The study included complete 2D imaging, M-mode, complete spectral Doppler, and color Doppler  The heart rate was 93 bpm,  at the start of the study  Images were obtained from the parasternal, apical, subcostal, and suprasternal notch acoustic windows  Image quality was adequate  LEFT VENTRICLE: Size was normal  Systolic function was mildly reduced  LVEF estimated 40-45% There was mild diffuse hypokinesis  Wall thickness was at the upper limits of normal  DOPPLER: Transmitral flow pattern: atrial fibrillation  RIGHT VENTRICLE: The size was normal  Systolic function was normal     LEFT ATRIUM: The atrium was mildly dilated  RIGHT ATRIUM: The atrium was mildly dilated  MITRAL VALVE: There was mild annular calcification  There was moderate diffuse thickening  AORTIC VALVE: The valve was trileaflet  DOPPLER: There was no evidence for stenosis  There was no regurgitation  TRICUSPID VALVE: DOPPLER: There was mild regurgitation  PULMONIC VALVE: Not well visualized  DOPPLER: There was trace regurgitation      PERICARDIUM: There was no pericardial effusion  AORTA: The root exhibited normal size  SYSTEMIC VEINS: IVC: The inferior vena cava was not well visualized  SYSTEM MEASUREMENT TABLES    2D mode  AoR Diam (2D): 31 mm  AoR Diam; Mean (2D): 31 mm  LA Dimension (2D): 43 mm  LA Dimension; Mean (2D): 43 mm  LA/Ao (2D): 1 4  EDV (2D-Cubed): 26562 mm3  EDV (BP): 760887 mm3  EF (2D-Cubed): 55 3 %  ESV (2D-Cubed): 51151 mm3  ESV (BP): 82893 mm3  FS (2D-Cubed): 23 5 %  FS (2D-Teich): 23 5 %  IVS/LVPW (2D): 0 9  IVSd (2D): 10 3 mm  IVSd; Mean chosen (2D): 10 3 mm  LVIDd (2D): 46 3 mm  LVIDd; Mean (2D): 46 3 mm  LVIDs (2D): 35 4 mm  LVIDs; Mean (2D): 35 4 mm  LVPWd (2D): 11 mm  LVPWd; Mean (2D): 11 mm  Left Ventricular Ejection Fraction; Method of Disks, Biplane; 2D mode;: 46 3 %  Left Ventricular Ejection Fraction; Teichholz; 2D mode;: 47 1 %  Left Ventricular End Diastolic Volume; Teichholz; 2D mode;: 13234 mm3  Left Ventricular End Systolic Volume; Teichholz; 2D mode;: 30949 mm3  SV (2D-Cubed): 74695 mm3  SV (BP): 31280 mm3  Stroke Volume; Teichholz; 2D mode;: 61172 mm3    Apical four chamber  EF (A4C): 50 2 %  LV MOD Diam; Recent value; End Diastole (A4C): 57 7 mm  LV MOD Diam; Recent value; End Diastole (A4C): 58 6 mm  LV MOD Diam; Recent value; End Diastole (A4C): 58 3 mm  LV MOD Diam; Recent value; End Diastole (A4C): 58 mm  LV MOD Diam; Recent value; End Diastole (A4C): 57 1 mm  LV MOD Diam; Recent value; End Diastole (A4C): 55 8 mm  LV MOD Diam; Recent value; End Diastole (A4C): 53 8 mm  LV MOD Diam; Recent value; End Diastole (A4C): 52 2 mm  LV MOD Diam; Recent value; End Diastole (A4C): 50 9 mm  LV MOD Diam; Recent value; End Diastole (A4C): 50 mm  LV MOD Diam; Recent value; End Diastole (A4C): 48 1 mm  LV MOD Diam; Recent value; End Diastole (A4C): 45 7 mm  LV MOD Diam; Recent value; End Diastole (A4C): 42 6 mm  LV MOD Diam; Recent value; End Diastole (A4C): 39 3 mm  LV MOD Diam; Recent value;  End Diastole (A4C): 35 mm  LV MOD Diam; Recent value; End Diastole (A4C): 28 6 mm  LV MOD Diam; Recent value; End Diastole (A4C): 20 9 mm  LV MOD Diam; Recent value; End Diastole (A4C): 27 3 mm  LV MOD Diam; Recent value; End Diastole (A4C): 52 2 mm  LV MOD Diam; Recent value; End Diastole (A4C): 56 4 mm  LV MOD Diam; Recent value; End Systole (A4C): 35 1 mm  LV MOD Diam; Recent value; End Systole (A4C): 33 3 mm  LV MOD Diam; Recent value; End Systole (A4C): 31 5 mm  LV MOD Diam; Recent value; End Systole (A4C): 28 4 mm  LV MOD Diam; Recent value; End Systole (A4C): 25 7 mm  LV MOD Diam; Recent value; End Systole (A4C): 22 1 mm  LV MOD Diam; Recent value; End Systole (A4C): 17 7 mm  LV MOD Diam; Recent value; End Systole (A4C): 12 8 mm  LV MOD Diam; Recent value; End Systole (A4C): 24 2 mm  LV MOD Diam; Recent value; End Systole (A4C): 45 3 mm  LV MOD Diam; Recent value; End Systole (A4C): 45 9 mm  LV MOD Diam; Recent value; End Systole (A4C): 46 5 mm  LV MOD Diam; Recent value; End Systole (A4C): 46 2 mm  LV MOD Diam; Recent value; End Systole (A4C): 45 3 mm  LV MOD Diam; Recent value; End Systole (A4C): 44 4 mm  LV MOD Diam; Recent value; End Systole (A4C): 41 6 mm  LV MOD Diam; Recent value; End Systole (A4C): 39 8 mm  LV MOD Diam; Recent value; End Systole (A4C): 38 6 mm  LV MOD Diam; Recent value; End Systole (A4C): 37 6 mm  LV MOD Diam; Recent value; End Systole (A4C): 36 4 mm  Left Ventricle diastolic major axis; Most recent value chosen; Method of Disks, Single Plane; 2D mode; Apical four chamber;: 79 8 mm  Left Ventricle systolic major axis; Most recent value chosen; Method of Disks, Single Plane; 2D mode; Apical four chamber;: 71 6 mm  Left Ventricular Diastolic Area; Most recent value chosen; Method of Disks, Single Plane; 2D mode; Apical four chamber;: 3820 mm2  Left Ventricular End Diastolic Volume; Most recent value chosen; Method of Disks, Single Plane; 2D mode; Apical four chamber;: 422379 mm3  Left Ventricular End Systolic Volume;  Most recent value chosen; Method of Disks, Single Plane; 2D mode; Apical four chamber;: 09375 mm3  Left Ventricular Systolic Area; Most recent value chosen; Method of Disks, Single Plane; 2D mode; Apical four chamber;: 2510 mm2  SV (A4C): 33394 mm3  Right Ventricle Basal Diameter; 2D mode; Apical four chamber;: 35 mm  Right Ventricle Basal Diameter; Mean; Mean value chosen; 2D mode; Apical four chamber;: 35 mm    Apical two chamber  EF (A2C): 45 6 %  LV MOD Diam; Recent value; End Diastole (A2C): 46 3 mm  LV MOD Diam; Recent value; End Diastole (A2C): 48 8 mm  LV MOD Diam; Recent value; End Diastole (A2C): 51 2 mm  LV MOD Diam; Recent value; End Diastole (A2C): 53 6 mm  LV MOD Diam; Recent value; End Diastole (A2C): 56 mm  LV MOD Diam; Recent value; End Diastole (A2C): 58 4 mm  LV MOD Diam; Recent value; End Diastole (A2C): 60 5 mm  LV MOD Diam; Recent value; End Diastole (A2C): 60 8 mm  LV MOD Diam; Recent value; End Diastole (A2C): 60 5 mm  LV MOD Diam; Recent value; End Diastole (A2C): 59 9 mm  LV MOD Diam; Recent value; End Diastole (A2C): 43 9 mm  LV MOD Diam; Recent value; End Diastole (A2C): 41 8 mm  LV MOD Diam; Recent value; End Diastole (A2C): 39 mm  LV MOD Diam; Recent value; End Diastole (A2C): 57 8 mm  LV MOD Diam; Recent value; End Diastole (A2C): 30 6 mm  LV MOD Diam; Recent value; End Diastole (A2C): 24 5 mm  LV MOD Diam; Recent value; End Diastole (A2C): 16 7 mm  LV MOD Diam; Recent value; End Diastole (A2C): 54 5 mm  LV MOD Diam; Recent value; End Diastole (A2C): 27 8 mm  LV MOD Diam; Recent value; End Diastole (A2C): 35 4 mm  LV MOD Diam; Recent value; End Systole (A2C): 10 mm  LV MOD Diam; Recent value; End Systole (A2C): 23 3 mm  LV MOD Diam; Recent value; End Systole (A2C): 47 1 mm  LV MOD Diam; Recent value; End Systole (A2C): 50 1 mm  LV MOD Diam; Recent value; End Systole (A2C): 51 6 mm  LV MOD Diam; Recent value; End Systole (A2C): 51 9 mm  LV MOD Diam; Recent value;  End Systole (A2C): 51 3 mm  LV MOD Diam; Recent value; End Systole (A2C): 49 8 mm  LV MOD Diam; Recent value; End Systole (A2C): 48 mm  LV MOD Diam; Recent value; End Systole (A2C): 46 2 mm  LV MOD Diam; Recent value; End Systole (A2C): 43 5 mm  LV MOD Diam; Recent value; End Systole (A2C): 39 9 mm  LV MOD Diam; Recent value; End Systole (A2C): 36 8 mm  LV MOD Diam; Recent value; End Systole (A2C): 33 8 mm  LV MOD Diam; Recent value; End Systole (A2C): 31 4 mm  LV MOD Diam; Recent value; End Systole (A2C): 15 1 mm  LV MOD Diam; Recent value; End Systole (A2C): 20 5 mm  LV MOD Diam; Recent value; End Systole (A2C): 24 8 mm  LV MOD Diam; Recent value; End Systole (A2C): 27 5 mm  LV MOD Diam; Recent value; End Systole (A2C): 29 6 mm  Left Ventricle diastolic major axis; Most recent value chosen; Method of Disks, Single Plane; 2D mode; Apical two chamber;: 76 6 mm  Left Ventricle systolic major axis; Most recent value chosen; Method of Disks, Single Plane; 2D mode; Apical two chamber;: 64 3 mm  Left Ventricular Diastolic Area; Most recent value chosen; Method of Disks, Single Plane; 2D mode; Apical two chamber;: 3570 mm2  Left Ventricular End Diastolic Volume; Most recent value chosen; Method of Disks, Single Plane; 2D mode; Apical two chamber;: 673406 mm3  Left Ventricular End Systolic Volume; Most recent value chosen; Method of Disks, Single Plane; 2D mode; Apical two chamber;: 57027 mm3  Left Ventricular Systolic Area; Most recent value chosen; Method of Disks, Single Plane; 2D mode;  Apical two chamber;: 2370 mm2  SV (A2C): 40560 mm3    M mode  Tricuspid Annular Plane Systolic Excursion; Mean; Mean value chosen; Tricuspid Annulus; M mode;: 10 7 mm  Tricuspid Annular Plane Systolic Excursion; Tricuspid Annulus; M mode;: 10 7 mm    Unspecified Scan Mode  Peak Grad; Mean; Regurgitant Flow: 20 mm[Hg]  Vmax; Mean; Regurgitant Flow: 2250 mm/s  Vmax; Regurgitant Flow: 2460 mm/s  Vmax; Regurgitant Flow: 2100 mm/s  Vmax; Regurgitant Flow: 2110 mm/s  Vmax; Regurgitant Flow: 2310 mm/s    [facility] [facility] Accredited Echocardiography Laboratory    Prepared and electronically signed by    Maris Cordova MD  Signed [id number] 07:56:14    No results found for this or any previous visit  No results found for this or any previous visit  No results found for this or any previous visit  EKG:  Atrial fibrillation    Counseling / Coordination of Care Time: 50 minutes Greater than 50% of total time spent on patient counseling and coordination of care  Code Status: Level 3 - DNAR and DNI  Collaboration of Care: Were Recommendations Directly Discussed with Primary Treatment Team? - Yes     Asia De León MD [facility]    "This note has been constructed using a voice recognition system  Therefore there may be syntax, spelling, and/or grammatical errors   Please call if you have any questions  "

## 2021-08-28 NOTE — ED PROCEDURE NOTE
PROCEDURE  ECG 12 Lead Documentation Only    Date/Time: 8/28/2021 3:02 AM  Performed by: Leeann Tinajero DO  Authorized by: Leeann Tinajero DO     ECG reviewed by me, the ED Provider: yes    Patient location:  ED  Interpretation:     Interpretation: abnormal    Rate:     ECG rate:  82    ECG rate assessment: normal    Rhythm:     Rhythm: atrial fibrillation    QRS:     QRS axis:  Right  ST segments:     ST segments:  Non-specific  T waves:     T waves: non-specific           Leeann Tinajero DO  08/28/21 0303

## 2021-08-28 NOTE — ASSESSMENT & PLAN NOTE
Patient woke from sleep with left-sided chest pain with radiation to the left arm  No associated nausea, vomiting, diaphoresis, shortness of breath, palpitations  Troponin negative  EKG with no acute ischemic changes  ROGER 5    · Admit to Medicine  · Serial EKGs and troponin  · Aspirin, statin  · Cardiology consultation  · Lipid panel and hemoglobin A1c in the a m

## 2021-08-28 NOTE — ASSESSMENT & PLAN NOTE
Lab Results   Component Value Date    HGBA1C 6 7 (H) 03/19/2021       No results for input(s): POCGLU in the last 72 hours      Blood Sugar Average: Last 72 hrs:  Accuchecks AC/HS with insulin sliding scale

## 2021-08-29 PROBLEM — I50.40 COMBINED SYSTOLIC AND DIASTOLIC CONGESTIVE HEART FAILURE (HCC): Status: ACTIVE | Noted: 2020-12-19

## 2021-08-29 PROBLEM — F03.91 DEMENTIA WITH BEHAVIORAL DISTURBANCE (HCC): Status: ACTIVE | Noted: 2021-08-29

## 2021-08-29 PROBLEM — F03.918 DEMENTIA WITH BEHAVIORAL DISTURBANCE: Status: ACTIVE | Noted: 2021-08-29

## 2021-08-29 LAB
ANION GAP SERPL CALCULATED.3IONS-SCNC: 8 MMOL/L (ref 4–13)
BACTERIA UR QL AUTO: NORMAL /HPF
BILIRUB UR QL STRIP: NEGATIVE
BUN SERPL-MCNC: 21 MG/DL (ref 5–25)
CALCIUM SERPL-MCNC: 8.6 MG/DL (ref 8.3–10.1)
CHLORIDE SERPL-SCNC: 103 MMOL/L (ref 100–108)
CLARITY UR: CLEAR
CO2 SERPL-SCNC: 29 MMOL/L (ref 21–32)
COLOR UR: YELLOW
CREAT SERPL-MCNC: 1.09 MG/DL (ref 0.6–1.3)
GFR SERPL CREATININE-BSD FRML MDRD: 46 ML/MIN/1.73SQ M
GLUCOSE P FAST SERPL-MCNC: 117 MG/DL (ref 65–99)
GLUCOSE SERPL-MCNC: 106 MG/DL (ref 65–140)
GLUCOSE SERPL-MCNC: 117 MG/DL (ref 65–140)
GLUCOSE SERPL-MCNC: 123 MG/DL (ref 65–140)
GLUCOSE SERPL-MCNC: 186 MG/DL (ref 65–140)
GLUCOSE SERPL-MCNC: 237 MG/DL (ref 65–140)
GLUCOSE SERPL-MCNC: 280 MG/DL (ref 65–140)
GLUCOSE UR STRIP-MCNC: NEGATIVE MG/DL
HGB UR QL STRIP.AUTO: ABNORMAL
KETONES UR STRIP-MCNC: NEGATIVE MG/DL
LEUKOCYTE ESTERASE UR QL STRIP: ABNORMAL
NITRITE UR QL STRIP: NEGATIVE
NON-SQ EPI CELLS URNS QL MICRO: NORMAL /HPF
PH UR STRIP.AUTO: 6 [PH]
POTASSIUM SERPL-SCNC: 3.5 MMOL/L (ref 3.5–5.3)
PROT UR STRIP-MCNC: NEGATIVE MG/DL
RBC #/AREA URNS AUTO: NORMAL /HPF
SODIUM SERPL-SCNC: 140 MMOL/L (ref 136–145)
SP GR UR STRIP.AUTO: 1.01 (ref 1–1.03)
UROBILINOGEN UR QL STRIP.AUTO: 0.2 E.U./DL
WBC #/AREA URNS AUTO: NORMAL /HPF

## 2021-08-29 PROCEDURE — 99232 SBSQ HOSP IP/OBS MODERATE 35: CPT | Performed by: FAMILY MEDICINE

## 2021-08-29 PROCEDURE — 82948 REAGENT STRIP/BLOOD GLUCOSE: CPT

## 2021-08-29 PROCEDURE — 80048 BASIC METABOLIC PNL TOTAL CA: CPT | Performed by: FAMILY MEDICINE

## 2021-08-29 PROCEDURE — 99215 OFFICE O/P EST HI 40 MIN: CPT | Performed by: INTERNAL MEDICINE

## 2021-08-29 PROCEDURE — 81001 URINALYSIS AUTO W/SCOPE: CPT | Performed by: NURSE PRACTITIONER

## 2021-08-29 PROCEDURE — G0426 INPT/ED TELECONSULT50: HCPCS | Performed by: PSYCHIATRY & NEUROLOGY

## 2021-08-29 RX ORDER — POTASSIUM CHLORIDE 20 MEQ/1
40 TABLET, EXTENDED RELEASE ORAL ONCE
Status: COMPLETED | OUTPATIENT
Start: 2021-08-29 | End: 2021-08-29

## 2021-08-29 RX ORDER — QUETIAPINE FUMARATE 25 MG/1
12.5 TABLET, FILM COATED ORAL
Status: DISCONTINUED | OUTPATIENT
Start: 2021-08-29 | End: 2021-08-31 | Stop reason: HOSPADM

## 2021-08-29 RX ORDER — ALPRAZOLAM 0.5 MG/1
0.5 TABLET ORAL ONCE
Status: COMPLETED | OUTPATIENT
Start: 2021-08-29 | End: 2021-08-29

## 2021-08-29 RX ADMIN — FUROSEMIDE 40 MG: 40 TABLET ORAL at 08:09

## 2021-08-29 RX ADMIN — APIXABAN 5 MG: 5 TABLET, FILM COATED ORAL at 17:00

## 2021-08-29 RX ADMIN — APIXABAN 5 MG: 5 TABLET, FILM COATED ORAL at 08:09

## 2021-08-29 RX ADMIN — PRIMIDONE 50 MG: 50 TABLET ORAL at 08:09

## 2021-08-29 RX ADMIN — POTASSIUM CHLORIDE 40 MEQ: 1500 TABLET, EXTENDED RELEASE ORAL at 10:55

## 2021-08-29 RX ADMIN — ASPIRIN 81 MG: 81 TABLET, COATED ORAL at 08:09

## 2021-08-29 RX ADMIN — METOPROLOL TARTRATE 100 MG: 100 TABLET, FILM COATED ORAL at 08:09

## 2021-08-29 RX ADMIN — LOSARTAN POTASSIUM 25 MG: 25 TABLET, FILM COATED ORAL at 08:09

## 2021-08-29 RX ADMIN — INSULIN LISPRO 1 UNITS: 100 INJECTION, SOLUTION INTRAVENOUS; SUBCUTANEOUS at 16:59

## 2021-08-29 RX ADMIN — PYRIDOXINE HCL TAB 50 MG 100 MG: 50 TAB at 08:09

## 2021-08-29 RX ADMIN — PRIMIDONE 50 MG: 50 TABLET ORAL at 20:13

## 2021-08-29 RX ADMIN — FUROSEMIDE 40 MG: 40 TABLET ORAL at 17:00

## 2021-08-29 RX ADMIN — PRAVASTATIN SODIUM 20 MG: 20 TABLET ORAL at 08:09

## 2021-08-29 RX ADMIN — ALPRAZOLAM 0.5 MG: 0.5 TABLET ORAL at 15:20

## 2021-08-29 RX ADMIN — INSULIN LISPRO 4 UNITS: 100 INJECTION, SOLUTION INTRAVENOUS; SUBCUTANEOUS at 12:06

## 2021-08-29 RX ADMIN — SERTRALINE HYDROCHLORIDE 50 MG: 50 TABLET ORAL at 08:09

## 2021-08-29 NOTE — PROGRESS NOTES
[facility] 46  Progress Note Deisi Pound 1935, 81 y o  female MRN: [id number]  Unit/Bed#: [address] [address] Encounter: [id number]  Primary Care Provider: Adonis Conklin MD   Date and time admitted to hospital: 8/28/2021  2:06 AM    * Chest pain  Assessment & Plan  Patient woke from sleep with left-sided chest pain with radiation to the left arm  No associated nausea, vomiting, diaphoresis, shortness of breath, palpitations  Troponin negative  EKG with no acute ischemic changes  ROGER 5    · Serial troponins neg  · Aspirin, statin  · Cardiology consultation appreciated  Plan for stress test in a m  · Lipid panel with LDL of 79    Dementia with behavioral disturbance [facility])  Assessment & Plan  Patient hallucinating and getting agitated per RN  Patient with history of dementia per daughter and has an appointment scheduled with Psychiatry in January  Will consult Psychiatry while here for further recommendation per family request    Benign essential tremor  Assessment & Plan  Continue primidone  Hypertension  Assessment & Plan  Continue losartan and metoprolol  Combined systolic and diastolic congestive heart failure (HCC)  Assessment & Plan  Wt Readings from Last 3 Encounters:   08/29/21 87 kg (191 lb 12 8 oz)   08/05/21 88 kg (194 lb)   08/03/21 88 kg (194 lb)     Denies weight gain, shortness of breath, mild pedal edema  · Continue lasix 40mg BID  · Daily Weight  · Intake and output  · Low sodium diet        DM (diabetes mellitus), type 2 [facility])  Assessment & Plan  Lab Results   Component Value Date    HGBA1C 6 9 (H) 08/28/2021       Recent Labs     08/28/21  2109 08/29/21  0231 08/29/21  0750 08/29/21  1059   POCGLU 126 106 123 280*     Accuchecks AC/HS with insulin sliding scale     CAD (coronary artery disease)  Assessment & Plan  Post CABG    Continue aspirin, statin, beta blocker    Persistent atrial fibrillation [facility])  Assessment & Plan  Continue lopressor and eliquis  Tachy-gale syndrome (HCC)  Assessment & Plan  Post PPM         VTE Pharmacologic Prophylaxis: VTE Score: 4 Moderate Risk (Score 3-4) - Pharmacological DVT Prophylaxis Ordered: apixaban (Eliquis)  Patient Centered Rounds: I performed bedside rounds with nursing staff today  Discussions with Specialists or Other Care Team Provider: yes - cardio    Education and Discussions with Family / Patient: Patient declined call to   Time Spent for Care: 30 minutes  More than 50% of total time spent on counseling and coordination of care as described above  Current Length of Stay: 0 day(s)  Current Patient Status: Inpatient   Certification Statement: The patient, admitted on an observation basis, will now require > 2 midnight hospital stay due to Chest pain with high ROGER score requiring stress test in a m  Discharge Plan: Anticipate discharge tomorrow to home  Code Status: Level 3 - DNAR and DNI    Subjective:   Denies any further chest pain  Hoping to go home tomorrow after the stress test    Objective:     Vitals:   Temp (24hrs), Av 9 °F (36 6 °C), Min:97 5 °F (36 4 °C), Max:98 5 °F (36 9 °C)    Temp:  [97 5 °F (36 4 °C)-98 5 °F (36 9 °C)] 98 °F (36 7 °C)  HR:  [67-98] 71  Resp:  [16-18] 16  BP: (126-149)/(57-73) 132/66  SpO2:  [94 %-97 %] 96 %  Body mass index is 38 74 kg/m²  Input and Output Summary (last 24 hours): Intake/Output Summary (Last 24 hours) at 2021 1512  Last data filed at 2021 1201  Gross per 24 hour   Intake 900 ml   Output 425 ml   Net 475 ml       Physical Exam:   Physical Exam  Vitals reviewed  Constitutional:       General: She is not in acute distress  Appearance: She is well-developed  HENT:      Head: Normocephalic and atraumatic  Eyes:      Conjunctiva/sclera: Conjunctivae normal    Cardiovascular:      Rate and Rhythm: Normal rate  Rhythm irregular  Pulmonary:      Effort: Pulmonary effort is normal  No respiratory distress  Breath sounds: Normal breath sounds  No wheezing or rales  Abdominal:      General: Bowel sounds are normal  There is no distension  Palpations: Abdomen is soft  Tenderness: There is no abdominal tenderness  Musculoskeletal:      Comments: Mild bilateral lower extremity edema   Neurological:      Mental Status: She is alert            Additional Data:     Labs:  Results from last 7 days   Lab Units 08/28/21  0213   WBC Thousand/uL 8 07   HEMOGLOBIN g/dL 10 7*   HEMATOCRIT % 34 4*   PLATELETS Thousands/uL 265   NEUTROS PCT % 64   LYMPHS PCT % 24   MONOS PCT % 8   EOS PCT % 3     Results from last 7 days   Lab Units 08/29/21  0454 08/28/21  0213   SODIUM mmol/L 140 139   POTASSIUM mmol/L 3 5 3 6   CHLORIDE mmol/L 103 101   CO2 mmol/L 29 31   BUN mg/dL 21 23   CREATININE mg/dL 1 09 1 02   ANION GAP mmol/L 8 7   CALCIUM mg/dL 8 6 8 5   ALBUMIN g/dL  --  3 3*   TOTAL BILIRUBIN mg/dL  --  0 28   ALK PHOS U/L  --  91   ALT U/L  --  26   AST U/L  --  27   GLUCOSE RANDOM mg/dL 117 187*     Results from last 7 days   Lab Units 08/28/21  0214   INR  1 33*     Results from last 7 days   Lab Units 08/29/21  1059 08/29/21  0750 08/29/21  0231 08/28/21  2109 08/28/21  1618 08/28/21  1121 08/28/21  0749   POC GLUCOSE mg/dl 280* 123 106 126 132 156* 111     Results from last 7 days   Lab Units 08/28/21  0535   HEMOGLOBIN A1C % 6 9*           Lines/Drains:  Invasive Devices     Peripheral Intravenous Line            Peripheral IV 08/28/21 Right Forearm <1 day                  Telemetry:  Telemetry Orders (From admission, onward)             48 Hour Telemetry Monitoring  (ED Bridging Orders Panel)  Continuous x 48 hours     Question:  Reason for 48 Hour Telemetry  Answer:  Acute MI, chest pain - R/O MI, or unstable angina                          Imaging: Reviewed radiology reports from this admission including: chest xray    Recent Cultures (last 7 days):         Last 24 Hours Medication List:   Current Facility-Administered Medications   Medication Dose Route Frequency Provider Last Rate    ALPRAZolam  0 5 mg Oral Once Mita Cullen DO      apixaban  5 mg Oral BID Glean Hunting, CRNP      aspirin  81 mg Oral Daily Glean Hunting, CRNP      furosemide  40 mg Oral BID Glean Hunting, CRNP      insulin lispro  1-6 Units Subcutaneous TID AC Glean Hunting, CRNP      insulin lispro  1-6 Units Subcutaneous HS Glean Hunting, CRNP      insulin lispro  1-6 Units Subcutaneous 0200 Glean Hunting, CRNP      losartan  25 mg Oral Daily Glean Hunting, CRNP      metoprolol tartrate  100 mg Oral Q12H [address] Glean Hunting, CRNP      pravastatin  20 mg Oral Daily Glean Hunting, CRNP      primidone  50 mg Oral Q12H [address] Glean Hunting, CRNP      pyridoxine  100 mg Oral Daily Glean Hunting, CRNP      sertraline  50 mg Oral Daily Glean Hunting, CRNP          Today, Patient Was Seen By: Ruslan Gamboa DO    **Please Note: This note may have been constructed using a voice recognition system  **

## 2021-08-29 NOTE — ASSESSMENT & PLAN NOTE
Lab Results   Component Value Date    HGBA1C 6 9 (H) 08/28/2021       Recent Labs     08/28/21  2109 08/29/21  0231 08/29/21  0750 08/29/21  1059   POCGLU 126 106 123 280*     Accuchecks AC/HS with insulin sliding scale

## 2021-08-29 NOTE — ED PROVIDER NOTES
History  Chief Complaint   Patient presents with    Chest Pain     Patient comes via EMS for cheest pain,mostly left sided     Patient brought in by EMS for evaluation of chest pain complaining mostly in the left side  Chest pain associated with shortness of breath  Patient is a poor historian has a history of dementia  No apparent modifying factors for her symptoms  History provided by:  Patient and EMS personnel  History limited by:  Dementia   used: No    Chest Pain  Associated symptoms: shortness of breath    Associated symptoms: no abdominal pain, no back pain, no cough, no fever, no palpitations and not vomiting        Prior to Admission Medications   Prescriptions Last Dose Informant Patient Reported? Taking?    Glucosamine-Chondroitin--[id number] MG TABS   Yes No   Sig: Take 1 tablet by mouth   apixaban (Eliquis) 5 mg  Self Yes No   Sig: Take 5 mg by mouth 2 (two) times a day    aspirin (ECOTRIN LOW STRENGTH) 81 mg EC tablet   Yes No   Sig: Take 81 mg by mouth daily    camphor-menthol (SARNA) lotion   No No   Sig: Apply topically as needed for itching   clobetasol (TEMOVATE) 0 05 % ointment   No No   Sig: Apply topically 2 (two) times a day Peas sized amount to affected skin   desloratadine (CLARINEX) 5 MG tablet   Yes No   Sig: Take 5 mg by mouth daily   docusate sodium (COLACE) 100 mg capsule  Self Yes No   Sig: Take 100 mg by mouth daily   furosemide (LASIX) 40 mg tablet   No No   Sig: Take 1 tablet (40 mg total) by mouth 2 (two) times a day   Patient taking differently: Take 40 mg by mouth 2 (two) times a day Take 1 tablet twice daily   glimepiride (AMARYL) 4 mg tablet   No No   Sig: Take 1 tablet (4 mg total) by mouth daily with breakfast   losartan (COZAAR) 25 mg tablet  Self Yes No   Sig: Take 25 mg by mouth daily   metFORMIN (GLUCOPHAGE) 500 mg tablet  Self Yes No   Sig: Take 500 mg by mouth daily with breakfast   metoprolol tartrate (LOPRESSOR) 100 mg tablet   No No Sig: Take 1 tablet (100 mg total) by mouth every 12 (twelve) hours   metroNIDAZOLE (METROCREAM) 0 75 % cream   No No   Sig: Apply topically 2 (two) times a day To the face   nystatin (MYCOSTATIN) ointment   No No   Sig: Apply topically 2 (two) times a day   Patient not taking: Reported on 7/19/2021   nystatin (MYCOSTATIN) powder  Self No No   Sig: Apply topically 2 (two) times a day Groin and under breasts   Patient not taking: Reported on 7/19/2021   pantoprazole (PROTONIX) 40 mg tablet   Yes No   Sig: Take 40 mg by mouth daily   Patient not taking: Reported on 8/3/2021   potassium chloride (MICRO-K) 10 MEQ CR capsule   No No   Sig: Take 1 capsule (10 mEq total) by mouth 2 (two) times a day   Patient not taking: Reported on 8/18/2021   pravastatin (PRAVACHOL) 20 mg tablet   No No   Sig: Take 1 tablet (20 mg total) by mouth daily   primidone (MYSOLINE) 50 mg tablet   No No   Sig: Take 1 tablet (50 mg total) by mouth every 12 (twelve) hours   pyridoxine (B-6) 100 MG tablet   Yes No   Sig: Take 100 mg by mouth daily   sertraline (ZOLOFT) 50 mg tablet   No No   Sig: Take 1 tablet (50 mg total) by mouth daily   triamcinolone (KENALOG) 0 1 % cream   No No   Sig: Apply twice a day to areas of itchy skin only  Avoid normal skin, the face and the groin        Facility-Administered Medications: None       Past Medical History:   Diagnosis Date    [facility]fib ([facility] )     Benign essential tremor     CAD (coronary artery disease)     Chest pain 12/19/2020    [facility]     Chronic pulmonary thromboembolism syndrome ([facility] ) 4/2/2021    [facility]     Frequent UTI     Hypertension     UTI (urinary tract infection) 9/24/2020       Past Surgical History:   Procedure Laterality Date    CARDIAC SURGERY      CORONARY ARTERY BYPASS GRAFT      IR PICC PLACEMENT SINGLE LUMEN  11/07/2019       Family History   Problem Relation Age of Onset    Heart disease Father      I have reviewed and agree with the history as documented  E-Cigarette/Vaping    E-Cigarette Use Never User      E-Cigarette/Vaping Substances    Nicotine No     THC No     CBD No     Flavoring No     Other No     Unknown No      Social History     Tobacco Use    Smoking status: Never Smoker    Smokeless tobacco: Never Used   Vaping Use    Vaping Use: Never used   Substance Use Topics    Alcohol use: Not Currently    Drug use: Never       Review of Systems   Constitutional: Negative for chills and fever  HENT: Negative for ear pain and sore throat  Eyes: Negative for pain and visual disturbance  Respiratory: Positive for shortness of breath  Negative for cough  Cardiovascular: Positive for chest pain  Negative for palpitations  Gastrointestinal: Negative for abdominal pain and vomiting  Genitourinary: Negative for dysuria and hematuria  Musculoskeletal: Negative for arthralgias and back pain  Skin: Negative for color change and rash  Neurological: Negative for seizures and syncope  All other systems reviewed and are negative  Physical Exam  Physical Exam  Vitals and nursing note reviewed  Constitutional:       General: She is not in acute distress  Appearance: Normal appearance  HENT:      Head: Atraumatic  Right Ear: External ear normal       Left Ear: External ear normal       Nose: Nose normal       Mouth/Throat:      Mouth: Mucous membranes are moist       Pharynx: Oropharynx is clear  Eyes:      General: No scleral icterus  Conjunctiva/sclera: Conjunctivae normal    Cardiovascular:      Rate and Rhythm: Normal rate and regular rhythm  Pulses: Normal pulses  Pulmonary:      Effort: Pulmonary effort is normal  No respiratory distress  Breath sounds: Normal breath sounds  Abdominal:      General: Abdomen is flat  Bowel sounds are normal  There is no distension  Palpations: Abdomen is soft  Tenderness: There is no abdominal tenderness   There is no guarding or rebound  Musculoskeletal:         General: No deformity  Normal range of motion  Skin:     Capillary Refill: Capillary refill takes less than 2 seconds  Findings: No rash  Neurological:      General: No focal deficit present  Mental Status: She is alert and oriented to person, place, and time           Vital Signs  ED Triage Vitals   Temperature Pulse Respirations Blood Pressure SpO2   08/28/21 0209 08/28/21 0209 08/28/21 0209 08/28/21 0209 08/28/21 0209   98 4 °F (36 9 °C) 88 20 155/76 98 %      Temp Source Heart Rate Source Patient Position - Orthostatic VS BP Location FiO2 (%)   08/28/21 0209 08/28/21 0209 08/28/21 0209 08/28/21 0209 --   Tympanic Monitor Lying Right arm       Pain Score       08/28/21 0537       No Pain           Vitals:    08/28/21 1531 08/28/21 1946 08/28/21 2235 08/29/21 0428   BP: 136/57 149/73 126/60 133/64   Pulse: 67 82 98 84   Patient Position - Orthostatic VS:             Visual Acuity      ED Medications  Medications   apixaban (ELIQUIS) tablet 5 mg (5 mg Oral Given 8/28/21 1720)   aspirin (ECOTRIN LOW STRENGTH) EC tablet 81 mg (81 mg Oral Given 8/28/21 0826)   furosemide (LASIX) tablet 40 mg (40 mg Oral Given 8/28/21 1720)   losartan (COZAAR) tablet 25 mg (25 mg Oral Given 8/28/21 0826)   metoprolol tartrate (LOPRESSOR) tablet 100 mg (100 mg Oral Given 8/28/21 2237)   pravastatin (PRAVACHOL) tablet 20 mg (20 mg Oral Given 8/28/21 0826)   primidone (MYSOLINE) tablet 50 mg (50 mg Oral Given 8/28/21 2235)   pyridoxine (VITAMIN B6) tablet 100 mg (100 mg Oral Given 8/28/21 0825)   sertraline (ZOLOFT) tablet 50 mg (50 mg Oral Given 8/28/21 0825)   insulin lispro (HumaLOG) 100 units/mL subcutaneous injection 1-6 Units (1 Units Subcutaneous Not Given 8/28/21 1632)   insulin lispro (HumaLOG) 100 units/mL subcutaneous injection 1-6 Units (1 Units Subcutaneous Not Given 8/28/21 2238)   insulin lispro (HumaLOG) 100 units/mL subcutaneous injection 1-6 Units (1 Units Subcutaneous Not Given 8/29/21 0235)       Diagnostic Studies  Results Reviewed     Procedure Component Value Units Date/Time    UA (URINE) with reflex to Scope [[id number]]  (Abnormal) Collected: 08/29/21 0431    Lab Status: Final result Specimen: Urine, Clean Catch Updated: 08/29/21 0437     Color, UA Yellow     Clarity, UA Clear     Specific Gravity, UA 1 015     pH, UA 6 0     Leukocytes, UA Trace     Nitrite, UA Negative     Protein, UA Negative mg/dl      Glucose, UA Negative mg/dl      Ketones, UA Negative mg/dl      Urobilinogen, UA 0 2 E U /dl      Bilirubin, UA Negative     Blood, UA Trace-Intact    Troponin I [[id number]]  (Normal) Collected: 08/28/21 0500    Lab Status: Final result Specimen: Blood from Arm, Left Updated: 08/28/21 0533     Troponin I <0 02 ng/mL     NT-BNP PRO [[id number]]  (Abnormal) Collected: 08/28/21 0213    Lab Status: Final result Specimen: Blood from Arm, Left Updated: 08/28/21 0244     NT-proBNP 2,569 pg/mL     Troponin I [[id number]]  (Normal) Collected: 08/28/21 0213    Lab Status: Final result Specimen: Blood from Arm, Left Updated: 08/28/21 0241     Troponin I <0 02 ng/mL     Comprehensive metabolic panel [[id number]]  (Abnormal) Collected: 08/28/21 0213    Lab Status: Final result Specimen: Blood from Arm, Left Updated: 08/28/21 0239     Sodium 139 mmol/L      Potassium 3 6 mmol/L      Chloride 101 mmol/L      CO2 31 mmol/L      ANION GAP 7 mmol/L      BUN 23 mg/dL      Creatinine 1 02 mg/dL      Glucose 187 mg/dL      Calcium 8 5 mg/dL      Corrected Calcium 9 1 mg/dL      AST 27 U/L      ALT 26 U/L      Alkaline Phosphatase 91 U/L      Total Protein 7 8 g/dL      Albumin 3 3 g/dL      Total Bilirubin 0 28 mg/dL      eGFR 50 ml/min/1 73sq m     Narrative:      [facility] guidelines for Chronic Kidney Disease (CKD):     Stage 1 with normal or high GFR (GFR > 90 mL/min/1 73 square meters)    Stage 2 Mild CKD (GFR = 60-89 mL/min/1 73 square meters)    Stage 3A Moderate CKD (GFR = 45-59 mL/min/1 73 square meters)    Stage 3B Moderate CKD (GFR = 30-44 mL/min/1 73 square meters)    Stage 4 Severe CKD (GFR = 15-29 mL/min/1 73 square meters)    Stage 5 End Stage CKD (GFR <15 mL/min/1 73 square meters)  Note: GFR calculation is accurate only with a steady state creatinine    Protime-INR [[id number]]  (Abnormal) Collected: 08/28/21 0214    Lab Status: Final result Specimen: Blood from Arm, Left Updated: 08/28/21 0236     Protime 16 4 seconds      INR 1 33    APTT [[id number]]  (Normal) Collected: 08/28/21 0214    Lab Status: Final result Specimen: Blood from Arm, Left Updated: 08/28/21 0236     PTT 32 seconds     CBC and differential [[id number]]  (Abnormal) Collected: 08/28/21 0213    Lab Status: Final result Specimen: Blood from Arm, Left Updated: 08/28/21 0219     WBC 8 07 Thousand/uL      RBC 3 82 Million/uL      Hemoglobin 10 7 g/dL      Hematocrit 34 4 %      MCV 90 fL      MCH 28 0 pg      MCHC 31 1 g/dL      RDW 14 6 %      MPV 10 0 fL      Platelets 277 Thousands/uL      nRBC 0 /100 WBCs      Neutrophils Relative 64 %      Immat GRANS % 0 %      Lymphocytes Relative 24 %      Monocytes Relative 8 %      Eosinophils Relative 3 %      Basophils Relative 1 %      Neutrophils Absolute 5 11 Thousands/µL      Immature Grans Absolute 0 02 Thousand/uL      Lymphocytes Absolute 1 95 Thousands/µL      Monocytes Absolute 0 67 Thousand/µL      Eosinophils Absolute 0 26 Thousand/µL      Basophils Absolute 0 06 Thousands/µL                  XR chest 1 view portable   Final Result by Joey Renner MD (08/28 1607)      No active pulmonary disease  Stable mild cardiomegaly                    Workstation performed: [id number]                    Procedures  Procedures         ED Course             HEART Risk Score      Most Recent Value   Heart Score Risk Calculator   History  1 Filed at: 08/28/2021 0303   ECG  1 Filed at: 08/28/2021 0303   Age  2 Filed at: 08/28/2021 5414 Risk Factors  2 Filed at: 08/28/2021 0303   Troponin  0 Filed at: 08/28/2021 0303   HEART Score  6 Filed at: 08/28/2021 0303                        ROGER Risk Score      Most Recent Value   Age >= 72  1 Filed at: 08/28/2021 0447   Known CAD (stenosis >= 50%)  1 Filed at: 08/28/2021 0447   Recent (<=24 hrs) Service Angina  1 Filed at: 08/28/2021 0447   ST Deviation >= 0 5 mm  0 Filed at: 08/28/2021 0447   3+ CAD Risk Factors (FHx, HTN, HLP, DM, Smoker)  1 Filed at: 08/28/2021 0447   Aspirin Use Past 7 Days  1 Filed at: 08/28/2021 0447   Elevated Cardiac Markers  0 Filed at: 08/28/2021 0447   ROGER Risk Score (Calculated)  5 Filed at: 08/28/2021 0447                  MDM  Number of Diagnoses or Management Options  Chest pain  Diagnosis management comments: Pulse ox 94% on room air indicating adequate oxygenation  CXR: NAD as read by me      Pain improved on repeat exam but not gone away completely  Amount and/or Complexity of Data Reviewed  Clinical lab tests: ordered and reviewed  Tests in the radiology section of CPT®: reviewed and ordered  Decide to obtain previous medical records or to obtain history from someone other than the patient: yes  Review and summarize past medical records: yes  Discuss the patient with other providers: yes  Independent visualization of images, tracings, or specimens: yes    Patient Progress  Patient progress: stable      Disposition  Final diagnoses:   Chest pain     Time reflects when diagnosis was documented in both MDM as applicable and the Disposition within this note     Time User Action Codes Description Comment    8/28/2021  3:15 AM Heidi Colindres Add [R07 9] Chest pain       ED Disposition     ED Disposition Condition Date/Time Comment    Admit Stable Sat Aug 28, 2021  3:15 AM Case was discussed with CODEY Henry and the patient's admission status was agreed to be Admission Status: observation status to the service of Dr Kelin Reed          Follow-up Information    None Current Discharge Medication List      CONTINUE these medications which have NOT CHANGED    Details   apixaban (Eliquis) 5 mg Take 5 mg by mouth 2 (two) times a day       aspirin (ECOTRIN LOW STRENGTH) 81 mg EC tablet Take 81 mg by mouth daily       camphor-menthol (SARNA) lotion Apply topically as needed for itching  Qty: 222 mL, Refills: 0    Associated Diagnoses: Pruritus      clobetasol (TEMOVATE) 0 05 % ointment Apply topically 2 (two) times a day Peas sized amount to affected skin  Qty: 30 g, Refills: 0    Associated Diagnoses: Lichen sclerosus; Fissure of vulva      desloratadine (CLARINEX) 5 MG tablet Take 5 mg by mouth daily      docusate sodium (COLACE) 100 mg capsule Take 100 mg by mouth daily      furosemide (LASIX) 40 mg tablet Take 1 tablet (40 mg total) by mouth 2 (two) times a day  Qty: 60 tablet, Refills: 5    Associated Diagnoses: Chronic congestive heart failure, unspecified heart failure type ([facility])      glimepiride (AMARYL) 4 mg tablet Take 1 tablet (4 mg total) by mouth daily with breakfast  Qty: 90 tablet, Refills: 1    Associated Diagnoses: Type 2 diabetes mellitus with other specified complication, without long-term current use of insulin (HCC)      Glucosamine-Chondroitin--400-167 MG TABS Take 1 tablet by mouth      losartan (COZAAR) 25 mg tablet Take 25 mg by mouth daily      metFORMIN (GLUCOPHAGE) 500 mg tablet Take 500 mg by mouth daily with breakfast      metoprolol tartrate (LOPRESSOR) 100 mg tablet Take 1 tablet (100 mg total) by mouth every 12 (twelve) hours  Qty: 180 tablet, Refills: 0    Associated Diagnoses: Hypertension, unspecified type      metroNIDAZOLE (METROCREAM) 0 75 % cream Apply topically 2 (two) times a day To the face  Qty: 45 g, Refills: 3    Associated Diagnoses: Pruritus; Rosacea      nystatin (MYCOSTATIN) ointment Apply topically 2 (two) times a day  Qty: 30 g, Refills: 0    Associated Diagnoses: Lichen sclerosus;  Fissure of vulva      nystatin (MYCOSTATIN) powder Apply topically 2 (two) times a day Groin and under breasts  Qty: 15 g, Refills: 0    Associated Diagnoses: Urinary tract infection      pantoprazole (PROTONIX) 40 mg tablet Take 40 mg by mouth daily      potassium chloride (MICRO-K) 10 MEQ CR capsule Take 1 capsule (10 mEq total) by mouth 2 (two) times a day  Qty: 60 capsule, Refills: 0    Associated Diagnoses: CHF (congestive heart failure) ([facility])      pravastatin (PRAVACHOL) 20 mg tablet Take 1 tablet (20 mg total) by mouth daily  Qty: 90 tablet, Refills: 1    Associated Diagnoses: Type 2 diabetes mellitus with other specified complication, without long-term current use of insulin ([facility])      primidone (MYSOLINE) 50 mg tablet Take 1 tablet (50 mg total) by mouth every 12 (twelve) hours  Qty: 60 tablet, Refills: 1    Associated Diagnoses: Seizures ([facility])      pyridoxine (B-6) 100 MG tablet Take 100 mg by mouth daily      sertraline (ZOLOFT) 50 mg tablet Take 1 tablet (50 mg total) by mouth daily  Qty: 90 tablet, Refills: 1    Associated Diagnoses: Current moderate episode of major depressive disorder, unspecified whether recurrent ([facility])      triamcinolone (KENALOG) 0 1 % cream Apply twice a day to areas of itchy skin only  Avoid normal skin, the face and the groin  Qty: 454 g, Refills: 0    Associated Diagnoses: Pruritus           No discharge procedures on file      PDMP Review       Value Time User    PDMP Reviewed  Yes 5/3/2021 11:07 AM Chuck Amin MD          ED Provider  Electronically Signed by           Margo Arevalo DO  08/29/21 8763

## 2021-08-29 NOTE — ASSESSMENT & PLAN NOTE
Patient hallucinating and getting agitated per RN  Patient with history of dementia per daughter and has an appointment scheduled with Psychiatry in January  Will consult Psychiatry while here for further recommendation per family request

## 2021-08-29 NOTE — ASSESSMENT & PLAN NOTE
Patient woke from sleep with left-sided chest pain with radiation to the left arm  No associated nausea, vomiting, diaphoresis, shortness of breath, palpitations  Troponin negative  EKG with no acute ischemic changes  ROGER 5    · Serial troponins neg  · Aspirin, statin  · Cardiology consultation appreciated  Plan for stress test in a m    · Lipid panel with LDL of 79

## 2021-08-29 NOTE — CONSULTS
PSYCHIATRIC CONSULT/EVALUATION      Patient Location/Referral Source: [place], [place]  Interactive Complexity:  [None]  Patient Consented to Telemedicine:  Yes  Reason for Consult: Evaluate to determine diagnosis, treatment and disposition  ____________________________________________    DIAGNOSITC IMPRESSION:   Current Symptomatology is consistent with a diagnosis of a mild dementia unspecified associated with  visual hallucinations and mild behavioral disturbances  There is no evidence of severe acute  psychosis, ron,  delirium, or severe depression  RISK LEVEL:  Low Risk: There is No clinically significant risk of harm to self or others     Mental Health Disorders:  Dementia Unspecified with behavioral disturbances   Anxiety Disorder Unspecified  Personality Traits/Disorders: Deferred  Medical Disorders: See medical records  Psychosocial Stressors :Psychological symptoms related to mental health condition and legal issues i e  emergency halfway order  GAF Scale:  70 Current      RECOMMENDATIONS/PLANS:    1  Discharge to Home when medically stable  2  RX: Trial of Seroquel 12 5 mg po qhs for hallucinations (patient refuses this recommendation)  The risks benefits and alternatives to using this medication were discussed with the patient  Who declined the use of additional psychotropic medications  3  [facility] [address] [ ] for further evaluation and treatment using psychiatric medications and/or psychotherapy to manage symptoms of dementia  4   Go to the emergency room if condition worsens such as the development of suicidal/homicidal plans, intentions or hallucinations        ---------------------------------------------------------------------------------------------------------------------------  Reason for Admission/Chief complaint:  Memory issues      History of Present Illness:   This patient is a 81y o    year old [fe]male who reportedly  has a past medical history of A-fib [facility]), Benign essential tremor, CAD (coronary artery disease), Chest pain (12/19/2020), CHF (congestive heart failure) ([facility] ), Chronic pulmonary thromboembolism syndrome ([facility] ) (4/2/2021), Diabetes mellitus ([facility] ), Frequent UTI, Hypertension, and UTI (urinary tract infection) (9/24/2020)  She has reportedly been diagnosed with dementia such that she c/o having memory lapses, difficulty recalling previously learned info, difficulty registering and processing new info which causes increased psychosocial distress  Regarding depressive symptoms, patient denies having depressed, irritable or anxious moods occurring nearly daily and lasting on most of day, denies having difficulty enjoying previously enjoyable activities, feelings of worthlessness, helplessness, hopelessness, low energy and motivation, difficulty sleeping, oversleeping or appetite fluctuations  Regarding anxiety symptoms, the patient denies being excessively worried and stressed-out on most days due to upcoming and past events  Patient denies having increased anxiety in social settings, crowded places or public speaking, and does not have any increased nervousness or fear being scrutinized by others  Patient further denies having any unexplainable episodes of elevated heart rate, increased sweatiness, feelings of apprehensiveness  Patient also denies having racing thoughts, accompanied by irritability, low frustration tolerance, difficulty concentrating, muscle tension/headaches, difficulty relaxing which causes difficulty falling asleep and/or staying asleep  Regarding trauma related symptoms, patient denies having recurrent nightmares, flashbacks or bad memories of traumatic events  Patient denies feeling easily startled by loud noises other environmental factors which correspond with traumatic experiences    Patient denies being hypervigilant or on the lookout for situations or people that trigger memories and bad mouth 2 (two) times a day       aspirin (ECOTRIN LOW STRENGTH) 81 mg EC tablet Take 81 mg by mouth daily       camphor-menthol (SARNA) lotion Apply topically as needed for itching 222 mL 0    clobetasol (TEMOVATE) 0 05 % ointment Apply topically 2 (two) times a day Peas sized amount to affected skin 30 g 0    desloratadine (CLARINEX) 5 MG tablet Take 5 mg by mouth daily      docusate sodium (COLACE) 100 mg capsule Take 100 mg by mouth daily      furosemide (LASIX) 40 mg tablet Take 1 tablet (40 mg total) by mouth 2 (two) times a day (Patient taking differently: Take 40 mg by mouth 2 (two) times a day Take 1 tablet twice daily) 60 tablet 5    glimepiride (AMARYL) 4 mg tablet Take 1 tablet (4 mg total) by mouth daily with breakfast 90 tablet 1    Glucosamine-Chondroitin--400-167 MG TABS Take 1 tablet by mouth      losartan (COZAAR) 25 mg tablet Take 25 mg by mouth daily      metFORMIN (GLUCOPHAGE) 500 mg tablet Take 500 mg by mouth daily with breakfast      metoprolol tartrate (LOPRESSOR) 100 mg tablet Take 1 tablet (100 mg total) by mouth every 12 (twelve) hours 180 tablet 0    metroNIDAZOLE (METROCREAM) 0 75 % cream Apply topically 2 (two) times a day To the face 45 g 3    nystatin (MYCOSTATIN) ointment Apply topically 2 (two) times a day (Patient not taking: Reported on 7/19/2021) 30 g 0    nystatin (MYCOSTATIN) powder Apply topically 2 (two) times a day Groin and under breasts (Patient not taking: Reported on 7/19/2021) 15 g 0    pantoprazole (PROTONIX) 40 mg tablet Take 40 mg by mouth daily (Patient not taking: Reported on 8/3/2021)      potassium chloride (MICRO-K) 10 MEQ CR capsule Take 1 capsule (10 mEq total) by mouth 2 (two) times a day (Patient not taking: Reported on 8/18/2021) 60 capsule 0    pravastatin (PRAVACHOL) 20 mg tablet Take 1 tablet (20 mg total) by mouth daily 90 tablet 1    primidone (MYSOLINE) 50 mg tablet Take 1 tablet (50 mg total) by mouth every 12 (twelve) hours 60 tablet 1    pyridoxine (B-6) 100 MG tablet Take 100 mg by mouth daily      sertraline (ZOLOFT) 50 mg tablet Take 1 tablet (50 mg total) by mouth daily 90 tablet 1    triamcinolone (KENALOG) 0 1 % cream Apply twice a day to areas of itchy skin only  Avoid normal skin, the face and the groin  454 g 0       Allergies: Allergies   Allergen Reactions    Clarithromycin Confusion and Other (See Comments)     Social History     Socioeconomic History    Marital status: Single     Spouse name: Not on file    Number of children: Not on file    Years of education: Not on file    Highest education level: Not on file   Occupational History    Not on file   Tobacco Use    Smoking status: Never Smoker    Smokeless tobacco: Never Used   Vaping Use    Vaping Use: Never used   Substance and Sexual Activity    Alcohol use: Not Currently    Drug use: Never    Sexual activity: Not Currently   Other Topics Concern    Not on file   Social History Narrative    Not on file     Social Determinants of Health     Financial Resource Strain: Low Risk     Difficulty of Paying Living Expenses: Not hard at all   Food Insecurity:     Worried About [address] [employer] in the Last Year:     [address] in the Last Year:    Transportation Needs: No Transportation Needs    Lack of Transportation (Medical): No    Lack of Transportation (Non-Medical):  No   Physical Activity:     Days of Exercise per Week:     Minutes of Exercise per Session:    Stress: Stress Concern Present    Feeling of Stress : Very much   Social Connections:     Frequency of Communication with Friends and Family:     Frequency of Social Gatherings with Friends and Family:     Attends [employer] orth[employer] Services:     Active Member of Clubs or Organizations:     Attends Club or Organization Meetings:     Marital Status:    Intimate Partner Violence: Not At Risk    Fear of Current or Ex-Partner: No    Emotionally Abused: No    Physically Abused: No    Sexually Abused: No          Physical findings     Vital Signs:  /84   Pulse 91   Temp (!) 97 4 °F (36 3 °C)   Resp 18   Ht 4' 11" (1 499 m)   Wt 87 kg (191 lb 12 8 oz)   SpO2 95%   BMI 38 74 kg/m²       General Appearance:    Patient was well nourished, well developed, appropriately dressed and in no acute distress  Musculoskeletal System:  muscle bulk and tone were grossly normal    Neurological: [normal]  Gait and Stance: [Not observed]    Psychiatric: Mental Status Examination:     APPEARANCE: This patient was [cooperative & pleasant] and in no acute distress  EYE CONTACT: Patient made [good] eye contact  SPEECH: The patient spoke with a [normal] rate, rhythm, inflection and tone  PSYCHOMOTOR activity: [normal]  MOOD: Patient's mood was [dysphoric], [i e  depressed, irritable and anxious] with a [constricted] range of affect that was mood congruent and appropriate  THOUGHT PROCESS: Patient's thought processes are [logical, linear and goal directed without any flight of ideas or loosening of associations]  THOUGHT CONTENT: The patient's thought content showed [no] active or current suicidal ideation, [no] active homicidal ideations, [no] active auditory hallucinations and [no] active visual hallucinations  There were [no] morbid ideations  There were [no] paranoid delusions and [no ] phobias  COGNITION: Alert and oriented in 3 spheres  ATTENTION SPAN: [Unimpaired]  MEMORY: There were  short-term deficits w/o  long-term memory deficits  JUDGMENT: [Adequate]  INSIGHT: [Adequate]  INTELLECT: Patient's intellect is grossly [above average]  INTERACTIONS WITH OTHERS: Patient's interactions with others were [appropriate]  IMPULSE CONTROL: Patient's impulse control was [Intact]  Laboratory /Radiological Studies:  Lab data records were reviewed

## 2021-08-29 NOTE — PROGRESS NOTES
Cardiology Inpatient Progress Note  [facility]   Kaye Palacios  1935  [id number]  PCP: Javan Rice MD  Date of Admission:  8/28/2021    Assessment/Plan:   Chest pain ruled out for acute coronary syndrome-  Patient reports having more current his  She has poor mobility  She has a history of coronary artery disease with prior bypass grafting  Plan for nuclear stress test to evaluate for loss of bypass is de [place] worsening coronary disease  She denies having recurrence of symptoms  Chronic diastolic heart failure-  Agree with continue her Lasix medications  Persistent atrial fibrillation and tachy-gale syndrome on Lopressor and Eliquis  Subjective:   Denies having chest pain today    Review of Systems:   CONSTITUTIONAL:  As per hpi  HEENT:  Eyes:  No visual loss, blurred vision, double vision or yellow sclerae  Ears, Nose, Throat:  No hearing loss, sneezing, congestion, runny nose or sore throat  SKIN:  No rash or itching  CARDIOVASCULAR:  As per hpi  RESPIRATORY:  As per hpi  GASTROINTESTINAL:  No anorexia, nausea, vomiting or diarrhea  No abdominal pain or blood  GENITOURINARY:  Burning on urination  No flank pain  NEUROLOGICAL:  No headache, dizziness, syncope, paralysis, ataxia, numbness or tingling in the extremities  No change in bowel or bladder control  MUSCULOSKELETAL:  No muscle, back pain, joint pain or stiffness  HEMATOLOGIC:  No anemia, bleeding or bruising  LYMPHATICS:  No enlarged nodes  No history of splenectomy  PSYCHIATRIC:  No active suicidal or homicidal ideation  ENDOCRINOLOGIC:  No reports of sweating, cold or heat intolerance  No polyuria or polydipsia  ALLERGIES:  No history of asthma, hives, eczema or rhinitis  More than 10 systems reviewed and otherwise noncontributory  Vitals: Blood pressure 132/66, pulse 71, temperature 98 °F (36 7 °C), resp  rate 16, height 4' 11" (1 499 m), weight 87 kg (191 lb 12 8 oz), SpO2 96 %    Vitals: 08/28/21 0530 08/29/21 0557   Weight: 86 8 kg (191 lb 5 8 oz) 87 kg (191 lb 12 8 oz)     Body mass index is 38 74 kg/m²  BP Readings from Last 3 Encounters:   08/29/21 132/66   08/18/21 130/80   07/19/21 124/68     Orthostatic Blood Pressures      Most Recent Value   Blood Pressure  132/66 filed at 08/29/2021 0710   Patient Position - Orthostatic VS  Lying filed at 08/28/2021 0415        I/O       08/27 0701 - 08/28 0700 08/28 0701 - 08/29 0700 08/29 0701 - 08/30 0700    P  O   1260 540    Total Intake(mL/kg)  1260 (14 5) 540 (6 2)    Urine (mL/kg/hr)  425 (0 2)     Total Output  425     Net  +835 +540               Invasive Devices     Peripheral Intravenous Line            Peripheral IV 08/28/21 Right Forearm <1 day                  Intake/Output Summary (Last 24 hours) at 8/29/2021 1525  Last data filed at 8/29/2021 1201  Gross per 24 hour   Intake 900 ml   Output 425 ml   Net 475 ml     Physical Examination:   Physical Exam  Constitutional:       General: She is not in acute distress  Appearance: She is well-developed  She is not diaphoretic  HENT:      Head: Normocephalic and atraumatic  Right Ear: External ear normal       Left Ear: External ear normal    Eyes:      General: No scleral icterus  Right eye: No discharge  Left eye: No discharge  Conjunctiva/sclera: Conjunctivae normal       Pupils: Pupils are equal, round, and reactive to light  Neck:      Thyroid: No thyromegaly  Vascular: No JVD  Trachea: No tracheal deviation  Cardiovascular:      Rate and Rhythm: Normal rate  Rhythm irregular  Heart sounds: Murmur heard  No friction rub  Gallop present  Pulmonary:      Effort: Pulmonary effort is normal  No respiratory distress  Breath sounds: Normal breath sounds  No stridor  No wheezing or rales  Chest:      Chest wall: No tenderness  Abdominal:      General: Bowel sounds are normal  There is distension  Palpations: Abdomen is soft  There is no mass  Tenderness: There is no abdominal tenderness  There is no guarding or rebound  Musculoskeletal:         General: Swelling present  No tenderness or deformity  Normal range of motion  Cervical back: Normal range of motion and neck supple  Skin:     General: Skin is warm and dry  Coloration: Skin is not pale  Findings: No erythema or rash  Neurological:      Mental Status: She is alert and oriented to person, place, and time  Cranial Nerves: No cranial nerve deficit  Motor: No abnormal muscle tone  Coordination: Coordination normal       Deep Tendon Reflexes: Reflexes are normal and symmetric  Reflexes normal    Psychiatric:         Behavior: Behavior normal          Thought Content: Thought content normal          Judgment: Judgment normal          Labs:   Lab Results:  I Have Reviewed All Lab Data Below:  CBC with diff:  Results from last 7 days   Lab Units 08/28/21  0213   WBC Thousand/uL 8 07   HEMOGLOBIN g/dL 10 7*   HEMATOCRIT % 34 4*   MCV fL 90   PLATELETS Thousands/uL 265   MCH pg 28 0   MCHC g/dL 31 1*   RDW % 14 6   MPV fL 10 0   NRBC AUTO /100 WBCs 0       CMP:  Results from last 7 days   Lab Units 08/29/21  0454 08/28/21  0213   SODIUM mmol/L 140 139   POTASSIUM mmol/L 3 5 3 6   CHLORIDE mmol/L 103 101   CO2 mmol/L 29 31   ANION GAP mmol/L 8 7   BUN mg/dL 21 23   CREATININE mg/dL 1 09 1 02   GLUCOSE FASTING mg/dL 117*  --    CALCIUM mg/dL 8 6 8 5   AST U/L  --  27   ALT U/L  --  26   ALK PHOS U/L  --  91   TOTAL PROTEIN g/dL  --  7 8   ALBUMIN g/dL  --  3 3*   TOTAL BILIRUBIN mg/dL  --  0 28   EGFR ml/min/1 73sq m 46 50     Magnesium:      Coags:  Results from last 7 days   Lab Units 08/28/21  0214   PTT seconds 32   INR  1 33*     TSH:    Lipid Profile:  Results from last 7 days   Lab Units 08/28/21  0535   CHOLESTEROL mg/dL 139   TRIGLYCERIDES mg/dL 111   HDL mg/dL 38*   LDL CALC mg/dL 79     Hgb A1c:  Results from last 7 days   Lab Units 21  0535   HEMOGLOBIN A1C % 6 9*     NT-proBNP:   Recent Labs     21  0213   NTBNP 2,569*        Troponins:  Results from last 7 days   Lab Units 21  0500 21  0213   TROPONIN I ng/mL <0 02 <0 02       Imaging & Testing   I have personally reviewed pertinent reports  Cardiac Testing   Results for orders placed during the hospital encounter of 20    Echo complete with contrast if indicated    Narrative  [address] [place] [id number] [phone]    Transthoracic Echocardiogram  2D, M-mode, Doppler, and Color Doppler    Study date:  21-Dec-2020    Patient: Seda Washburn  MR number: [id number]  Account number: [de-identified]  : 1935  Age: 81 years  Gender: Female  Status: Inpatient  Location: Bedside  Height: 60 in  Weight: 175 lb  BP: 127/ 67 mmHg    Indications: Assess left ventricular function  Diagnoses: I42 9 - Cardiomyopathy, unspecified    Sonographer:  Jodee Delarosa RDCS  Referring Physician:  JAMES Forte  Group:  [facility]  Interpreting Physician:  Mark Salomon MD    SUMMARY    LEFT VENTRICLE:  Systolic function was mildly reduced  LVEF estimated 40-45%  There was mild diffuse hypokinesis  Wall thickness was at the upper limits of normal     LEFT ATRIUM:  The atrium was mildly dilated  RIGHT ATRIUM:  The atrium was mildly dilated  MITRAL VALVE:  There was mild annular calcification  There was moderate diffuse thickening  TRICUSPID VALVE:  There was mild regurgitation  PULMONIC VALVE:  There was trace regurgitation  HISTORY: PRIOR HISTORY: CHF, hypertension, DM, AFIB, tachy-gale syndrome, CAD, CP    PROCEDURE: The procedure was performed at the bedside  This was a routine study  The transthoracic approach was used  The study included complete 2D imaging, M-mode, complete spectral Doppler, and color Doppler   The heart rate was 93 bpm,  at the start of the study  Images were obtained from the parasternal, apical, subcostal, and suprasternal notch acoustic windows  Image quality was adequate  LEFT VENTRICLE: Size was normal  Systolic function was mildly reduced  LVEF estimated 40-45% There was mild diffuse hypokinesis  Wall thickness was at the upper limits of normal  DOPPLER: Transmitral flow pattern: atrial fibrillation  RIGHT VENTRICLE: The size was normal  Systolic function was normal     LEFT ATRIUM: The atrium was mildly dilated  RIGHT ATRIUM: The atrium was mildly dilated  MITRAL VALVE: There was mild annular calcification  There was moderate diffuse thickening  AORTIC VALVE: The valve was trileaflet  DOPPLER: There was no evidence for stenosis  There was no regurgitation  TRICUSPID VALVE: DOPPLER: There was mild regurgitation  PULMONIC VALVE: Not well visualized  DOPPLER: There was trace regurgitation  PERICARDIUM: There was no pericardial effusion  AORTA: The root exhibited normal size  SYSTEMIC VEINS: IVC: The inferior vena cava was not well visualized  SYSTEM MEASUREMENT TABLES    2D mode  AoR Diam (2D): 31 mm  AoR Diam; Mean (2D): 31 mm  LA Dimension (2D): 43 mm  LA Dimension; Mean (2D): 43 mm  LA/Ao (2D): 1 4  EDV (2D-Cubed): 58160 mm3  EDV (BP): 551939 mm3  EF (2D-Cubed): 55 3 %  ESV (2D-Cubed): 70994 mm3  ESV (BP): 84462 mm3  FS (2D-Cubed): 23 5 %  FS (2D-Teich): 23 5 %  IVS/LVPW (2D): 0 9  IVSd (2D): 10 3 mm  IVSd; Mean chosen (2D): 10 3 mm  LVIDd (2D): 46 3 mm  LVIDd; Mean (2D): 46 3 mm  LVIDs (2D): 35 4 mm  LVIDs; Mean (2D): 35 4 mm  LVPWd (2D): 11 mm  LVPWd; Mean (2D): 11 mm  Left Ventricular Ejection Fraction; Method of Disks, Biplane; 2D mode;: 46 3 %  Left Ventricular Ejection Fraction; Teichholz; 2D mode;: 47 1 %  Left Ventricular End Diastolic Volume; Teichholz; 2D mode;: 23140 mm3  Left Ventricular End Systolic Volume;  Teichholz; 2D mode;: 57978 mm3  SV (2D-Cubed): 70865 mm3  SV (BP): 68890 mm3  Stroke Volume; Guzman; 2D mode;: 55213 mm3    Apical four chamber  EF (A4C): 50 2 %  LV MOD Diam; Recent value; End Systole (A4C): 45 9 mm  LV MOD Diam; Recent value; End Systole (A4C): 46 5 mm  LV MOD Diam; Recent value; End Systole (A4C): 46 2 mm  LV MOD Diam; Recent value; End Systole (A4C): 45 3 mm  LV MOD Diam; Recent value; End Systole (A4C): 44 4 mm  LV MOD Diam; Recent value; End Systole (A4C): 41 6 mm  LV MOD Diam; Recent value; End Systole (A4C): 39 8 mm  LV MOD Diam; Recent value; End Systole (A4C): 38 6 mm  LV MOD Diam; Recent value; End Systole (A4C): 37 6 mm  LV MOD Diam; Recent value; End Systole (A4C): 36 4 mm  Left Ventricle diastolic major axis; Most recent value chosen; Method of Disks, Single Plane; 2D mode; Apical four chamber;: 79 8 mm  Left Ventricle systolic major axis; Most recent value chosen; Method of Disks, Single Plane; 2D mode; Apical four chamber;: 71 6 mm  Left Ventricular Diastolic Area; Most recent value chosen; Method of Disks, Single Plane; 2D mode; Apical four chamber;: 3820 mm2  Left Ventricular End Diastolic Volume; Most recent value chosen; Method of Disks, Single Plane; 2D mode; Apical four chamber;: 632638 mm3  Left Ventricular End Systolic Volume; Most recent value chosen; Method of Disks, Single Plane; 2D mode; Apical four chamber;: 44425 mm3  Left Ventricular Systolic Area; Most recent value chosen; Method of Disks, Single Plane; 2D mode; Apical four chamber;: 2510 mm2  SV (A4C): 76467 mm3  Right Ventricle Basal Diameter; 2D mode; Apical four chamber;: 35 mm  Right Ventricle Basal Diameter; Mean; Mean value chosen; 2D mode; Apical four chamber;: 35 mm    Apical two chamber  EF (A2C): 45 6 %  Left Ventricle diastolic major axis; Most recent value chosen; Method of Disks, Single Plane; 2D mode; Apical two chamber;: 76 6 mm  Left Ventricle systolic major axis; Most recent value chosen; Method of Disks, Single Plane; 2D mode;  Apical two chamber;: 64 3 mm  Left Ventricular Diastolic Area; Most recent value chosen; Method of Disks, Single Plane; 2D mode; Apical two chamber;: 3570 mm2  Left Ventricular End Diastolic Volume; Most recent value chosen; Method of Disks, Single Plane; 2D mode; Apical two chamber;: 039849 mm3  Left Ventricular End Systolic Volume; Most recent value chosen; Method of Disks, Single Plane; 2D mode; Apical two chamber;: 55262 mm3  Left Ventricular Systolic Area; Most recent value chosen; Method of Disks, Single Plane; 2D mode; Apical two chamber;: 2370 mm2  SV (A2C): 28538 mm3    M mode  Tricuspid Annular Plane Systolic Excursion; Mean; Mean value chosen; Tricuspid Annulus; M mode;: 10 7 mm  Tricuspid Annular Plane Systolic Excursion; Tricuspid Annulus; M mode;: 10 7 mm    Unspecified Scan Mode  Peak Grad; Mean; Regurgitant Flow: 20 mm[Hg]  Vmax; Mean; Regurgitant Flow: 2250 mm/s  Vmax; Regurgitant Flow: 2460 mm/s  Vmax; Regurgitant Flow: 2100 mm/s  Vmax; Regurgitant Flow: 2110 mm/s  Vmax; Regurgitant Flow: 2310 mm/s    [facility] Accredited Echocardiography Laboratory    Prepared and electronically signed by    Leander Olmstead MD  Signed 22-Dec-2020 07:56:14      EKG: Personally reviewed  Counseling :  Counseling / Coordination of Care Time: 40min  Greater than 50% of total time spent on patient counseling and coordination of care  Code Status: Level 3 - DNAR and DNI  Collaboration of Care: Were Recommendations Directly Discussed with Primary Treatment Team? - Yes     Dusty Pink MD [facility]    "This note has been constructed using a voice recognition system  Therefore there may be syntax, spelling, and/or grammatical errors   Please call if you have any questions  "

## 2021-08-29 NOTE — ASSESSMENT & PLAN NOTE
Wt Readings from Last 3 Encounters:   08/29/21 87 kg (191 lb 12 8 oz)   08/05/21 88 kg (194 lb)   08/03/21 88 kg (194 lb)     Denies weight gain, shortness of breath, mild pedal edema  · Continue lasix 40mg BID  · Daily Weight  · Intake and output  · Low sodium diet

## 2021-08-30 ENCOUNTER — APPOINTMENT (INPATIENT)
Dept: RADIOLOGY | Facility: HOSPITAL | Age: 86
DRG: 292 | End: 2021-08-30
Payer: MEDICARE

## 2021-08-30 ENCOUNTER — APPOINTMENT (INPATIENT)
Dept: NON INVASIVE DIAGNOSTICS | Facility: HOSPITAL | Age: 86
DRG: 292 | End: 2021-08-30
Payer: MEDICARE

## 2021-08-30 LAB
ATRIAL RATE: 87 BPM
CHEST PAIN STATEMENT: NORMAL
GLUCOSE SERPL-MCNC: 124 MG/DL (ref 65–140)
GLUCOSE SERPL-MCNC: 158 MG/DL (ref 65–140)
GLUCOSE SERPL-MCNC: 215 MG/DL (ref 65–140)
GLUCOSE SERPL-MCNC: 235 MG/DL (ref 65–140)
MAX DIASTOLIC BP: 62 MMHG
MAX HEART RATE: 97 BPM
MAX PREDICTED HEART RATE: 135 BPM
MAX. SYSTOLIC BP: 130 MMHG
PROTOCOL NAME: NORMAL
QRS AXIS: 91 DEGREES
QRSD INTERVAL: 88 MS
QT INTERVAL: 440 MS
QTC INTERVAL: 514 MS
REASON FOR TERMINATION: NORMAL
T WAVE AXIS: -73 DEGREES
TARGET HR FORMULA: NORMAL
TEST INDICATION: NORMAL
TIME IN EXERCISE PHASE: NORMAL
VENTRICULAR RATE: 82 BPM

## 2021-08-30 PROCEDURE — 78452 HT MUSCLE IMAGE SPECT MULT: CPT | Performed by: INTERNAL MEDICINE

## 2021-08-30 PROCEDURE — 78452 HT MUSCLE IMAGE SPECT MULT: CPT

## 2021-08-30 PROCEDURE — G1004 CDSM NDSC: HCPCS

## 2021-08-30 PROCEDURE — 93017 CV STRESS TEST TRACING ONLY: CPT

## 2021-08-30 PROCEDURE — 93010 ELECTROCARDIOGRAM REPORT: CPT | Performed by: INTERNAL MEDICINE

## 2021-08-30 PROCEDURE — 93016 CV STRESS TEST SUPVJ ONLY: CPT | Performed by: INTERNAL MEDICINE

## 2021-08-30 PROCEDURE — A9502 TC99M TETROFOSMIN: HCPCS

## 2021-08-30 PROCEDURE — 99232 SBSQ HOSP IP/OBS MODERATE 35: CPT | Performed by: INTERNAL MEDICINE

## 2021-08-30 PROCEDURE — 99232 SBSQ HOSP IP/OBS MODERATE 35: CPT | Performed by: NURSE PRACTITIONER

## 2021-08-30 PROCEDURE — 93018 CV STRESS TEST I&R ONLY: CPT | Performed by: INTERNAL MEDICINE

## 2021-08-30 PROCEDURE — 82948 REAGENT STRIP/BLOOD GLUCOSE: CPT

## 2021-08-30 RX ADMIN — APIXABAN 5 MG: 5 TABLET, FILM COATED ORAL at 17:29

## 2021-08-30 RX ADMIN — INSULIN LISPRO 3 UNITS: 100 INJECTION, SOLUTION INTRAVENOUS; SUBCUTANEOUS at 13:48

## 2021-08-30 RX ADMIN — PRIMIDONE 50 MG: 50 TABLET ORAL at 21:54

## 2021-08-30 RX ADMIN — INSULIN LISPRO 1 UNITS: 100 INJECTION, SOLUTION INTRAVENOUS; SUBCUTANEOUS at 17:00

## 2021-08-30 RX ADMIN — SERTRALINE HYDROCHLORIDE 50 MG: 50 TABLET ORAL at 09:14

## 2021-08-30 RX ADMIN — INSULIN LISPRO 2 UNITS: 100 INJECTION, SOLUTION INTRAVENOUS; SUBCUTANEOUS at 21:56

## 2021-08-30 RX ADMIN — METOPROLOL TARTRATE 100 MG: 100 TABLET, FILM COATED ORAL at 21:54

## 2021-08-30 RX ADMIN — FUROSEMIDE 40 MG: 40 TABLET ORAL at 09:14

## 2021-08-30 RX ADMIN — REGADENOSON 0.4 MG: 0.08 INJECTION, SOLUTION INTRAVENOUS at 12:15

## 2021-08-30 RX ADMIN — ASPIRIN 81 MG: 81 TABLET, COATED ORAL at 09:14

## 2021-08-30 RX ADMIN — FUROSEMIDE 40 MG: 40 TABLET ORAL at 17:29

## 2021-08-30 RX ADMIN — LOSARTAN POTASSIUM 25 MG: 25 TABLET, FILM COATED ORAL at 09:15

## 2021-08-30 RX ADMIN — PRAVASTATIN SODIUM 20 MG: 20 TABLET ORAL at 09:14

## 2021-08-30 RX ADMIN — PRIMIDONE 50 MG: 50 TABLET ORAL at 09:14

## 2021-08-30 RX ADMIN — APIXABAN 5 MG: 5 TABLET, FILM COATED ORAL at 09:15

## 2021-08-30 RX ADMIN — METOPROLOL TARTRATE 100 MG: 100 TABLET, FILM COATED ORAL at 09:15

## 2021-08-30 RX ADMIN — PYRIDOXINE HCL TAB 50 MG 100 MG: 50 TAB at 09:14

## 2021-08-30 NOTE — PROGRESS NOTES
Progress Note - Cardiology   [facility]     Sari De Paz 81 y o  female MRN: [id number]  : 1935  Unit/Bed#: [address] [place] [address] Encounter: [id number]    Assessment and Plan:   1  Chest pain:  Troponins negative  No further complaints of chest pain since admission to hospital    -  history of CAD with CABG    -  continue metoprolol tartrate 100 mg twice a day    -  continue aspirin 81 mg once a day    -  continue Pravachol as currently ordered    -  patient for Lexiscan nuclear stress test today    2  Hypertension:  Blood pressure is currently stable  Would avoid hypotension in this elderly female  -  continue Cozaar 25 mg once a day and titrate as needed    -  continue beta-blocker    3  Chronic diastolic heart failure:  Echo from 2020 with EF of 40 45%  -  continue Cozaar 25 mg once a day    -  continue Lopressor 100 mg twice a day    -  continue Lasix 40 mg p o  Twice a day    -  low-sodium diet    -  I&O, daily weights and monitor electrolytes    4  Chronic atrial fibrillation:  Patient with history of sick sinus syndrome and permanent pacemaker    -  continue Lopressor 20 mg twice a day    -  continue Eliquis 5 mg b i d  Subjective / Objective:   Patient seen and examined  She states she did not sleep well last night, denies chest pain or shortness of breath  States that she is urinating a lot with new water pill  Patient scheduled for Lexiscan nuclear stress test today  Vitals: Blood pressure 149/69, pulse 76, temperature 97 9 °F (36 6 °C), resp  rate 18, height 4' 11" (1 499 m), weight 88 2 kg (194 lb 7 1 oz), SpO2 92 %  Vitals:    21 0557 21 0600   Weight: 87 kg (191 lb 12 8 oz) 88 2 kg (194 lb 7 1 oz)     Body mass index is 39 27 kg/m²    BP Readings from Last 3 Encounters:   21 149/69   21 130/80   21 124/68     Orthostatic Blood Pressures      Most Recent Value   Blood Pressure  149/69 filed at 2021 7660 Patient Position - Orthostatic VS  Lying filed at 08/29/2021 2330        I/O       08/28 0701 - 08/29 0700 08/29 0701 - 08/30 0700 08/30 0701 - 08/31 0700    P  O  1260 830     Total Intake(mL/kg) 1260 (14 5) 830 (9 4)     Urine (mL/kg/hr) 425 (0 2) 650 (0 3)     Total Output 425 650     Net +835 +180            Unmeasured Urine Occurrence  3 x         Invasive Devices     Peripheral Intravenous Line            Peripheral IV 08/28/21 Right Forearm 1 day                  Intake/Output Summary (Last 24 hours) at 8/30/2021 0901  Last data filed at 8/30/2021 0000  Gross per 24 hour   Intake 530 ml   Output 450 ml   Net 80 ml         Physical Exam:   Physical Exam  Vitals and nursing note reviewed  Constitutional:       General: She is not in acute distress  Appearance: Normal appearance  She is well-developed  She is obese  HENT:      Head: Normocephalic  Right Ear: External ear normal       Left Ear: External ear normal       Nose: Nose normal    Eyes:      General: No scleral icterus  Right eye: No discharge  Left eye: No discharge  Neck:      Thyroid: No thyromegaly  Cardiovascular:      Rate and Rhythm: Normal rate  Rhythm irregular  Pulses: Normal pulses  Heart sounds: Normal heart sounds  No murmur heard  Pulmonary:      Effort: Pulmonary effort is normal  No accessory muscle usage or respiratory distress  Breath sounds: Examination of the right-lower field reveals decreased breath sounds  Examination of the left-lower field reveals decreased breath sounds  Decreased breath sounds present  No wheezing, rhonchi or rales  Abdominal:      General: Bowel sounds are normal  There is no distension  Palpations: Abdomen is soft  Musculoskeletal:      Cervical back: Normal range of motion and neck supple  Right lower leg: Edema present  Left lower leg: Edema present        Comments: Trace edema with significant superficial varicosities   Skin: General: Skin is warm and dry  Capillary Refill: Capillary refill takes less than 2 seconds  Neurological:      General: No focal deficit present  Mental Status: She is alert and oriented to person, place, and time  Mental status is at baseline     Psychiatric:         Mood and Affect: Mood normal                    Medications/ Allergies:     Current Facility-Administered Medications   Medication Dose Route Frequency Provider Last Rate    apixaban  5 mg Oral BID Darryl No, CRNP      aspirin  81 mg Oral Daily Darryl No, CRNP      furosemide  40 mg Oral BID Darryl No, CRNP      insulin lispro  1-6 Units Subcutaneous TID AC Darryl No, CRNP      insulin lispro  1-6 Units Subcutaneous HS Darryl No, CRNP      insulin lispro  1-6 Units Subcutaneous 0200 Darryl No, CRNP      losartan  25 mg Oral Daily Darryl No, CRNP      metoprolol tartrate  100 mg Oral Q12H [address] Darryl No, CRNP      pravastatin  20 mg Oral Daily Darryl No, CRNP      primidone  50 mg Oral Q12H [address] Darryl No, CRNP      pyridoxine  100 mg Oral Daily Darryl No, CRNP      QUEtiapine  12 5 mg Oral HS PRN Leopoldo Bias, PA-C      sertraline  50 mg Oral Daily Darryl No, CRNP       QUEtiapine, 12 5 mg, HS PRN      Allergies   Allergen Reactions    Clarithromycin Confusion and Other (See Comments)       VTE Pharmacologic Prophylaxis:   Sequential compression device (Venodyne)  and Eliquis    Labs:   Troponins:  Results from last 7 days   Lab Units 08/28/21  0500 08/28/21  0213   TROPONIN I ng/mL <0 02 <0 02     CBC with diff:  Results from last 7 days   Lab Units 08/28/21  0213   WBC Thousand/uL 8 07   HEMOGLOBIN g/dL 10 7*   HEMATOCRIT % 34 4*   MCV fL 90   PLATELETS Thousands/uL 265   MCH pg 28 0   MCHC g/dL 31 1*   RDW % 14 6   MPV fL 10 0   NRBC AUTO /100 WBCs 0     CMP:  Results from last 7 days   Lab Units 08/29/21  0454 08/28/21  0213   SODIUM mmol/L 140 139   POTASSIUM mmol/L 3 5 3 6   CHLORIDE mmol/L 103 101   CO2 mmol/L 29 31   ANION GAP mmol/L 8 7   BUN mg/dL 21 23   CREATININE mg/dL 1 09 1 02   GLUCOSE FASTING mg/dL 117*  --    CALCIUM mg/dL 8 6 8 5   AST U/L  --  27   ALT U/L  --  26   ALK PHOS U/L  --  91   TOTAL PROTEIN g/dL  --  7 8   ALBUMIN g/dL  --  3 3*   TOTAL BILIRUBIN mg/dL  --  0 28   EGFR ml/min/1 73sq m 46 50     Coags:  Results from last 7 days   Lab Units 08/28/21  0214   PTT seconds 32   INR  1 33*     Lipid Profile:  Results from last 7 days   Lab Units 08/28/21  0535   CHOLESTEROL mg/dL 139   TRIGLYCERIDES mg/dL 111   HDL mg/dL 38*   LDL CALC mg/dL 79     Hgb A1c:  Results from last 7 days   Lab Units 08/28/21  0535   HEMOGLOBIN A1C % 6 9*     NT-proBNP:   Recent Labs     08/28/21  0213   NTBNP 2,569*        Imaging & Testing   I have personally reviewed pertinent reports  XR chest 1 view portable    Result Date: 8/28/2021  Narrative: CHEST INDICATION:   chest pain  COMPARISON:  April 29, 2021  EXAM PERFORMED/VIEWS:  XR CHEST PORTABLE  AP semierect FINDINGS:  Left-sided chest wall intracardiac device is identified  Leads are intact  Stable mild cardiomegaly  Median sternotomy  The lungs are clear  No pneumothorax or pleural effusion  Osseous structures appear within normal limits for patient age  Surgical clips in the right axilla  Impression: No active pulmonary disease  Stable mild cardiomegaly  Workstation performed: [id number]        EKG / Monitor: Personally reviewed      Atrial fibrillation with a controlled ventricular response    Cardiac testing:   Results for orders placed during the hospital encounter of 12/19/20    Echo complete with contrast if indicated    Narrative  [address] [address] [phone]    Transthoracic Echocardiogram  2D, M-mode, Doppler, and Color Doppler    Study date:  21-Dec-2020    Patient: Karen Wren  MR number: [id number]  Account number: [de-identified]  : 1935  Age: 81 years  Gender: Female  Status: Inpatient  Location: Bedside  Height: 60 in  Weight: 175 lb  BP: 127/ 67 mmHg    Indications: Assess left ventricular function  Diagnoses: I42 9 - Cardiomyopathy, unspecified    Sonographer:  Jazlyn Medina RDCS  Referring Physician:  JAMES Mejia  Group:  [facility]  Interpreting Physician:  Lester Blanc MD    SUMMARY    LEFT VENTRICLE:  Systolic function was mildly reduced  LVEF estimated 40-45%  There was mild diffuse hypokinesis  Wall thickness was at the upper limits of normal     LEFT ATRIUM:  The atrium was mildly dilated  RIGHT ATRIUM:  The atrium was mildly dilated  MITRAL VALVE:  There was mild annular calcification  There was moderate diffuse thickening  TRICUSPID VALVE:  There was mild regurgitation  PULMONIC VALVE:  There was trace regurgitation  HISTORY: PRIOR HISTORY: CHF, hypertension, DM, AFIB, tachy-gale syndrome, CAD, CP    PROCEDURE: The procedure was performed at the bedside  This was a routine study  The transthoracic approach was used  The study included complete 2D imaging, M-mode, complete spectral Doppler, and color Doppler  The heart rate was 93 bpm,  at the start of the study  Images were obtained from the parasternal, apical, subcostal, and suprasternal notch acoustic windows  Image quality was adequate  LEFT VENTRICLE: Size was normal  Systolic function was mildly reduced  LVEF estimated 40-45% There was mild diffuse hypokinesis  Wall thickness was at the upper limits of normal  DOPPLER: Transmitral flow pattern: atrial fibrillation  RIGHT VENTRICLE: The size was normal  Systolic function was normal     LEFT ATRIUM: The atrium was mildly dilated  RIGHT ATRIUM: The atrium was mildly dilated  MITRAL VALVE: There was mild annular calcification  There was moderate diffuse thickening  AORTIC VALVE: The valve was trileaflet  DOPPLER: There was no evidence for stenosis  There was no regurgitation  TRICUSPID VALVE: DOPPLER: There was mild regurgitation  PULMONIC VALVE: Not well visualized  DOPPLER: There was trace regurgitation  PERICARDIUM: There was no pericardial effusion  AORTA: The root exhibited normal size  SYSTEMIC VEINS: IVC: The inferior vena cava was not well visualized  SYSTEM MEASUREMENT TABLES    2D mode  AoR Diam (2D): 31 mm  AoR Diam; Mean (2D): 31 mm  LA Dimension (2D): 43 mm  LA Dimension; Mean (2D): 43 mm  LA/Ao (2D): 1 4  EDV (2D-Cubed): 03661 mm3  EDV (BP): 125205 mm3  EF (2D-Cubed): 55 3 %  ESV (2D-Cubed): 75920 mm3  ESV (BP): 85791 mm3  FS (2D-Cubed): 23 5 %  FS (2D-Teich): 23 5 %  IVS/LVPW (2D): 0 9  IVSd (2D): 10 3 mm  IVSd; Mean chosen (2D): 10 3 mm  LVIDd (2D): 46 3 mm  LVIDd; Mean (2D): 46 3 mm  LVIDs (2D): 35 4 mm  LVIDs; Mean (2D): 35 4 mm  LVPWd (2D): 11 mm  LVPWd; Mean (2D): 11 mm  Left Ventricular Ejection Fraction; Method of Disks, Biplane; 2D mode;: 46 3 %  Left Ventricular Ejection Fraction; Teichholz; 2D mode;: 47 1 %  Left Ventricular End Diastolic Volume; Teichholz; 2D mode;: 55105 mm3  Left Ventricular End Systolic Volume; Teichholz; 2D mode;: 20415 mm3  SV (2D-Cubed): 04696 mm3  SV (BP): 93723 mm3  Stroke Volume; Teichholz; 2D mode;: 59397 mm3    Apical four chamber  EF (A4C): 50 2 %  LV MOD Diam; Recent value; End Diastole (A4C): 57 7 mm  LV MOD Diam; Recent value; End Diastole (A4C): 58 6 mm  LV MOD Diam; Recent value; End Diastole (A4C): 58 3 mm  LV MOD Diam; Recent value; End Diastole (A4C): 58 mm  LV MOD Diam; Recent value; End Diastole (A4C): 57 1 mm  LV MOD Diam; Recent value; End Diastole (A4C): 55 8 mm  LV MOD Diam; Recent value; End Diastole (A4C): 53 8 mm  LV MOD Diam; Recent value; End Diastole (A4C): 52 2 mm  LV MOD Diam; Recent value; End Diastole (A4C): 50 9 mm  LV MOD Diam; Recent value; End Diastole (A4C): 50 mm  LV MOD Diam; Recent value;  End Diastole (A4C): 48 1 mm  LV MOD Diam; Recent value; End Diastole (A4C): 45 7 mm  LV MOD Diam; Recent value; End Diastole (A4C): 42 6 mm  LV MOD Diam; Recent value; End Diastole (A4C): 39 3 mm  LV MOD Diam; Recent value; End Diastole (A4C): 35 mm  LV MOD Diam; Recent value; End Diastole (A4C): 28 6 mm  LV MOD Diam; Recent value; End Diastole (A4C): 20 9 mm  LV MOD Diam; Recent value; End Diastole (A4C): 27 3 mm  LV MOD Diam; Recent value; End Diastole (A4C): 52 2 mm  LV MOD Diam; Recent value; End Diastole (A4C): 56 4 mm  LV MOD Diam; Recent value; End Systole (A4C): 35 1 mm  LV MOD Diam; Recent value; End Systole (A4C): 33 3 mm  LV MOD Diam; Recent value; End Systole (A4C): 31 5 mm  LV MOD Diam; Recent value; End Systole (A4C): 28 4 mm  LV MOD Diam; Recent value; End Systole (A4C): 25 7 mm  LV MOD Diam; Recent value; End Systole (A4C): 22 1 mm  LV MOD Diam; Recent value; End Systole (A4C): 17 7 mm  LV MOD Diam; Recent value; End Systole (A4C): 12 8 mm  LV MOD Diam; Recent value; End Systole (A4C): 24 2 mm  LV MOD Diam; Recent value; End Systole (A4C): 45 3 mm  LV MOD Diam; Recent value; End Systole (A4C): 45 9 mm  LV MOD Diam; Recent value; End Systole (A4C): 46 5 mm  LV MOD Diam; Recent value; End Systole (A4C): 46 2 mm  LV MOD Diam; Recent value; End Systole (A4C): 45 3 mm  LV MOD Diam; Recent value; End Systole (A4C): 44 4 mm  LV MOD Diam; Recent value; End Systole (A4C): 41 6 mm  LV MOD Diam; Recent value; End Systole (A4C): 39 8 mm  LV MOD Diam; Recent value; End Systole (A4C): 38 6 mm  LV MOD Diam; Recent value; End Systole (A4C): 37 6 mm  LV MOD Diam; Recent value; End Systole (A4C): 36 4 mm  Left Ventricle diastolic major axis; Most recent value chosen; Method of Disks, Single Plane; 2D mode; Apical four chamber;: 79 8 mm  Left Ventricle systolic major axis; Most recent value chosen; Method of Disks, Single Plane; 2D mode; Apical four chamber;: 71 6 mm  Left Ventricular Diastolic Area;  Most recent value chosen; Method of Disks, Single Plane; 2D mode; Apical four chamber;: 3820 mm2  Left Ventricular End Diastolic Volume; Most recent value chosen; Method of Disks, Single Plane; 2D mode; Apical four chamber;: 787107 mm3  Left Ventricular End Systolic Volume; Most recent value chosen; Method of Disks, Single Plane; 2D mode; Apical four chamber;: 52494 mm3  Left Ventricular Systolic Area; Most recent value chosen; Method of Disks, Single Plane; 2D mode; Apical four chamber;: 2510 mm2  SV (A4C): 00524 mm3  Right Ventricle Basal Diameter; 2D mode; Apical four chamber;: 35 mm  Right Ventricle Basal Diameter; Mean; Mean value chosen; 2D mode; Apical four chamber;: 35 mm    Apical two chamber  EF (A2C): 45 6 %  LV MOD Diam; Recent value; End Diastole (A2C): 46 3 mm  LV MOD Diam; Recent value; End Diastole (A2C): 48 8 mm  LV MOD Diam; Recent value; End Diastole (A2C): 51 2 mm  LV MOD Diam; Recent value; End Diastole (A2C): 53 6 mm  LV MOD Diam; Recent value; End Diastole (A2C): 56 mm  LV MOD Diam; Recent value; End Diastole (A2C): 58 4 mm  LV MOD Diam; Recent value; End Diastole (A2C): 60 5 mm  LV MOD Diam; Recent value; End Diastole (A2C): 60 8 mm  LV MOD Diam; Recent value; End Diastole (A2C): 60 5 mm  LV MOD Diam; Recent value; End Diastole (A2C): 59 9 mm  LV MOD Diam; Recent value; End Diastole (A2C): 43 9 mm  LV MOD Diam; Recent value; End Diastole (A2C): 41 8 mm  LV MOD Diam; Recent value; End Diastole (A2C): 39 mm  LV MOD Diam; Recent value; End Diastole (A2C): 57 8 mm  LV MOD Diam; Recent value; End Diastole (A2C): 30 6 mm  LV MOD Diam; Recent value; End Diastole (A2C): 24 5 mm  LV MOD Diam; Recent value; End Diastole (A2C): 16 7 mm  LV MOD Diam; Recent value; End Diastole (A2C): 54 5 mm  LV MOD Diam; Recent value; End Diastole (A2C): 27 8 mm  LV MOD Diam; Recent value; End Diastole (A2C): 35 4 mm  LV MOD Diam; Recent value; End Systole (A2C): 10 mm  LV MOD Diam; Recent value; End Systole (A2C): 23 3 mm  LV MOD Diam; Recent value;  End Systole [facility]): 47 1 mm  LV MOD Diam; Recent value; End Systole (A2C): 50 1 mm  LV MOD Diam; Recent value; End Systole (A2C): 51 6 mm  LV MOD Diam; Recent value; End Systole (A2C): 51 9 mm  LV MOD Diam; Recent value; End Systole (A2C): 51 3 mm  LV MOD Diam; Recent value; End Systole (A2C): 49 8 mm  LV MOD Diam; Recent value; End Systole (A2C): 48 mm  LV MOD Diam; Recent value; End Systole (A2C): 46 2 mm  LV MOD Diam; Recent value; End Systole (A2C): 43 5 mm  LV MOD Diam; Recent value; End Systole (A2C): 39 9 mm  LV MOD Diam; Recent value; End Systole (A2C): 36 8 mm  LV MOD Diam; Recent value; End Systole (A2C): 33 8 mm  LV MOD Diam; Recent value; End Systole (A2C): 31 4 mm  LV MOD Diam; Recent value; End Systole (A2C): 15 1 mm  LV MOD Diam; Recent value; End Systole (A2C): 20 5 mm  LV MOD Diam; Recent value; End Systole (A2C): 24 8 mm  LV MOD Diam; Recent value; End Systole (A2C): 27 5 mm  LV MOD Diam; Recent value; End Systole (A2C): 29 6 mm  Left Ventricle diastolic major axis; Most recent value chosen; Method of Disks, Single Plane; 2D mode; Apical two chamber;: 76 6 mm  Left Ventricle systolic major axis; Most recent value chosen; Method of Disks, Single Plane; 2D mode; Apical two chamber;: 64 3 mm  Left Ventricular Diastolic Area; Most recent value chosen; Method of Disks, Single Plane; 2D mode; Apical two chamber;: 3570 mm2  Left Ventricular End Diastolic Volume; Most recent value chosen; Method of Disks, Single Plane; 2D mode; Apical two chamber;: 137458 mm3  Left Ventricular End Systolic Volume; Most recent value chosen; Method of Disks, Single Plane; 2D mode; Apical two chamber;: 30422 mm3  Left Ventricular Systolic Area; Most recent value chosen; Method of Disks, Single Plane; 2D mode;  Apical two chamber;: 2370 mm2  SV (A2C): 28607 mm3    M mode  Tricuspid Annular Plane Systolic Excursion; Mean; Mean value chosen; Tricuspid Annulus; M mode;: 10 7 mm  Tricuspid Annular Plane Systolic Excursion; Tricuspid Annulus; M mode;: 10 7 mm    Unspecified Scan Mode  Peak Grad; Mean; Regurgitant Flow: 20 mm[Hg]  Vmax; Mean; Regurgitant Flow: 2250 mm/s  Vmax; Regurgitant Flow: 2460 mm/s  Vmax; Regurgitant Flow: 2100 mm/s  Vmax; Regurgitant Flow: 2110 mm/s  Vmax; Regurgitant Flow: 2310 mm/s    [facility] Accredited Echocardiography Laboratory    Prepared and electronically signed by    Darien Camilo MD  Signed 22-Dec-2020 07:56:14        Kirit Esteban, [facility]  Cardiology      "This note has been constructed using a voice recognition system  Therefore there may be syntax, spelling, and/or grammatical errors   Please call if you have any questions  "

## 2021-08-30 NOTE — PROGRESS NOTES
Gray 46  Progress Note Alean Clarity 1935, 81 y o  female MRN: [id number]  Unit/Bed#: [address] [place] [address] Encounter: [id number]  Primary Care Provider: Estevan Jefferson MD   Date and time admitted to hospital: 8/28/2021  2:06 AM    * Chest pain  Assessment & Plan  Patient woke from sleep with left-sided chest pain with radiation to the left arm  No associated nausea, vomiting, diaphoresis, shortness of breath, palpitations  Troponin negative  EKG with no acute ischemic changes  ROGER 5    · Serial troponins neg  · Aspirin, statin  · Cardiology consultation appreciated  · Stress test unremarkable   · Lipid panel with LDL of 79  · Pending PT/OT eval prior to discharge     Dementia with behavioral disturbance [facility])  Assessment & Plan  Patient hallucinating and getting agitated per RN  Patient with history of dementia per daughter and has an appointment scheduled with Psychiatry in January  Consulted Psychiatry while here for further recommendation per family request - recommend Seroquel YELENAN at [facility]    Benign essential tremor  Assessment & Plan  Continue primidone  Hypertension  Assessment & Plan  Continue losartan and metoprolol  Combined systolic and diastolic congestive heart failure (HCC)  Assessment & Plan  Wt Readings from Last 3 Encounters:   08/30/21 88 2 kg (194 lb 7 1 oz)   08/05/21 88 kg (194 lb)   08/03/21 88 kg (194 lb)     Denies weight gain, shortness of breath, mild pedal edema  · Continue lasix 40mg BID  · Daily Weight  · Intake and output  · Low sodium diet    DM (diabetes mellitus), type 2 [facility])  Assessment & Plan  Lab Results   Component Value Date    HGBA1C 6 9 (H) 08/28/2021       Recent Labs     08/29/21  2044 08/30/21  0713 08/30/21  1113 08/30/21  1715   POCGLU 237* 124 235* 158*     Accuchecks AC/HS with insulin sliding scale     CAD (coronary artery disease)  Assessment & Plan  Post CABG    Continue aspirin, statin, beta blocker    Persistent atrial fibrillation [facility])  Assessment & Plan  Continue lopressor and eliquis  Tachy-gale syndrome (HCC)  Assessment & Plan  Post PPM       VTE Pharmacologic Prophylaxis:   Pharmacologic: Apixaban (Eliquis)  Mechanical VTE Prophylaxis in Place: Yes    Patient Centered Rounds: I have performed bedside rounds with nursing staff today  Discussions with Specialists or Other Care Team Provider: nursing, CM, cardiology    Education and Discussions with Family / Patient: I have answered all questions to the best of my ability  Spoke with daughter via telephone     Time Spent for Care: 30 minutes  More than 50% of total time spent on counseling and coordination of care as described above  Current Length of Stay: 1 day(s)    Current Patient Status: Inpatient   Certification Statement: The patient will continue to require additional inpatient hospital stay due to fatigue - needs PT eval     Discharge Plan: Patient is not medically stable for discharge today, likely in 24 hours after PT eval     Code Status: Level 3 - DNAR and DNI      Subjective:   Patient seen and examined this morning  Sitting OOB in chair  Sleeping  Easily aroused  States she is very tired from not getting to sleep until 3am  Wants to keep sleeping  No further having any chest pain  Denies HA, dizziness, N/V/D  Objective:     Vitals:   Temp (24hrs), Av 8 °F (36 6 °C), Min:96 8 °F (36 °C), Max:98 6 °F (37 °C)    Temp:  [96 8 °F (36 °C)-98 6 °F (37 °C)] 96 8 °F (36 °C)  HR:  [73-91] 77  Resp:  [18-20] 18  BP: (146-150)/(69-80) 146/69  SpO2:  [92 %-98 %] 98 %  Body mass index is 39 27 kg/m²  Input and Output Summary (last 24 hours): Intake/Output Summary (Last 24 hours) at 2021 1752  Last data filed at 2021 1201  Gross per 24 hour   Intake 530 ml   Output 350 ml   Net 180 ml       Physical Exam:     Physical Exam  Vitals and nursing note reviewed  Constitutional:       General: She is not in acute distress       Appearance: She is well-developed  She is obese  She is ill-appearing (appears slightly deconditioned )  She is not toxic-appearing or diaphoretic  Comments: Pleasant female, sleeping in chair on room air    HENT:      Head: Normocephalic  Cardiovascular:      Rate and Rhythm: Normal rate and regular rhythm  Pulmonary:      Effort: Pulmonary effort is normal  No tachypnea or respiratory distress  Breath sounds: Examination of the right-lower field reveals decreased breath sounds  Examination of the left-lower field reveals decreased breath sounds  Decreased breath sounds and wheezing present  No rhonchi or rales  Abdominal:      General: Bowel sounds are normal  There is no distension  Palpations: Abdomen is soft  Tenderness: There is no abdominal tenderness  Musculoskeletal:         General: No tenderness  Normal range of motion  Cervical back: Normal range of motion  Skin:     General: Skin is warm and dry  Capillary Refill: Capillary refill takes less than 2 seconds  Neurological:      Mental Status: She is alert and oriented to person, place, and time  Mental status is at baseline  Deep Tendon Reflexes: Reflexes are normal and symmetric        Comments: Forgetful at times    Psychiatric:         Mood and Affect: Mood normal          Behavior: Behavior normal          Additional Data:     Labs:    Results from last 7 days   Lab Units 08/28/21  0213   WBC Thousand/uL 8 07   HEMOGLOBIN g/dL 10 7*   HEMATOCRIT % 34 4*   PLATELETS Thousands/uL 265   NEUTROS PCT % 64   LYMPHS PCT % 24   MONOS PCT % 8   EOS PCT % 3     Results from last 7 days   Lab Units 08/29/21  0454 08/28/21  0213   POTASSIUM mmol/L 3 5 3 6   CHLORIDE mmol/L 103 101   CO2 mmol/L 29 31   BUN mg/dL 21 23   CREATININE mg/dL 1 09 1 02   CALCIUM mg/dL 8 6 8 5   ALK PHOS U/L  --  91   ALT U/L  --  26   AST U/L  --  27     Results from last 7 days   Lab Units 08/28/21  0214   INR  1 33*       * I Have Reviewed All Lab Data Listed Above  * Additional Pertinent Lab Tests Reviewed: All Labs Within Last 24 Hours Reviewed    Imaging:    Imaging Reports Reviewed Today Include: Stress test  Imaging Personally Reviewed by Myself Includes:  None     Recent Cultures (last 7 days):           Last 24 Hours Medication List:   Current Facility-Administered Medications   Medication Dose Route Frequency Provider Last Rate    apixaban  5 mg Oral BID Murcia Edge, CRNP      aspirin  81 mg Oral Daily Murcia Edge, CRNP      furosemide  40 mg Oral BID Murcia Edge, CRNP      insulin lispro  1-6 Units Subcutaneous TID AC Murcia Edge, CRNP      insulin lispro  1-6 Units Subcutaneous HS Murcia Edge, CRNP      insulin lispro  1-6 Units Subcutaneous 0200 Murcia Edge, CRNP      losartan  25 mg Oral Daily Murcia Edge, CRNP      metoprolol tartrate  100 mg Oral Q12H [address] Murcia Edge, CRNP      pravastatin  20 mg Oral Daily Murcia Edge, CRNP      primidone  50 mg Oral Q12H [address] Murcia Edge, CRNP      pyridoxine  100 mg Oral Daily Murcia Edge, CRNP      QUEtiapine  12 5 mg Oral HS PRN Yeimi Paredes PA-C      sertraline  50 mg Oral Daily Murcia Edge, CRSUGAR          Today, Patient Was Seen By: JAMES Richey    ** Please Note: Dictation voice to text software may have been used in the creation of this document   **

## 2021-08-30 NOTE — ASSESSMENT & PLAN NOTE
Patient woke from sleep with left-sided chest pain with radiation to the left arm  No associated nausea, vomiting, diaphoresis, shortness of breath, palpitations  Troponin negative  EKG with no acute ischemic changes  ROGER 5    · Serial troponins neg  · Aspirin, statin  · Cardiology consultation appreciated      · Stress test unremarkable   · Lipid panel with LDL of 79  · Pending PT/OT eval prior to discharge

## 2021-08-30 NOTE — ASSESSMENT & PLAN NOTE
Patient hallucinating and getting agitated per RN  Patient with history of dementia per daughter and has an appointment scheduled with Psychiatry in January  Consulted Psychiatry while here for further recommendation per family request - recommend Cuca VERDIN at [facility]

## 2021-08-30 NOTE — PROGRESS NOTES
Entered patients room due to call bell  Patient asked to go to bathroom  As writer was assisting patient, she began talking to the corner of her room  Then she turned to me and stated "my family doesn't want to leave yet  We just had pie and ice cream and cake " Assisted to bathroom  As we entered main bedroom area to go back to bed the patient looked around and stated, "good, my family must have just left " Patient now in bed resting comfortably

## 2021-08-30 NOTE — PLAN OF CARE
Problem: MOBILITY - ADULT  Goal: Maintain or return to baseline ADL function  Description: INTERVENTIONS:  -  Assess patient's ability to carry out ADLs; assess patient's baseline for ADL function and identify physical deficits which impact ability to perform ADLs (bathing, care of mouth/teeth, toileting, grooming, dressing, etc )  - Assess/evaluate cause of self-care deficits   - Assess range of motion  - Assess patient's mobility; develop plan if impaired  - Assess patient's need for assistive devices and provide as appropriate  - Encourage maximum independence but intervene and supervise when necessary  - Involve family in performance of ADLs  - Assess for home care needs following discharge   - Consider OT consult to assist with ADL evaluation and planning for discharge  - Provide patient education as appropriate  Outcome: Progressing  Goal: Maintains/Returns to pre admission functional level  Description: INTERVENTIONS:  - Perform BMAT or MOVE assessment daily    - Set and communicate daily mobility goal to care team and patient/family/caregiver  - Collaborate with rehabilitation services on mobility goals if consulted  - Perform Range of Motion 3 times a day  - Reposition patient every 2 hours    - Dangle patient 3 times a day  - Stand patient 3 times a day  - Ambulate patient 3 times a day  - Out of bed to chair 3 times a day   - Out of bed for meals 3 times a day  - Out of bed for toileting  - Record patient progress and toleration of activity level   Outcome: Progressing     Problem: Potential for Falls  Goal: Patient will remain free of falls  Description: INTERVENTIONS:  - Educate patient/family on patient safety including physical limitations  - Instruct patient to call for assistance with activity   - Consult OT/PT to assist with strengthening/mobility   - Keep Call bell within reach  - Keep bed low and locked with side rails adjusted as appropriate  - Keep care items and personal belongings within reach  - Initiate and maintain comfort rounds  - Make Fall Risk Sign visible to staff  - Offer Toileting every 2 Hours, in advance of need  - Initiate/Maintain bed alarm  - Obtain necessary fall risk management equipment: Standard Walker  - Apply yellow socks and bracelet for high fall risk patients  - Consider moving patient to room near nurses station  Outcome: Progressing     Problem: Prexisting or High Potential for Compromised Skin Integrity  Goal: Skin integrity is maintained or improved  Description: INTERVENTIONS:  - Identify patients at risk for skin breakdown  - Assess and monitor skin integrity  - Assess and monitor nutrition and hydration status  - Monitor labs   - Assess for incontinence   - Turn and reposition patient  - Assist with mobility/ambulation  - Relieve pressure over bony prominences  - Avoid friction and shearing  - Provide appropriate hygiene as needed including keeping skin clean and dry  - Evaluate need for skin moisturizer/barrier cream  - Collaborate with interdisciplinary team   - Patient/family teaching  - Consider wound care consult   Outcome: Progressing

## 2021-08-30 NOTE — ASSESSMENT & PLAN NOTE
Wt Readings from Last 3 Encounters:   08/30/21 88 2 kg (194 lb 7 1 oz)   08/05/21 88 kg (194 lb)   08/03/21 88 kg (194 lb)     Denies weight gain, shortness of breath, mild pedal edema  · Continue lasix 40mg BID  · Daily Weight  · Intake and output  · Low sodium diet

## 2021-08-30 NOTE — ASSESSMENT & PLAN NOTE
Lab Results   Component Value Date    HGBA1C 6 9 (H) 08/28/2021       Recent Labs     08/29/21  2044 08/30/21  0713 08/30/21  1113 08/30/21  1715   POCGLU 237* 124 235* 158*     Accuchecks AC/HS with insulin sliding scale

## 2021-08-31 VITALS
RESPIRATION RATE: 19 BRPM | WEIGHT: 194.45 LBS | HEART RATE: 86 BPM | SYSTOLIC BLOOD PRESSURE: 136 MMHG | TEMPERATURE: 97.9 F | HEIGHT: 59 IN | OXYGEN SATURATION: 96 % | BODY MASS INDEX: 39.2 KG/M2 | DIASTOLIC BLOOD PRESSURE: 69 MMHG

## 2021-08-31 LAB
ANION GAP SERPL CALCULATED.3IONS-SCNC: 8 MMOL/L (ref 4–13)
BUN SERPL-MCNC: 20 MG/DL (ref 5–25)
CALCIUM SERPL-MCNC: 8.5 MG/DL (ref 8.3–10.1)
CHLORIDE SERPL-SCNC: 105 MMOL/L (ref 100–108)
CO2 SERPL-SCNC: 27 MMOL/L (ref 21–32)
CREAT SERPL-MCNC: 0.89 MG/DL (ref 0.6–1.3)
ERYTHROCYTE [DISTWIDTH] IN BLOOD BY AUTOMATED COUNT: 14.6 % (ref 11.6–15.1)
GFR SERPL CREATININE-BSD FRML MDRD: 59 ML/MIN/1.73SQ M
GLUCOSE SERPL-MCNC: 121 MG/DL (ref 65–140)
GLUCOSE SERPL-MCNC: 126 MG/DL (ref 65–140)
GLUCOSE SERPL-MCNC: 131 MG/DL (ref 65–140)
GLUCOSE SERPL-MCNC: 198 MG/DL (ref 65–140)
HCT VFR BLD AUTO: 33.8 % (ref 34.8–46.1)
HGB BLD-MCNC: 10.4 G/DL (ref 11.5–15.4)
MAGNESIUM SERPL-MCNC: 2.3 MG/DL (ref 1.6–2.6)
MCH RBC QN AUTO: 28.7 PG (ref 26.8–34.3)
MCHC RBC AUTO-ENTMCNC: 30.8 G/DL (ref 31.4–37.4)
MCV RBC AUTO: 93 FL (ref 82–98)
PLATELET # BLD AUTO: 236 THOUSANDS/UL (ref 149–390)
PMV BLD AUTO: 10.2 FL (ref 8.9–12.7)
POTASSIUM SERPL-SCNC: 3.7 MMOL/L (ref 3.5–5.3)
RBC # BLD AUTO: 3.63 MILLION/UL (ref 3.81–5.12)
SODIUM SERPL-SCNC: 140 MMOL/L (ref 136–145)
WBC # BLD AUTO: 6.77 THOUSAND/UL (ref 4.31–10.16)

## 2021-08-31 PROCEDURE — 82948 REAGENT STRIP/BLOOD GLUCOSE: CPT

## 2021-08-31 PROCEDURE — 85027 COMPLETE CBC AUTOMATED: CPT | Performed by: NURSE PRACTITIONER

## 2021-08-31 PROCEDURE — 99232 SBSQ HOSP IP/OBS MODERATE 35: CPT | Performed by: INTERNAL MEDICINE

## 2021-08-31 PROCEDURE — 97530 THERAPEUTIC ACTIVITIES: CPT

## 2021-08-31 PROCEDURE — 80048 BASIC METABOLIC PNL TOTAL CA: CPT | Performed by: NURSE PRACTITIONER

## 2021-08-31 PROCEDURE — 97167 OT EVAL HIGH COMPLEX 60 MIN: CPT

## 2021-08-31 PROCEDURE — 99239 HOSP IP/OBS DSCHRG MGMT >30: CPT | Performed by: FAMILY MEDICINE

## 2021-08-31 PROCEDURE — 83735 ASSAY OF MAGNESIUM: CPT | Performed by: NURSE PRACTITIONER

## 2021-08-31 PROCEDURE — 97163 PT EVAL HIGH COMPLEX 45 MIN: CPT

## 2021-08-31 RX ORDER — QUETIAPINE FUMARATE 25 MG/1
12.5 TABLET, FILM COATED ORAL
Qty: 30 TABLET | Refills: 0 | Status: SHIPPED | OUTPATIENT
Start: 2021-08-31 | End: 2021-09-28 | Stop reason: SDUPTHER

## 2021-08-31 RX ADMIN — PYRIDOXINE HCL TAB 50 MG 100 MG: 50 TAB at 08:14

## 2021-08-31 RX ADMIN — SERTRALINE HYDROCHLORIDE 50 MG: 50 TABLET ORAL at 08:14

## 2021-08-31 RX ADMIN — FUROSEMIDE 40 MG: 40 TABLET ORAL at 08:14

## 2021-08-31 RX ADMIN — PRAVASTATIN SODIUM 20 MG: 20 TABLET ORAL at 08:14

## 2021-08-31 RX ADMIN — ASPIRIN 81 MG: 81 TABLET, COATED ORAL at 08:14

## 2021-08-31 RX ADMIN — METOPROLOL TARTRATE 100 MG: 100 TABLET, FILM COATED ORAL at 08:14

## 2021-08-31 RX ADMIN — PRIMIDONE 50 MG: 50 TABLET ORAL at 08:14

## 2021-08-31 RX ADMIN — LOSARTAN POTASSIUM 25 MG: 25 TABLET, FILM COATED ORAL at 08:14

## 2021-08-31 RX ADMIN — APIXABAN 5 MG: 5 TABLET, FILM COATED ORAL at 08:14

## 2021-08-31 RX ADMIN — INSULIN LISPRO 2 UNITS: 100 INJECTION, SOLUTION INTRAVENOUS; SUBCUTANEOUS at 13:16

## 2021-08-31 NOTE — ASSESSMENT & PLAN NOTE
Wt Readings from Last 3 Encounters:   08/30/21 88 2 kg (194 lb 7 1 oz)   08/05/21 88 kg (194 lb)   08/03/21 88 kg (194 lb)     Denies weight gain, shortness of breath, mild pedal edema  · Continue lasix 40mg BID  · Daily Weight

## 2021-08-31 NOTE — OCCUPATIONAL THERAPY NOTE
Occupational Therapy Evaluation       08/31/21 1115   Note Type   Note type Evaluation   Restrictions/Precautions   Other Precautions Chair Alarm; Bed Alarm; Fall Risk   Pain Assessment   Pain Assessment Tool 0-10   Pain Score No Pain   Home Living   Type of Home House   Home Layout Two level; Able to live on main level with bedroom/bathroom; Performs ADLs on one level   Bathroom Shower/Tub   (Pt sponge bathes at sink at baseline)   [address] Other (Comment)  (None)   Home Equipment Walker   Additional Comments Patient presented to hospital with chest pain   Prior Function   Level of [place] Independent with ADLs and functional mobility  (Some assist with lowe body ADLs)   Lives With Family;Daughter   Receives Help From Family   ADL Assistance Independent   IADLs Needs assistance   Comments Patient reports PTA mostly independent in ADLs, gets some assist with lower body dressing and sponge bathes at the sink at baseline   Reports using RW only when she first wakes up in the AM and "when I feel like I need it"   ADL   Eating Assistance 7  Independent   Grooming Assistance 5  Supervision/Setup   Grooming Deficit Wash/dry hands  (Standing to sink)   UB Bathing Assistance 5  Supervision/Setup   LB Bathing Assistance 3  Moderate Assistance   UB Dressing Assistance 5  Supervision/Setup   LB Dressing Assistance 3  Moderate [address]  5  Supervision/Setup   Bed Mobility   Additional Comments Not assessed, patient received seated in recliner chair   Transfers   Sit to Stand 5  Supervision   Stand to [facility] [place] [facility] transfer 5  Supervision   Additional items Standard toilet  (Ambulatory transfer with RW)   Additional Comments STS from chair x 2 trials with supervision   Functional Mobility   Functional Mobility 5  Supervision   Additional Comments Patient ambulated short household distance in room with RW and supervision, min VCs for safe RW management Balance   Static Sitting Good   Dynamic Sitting Fair +   Static Standing Fair   Dynamic Standing Fair   Activity Tolerance   Activity Tolerance Patient limited by fatigue   RUE Assessment   RUE Assessment WFL   LUE Assessment   LUE Assessment WFL   Cognition   Overall Cognitive Status Impaired   Arousal/Participation Alert; Cooperative  (Forgetful)   Attention Attends with cues to redirect   Orientation Level Oriented to person;Oriented to time;Disoriented to time   Following Commands Follows multistep commands with increased time or repetition   Comments Patient is forgetful at times, needs increased cues for safety awareness during functional tasks   Assessment   Limitation Decreased ADL status; Decreased UE strength;Decreased Safe judgement during ADL;Decreased endurance;Decreased self-care trans;Decreased high-level ADLs   Prognosis Good   Assessment Patient evaluated by Occupational Therapy  Patient admitted with Chest pain  The patients occupational profile, medical and therapy history includes a extensive additional review of physical, cognitive, or psychosocial history related to current functional performance  Comorbidities affecting functional mobility and ADLS include: DM, HTN, CHF, CAD, Afib, UTI, chest pain  Prior to admission, patient was independent with functional mobility with and without RW, independent with ADLS, requiring assist for IADLS and home with family assist   The evaluation identifies the following performance deficits: weakness, impaired balance, decreased endurance, increased fall risk, new onset of impairment of functional mobility, decreased ADLS, decreased IADLS, decreased activity tolerance, decreased safety awareness and decreased strength, that result in activity limitations and/or participation restrictions   This evaluation requires clinical decision making of high complexity, because the patient presents with comorbidites that affect occupational performance and required significant modification of tasks or assistance with consideration of multiple treatment options  The Barthel Index was used as a functional outcome tool presenting with a score of 55, indicating marked limitations of functional mobility and ADLS  The patient's raw score on the AM-PAC Daily Activity inpatient short form is 18, standardized score is 38 66, less than 39 4  Patients at this level are likely to benefit from DC to post-acute rehabilitation services  Please refer to the recommendation of the Occupational Therapist for safe DC planning  Despite AM-PAC score, patient has assist from family as needed for ADLs and will benefit from return to home with continued family support  Patient will benefit from skilled Occupational Therapy services to address above deficits and facilitate a safe return to prior level of function  Goals   Patient Goals To go home today   STG Time Frame   (1-7 days)   Short Term Goal  Goals established to promote patient goal of going home:  Patient will increase standing tolerance to 10 minutes during ADL task to decrease assistance level and decrease fall risk; Patient will increase bed mobility to supervision in preparation for ADLS and transfers;  Patient will increase functional mobility to and from bathroom with rolling walker independently to increase performance with ADLS and to use a toilet; Patient will tolerate 10 minutes of UE ROM/strengthening to increase general activity tolerance and performance in ADLS/IADLS; Patient will improve functional activity tolerance to 10 minutes of sustained functional tasks to increase participation in basic self-care and decrease assistance level;  Patient will be able to to verbalize understanding and perform energy conservation/proper body mechanics during ADLS and functional mobility at least 75% of the time with minimal cueing to decrease signs of fatigue and increase stamina to return to prior level of function; Patient will increase dynamic sitting balance to good to improve the ability to sit at edge of bed or on a chair for ADLS;  Patient will increase static/dynamic standing balance to fair+ to improve postural stability and decrease fall risk during standing ADLS and transfers  LTG Time Frame   (8-14 days)   Long Term Goal Patient will increase standing tolerance to 15 minutes during ADL task to decrease assistance level and decrease fall risk; Patient will increase bed mobility to independent in preparation for ADLS and transfers; Patient will increase functional mobility to and from bathroom with no assistive device independently to increase performance with ADLS and to use a toilet; Patient will tolerate 15 minutes of UE ROM/strengthening to increase general activity tolerance and performance in ADLS/IADLS; Patient will improve functional activity tolerance to 15 minutes of sustained functional tasks to increase participation in basic self-care and decrease assistance level;  Patient will be able to to verbalize understanding and perform energy conservation/proper body mechanics during ADLS and functional mobility at least 90% of the time with no cueing to decrease signs of fatigue and increase stamina to return to prior level of function; Patient will increase static/dynamic standing balance to good to improve postural stability and decrease fall risk during standing ADLS and transfers  Pt will score >/= 22/24 on AM-PAC Daily Activity Inpatient scale to promote safe independence with ADLs and functional mobility;  Pt will score >/= 85/100 on Barthel Index in order to decrease caregiver assistance needed and increase ability to perform ADLs and functional mobility   Functional Transfer Goals   Pt Will Perform All Functional Transfers   (LTG independent)   ADL Goals   Pt Will Perform Grooming   (LTG independent)   Pt Will Perform Bathing   (STG min assist, LTG supervision)   Pt Will Perform UE Dressing   (LTG independent)   Pt Will Perform LE Dressing   (STG min assist, LTG supervision)   Pt Will Perform Toileting   (LTG independent)   Plan   Treatment Interventions ADL retraining;Functional transfer training;UE strengthening/ROM; Endurance training;Patient/family training;Equipment evaluation/education; Compensatory technique education;Continued evaluation; Energy conservation; Activityengagement   Goal Expiration Date 09/14/21   OT Frequency 3-5x/wk   Recommendation   OT Discharge Recommendation No rehabilitation needs   Additional Comments  Recommend home with previous level of support from family   AM-PAC Daily Activity Inpatient   Lower Body Dressing 2   Bathing 2   Toileting 3   Upper Body Dressing 3   Grooming 4   Eating 4   Daily Activity Raw Score 18   Daily Activity Standardized Score (Calc for Raw Score >=11) 38 66   AM-PAC Applied Cognition Inpatient   Following a Speech/Presentation 4   Understanding Ordinary Conversation 4   Taking Medications 3   Remembering Where Things Are Placed or Put Away 3   Remembering List of 4-5 Errands 3   Taking Care of Complicated Tasks 3   Applied Cognition Raw Score 20   Applied Cognition Standardized Score 41 76   Barthel Index   Feeding 10   Bathing 0   Grooming Score 0   Dressing Score 5   Bladder Score 10   Bowels Score 10   Toilet Use Score 5   Transfers (Bed/Chair) Score 10   Mobility (Level Surface) Score 0   Stairs Score 5   Barthel Index Score 54   Licensure   [place] License Number  [employer]ace [employer] OTR/EL [id number]

## 2021-08-31 NOTE — ASSESSMENT & PLAN NOTE
Lab Results   Component Value Date    HGBA1C 6 9 (H) 08/28/2021       Recent Labs     08/30/21  2049 08/31/21  0154 08/31/21  0720 08/31/21  1126   POCGLU 215* 126 131 198*     Continue home medication

## 2021-08-31 NOTE — ASSESSMENT & PLAN NOTE
Patient woke from sleep with left-sided chest pain with radiation to the left arm  No associated nausea, vomiting, diaphoresis, shortness of breath, palpitations  Troponin negative  EKG with no acute ischemic changes    ROGER 5    · Serial troponins neg  · Aspirin, statin  · Stress test showed small apical infarct, most likely artifact per Cardiology  · Lipid panel with LDL of 79

## 2021-08-31 NOTE — DISCHARGE INSTRUCTIONS
Follow-up with your cardiologist after discharge    Take your diabetes medications as you normally do

## 2021-08-31 NOTE — PHYSICAL THERAPY NOTE
PT EVALUATION       08/31/21 0900   PT Last Visit   PT Visit Date 08/31/21   Note Type   Note type Evaluation   Pain Assessment   Pain Assessment Tool Pain Assessment not indicated - pt denies pain   Home Living   Type of [address] Two level; Able to live on main level with bedroom/bathroom  (2 MOSES without a railing)   Home Equipment   (RW)   Prior Function   Level of [place] Independent with ADLs and functional mobility   Lives With Family  (Daughter, granddaughter and granddaughter friend)   Receives Help From Family   ADL Assistance Independent   Comments Pt reports ambulatory without an assistive device inside her home except 1st thing in the morning when she uses her RW; pt uses a RW outside   Restrictions/Precautions   Other Precautions Fall Risk;Bed Alarm; Chair Alarm   General   Additional Pertinent History Pt admitted with chest pain   Family/Caregiver Present No   Cognition   Overall Cognitive Status Impaired   Arousal/Participation Cooperative   Orientation Level Oriented to person;Oriented to place; Disoriented to time;Disoriented to situation   Following Commands Follows multistep commands without difficulty   RLE Assessment   RLE Assessment WFL  (3 to 3+/5)   LLE Assessment   LLE Assessment WFL  (3 to 3+/5)   Transfers   Sit to Stand 5  Supervision   Additional items Verbal cues  (Cues for hand placement)   Stand to Sit 5  Supervision   Additional items Verbal cues  (Cues for hand placement)   Ambulation/Elevation   Gait pattern Short stride  (Slow zaki)   Gait Assistance   (Close S)   Additional items Assist x 1;Verbal cues; Tactile cues   Assistive Device Rolling walker   Distance 15 ft with change in direction; pt remained out of bed in chair with all needs in reach and a chair alarm   Balance   Static Sitting Good   Static Standing Fair   Dynamic Standing Fair   Ambulatory   (F/F-)   Activity Tolerance   Activity Tolerance Patient limited by fatigue;Treatment limited secondary to medical complications (Comment)  (Weakness and deconditioning)   Assessment   Problem List Decreased strength;Decreased range of motion;Decreased endurance; Impaired balance;Decreased mobility; Decreased coordination;Decreased safety awareness   Assessment Patient seen for Physical Therapy evaluation  Patient admitted with Chest pain  Comorbidities affecting patient's physical performance include: Tachy-gale syndrome, atrial fibrillation, CAD, dm 2, CHF, hypertension, dementia, OA, chronic fatigue  Personal factors affecting patient at time of initial evaluation include: lives in two story house, ambulating with assistive device, stairs to enter home, inability to navigate community distances, inability to navigate level surfaces without external assistance, inability to perform dynamic tasks in community and decreased cognition  Prior to admission, patient was independent with functional mobility with RW, independent with functional mobility without assistive device, independent with ADLS, living with family in a two level home with 2 steps to enter, ambulating household distance and lives in a multilevel house but has 1st floor setup  Please find objective findings from Physical Therapy assessment regarding body systems outlined above with impairments and limitations including weakness, decreased ROM, impaired balance, decreased endurance, impaired coordination, gait deviations, decreased activity tolerance, decreased functional mobility tolerance, decreased safety awareness and fall risk  The Barthel Index was used as a functional outcome tool presenting with a score of 55 today indicating marked limitations of functional mobility and ADLS    Patient's clinical presentation is currently unstable/unpredictable as seen in patient's presentation of vital sign response, varying levels of cognitive performance, increased fall risk, new onset of impairment of functional mobility, decreased endurance and new onset of weakness  Pt would benefit from continued Physical Therapy treatment to address deficits as defined above and maximize level of functional mobility  As demonstrated by objective findings, the assigned level of complexity for this evaluation is high  The patient's [facility] Basic Mobility Inpatient Short Form Raw Score is 16, Standardized Score is 38 32  A standardized score less than 42 9 suggests the patient may benefit from discharge to post-acute rehabilitation services  Please also refer to the recommendation of the Physical Therapist for safe discharge planning  Despite ampac score, pt is safe for discharge home with family, continued ambulation with a RW and home PT  Goals   Patient Goals go home   STG Expiration Date 09/07/21   Short Term Goal #1 Bed mobility and transfers-I   Short Term Goal #2 Pt will ambulate with a RW functional household distances-S/I; up/down 2 steps so pt can enter/exit her home-Min assist in order to meet her goal of going home   LTG Expiration Date 09/14/21   Long Term Goal #1 Pt will return to I gait and functional mobility functional household distances with/without an assistive device as previous; strength lower extremities 3+ to 4-/5   Long Term Goal #2 Balance with/without an assistive device will be F+/good for safe gait and mobility and to decrease fall risk; up/down 2 steps with a railing and S so pt can enter/exit her home   Plan   Treatment/Interventions ADL retraining;Functional transfer training;LE strengthening/ROM; Elevations; Therapeutic exercise; Endurance training;Patient/family training;Equipment eval/education; Bed mobility;Gait training; Compensatory technique education   PT Frequency 5x/wk   Recommendation   PT Discharge Recommendation Home with home health rehabilitation   Equipment Recommended   (Pt has a RW)   [facility] Basic Mobility Inpatient   Turning in Bed Without Bedrails 3   Lying on Back to Sitting on Edge of Flat Bed 2   Moving Bed to Chair 3   Standing Up From Chair 3   Walk in Room 3   Climb 3-5 Stairs 2   Basic Mobility Inpatient Raw Score 16   Basic Mobility Standardized Score 38 32   Barthel Index   Feeding 10   Bathing 0   Grooming Score 0   Dressing Score 5   Bladder Score 10   Bowels Score 10   Toilet Use Score 5   Transfers (Bed/Chair) Score 10   Mobility (Level Surface) Score 0   Stairs Score 5   Barthel Index Score 54   Licensure   [place] License Number  Charissa Prince PT [id number]       Time In:0900  Time Out:0910  Total Time: 10 mins      S:  Pt reports no pain but moans out loud with functional mobility stating I always moan when I move  O:  Pt transfers sit to stand with S and ambulates 20-25 feet with a RW and change in direction with S  Pt transfer stand to sit with S  Cues for hand placement during all transfers for safety  A:  Pt with good tolerance to transfers, ambulation with a RW but does fatigue easily  Pt will benefit from continued skilled physical therapy services to return her to her prior level of function  Pt expresses multiple times during PT session that she is ready to go home  P:  Continue per PT POC  DCP-home with family support, continued ambulation with a RW and home PT  Charissa Prince, [address] [place], [place] [id number]    Portions of the documentation may have been created using voice recognition software  Occasional wrong word or sound alike substitutions may have occurred due to the inherent limitation of the voice recognition software  Read the chart carefully and recognize, using context, where substitutions have occurred  Detail Level: Detailed Detail Level: Zone

## 2021-08-31 NOTE — DISCHARGE SUMMARY
Discharge Summary - [facility]    Patient Information: Armida Gonzalez 81 y o  female MRN: [id number]  Unit/Bed#: [address] [place] [address] Encounter: [id number]    Discharging Physician / Practitioner: Adams Crenshaw DO  PCP: Simran Willett MD  Admission Date: 8/28/2021  Discharge Date: 08/31/21    Reason for Admission: Chest Pain (Patient comes via EMS for cheest pain,mostly left sided)      Discharge Diagnoses:     Principal Problem:    Chest pain  Active Problems:    Tachy-gale syndrome (HCC)    Persistent atrial fibrillation ([facility] [address] )    CAD (coronary artery disease)    DM (diabetes mellitus), type 2 (HCC)    Combined systolic and diastolic congestive heart failure ([facility] )    Hypertension    Benign essential tremor    Dementia with behavioral disturbance ([facility] )  Resolved Problems:    * No resolved hospital problems  *        * Chest pain  Assessment & Plan  Patient woke from sleep with left-sided chest pain with radiation to the left arm  No associated nausea, vomiting, diaphoresis, shortness of breath, palpitations  Troponin negative  EKG with no acute ischemic changes    ROGER 5    · Serial troponins neg  · Aspirin, statin  · Stress test showed small apical infarct, most likely artifact per Cardiology  · Lipid panel with LDL of 79    Dementia with behavioral disturbance [facility])  Assessment & Plan  Patient hallucinating and getting agitated per RN  Patient with history of dementia per daughter and has an appointment scheduled with Psychiatry in January  Consulted Psychiatry while here for further recommendation per family request - Cuca VERDIN at [facility]    Benign essential tremor  Assessment & Plan  Continue primidone    Hypertension  Assessment & Plan  Continue losartan and metoprolol    Combined systolic and diastolic congestive heart failure [facility])  Assessment & Plan  Wt Readings from Last 3 Encounters:   08/30/21 88 2 kg (194 lb 7 1 oz)   08/05/21 88 kg (194 lb)   08/03/21 88 kg (194 lb)     Denies weight gain, shortness of breath, mild pedal edema  · Continue lasix 40mg BID  · Daily Weight    DM (diabetes mellitus), type 2 [facility])  Assessment & Plan  Lab Results   Component Value Date    HGBA1C 6 9 (H) 2021       Recent Labs     21  2049 21  0154 21  0720 21  1126   POCGLU 215* 126 131 198*     Continue home medication    CAD (coronary artery disease)  Assessment & Plan  Post CABG  Continue aspirin, statin, beta blocker    Persistent atrial fibrillation ([facility] [address] )  Assessment & Plan  Continue lopressor and eliquis    Tachy-gale syndrome ([facility] [address] )  Assessment & Plan  Post PPM      Consultations During Hospital Stay:  IP CONSULT TO CARDIOLOGY  IP CONSULT TO PSYCHIATRY    Procedures Performed:     · Stress test    Significant Findings:     · Refer to hospital course and above listed diagnosis related plan for details    Imaging while in hospital:    XR chest 1 view portable    Result Date: 2021  Narrative: CHEST INDICATION:   chest pain  COMPARISON:  2021  EXAM PERFORMED/VIEWS:  XR CHEST PORTABLE  AP semierect FINDINGS:  Left-sided chest wall intracardiac device is identified  Leads are intact  Stable mild cardiomegaly  Median sternotomy  The lungs are clear  No pneumothorax or pleural effusion  Osseous structures appear within normal limits for patient age  Surgical clips in the right axilla  Impression: No active pulmonary disease  Stable mild cardiomegaly   Workstation performed: [id number]     Stress strip    Result Date: 2021  Narrative: Confirmed by Karla Gould ([id number]),  Charles Jenkins ([id number]) on 2021 3:49:14 PM    NM myocardial perfusion spect (rx stress and/or rest)    Result Date: 2021  Narrative: [phone] [employer], [phone] Rest/Stress Gated SPECT Myocardial Perfusion Imaging After Regadenoson Patient: Karie Shah MR number: [id number] Account number: [de-identified] : 1935 Age: 81 years Gender: Female Status: Inpatient Location: Stress lab Height: 59 in Weight: 194 lb BP: 125/ 68 mmHg Allergies: CLARITHROMYCIN Diagnosis: R07 9 - Chest pain, unspecified Primary Physician:  Yecenia Berrios RN:  GIORGI Martins Referring Physician:  Makayla Ferrer MD Group:  Othelia Nails Report Prepared By[de-identified]  GIORGI Martins Interpreting Physician:  Anjana Argueta DO INDICATIONS: Evaluation of known coronary artery disease  HISTORY: The patient is a 81year old  female  Chest pain status: chest pain  Coronary artery disease risk factors: dyslipidemia, hypertension, family history of premature coronary artery disease, and diabetes mellitus  Cardiovascular history: coronary artery disease, congestive heart failure, and arrhythmia  Prior cardiovascular procedures: coronary artery bypass grafting and pacemaker  Co-morbidity: obesity  Medications: an anticoagulant, a beta blocker, an ACE inhibitor/ARB, a diuretic, aspirin, a lipid lowering agent, and diabetic medications  PHYSICAL EXAM: Baseline physical exam screening: no wheezes audible  REST ECG: Atrial fibrillation  The ECG showed occasional premature ventricular contractions  PROCEDURE: The study was performed in the the Stress lab  A regadenoson infusion pharmacologic stress test was performed  Gated SPECT myocardial perfusion imaging was performed after stress and at rest  Systolic blood pressure was 125 mmHg, at the start of the study  Diastolic blood pressure was 68 mmHg, at the start of the study  The heart rate was 77 bpm, at the start of the study  IV double checked  Regadenoson protocol: Time HR bpm SBP mmHg DBP mmHg Symptoms Rhythm/conduct Baseline 12:13 77 125 68 none A-fib, occasional PVC's Immediate 12:15 78 124 67 none same as above 1 min 12:16 81 130 62 none -- 2 min 12:17 83 124 61 none same as above 3 min 12:18 86 127 60 none -- 4 min 12:19 62 119 51 none same as above No medications or fluids given   STRESS SUMMARY: Duration of pharmacologic stress was 3 min  Maximal heart rate during stress was 97 bpm  The heart rate response to stress was normal  There was normal resting blood pressure with an appropriate response to stress  The rate-pressure product for the peak heart rate and blood pressure was 16607  There was no chest pain during stress  The stress test was terminated due to protocol completion  Pre oxygen saturation: 98 %  Peak oxygen saturation: 100 %  The stress ECG was negative for ischemia and normal  Arrhythmia during stress: atrial fibrillation  ISOTOPE ADMINISTRATION: Resting isotope administration Stress isotope administration Agent Tetrofosmin Tetrofosmin Dose 11 mCi 31 9 mCi Date 08/30/2021 08/30/2021 The radiopharmaceutical was injected at the peak effect of pharmacologic stress  MYOCARDIAL PERFUSION IMAGING: The image quality was fair  Rotating projection images reveal patient motion and subdiaphragmatic activity  Left ventricular size was normal  PERFUSION DEFECTS: -  There was a small, moderately severe, fixed myocardial perfusion defect of the apical wall possibly due to motion artifact  GATED SPECT: The calculated left ventricular ejection fraction was 47 %  Left ventricular ejection fraction was mildly decreased by visual estimate  There was no left ventricular regional abnormality  SUMMARY: -  Stress results: Duration of pharmacologic stress was 3 min  There was no chest pain during stress  -  ECG conclusions: The stress ECG was negative for ischemia and normal  Arrhythmia during stress: atrial fibrillation  -  Perfusion imaging: There was a small, moderately severe, fixed myocardial perfusion defect of the apical wall possibly due to motion artifact  -  Gated SPECT: The calculated left ventricular ejection fraction was 47 %  Left ventricular ejection fraction was mildly decreased by visual estimate  There was no left ventricular regional abnormality  IMPRESSIONS: Abnormal study after pharmacologic stress   There was a small infarct in the apical region which may be due to artifact  There was no ischemia present  Left ventricular systolic function was reduced, without distinct regional wall motion abnormalities  Prepared and signed by Josefa Pires DO Signed 08/30/2021 14:24:27       Incidental Findings:   · none    Test Results Pending at Discharge (will require follow up):   · As per After Visit Summary     Outpatient Tests Requested:  · none    Complications:  Refer to hospital course and above listed diagnosis related plan, if any    Hospital Course:     Santos Hancock is a 81 y o  female patient who originally presented to the hospital on 8/28/2021 due to Left-sided chest pain radiating to her left arm  Denied any shortness of breath  Patient was admitted and seen by Cardiology  Underwent stress test and cleared by Cardiology prior to discharge  Discharge plan discussed with patient  Called patient's daughter who was busy and could not talk    Please see above list of diagnoses and related plan for additional information  Condition at Discharge: stable     Discharge Day Visit / Exam:     Subjective:  Denies any chest pain and wants to go home as soon as possible    Vitals: Blood Pressure: 136/69 (08/31/21 0804)  Pulse: 86 (08/31/21 0804)  Temperature: 97 9 °F (36 6 °C) (08/31/21 0804)  Temp Source: Oral (08/29/21 2330)  Respirations: 19 (08/31/21 0804)  Height: 4' 11" (149 9 cm) (08/28/21 0530)  Weight - Scale: 88 2 kg (194 lb 7 1 oz) (08/30/21 0600)  SpO2: 96 % (08/31/21 0804)  Exam:   Physical Exam  Constitutional:       General: She is not in acute distress  Appearance: She is not diaphoretic  HENT:      Head: Normocephalic and atraumatic  Eyes:      General:         Right eye: No discharge  Left eye: No discharge  Cardiovascular:      Rate and Rhythm: Normal rate  Rhythm irregular  Pulmonary:      Effort: Pulmonary effort is normal  No respiratory distress        Breath sounds: Normal breath sounds  No wheezing or rales  Abdominal:      General: Bowel sounds are normal  There is no distension  Palpations: Abdomen is soft  Tenderness: There is no abdominal tenderness  Neurological:      Mental Status: She is alert  Discharge instructions/Information to patient and family:(Discharge Medications and Follow up):   See after visit summary for information provided to patient and family  Provisions for Follow-Up Care:  See after visit summary for information related to follow-up care and any pertinent home health orders  Disposition: Home with [facility]    Planned Readmission:  No     Discharge Statement:  I spent > 30 minutes discharging the patient  This time was spent on the day of discharge  I had direct contact with the patient on the day of discharge  Greater than 50% of the total time was spent examining patient, answering all patient questions, arranging and discussing plan of care with patient as well as directly providing post-discharge instructions  Additional time then spent on discharge activities  Discharge Medications:  See after visit summary for reconciled discharge medications provided to patient and family  ** Please Note: "This note has been constructed using a voice recognition system  Therefore there may be syntax, spelling, and/or grammatical errors   Please call if you have any questions  "**

## 2021-08-31 NOTE — ASSESSMENT & PLAN NOTE
Patient hallucinating and getting agitated per RN  Patient with history of dementia per daughter and has an appointment scheduled with Psychiatry in January  Consulted Psychiatry while here for further recommendation per family request - Cuca VERDIN at [facility]

## 2021-08-31 NOTE — PROGRESS NOTES
Progress Note - Cardiology   [facility]     Angelica Huber 81 y o  female MRN: [id number]  : 1935  Unit/Bed#: [address] [place] [address] Encounter: [id number]    Assessment and Plan:   1  Chest pain:  Troponins negative  No further complaints of chest pain since admission to hospital    -  history of CAD with CABG    -  continue metoprolol tartrate 100 mg twice a day    -  continue aspirin 81 mg once a day    -  continue Pravachol as currently ordered    -   Lexiscan nuclear stress test did not demonstrate ischemia      2  Hypertension:  Blood pressure is currently stable  Would avoid hypotension in this elderly female  -  continue Cozaar 25 mg once a day and titrate as needed    -  continue beta-blocker     3  Chronic diastolic heart failure:  Echo from 2020 with EF of 40-45%  -  continue Cozaar 25 mg once a day    -  continue Lopressor 100 mg twice a day    -  continue Lasix 40 mg p o  Twice a day    -  low-sodium diet    -  I&O, daily weights and monitor electrolytes     4  Chronic atrial fibrillation:  Patient with history of sick sinus syndrome and permanent pacemaker    -  continue Lopressor 20 mg twice a day    -  continue Eliquis 5 mg b i d  Patient appears to be stable from a cardiac standpoint  May be discharged when cleared by primary team     Subjective / Objective:   Patient seen and examined  She is more alert and oriented today  Patient had [place] [facility] nuclear stress test yesterday which demonstrated a small, fixed nuclear defect at the apical wall possibly due to motion artifact  EF was gated at 47%  Vitals: Blood pressure 136/69, pulse 86, temperature 97 9 °F (36 6 °C), resp  rate 19, height 4' 11" (1 499 m), weight 88 2 kg (194 lb 7 1 oz), SpO2 96 %  Vitals:    21 0557 21 0600   Weight: 87 kg (191 lb 12 8 oz) 88 2 kg (194 lb 7 1 oz)     Body mass index is 39 27 kg/m²    BP Readings from Last 3 Encounters:   21 136/69   21 130/80 07/19/21 124/68     Orthostatic Blood Pressures      Most Recent Value   Blood Pressure  136/69 filed at 08/31/2021 0804   Patient Position - Orthostatic VS  Lying filed at 08/29/2021 2330        I/O       08/29 0701 - 08/30 0700 08/30 0701 - 08/31 0700 08/31 0701 - 09/01 0700    P  O  830 240     Total Intake(mL/kg) 830 (9 4) 240 (2 7)     Urine (mL/kg/hr) 650 (0 3) 100 (0)     Total Output 650 100     Net +180 +140            Unmeasured Urine Occurrence 3 x 2 x         Invasive Devices     Peripheral Intravenous Line            Peripheral IV 08/31/21 Left Hand <1 day                  Intake/Output Summary (Last 24 hours) at 8/31/2021 0803  Last data filed at 8/30/2021 1201  Gross per 24 hour   Intake 240 ml   Output 100 ml   Net 140 ml         Physical Exam:   Physical Exam  Vitals and nursing note reviewed  Constitutional:       General: She is not in acute distress  Appearance: Normal appearance  She is well-developed  She is obese  HENT:      Head: Normocephalic  Right Ear: External ear normal       Left Ear: External ear normal       Nose: Nose normal    Eyes:      General: No scleral icterus  Right eye: No discharge  Left eye: No discharge  Neck:      Thyroid: No thyromegaly  Cardiovascular:      Rate and Rhythm: Normal rate and regular rhythm  Pulses: Normal pulses  Heart sounds: Normal heart sounds  Pulmonary:      Effort: Pulmonary effort is normal  No respiratory distress  Breath sounds: Normal breath sounds  No wheezing, rhonchi or rales  Abdominal:      General: Bowel sounds are normal  There is no distension  Palpations: Abdomen is soft  Musculoskeletal:      Cervical back: Normal range of motion and neck supple  Right lower leg: No edema  Left lower leg: No edema  Skin:     General: Skin is warm and dry  Capillary Refill: Capillary refill takes less than 2 seconds  Neurological:      Mental Status: She is alert  Psychiatric:         Mood and Affect: Mood normal          Behavior: Behavior normal          Thought Content:  Thought content normal          Judgment: Judgment normal                    Medications/ Allergies:     Current Facility-Administered Medications   Medication Dose Route Frequency Provider Last Rate    apixaban  5 mg Oral BID Waylan Molt, CRNP      aspirin  81 mg Oral Daily Waylan Molt, CRNP      furosemide  40 mg Oral BID Waylan Molt, CRNP      insulin lispro  1-6 Units Subcutaneous TID AC Waylan Molt, CRNP      insulin lispro  1-6 Units Subcutaneous HS Waylan Molt, CRNP      insulin lispro  1-6 Units Subcutaneous 0200 Waylan Molt, CRNP      losartan  25 mg Oral Daily Waylan Molt, CRNP      metoprolol tartrate  100 mg Oral Q12H [address] Waylan Molt, CRNP      pravastatin  20 mg Oral Daily Waylan Molt, CRNP      primidone  50 mg Oral Q12H [address] Waylan Molt, CRNP      pyridoxine  100 mg Oral Daily Waylan Molt, CRNP      QUEtiapine  12 5 mg Oral HS PRN Roselee Comfort, PA-C      sertraline  50 mg Oral Daily Waylan Molt, CRNP       QUEtiapine, 12 5 mg, HS PRN      Allergies   Allergen Reactions    Clarithromycin Confusion and Other (See Comments)       VTE Pharmacologic Prophylaxis:   Sequential compression device (Venodyne)     Labs:   Troponins:  Results from last 7 days   Lab Units 08/28/21  0500 08/28/21  0213   TROPONIN I ng/mL <0 02 <0 02     CBC with diff:  Results from last 7 days   Lab Units 08/31/21  0623 08/28/21  0213   WBC Thousand/uL 6 77 8 07   HEMOGLOBIN g/dL 10 4* 10 7*   HEMATOCRIT % 33 8* 34 4*   MCV fL 93 90   PLATELETS Thousands/uL 236 265   MCH pg 28 7 28 0   MCHC g/dL 30 8* 31 1*   RDW % 14 6 14 6   MPV fL 10 2 10 0   NRBC AUTO /100 WBCs  --  0     CMP:  Results from last 7 days   Lab Units 08/31/21  0623 08/29/21  0454 08/28/21  0213   SODIUM mmol/L 140 140 139   POTASSIUM mmol/L 3 7 3 5 3 6   CHLORIDE mmol/L 105 103 101   CO2 mmol/L 27 29 31   ANION GAP mmol/L 8 8 7   BUN mg/dL 20 21 23   CREATININE mg/dL 0 89 1 09 1 02   GLUCOSE FASTING mg/dL  --  117*  --    CALCIUM mg/dL 8 5 8 6 8 5   AST U/L  --   --  27   ALT U/L  --   --  26   ALK PHOS U/L  --   --  91   TOTAL PROTEIN g/dL  --   --  7 8   ALBUMIN g/dL  --   --  3 3*   TOTAL BILIRUBIN mg/dL  --   --  0 28   EGFR ml/min/1 73sq m 59 46 50     Magnesium:  Results from last 7 days   Lab Units 08/31/21  0623   MAGNESIUM mg/dL 2 3     Coags:  Results from last 7 days   Lab Units 08/28/21  0214   PTT seconds 32   INR  1 33*     TSH:    No components found for: TSH3  Lipid Profile:  Results from last 7 days   Lab Units 08/28/21  0535   CHOLESTEROL mg/dL 139   TRIGLYCERIDES mg/dL 111   HDL mg/dL 38*   LDL CALC mg/dL 79     Hgb A1c:  Results from last 7 days   Lab Units 08/28/21  0535   HEMOGLOBIN A1C % 6 9*       Imaging & Testing   I have personally reviewed pertinent reports  XR chest 1 view portable    Result Date: 8/28/2021  Narrative: CHEST INDICATION:   chest pain  COMPARISON:  April 29, 2021  EXAM PERFORMED/VIEWS:  XR CHEST PORTABLE  AP semierect FINDINGS:  Left-sided chest wall intracardiac device is identified  Leads are intact  Stable mild cardiomegaly  Median sternotomy  The lungs are clear  No pneumothorax or pleural effusion  Osseous structures appear within normal limits for patient age  Surgical clips in the right axilla  Impression: No active pulmonary disease  Stable mild cardiomegaly   Workstation performed: [id number]     Stress strip    Result Date: 8/30/2021  Narrative: Confirmed by Jennifer Lomeli ([id number]),  Jose Saleem ([id number]) on 8/30/2021 3:49:14 PM    NM myocardial perfusion spect (rx stress and/or rest)    Result Date: 8/30/2021  Narrative: [address], [facility] 6 [phone] Rest/Stress Gated SPECT Myocardial Perfusion Imaging After Regadenoson Patient: Sven Lee MR number: [id number] Account number: [de-identified] : 1935 Age: 81 years Gender: Female Status: Inpatient Location: Stress lab Height: 59 in Weight: 194 lb BP: 125/ 68 mmHg Allergies: CLARITHROMYCIN Diagnosis: R07 9 - Chest pain, unspecified Primary Physician:  Thiago Garcia RN:  GIORGI Wallis Referring Physician:  Courtney Nam MD Group:  [facility] Report Prepared By[de-identified]  GIORGI Wallis Interpreting Physician:  Stephane Godoy DO INDICATIONS: Evaluation of known coronary artery disease  HISTORY: The patient is a 81year old  female  Chest pain status: chest pain  Coronary artery disease risk factors: dyslipidemia, hypertension, family history of premature coronary artery disease, and diabetes mellitus  Cardiovascular history: coronary artery disease, congestive heart failure, and arrhythmia  Prior cardiovascular procedures: coronary artery bypass grafting and pacemaker  Co-morbidity: obesity  Medications: an anticoagulant, a beta blocker, an ACE inhibitor/ARB, a diuretic, aspirin, a lipid lowering agent, and diabetic medications  PHYSICAL EXAM: Baseline physical exam screening: no wheezes audible  REST ECG: Atrial fibrillation  The ECG showed occasional premature ventricular contractions  PROCEDURE: The study was performed in the the Stress lab  A regadenoson infusion pharmacologic stress test was performed  Gated SPECT myocardial perfusion imaging was performed after stress and at rest  Systolic blood pressure was 125 mmHg, at the start of the study  Diastolic blood pressure was 68 mmHg, at the start of the study  The heart rate was 77 bpm, at the start of the study  IV double checked   Regadenoson protocol: Time HR bpm SBP mmHg DBP mmHg Symptoms Rhythm/conduct Baseline 12:13 77 125 68 none A-fib, occasional PVC's Immediate 12:15 78 124 67 none same as above 1 min 12:16 81 130 62 none -- 2 min 12:17 83 124 61 none same as above 3 min 12:18 86 127 60 none -- 4 min 12:19 62 119 51 none same as above No medications or fluids given  STRESS SUMMARY: Duration of pharmacologic stress was 3 min  Maximal heart rate during stress was 97 bpm  The heart rate response to stress was normal  There was normal resting blood pressure with an appropriate response to stress  The rate-pressure product for the peak heart rate and blood pressure was 19884  There was no chest pain during stress  The stress test was terminated due to protocol completion  Pre oxygen saturation: 98 %  Peak oxygen saturation: 100 %  The stress ECG was negative for ischemia and normal  Arrhythmia during stress: atrial fibrillation  ISOTOPE ADMINISTRATION: Resting isotope administration Stress isotope administration Agent Tetrofosmin Tetrofosmin Dose 11 mCi 31 9 mCi Date 08/30/2021 08/30/2021 The radiopharmaceutical was injected at the peak effect of pharmacologic stress  MYOCARDIAL PERFUSION IMAGING: The image quality was fair  Rotating projection images reveal patient motion and subdiaphragmatic activity  Left ventricular size was normal  PERFUSION DEFECTS: -  There was a small, moderately severe, fixed myocardial perfusion defect of the apical wall possibly due to motion artifact  GATED SPECT: The calculated left ventricular ejection fraction was 47 %  Left ventricular ejection fraction was mildly decreased by visual estimate  There was no left ventricular regional abnormality  SUMMARY: -  Stress results: Duration of pharmacologic stress was 3 min  There was no chest pain during stress  -  ECG conclusions: The stress ECG was negative for ischemia and normal  Arrhythmia during stress: atrial fibrillation  -  Perfusion imaging: There was a small, moderately severe, fixed myocardial perfusion defect of the apical wall possibly due to motion artifact  -  Gated SPECT: The calculated left ventricular ejection fraction was 47 %  Left ventricular ejection fraction was mildly decreased by visual estimate  There was no left ventricular regional abnormality  IMPRESSIONS: Abnormal study after pharmacologic stress  There was a small infarct in the apical region which may be due to artifact  There was no ischemia present  Left ventricular systolic function was reduced, without distinct regional wall motion abnormalities  Prepared and signed by Linda Pierre DO Signed 2021 14:24:27     Cardiac testing:   Results for orders placed during the hospital encounter of 20    Echo complete with contrast if indicated    Narrative  [address] [place] [address] [phone]    Transthoracic Echocardiogram  2D, M-mode, Doppler, and Color Doppler    Study date:  21-Dec-2020    Patient: Timoteo Malloy  MR number: [id number]  Account number: [de-identified]  : 1935  Age: 81 years  Gender: Female  Status: Inpatient  Location: Bedside  Height: 60 in  Weight: 175 lb  BP: 127/ 67 mmHg    Indications: Assess left ventricular function  Diagnoses: I42 9 - Cardiomyopathy, unspecified    Sonographer:  Milena Miguel RDCS  Referring Physician:  JAMES Sims  Group:  [facility]  Interpreting Physician:  Laurence Velázquez MD    SUMMARY    LEFT VENTRICLE:  Systolic function was mildly reduced  LVEF estimated 40-45%  There was mild diffuse hypokinesis  Wall thickness was at the upper limits of normal     LEFT ATRIUM:  The atrium was mildly dilated  RIGHT ATRIUM:  The atrium was mildly dilated  MITRAL VALVE:  There was mild annular calcification  There was moderate diffuse thickening  TRICUSPID VALVE:  There was mild regurgitation  PULMONIC VALVE:  There was trace regurgitation  HISTORY: PRIOR HISTORY: CHF, hypertension, DM, AFIB, tachy-gale syndrome, CAD, CP    PROCEDURE: The procedure was performed at the bedside  This was a routine study  The transthoracic approach was used  The study included complete 2D imaging, M-mode, complete spectral Doppler, and color Doppler   The heart rate was 93 bpm,  at the start of the study  Images were obtained from the parasternal, apical, subcostal, and suprasternal notch acoustic windows  Image quality was adequate  LEFT VENTRICLE: Size was normal  Systolic function was mildly reduced  LVEF estimated 40-45% There was mild diffuse hypokinesis  Wall thickness was at the upper limits of normal  DOPPLER: Transmitral flow pattern: atrial fibrillation  RIGHT VENTRICLE: The size was normal  Systolic function was normal     LEFT ATRIUM: The atrium was mildly dilated  RIGHT ATRIUM: The atrium was mildly dilated  MITRAL VALVE: There was mild annular calcification  There was moderate diffuse thickening  AORTIC VALVE: The valve was trileaflet  DOPPLER: There was no evidence for stenosis  There was no regurgitation  TRICUSPID VALVE: DOPPLER: There was mild regurgitation  PULMONIC VALVE: Not well visualized  DOPPLER: There was trace regurgitation  PERICARDIUM: There was no pericardial effusion  AORTA: The root exhibited normal size  SYSTEMIC VEINS: IVC: The inferior vena cava was not well visualized  SYSTEM MEASUREMENT TABLES    2D mode  AoR Diam (2D): 31 mm  AoR Diam; Mean (2D): 31 mm  LA Dimension (2D): 43 mm  LA Dimension; Mean (2D): 43 mm  LA/Ao (2D): 1 4  EDV (2D-Cubed): 31834 mm3  EDV (BP): 761771 mm3  EF (2D-Cubed): 55 3 %  ESV (2D-Cubed): 29626 mm3  ESV (BP): 30240 mm3  FS (2D-Cubed): 23 5 %  FS (2D-Teich): 23 5 %  IVS/LVPW (2D): 0 9  IVSd (2D): 10 3 mm  IVSd; Mean chosen (2D): 10 3 mm  LVIDd (2D): 46 3 mm  LVIDd; Mean (2D): 46 3 mm  LVIDs (2D): 35 4 mm  LVIDs; Mean (2D): 35 4 mm  LVPWd (2D): 11 mm  LVPWd; Mean (2D): 11 mm  Left Ventricular Ejection Fraction; Method of Disks, Biplane; 2D mode;: 46 3 %  Left Ventricular Ejection Fraction; Teichholz; 2D mode;: 47 1 %  Left Ventricular End Diastolic Volume; Teichholz; 2D mode;: 78128 mm3  Left Ventricular End Systolic Volume;  Teichholz; 2D mode;: 74860 mm3  SV (2D-Cubed): X462364 mm3  SV (BP): 90686 mm3  Stroke Volume; Teichholz; 2D mode;: 32914 mm3    Apical four chamber  EF (A4C): 50 2 %  LV MOD Diam; Recent value; End Diastole (A4C): 57 7 mm  LV MOD Diam; Recent value; End Diastole (A4C): 58 6 mm  LV MOD Diam; Recent value; End Diastole (A4C): 58 3 mm  LV MOD Diam; Recent value; End Diastole (A4C): 58 mm  LV MOD Diam; Recent value; End Diastole (A4C): 57 1 mm  LV MOD Diam; Recent value; End Diastole (A4C): 55 8 mm  LV MOD Diam; Recent value; End Diastole (A4C): 53 8 mm  LV MOD Diam; Recent value; End Diastole (A4C): 52 2 mm  LV MOD Diam; Recent value; End Diastole (A4C): 50 9 mm  LV MOD Diam; Recent value; End Diastole (A4C): 50 mm  LV MOD Diam; Recent value; End Diastole (A4C): 48 1 mm  LV MOD Diam; Recent value; End Diastole (A4C): 45 7 mm  LV MOD Diam; Recent value; End Diastole (A4C): 42 6 mm  LV MOD Diam; Recent value; End Diastole (A4C): 39 3 mm  LV MOD Diam; Recent value; End Diastole (A4C): 35 mm  LV MOD Diam; Recent value; End Diastole (A4C): 28 6 mm  LV MOD Diam; Recent value; End Diastole (A4C): 20 9 mm  LV MOD Diam; Recent value; End Diastole (A4C): 27 3 mm  LV MOD Diam; Recent value; End Diastole (A4C): 52 2 mm  LV MOD Diam; Recent value; End Diastole (A4C): 56 4 mm  LV MOD Diam; Recent value; End Systole (A4C): 35 1 mm  LV MOD Diam; Recent value; End Systole (A4C): 33 3 mm  LV MOD Diam; Recent value; End Systole (A4C): 31 5 mm  LV MOD Diam; Recent value; End Systole (A4C): 28 4 mm  LV MOD Diam; Recent value; End Systole (A4C): 25 7 mm  LV MOD Diam; Recent value; End Systole (A4C): 22 1 mm  LV MOD Diam; Recent value; End Systole (A4C): 17 7 mm  LV MOD Diam; Recent value; End Systole (A4C): 12 8 mm  LV MOD Diam; Recent value; End Systole (A4C): 24 2 mm  LV MOD Diam; Recent value; End Systole (A4C): 45 3 mm  LV MOD Diam; Recent value; End Systole (A4C): 45 9 mm  LV MOD Diam; Recent value; End Systole (A4C): 46 5 mm  LV MOD Diam; Recent value;  End Systole (A4C): 46 2 mm  LV MOD Diam; Recent value; End Systole (A4C): 45 3 mm  LV MOD Diam; Recent value; End Systole (A4C): 44 4 mm  LV MOD Diam; Recent value; End Systole (A4C): 41 6 mm  LV MOD Diam; Recent value; End Systole (A4C): 39 8 mm  LV MOD Diam; Recent value; End Systole (A4C): 38 6 mm  LV MOD Diam; Recent value; End Systole (A4C): 37 6 mm  LV MOD Diam; Recent value; End Systole (A4C): 36 4 mm  Left Ventricle diastolic major axis; Most recent value chosen; Method of Disks, Single Plane; 2D mode; Apical four chamber;: 79 8 mm  Left Ventricle systolic major axis; Most recent value chosen; Method of Disks, Single Plane; 2D mode; Apical four chamber;: 71 6 mm  Left Ventricular Diastolic Area; Most recent value chosen; Method of Disks, Single Plane; 2D mode; Apical four chamber;: 3820 mm2  Left Ventricular End Diastolic Volume; Most recent value chosen; Method of Disks, Single Plane; 2D mode; Apical four chamber;: 919567 mm3  Left Ventricular End Systolic Volume; Most recent value chosen; Method of Disks, Single Plane; 2D mode; Apical four chamber;: 31894 mm3  Left Ventricular Systolic Area; Most recent value chosen; Method of Disks, Single Plane; 2D mode; Apical four chamber;: 2510 mm2  SV (A4C): 78687 mm3  Right Ventricle Basal Diameter; 2D mode; Apical four chamber;: 35 mm  Right Ventricle Basal Diameter; Mean; Mean value chosen; 2D mode; Apical four chamber;: 35 mm    Apical two chamber  EF (A2C): 45 6 %  LV MOD Diam; Recent value; End Diastole (A2C): 46 3 mm  LV MOD Diam; Recent value; End Diastole (A2C): 48 8 mm  LV MOD Diam; Recent value; End Diastole (A2C): 51 2 mm  LV MOD Diam; Recent value; End Diastole (A2C): 53 6 mm  LV MOD Diam; Recent value; End Diastole (A2C): 56 mm  LV MOD Diam; Recent value; End Diastole (A2C): 58 4 mm  LV MOD Diam; Recent value; End Diastole (A2C): 60 5 mm  LV MOD Diam; Recent value; End Diastole (A2C): 60 8 mm  LV MOD Diam; Recent value; End Diastole (A2C): 60 5 mm  LV MOD Diam; Recent value;  End Diastole [facility]): 59 9 mm  LV MOD Diam; Recent value; End Diastole (A2C): 43 9 mm  LV MOD Diam; Recent value; End Diastole (A2C): 41 8 mm  LV MOD Diam; Recent value; End Diastole (A2C): 39 mm  LV MOD Diam; Recent value; End Diastole (A2C): 57 8 mm  LV MOD Diam; Recent value; End Diastole (A2C): 30 6 mm  LV MOD Diam; Recent value; End Diastole (A2C): 24 5 mm  LV MOD Diam; Recent value; End Diastole (A2C): 16 7 mm  LV MOD Diam; Recent value; End Diastole (A2C): 54 5 mm  LV MOD Diam; Recent value; End Diastole (A2C): 27 8 mm  LV MOD Diam; Recent value; End Diastole (A2C): 35 4 mm  LV MOD Diam; Recent value; End Systole (A2C): 10 mm  LV MOD Diam; Recent value; End Systole (A2C): 23 3 mm  LV MOD Diam; Recent value; End Systole (A2C): 47 1 mm  LV MOD Diam; Recent value; End Systole (A2C): 50 1 mm  LV MOD Diam; Recent value; End Systole (A2C): 51 6 mm  LV MOD Diam; Recent value; End Systole (A2C): 51 9 mm  LV MOD Diam; Recent value; End Systole (A2C): 51 3 mm  LV MOD Diam; Recent value; End Systole (A2C): 49 8 mm  LV MOD Diam; Recent value; End Systole (A2C): 48 mm  LV MOD Diam; Recent value; End Systole (A2C): 46 2 mm  LV MOD Diam; Recent value; End Systole (A2C): 43 5 mm  LV MOD Diam; Recent value; End Systole (A2C): 39 9 mm  LV MOD Diam; Recent value; End Systole (A2C): 36 8 mm  LV MOD Diam; Recent value; End Systole (A2C): 33 8 mm  LV MOD Diam; Recent value; End Systole (A2C): 31 4 mm  LV MOD Diam; Recent value; End Systole (A2C): 15 1 mm  LV MOD Diam; Recent value; End Systole (A2C): 20 5 mm  LV MOD Diam; Recent value; End Systole (A2C): 24 8 mm  LV MOD Diam; Recent value; End Systole (A2C): 27 5 mm  LV MOD Diam; Recent value; End Systole (A2C): 29 6 mm  Left Ventricle diastolic major axis; Most recent value chosen; Method of Disks, Single Plane; 2D mode; Apical two chamber;: 76 6 mm  Left Ventricle systolic major axis; Most recent value chosen; Method of Disks, Single Plane; 2D mode;  Apical two chamber;: 64 3 mm  Left Ventricular Diastolic Area; Most recent value chosen; Method of Disks, Single Plane; 2D mode; Apical two chamber;: 3570 mm2  Left Ventricular End Diastolic Volume; Most recent value chosen; Method of Disks, Single Plane; 2D mode; Apical two chamber;: 849995 mm3  Left Ventricular End Systolic Volume; Most recent value chosen; Method of Disks, Single Plane; 2D mode; Apical two chamber;: 96865 mm3  Left Ventricular Systolic Area; Most recent value chosen; Method of Disks, Single Plane; 2D mode; Apical two chamber;: 2370 mm2  SV (A2C): 65835 mm3    M mode  Tricuspid Annular Plane Systolic Excursion; Mean; Mean value chosen; Tricuspid Annulus; M mode;: 10 7 mm  Tricuspid Annular Plane Systolic Excursion; Tricuspid Annulus; M mode;: 10 7 mm    Unspecified Scan Mode  Peak Grad; Mean; Regurgitant Flow: 20 mm[Hg]  Vmax; Mean; Regurgitant Flow: 2250 mm/s  Vmax; Regurgitant Flow: 2460 mm/s  Vmax; Regurgitant Flow: 2100 mm/s  Vmax; Regurgitant Flow: 2110 mm/s  Vmax; Regurgitant Flow: 2310 mm/s    [facility]    Prepared and electronically signed by    Mark Salomon MD  Signed 22-Dec-2020 07:56:14      Aren Nichols, [facility]  Cardiology      "This note has been constructed using a voice recognition system  Therefore there may be syntax, spelling, and/or grammatical errors   Please call if you have any questions  "

## 2021-08-31 NOTE — CASE MANAGEMENT
Patient is anticipated for dc today  CM met with patient and discussed dc planning and PT's recommendations for HHS at dc  Patient feels it would be a benefit and agrees to referral  She was provided with choice and states she has no preference  A referral was sent to [address] in [place]  Patient states she will have a ride home with her daughter Clint Juarez at dc  IMM was discussed and she states understanding and agrees for dc today  Bundle notice was also discussed and copy's provided

## 2021-09-01 ENCOUNTER — TRANSITIONAL CARE MANAGEMENT (OUTPATIENT)
Dept: FAMILY MEDICINE CLINIC | Facility: CLINIC | Age: 86
End: 2021-09-01

## 2021-09-03 ENCOUNTER — PATIENT OUTREACH (OUTPATIENT)
Dept: CASE MANAGEMENT | Facility: HOSPITAL | Age: 86
End: 2021-09-03

## 2021-09-03 DIAGNOSIS — N90.89 FISSURE OF VULVA: ICD-10-CM

## 2021-09-03 DIAGNOSIS — L90.0 LICHEN SCLEROSUS: ICD-10-CM

## 2021-09-03 NOTE — PROGRESS NOTES
1  Role of OPCM and BPCI program explained  Outpatient Care Manager's contact information given  Primary learner is Elvira Arriaga, patient's daughter      2  Condition Knowledge (Educate HF- What it is, causes and symptoms)   What symptoms brought you to the hospital? Chest pain   What was the final diagnosis? Unsure of cause   Did the doctor explain what caused your heart failure? N/A- no acute HF during this admission  Patient/Caregiver educated on causes and symptoms       Teach back and rate understanding   o 75% to 89%    3  Daily Care Plan:   Do you weigh yourself daily and record? Not regularly   How much weight gain should you be concerned about? unsure   Do you follow a low sodium diet? Yes   Do you limit how much liquid you drink in a day? unsure   Do you monitor your symptoms and know what to do if your symptoms worsen? Call cardiologist or go to hospital   How active are you on a daily basis? Walks around house- on first floor  Uses RW prn in house and usually when leaves house   Do you have an appointment scheduled with your PCP or cardiologist? Need to make with PCP  Ocasio send of month with cardiology   Do you have a home health nurse or physical therapist coming to your home? Yes  HF Zone tool/Daily Care Plan including daily weights, low sodium diet, symptom monitoring and when to notify provider, activity and importance of follow up reviewed with patient/caregiver   Teach back and rate the patient's understanding of how to care for HF   o 75% to 89%    4  Medication:   How do you manage your medication? Daughter manages medication  Granddaughter assists at times   Does anyone help you with your medication? daughter  Rivera Alessandra you have any problems affording your medication? No           Teach back and rate the patient's understanding of medication  [facility] RN to review medication  o 90% or more  o 75% to 89%  o 50% to 74%  o Less than 50%  o Other:     5   Social Determinants/ Barriers:   Are you having any problems with your heart failure self care needs?  o Caregiver Support:  Daughter states is ok "for now" and that granddaughter is helping out, but admits that her mom will likely need some additional home health aid and [facility] has sent her a list of agencies  She has not pursued any yet  She states feels eventually she will need to go LT to SNF, but "holding out as long as I can"   Assistance provided   o Referral to Outpatient - has contact information via e-mail for [employer] MSW      6   Additional Heart Failure education provided:   Mailed Zone tool and weight log

## 2021-09-07 RX ORDER — CLOBETASOL PROPIONATE 0.5 MG/G
OINTMENT TOPICAL 2 TIMES DAILY
Qty: 30 G | Refills: 0 | Status: SHIPPED | OUTPATIENT
Start: 2021-09-07 | End: 2022-04-28

## 2021-09-17 ENCOUNTER — PATIENT OUTREACH (OUTPATIENT)
Dept: CASE MANAGEMENT | Facility: HOSPITAL | Age: 86
End: 2021-09-17

## 2021-09-28 DIAGNOSIS — R56.9 SEIZURES (HCC): ICD-10-CM

## 2021-09-28 DIAGNOSIS — F03.91 DEMENTIA WITH BEHAVIORAL DISTURBANCE (HCC): ICD-10-CM

## 2021-09-29 RX ORDER — PRIMIDONE 50 MG/1
50 TABLET ORAL EVERY 12 HOURS SCHEDULED
Qty: 60 TABLET | Refills: 1 | Status: SHIPPED | OUTPATIENT
Start: 2021-09-29 | End: 2021-10-22

## 2021-09-29 RX ORDER — QUETIAPINE FUMARATE 25 MG/1
12.5 TABLET, FILM COATED ORAL
Qty: 30 TABLET | Refills: 0 | Status: SHIPPED | OUTPATIENT
Start: 2021-09-29 | End: 2021-11-10 | Stop reason: SDUPTHER

## 2021-10-04 DIAGNOSIS — I10 PRIMARY HYPERTENSION: Primary | ICD-10-CM

## 2021-10-05 RX ORDER — LOSARTAN POTASSIUM 25 MG/1
25 TABLET ORAL DAILY
Qty: 30 TABLET | Refills: 5 | Status: SHIPPED | OUTPATIENT
Start: 2021-10-05 | End: 2022-02-28

## 2021-10-08 ENCOUNTER — PATIENT OUTREACH (OUTPATIENT)
Dept: CASE MANAGEMENT | Facility: HOSPITAL | Age: 86
End: 2021-10-08

## 2021-10-22 DIAGNOSIS — R56.9 SEIZURES (HCC): ICD-10-CM

## 2021-10-22 RX ORDER — PRIMIDONE 50 MG/1
50 TABLET ORAL EVERY 12 HOURS SCHEDULED
Qty: 60 TABLET | Refills: 1 | Status: SHIPPED | OUTPATIENT
Start: 2021-10-22 | End: 2021-11-15

## 2021-10-28 ENCOUNTER — TELEPHONE (OUTPATIENT)
Dept: FAMILY MEDICINE CLINIC | Facility: CLINIC | Age: 86
End: 2021-10-28

## 2021-10-28 DIAGNOSIS — F03.91 DEMENTIA WITH BEHAVIORAL DISTURBANCE (HCC): ICD-10-CM

## 2021-11-01 DIAGNOSIS — I50.9 CHRONIC CONGESTIVE HEART FAILURE, UNSPECIFIED HEART FAILURE TYPE (HCC): ICD-10-CM

## 2021-11-01 RX ORDER — FUROSEMIDE 40 MG/1
40 TABLET ORAL 2 TIMES DAILY
Qty: 60 TABLET | Refills: 5 | Status: SHIPPED | OUTPATIENT
Start: 2021-11-01 | End: 2022-01-08 | Stop reason: SDUPTHER

## 2021-11-10 ENCOUNTER — TELEMEDICINE (OUTPATIENT)
Dept: FAMILY MEDICINE CLINIC | Facility: CLINIC | Age: 86
End: 2021-11-10
Payer: MEDICARE

## 2021-11-10 VITALS — WEIGHT: 192 LBS | BODY MASS INDEX: 38.71 KG/M2 | HEIGHT: 59 IN

## 2021-11-10 DIAGNOSIS — F32.9 MAJOR DEPRESSIVE DISORDER WITH CURRENT ACTIVE EPISODE, UNSPECIFIED DEPRESSION EPISODE SEVERITY, UNSPECIFIED WHETHER RECURRENT: Primary | ICD-10-CM

## 2021-11-10 DIAGNOSIS — F03.91 DEMENTIA WITH BEHAVIORAL DISTURBANCE (HCC): ICD-10-CM

## 2021-11-10 DIAGNOSIS — E11.69 TYPE 2 DIABETES MELLITUS WITH OTHER SPECIFIED COMPLICATION, WITHOUT LONG-TERM CURRENT USE OF INSULIN (HCC): ICD-10-CM

## 2021-11-10 PROCEDURE — 99214 OFFICE O/P EST MOD 30 MIN: CPT | Performed by: FAMILY MEDICINE

## 2021-11-10 RX ORDER — GLIMEPIRIDE 4 MG/1
4 TABLET ORAL
Qty: 90 TABLET | Refills: 1 | Status: SHIPPED | OUTPATIENT
Start: 2021-11-10 | End: 2022-05-09

## 2021-11-10 RX ORDER — SERTRALINE HYDROCHLORIDE 100 MG/1
100 TABLET, FILM COATED ORAL DAILY
Qty: 30 TABLET | Refills: 1 | Status: SHIPPED | OUTPATIENT
Start: 2021-11-10 | End: 2021-12-06

## 2021-11-10 RX ORDER — QUETIAPINE FUMARATE 25 MG/1
25 TABLET, FILM COATED ORAL
Qty: 30 TABLET | Refills: 0 | Status: SHIPPED | OUTPATIENT
Start: 2021-11-10 | End: 2021-12-06

## 2021-11-14 DIAGNOSIS — R56.9 SEIZURES (HCC): ICD-10-CM

## 2021-11-15 RX ORDER — PRIMIDONE 50 MG/1
50 TABLET ORAL EVERY 12 HOURS SCHEDULED
Qty: 60 TABLET | Refills: 1 | Status: SHIPPED | OUTPATIENT
Start: 2021-11-15 | End: 2021-11-30

## 2021-11-18 ENCOUNTER — PATIENT OUTREACH (OUTPATIENT)
Dept: CASE MANAGEMENT | Facility: HOSPITAL | Age: 86
End: 2021-11-18

## 2021-11-29 ENCOUNTER — EPISODE CHANGES (OUTPATIENT)
Dept: CASE MANAGEMENT | Facility: HOSPITAL | Age: 86
End: 2021-11-29

## 2021-11-29 ENCOUNTER — PATIENT OUTREACH (OUTPATIENT)
Dept: CASE MANAGEMENT | Facility: HOSPITAL | Age: 86
End: 2021-11-29

## 2021-11-29 DIAGNOSIS — R56.9 SEIZURES (HCC): ICD-10-CM

## 2021-11-30 RX ORDER — PRIMIDONE 50 MG/1
TABLET ORAL
Qty: 180 TABLET | Refills: 1 | Status: SHIPPED | OUTPATIENT
Start: 2021-11-30

## 2021-12-04 DIAGNOSIS — F03.91 DEMENTIA WITH BEHAVIORAL DISTURBANCE (HCC): ICD-10-CM

## 2021-12-04 DIAGNOSIS — F32.9 MAJOR DEPRESSIVE DISORDER WITH CURRENT ACTIVE EPISODE, UNSPECIFIED DEPRESSION EPISODE SEVERITY, UNSPECIFIED WHETHER RECURRENT: ICD-10-CM

## 2021-12-06 RX ORDER — SERTRALINE HYDROCHLORIDE 100 MG/1
TABLET, FILM COATED ORAL
Qty: 30 TABLET | Refills: 1 | Status: SHIPPED | OUTPATIENT
Start: 2021-12-06 | End: 2022-01-04

## 2021-12-06 RX ORDER — QUETIAPINE FUMARATE 25 MG/1
TABLET, FILM COATED ORAL
Qty: 30 TABLET | Refills: 0 | Status: SHIPPED | OUTPATIENT
Start: 2021-12-06 | End: 2022-01-04

## 2021-12-18 ENCOUNTER — HOSPITAL ENCOUNTER (EMERGENCY)
Facility: HOSPITAL | Age: 86
Discharge: HOME | End: 2021-12-18
Attending: EMERGENCY MEDICINE | Admitting: EMERGENCY MEDICINE
Payer: MEDICARE

## 2021-12-18 ENCOUNTER — APPOINTMENT (EMERGENCY)
Dept: RADIOLOGY | Facility: HOSPITAL | Age: 86
End: 2021-12-18
Payer: MEDICARE

## 2021-12-18 VITALS
RESPIRATION RATE: 16 BRPM | OXYGEN SATURATION: 96 % | HEART RATE: 84 BPM | DIASTOLIC BLOOD PRESSURE: 88 MMHG | TEMPERATURE: 98.5 F | SYSTOLIC BLOOD PRESSURE: 158 MMHG

## 2021-12-18 DIAGNOSIS — S02.2XXA NASAL FRACTURE: ICD-10-CM

## 2021-12-18 DIAGNOSIS — R44.3 HALLUCINATION: Primary | ICD-10-CM
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
## 2021-12-18 LAB
ALBUMIN SERPL BCP-MCNC: 3.6 G/DL (ref 3.5–5)
ALP SERPL-CCNC: 94 U/L (ref 46–116)
ALT SERPL W P-5'-P-CCNC: 23 U/L (ref 12–78)
ANION GAP SERPL CALCULATED.3IONS-SCNC: 10 MMOL/L (ref 4–13)
AST SERPL W P-5'-P-CCNC: 17 U/L (ref 5–45)
BASOPHILS # BLD AUTO: 0.05 THOUSANDS/ΜL (ref 0–0.1)
BASOPHILS NFR BLD AUTO: 1 % (ref 0–1)
BILIRUB SERPL-MCNC: 0.35 MG/DL (ref 0.2–1)
BUN SERPL-MCNC: 25 MG/DL (ref 5–25)
CALCIUM SERPL-MCNC: 8.9 MG/DL (ref 8.3–10.1)
CHLORIDE SERPL-SCNC: 103 MMOL/L (ref 100–108)
CO2 SERPL-SCNC: 29 MMOL/L (ref 21–32)
CREAT SERPL-MCNC: 0.98 MG/DL (ref 0.6–1.3)
EOSINOPHIL # BLD AUTO: 0.33 THOUSAND/ΜL (ref 0–0.61)
EOSINOPHIL NFR BLD AUTO: 4 % (ref 0–6)
ERYTHROCYTE [DISTWIDTH] IN BLOOD BY AUTOMATED COUNT: 14.8 % (ref 11.6–15.1)
GFR SERPL CREATININE-BSD FRML MDRD: 52 ML/MIN/1.73SQ M
GLUCOSE SERPL-MCNC: 170 MG/DL (ref 65–140)
HCT VFR BLD AUTO: 33.1 % (ref 34.8–46.1)
HGB BLD-MCNC: 10.5 G/DL (ref 11.5–15.4)
IMM GRANULOCYTES # BLD AUTO: 0.03 THOUSAND/UL (ref 0–0.2)
IMM GRANULOCYTES NFR BLD AUTO: 0 % (ref 0–2)
LYMPHOCYTES # BLD AUTO: 1.52 THOUSANDS/ΜL (ref 0.6–4.47)
LYMPHOCYTES NFR BLD AUTO: 17 % (ref 14–44)
MCH RBC QN AUTO: 28.5 PG (ref 26.8–34.3)
MCHC RBC AUTO-ENTMCNC: 31.7 G/DL (ref 31.4–37.4)
MCV RBC AUTO: 90 FL (ref 82–98)
MONOCYTES # BLD AUTO: 0.65 THOUSAND/ΜL (ref 0.17–1.22)
MONOCYTES NFR BLD AUTO: 7 % (ref 4–12)
NEUTROPHILS # BLD AUTO: 6.39 THOUSANDS/ΜL (ref 1.85–7.62)
NEUTS SEG NFR BLD AUTO: 71 % (ref 43–75)
NRBC BLD AUTO-RTO: 0 /100 WBCS
PLATELET # BLD AUTO: 245 THOUSANDS/UL (ref 149–390)
PMV BLD AUTO: 9.7 FL (ref 8.9–12.7)
POTASSIUM SERPL-SCNC: 3.7 MMOL/L (ref 3.5–5.3)
PROT SERPL-MCNC: 8.2 G/DL (ref 6.4–8.2)
RBC # BLD AUTO: 3.68 MILLION/UL (ref 3.81–5.12)
SODIUM SERPL-SCNC: 142 MMOL/L (ref 136–145)
WBC # BLD AUTO: 8.97 THOUSAND/UL (ref 4.31–10.16)

## 2021-12-18 PROCEDURE — 99285 EMERGENCY DEPT VISIT HI MDM: CPT | Performed by: EMERGENCY MEDICINE

## 2021-12-18 PROCEDURE — G1004 CDSM NDSC: HCPCS

## 2021-12-18 PROCEDURE — 80053 COMPREHEN METABOLIC PANEL: CPT | Performed by: EMERGENCY MEDICINE

## 2021-12-18 PROCEDURE — 93005 ELECTROCARDIOGRAM TRACING: CPT

## 2021-12-18 PROCEDURE — 85025 COMPLETE CBC W/AUTO DIFF WBC: CPT | Performed by: EMERGENCY MEDICINE

## 2021-12-18 PROCEDURE — 36415 COLL VENOUS BLD VENIPUNCTURE: CPT | Performed by: EMERGENCY MEDICINE

## 2021-12-18 PROCEDURE — 99284 EMERGENCY DEPT VISIT MOD MDM: CPT

## 2021-12-18 PROCEDURE — 70450 CT HEAD/BRAIN W/O DYE: CPT

## 2021-12-20 LAB
QRS AXIS: 95 DEGREES
QRSD INTERVAL: 88 MS
QT INTERVAL: 422 MS
QTC INTERVAL: 502 MS
T WAVE AXIS: 92 DEGREES
VENTRICULAR RATE: 85 BPM

## 2021-12-20 PROCEDURE — 93010 ELECTROCARDIOGRAM REPORT: CPT | Performed by: INTERNAL MEDICINE

## 2021-12-21 ENCOUNTER — APPOINTMENT (OUTPATIENT)
Dept: LAB | Facility: HOSPITAL | Age: 86
End: 2021-12-21
Attending: EMERGENCY MEDICINE
Payer: MEDICARE

## 2021-12-21 ENCOUNTER — VBI (OUTPATIENT)
Dept: FAMILY MEDICINE CLINIC | Facility: CLINIC | Age: 86
End: 2021-12-21

## 2021-12-21 DIAGNOSIS — R44.3 HALLUCINATION: ICD-10-CM

## 2021-12-21 LAB
BACTERIA UR QL AUTO: ABNORMAL /HPF
BILIRUB UR QL STRIP: NEGATIVE
CAOX CRY URNS QL MICRO: ABNORMAL /HPF
CLARITY UR: ABNORMAL
COLOR UR: ABNORMAL
GLUCOSE UR STRIP-MCNC: NEGATIVE MG/DL
HGB UR QL STRIP.AUTO: ABNORMAL
KETONES UR STRIP-MCNC: NEGATIVE MG/DL
LEUKOCYTE ESTERASE UR QL STRIP: ABNORMAL
NITRITE UR QL STRIP: POSITIVE
NON-SQ EPI CELLS URNS QL MICRO: ABNORMAL /HPF
PH UR STRIP.AUTO: 5.5 [PH]
PROT UR STRIP-MCNC: NEGATIVE MG/DL
RBC #/AREA URNS AUTO: ABNORMAL /HPF
SP GR UR STRIP.AUTO: 1.01 (ref 1–1.03)
UROBILINOGEN UR QL STRIP.AUTO: 0.2 E.U./DL
WBC #/AREA URNS AUTO: ABNORMAL /HPF

## 2021-12-21 PROCEDURE — 81001 URINALYSIS AUTO W/SCOPE: CPT

## 2021-12-21 PROCEDURE — 87077 CULTURE AEROBIC IDENTIFY: CPT | Performed by: FAMILY MEDICINE

## 2021-12-21 PROCEDURE — 87086 URINE CULTURE/COLONY COUNT: CPT | Performed by: FAMILY MEDICINE

## 2021-12-21 PROCEDURE — 87186 SC STD MICRODIL/AGAR DIL: CPT

## 2021-12-21 PROCEDURE — 87077 CULTURE AEROBIC IDENTIFY: CPT

## 2021-12-21 PROCEDURE — 87086 URINE CULTURE/COLONY COUNT: CPT

## 2021-12-21 PROCEDURE — 87186 SC STD MICRODIL/AGAR DIL: CPT | Performed by: FAMILY MEDICINE

## 2021-12-23 DIAGNOSIS — N39.0 ACUTE UTI (URINARY TRACT INFECTION): Primary | ICD-10-CM

## 2021-12-23 LAB
BACTERIA UR CULT: ABNORMAL
BACTERIA UR CULT: ABNORMAL

## 2021-12-23 RX ORDER — CEPHALEXIN 500 MG/1
500 CAPSULE ORAL EVERY 12 HOURS SCHEDULED
Qty: 14 CAPSULE | Refills: 0 | Status: SHIPPED | OUTPATIENT
Start: 2021-12-23 | End: 2021-12-30

## 2021-12-28 DIAGNOSIS — L90.0 LICHEN SCLEROSUS: Primary | ICD-10-CM

## 2021-12-28 RX ORDER — CLOBETASOL PROPIONATE 0.5 MG/G
OINTMENT TOPICAL 2 TIMES DAILY
Qty: 30 G | Refills: 0 | Status: SHIPPED | OUTPATIENT
Start: 2021-12-28 | End: 2022-02-28

## 2022-01-03 ENCOUNTER — TELEPHONE (OUTPATIENT)
Dept: FAMILY MEDICINE CLINIC | Facility: CLINIC | Age: 87
End: 2022-01-03

## 2022-01-03 DIAGNOSIS — F32.9 MAJOR DEPRESSIVE DISORDER WITH CURRENT ACTIVE EPISODE, UNSPECIFIED DEPRESSION EPISODE SEVERITY, UNSPECIFIED WHETHER RECURRENT: ICD-10-CM

## 2022-01-03 DIAGNOSIS — N39.0 ACUTE UTI (URINARY TRACT INFECTION): Primary | ICD-10-CM

## 2022-01-03 DIAGNOSIS — F03.91 DEMENTIA WITH BEHAVIORAL DISTURBANCE (HCC): ICD-10-CM

## 2022-01-03 NOTE — TELEPHONE ENCOUNTER
Patient needs an order for a urine test    She had a uti with Hallucinations  She is still having problems  Please advise

## 2022-01-03 NOTE — TELEPHONE ENCOUNTER
She continues with hallucinations  She has had chronic UTI's in the past   Daughter is taking care of her and is wondering if last UTI is still persisting  Please advise if you can order this    Maddie Evans

## 2022-01-04 ENCOUNTER — APPOINTMENT (OUTPATIENT)
Dept: LAB | Facility: HOSPITAL | Age: 87
End: 2022-01-04
Payer: MEDICARE

## 2022-01-04 DIAGNOSIS — N39.0 ACUTE UTI (URINARY TRACT INFECTION): ICD-10-CM

## 2022-01-04 PROCEDURE — 87086 URINE CULTURE/COLONY COUNT: CPT

## 2022-01-04 PROCEDURE — 87077 CULTURE AEROBIC IDENTIFY: CPT

## 2022-01-04 PROCEDURE — 87186 SC STD MICRODIL/AGAR DIL: CPT

## 2022-01-04 RX ORDER — QUETIAPINE FUMARATE 25 MG/1
TABLET, FILM COATED ORAL
Qty: 30 TABLET | Refills: 0 | Status: SHIPPED | OUTPATIENT
Start: 2022-01-04 | End: 2022-01-29 | Stop reason: SDUPTHER

## 2022-01-04 RX ORDER — SERTRALINE HYDROCHLORIDE 100 MG/1
TABLET, FILM COATED ORAL
Qty: 30 TABLET | Refills: 0 | Status: SHIPPED | OUTPATIENT
Start: 2022-01-04 | End: 2022-01-29 | Stop reason: SDUPTHER

## 2022-01-07 ENCOUNTER — TELEPHONE (OUTPATIENT)
Dept: FAMILY MEDICINE CLINIC | Facility: CLINIC | Age: 87
End: 2022-01-07

## 2022-01-07 DIAGNOSIS — N39.0 ACUTE UTI: Primary | ICD-10-CM

## 2022-01-07 LAB
BACTERIA UR CULT: ABNORMAL
BACTERIA UR CULT: ABNORMAL

## 2022-01-07 RX ORDER — CEPHALEXIN 500 MG/1
500 CAPSULE ORAL EVERY 8 HOURS SCHEDULED
Qty: 21 CAPSULE | Refills: 0 | Status: SHIPPED | OUTPATIENT
Start: 2022-01-07 | End: 2022-01-14

## 2022-01-07 NOTE — TELEPHONE ENCOUNTER
1/7/2022 8:50 AM Called Erin's daughter and let her know that the urine culture did show an infection    Patient started on antibiotic    Bessy Haines DO

## 2022-01-08 DIAGNOSIS — I50.9 CHRONIC CONGESTIVE HEART FAILURE, UNSPECIFIED HEART FAILURE TYPE (HCC): ICD-10-CM

## 2022-01-08 NOTE — TELEPHONE ENCOUNTER
Dr Galan's patient  Needs to be written as 2 in AM and 1 at [facility]  She has been running out bc script dosage wrong  Thank you!

## 2022-01-10 ENCOUNTER — TELEPHONE (OUTPATIENT)
Dept: FAMILY MEDICINE CLINIC | Facility: CLINIC | Age: 87
End: 2022-01-10

## 2022-01-10 DIAGNOSIS — I50.9 CHRONIC CONGESTIVE HEART FAILURE, UNSPECIFIED HEART FAILURE TYPE (HCC): ICD-10-CM

## 2022-01-10 RX ORDER — FUROSEMIDE 40 MG/1
40 TABLET ORAL 2 TIMES DAILY
Qty: 60 TABLET | Refills: 5 | Status: SHIPPED | OUTPATIENT
Start: 2022-01-10 | End: 2022-01-10 | Stop reason: SDUPTHER

## 2022-01-10 RX ORDER — FUROSEMIDE 40 MG/1
40 TABLET ORAL 3 TIMES DAILY
Qty: 60 TABLET | Refills: 5 | Status: SHIPPED | OUTPATIENT
Start: 2022-01-10

## 2022-01-10 NOTE — TELEPHONE ENCOUNTER
See refill request message from 1/8  The daughter is requesting a call back  She is taking this 3 times daily and she left us a detailed message on 1/8  She ran out of this medication   Dr Sandy Stafford refilled it for only twice daily and she has been taking this 3 times daily "for a long time" [id number]

## 2022-01-20 ENCOUNTER — TELEPHONE (OUTPATIENT)
Dept: FAMILY MEDICINE CLINIC | Facility: CLINIC | Age: 87
End: 2022-01-20

## 2022-01-20 DIAGNOSIS — I10 HYPERTENSION, UNSPECIFIED TYPE: ICD-10-CM

## 2022-01-20 RX ORDER — METOPROLOL TARTRATE 100 MG/1
50 TABLET ORAL EVERY 12 HOURS
Qty: 90 TABLET | Refills: 0 | Status: SHIPPED | OUTPATIENT
Start: 2022-01-20 | End: 2022-02-28

## 2022-01-20 NOTE — TELEPHONE ENCOUNTER
We need to confirm her Metoprolol  instructions  Daughter left a message and states 1/2 tablet twice arora  We have 1 tablet twice daily   [facility]

## 2022-01-20 NOTE — TELEPHONE ENCOUNTER
Dr Solomon Guerra, it does look like this medication has been managed by you  Please advise how you would like her to take this medication    Lalit Luna MA

## 2022-01-20 NOTE — TELEPHONE ENCOUNTER
Called daughter to let her know we did not RX this med  She would like if you could since you are now her PCP and she no longer goes to the doctor in [place]  The last time you saw her was 08/2021 for a URI, the daughter says it is almost impossible to get her here  She hasn't been seen here for hypertension    Kelli Lion

## 2022-01-28 NOTE — PROGRESS NOTES
FOLLOW-UP OFFICE VISIT  [facility] [address] [place] [address]    NAME: Willie Wilson  AGE: 81 y o  SEX: female  : 1935     DATE: 2021     Assessment and Plan:     1  Type 2 diabetes mellitus with other specified complication, without long-term current use of insulin (HCC)-wel controlled  Will stay on current dosing of metfromin and amaryl  DM foot exam concerning for decreased sensation thus at risk for DM ulcer  Refer to podiatry for further eval and monitoring    - Ambulatory referral to Podiatry; Future    2  Chronic pulmonary thromboembolism syndrome (HCC)-h/o of PE over 3 yrs ago  Received 6mo of coumadin  CTA PE on 3/15/2019 shows resolution  3  Chronic obstructive pulmonary disease, unspecified COPD type (HCC)-stable  No respiratory therapies required at this time  4  Obesity, morbid (HCC)-2/2 poor diet and limited mobility  Dietary changed encouraged  5  Subclinical hypothyroidism-pt would like to monitor  Repeat labs in upcoming weeks  If upward trend will start synthroid   - TSH, 3rd generation with Free T4 reflex; Future    6  Pruritus  - camphor-menthol (SARNA) lotion; Apply topically as needed for itching  Dispense: 222 mL; Refill: 0          No follow-ups on file  Chief Complaint:     Chief Complaint   Patient presents with    Follow-up        History of Present Illness:      81-year-old female past medical history listed below presents for diabetes follow-up  Presents with son  Blood sugars have been monitored as discussed prior  Avg  Sugars 120s-150s   Last A1C 6 7   Diet Unrestricted  Will eat sweets often  Exercise Limited due to mobility    Last Eye Exam        Current Meds Metformin  500mg qd and amaryl 4mg qd    Recently completed COVID19 series  Pt admits she feels like a burden to her family  This is why she has thoughts of wanting to die  She stay awake for1-2hrs at a time and then sleeps  She doesn't know why  Pt arrives to the ED with complaint of SOB starting yesterday but worsening over night. Pt states overusing albuterol inhaler (about 6-7x per day) for sob over the last couple weeks but ran out of inhaler yesterday. Hx: COPD, wears 2L O2 at baseline but had to increase to 3L. Denies cp/n/v.    Review of Systems:     Review of Systems   Constitutional: Negative for fatigue  Respiratory: Negative for shortness of breath  Cardiovascular: Negative for chest pain  Musculoskeletal: Positive for gait problem  Neurological: Positive for tremors  Psychiatric/Behavioral: Positive for dysphoric mood and sleep disturbance (still wants to sleep most of the day )  Negative for decreased concentration  Problem List:     Patient Active Problem List   Diagnosis    Tachy-gale syndrome (HCC)    Persistent atrial fibrillation (HCC)    CAD (coronary artery disease)    DM (diabetes mellitus), type 2 (HCC)    Acute encephalopathy    Dysphagia    Chest pain    CHF (congestive heart failure) (HCC)    Seasonal allergic rhinitis    Primary osteoarthritis of both knees    Chronic fatigue    Pruritus        Objective:     Ht 5' (1 524 m)   BMI 35 54 kg/m²     Physical Exam  HENT:      Head: Normocephalic and atraumatic  Right Ear: External ear normal       Left Ear: External ear normal       Nose: Nose normal    Eyes:      Conjunctiva/sclera: Conjunctivae normal    Cardiovascular:      Rate and Rhythm: Normal rate and regular rhythm  Pulses: Pulses are weak  Dorsalis pedis pulses are 1+ on the right side and 1+ on the left side  Posterior tibial pulses are 0 on the right side and 0 on the left side  Pulmonary:      Effort: Pulmonary effort is normal    Musculoskeletal:      Right lower leg: No edema  Left lower leg: No edema  Neurological:      Mental Status: She is alert and oriented to person, place, and time  Motor: Tremor present  Psychiatric:         Attention and Perception: Attention normal          Mood and Affect: Mood is depressed  Affect is flat  Speech: Speech normal         Patient's shoes and socks removed  Right Foot/Ankle   Right Foot Inspection      Sensory       Monofilament testing: diminished  Vascular  Capillary refills: elevated  The right DP pulse is 1+  The right PT pulse is 0  Left Foot/Ankle  Left Foot Inspection                                           Sensory       Monofilament: diminished  Vascular  Capillary refills: < 3 seconds  The left DP pulse is 1+  The left PT pulse is 0  Assign Risk Category:  No deformity present; Loss of protective sensation; Weak pulses       Risk: 2      Pertinent Laboratory/Diagnostic Studies:    Laboratory Results: I have personally reviewed the pertinent laboratory results/reports     CBC:   Results from Last 12 Months   Lab Units 01/25/21  2122   WBC Thousand/uL 8 29   RBC Million/uL 3 84   HEMOGLOBIN g/dL 10 8*   HEMATOCRIT % 34 8   MCV fL 91   MCH pg 28 1   MCHC g/dL 31 0*   RDW % 13 9   MPV fL 9 6   PLATELETS Thousands/uL 241   NRBC AUTO /100 WBCs 0   NEUTROS PCT % 56   LYMPHS PCT % 28   MONOS PCT % 10   EOS PCT % 4   BASOS PCT % 1   NEUTROS ABS Thousands/µL 4 73   LYMPHS ABS Thousands/µL 2 31   MONOS ABS Thousand/µL 0 86   EOS ABS Thousand/µL 0 29     Chemistry Profile:   Results from Last 12 Months   Lab Units 03/19/21  1235 01/25/21  2122   POTASSIUM mmol/L 3 8 3 5   CHLORIDE mmol/L 105 103   CO2 mmol/L 29 32   BUN mg/dL 21 18   CREATININE mg/dL 1 08 0 95   GLUCOSE RANDOM mg/dL 261* 121   CALCIUM mg/dL 9 1 9 2   CORRECTED CALCIUM mg/dL  --  9 8   AST U/L 13 17   ALT U/L 19 23   ALK PHOS U/L 122* 96   EGFR ml/min/1 73sq m 47 55     Endocrine Studies:   Results from Last 12 Months   Lab Units 03/19/21  1235   HEMOGLOBIN A1C % 6 7*   TSH 3RD GENERATON uIU/mL 3 920*   FREE T4 ng/dL 0 98       Radiology/Other Diagnostic Testing Results: I have personally reviewed pertinent reports          Lamar GUILLERMO [facility]  4/2/2021 10:49 AM

## 2022-01-29 DIAGNOSIS — F32.9 MAJOR DEPRESSIVE DISORDER WITH CURRENT ACTIVE EPISODE, UNSPECIFIED DEPRESSION EPISODE SEVERITY, UNSPECIFIED WHETHER RECURRENT: ICD-10-CM

## 2022-01-29 DIAGNOSIS — F03.91 DEMENTIA WITH BEHAVIORAL DISTURBANCE (HCC): ICD-10-CM

## 2022-01-29 RX ORDER — SERTRALINE HYDROCHLORIDE 100 MG/1
TABLET, FILM COATED ORAL
Qty: 30 TABLET | Refills: 0 | Status: SHIPPED | OUTPATIENT
Start: 2022-01-29 | End: 2022-02-23

## 2022-01-29 RX ORDER — QUETIAPINE FUMARATE 25 MG/1
TABLET, FILM COATED ORAL
Qty: 30 TABLET | Refills: 0 | Status: SHIPPED | OUTPATIENT
Start: 2022-01-29 | End: 2022-02-23

## 2022-02-01 ENCOUNTER — APPOINTMENT (OUTPATIENT)
Dept: LAB | Facility: HOSPITAL | Age: 87
End: 2022-02-01
Attending: FAMILY MEDICINE
Payer: MEDICARE

## 2022-02-01 ENCOUNTER — TELEPHONE (OUTPATIENT)
Dept: FAMILY MEDICINE CLINIC | Facility: CLINIC | Age: 87
End: 2022-02-01

## 2022-02-01 DIAGNOSIS — N39.0 ACUTE UTI (URINARY TRACT INFECTION): Primary | ICD-10-CM

## 2022-02-01 LAB
BACTERIA UR QL AUTO: ABNORMAL /HPF
BILIRUB UR QL STRIP: NEGATIVE
CLARITY UR: ABNORMAL
COLOR UR: ABNORMAL
GLUCOSE UR STRIP-MCNC: NEGATIVE MG/DL
HGB UR QL STRIP.AUTO: ABNORMAL
KETONES UR STRIP-MCNC: NEGATIVE MG/DL
LEUKOCYTE ESTERASE UR QL STRIP: ABNORMAL
NITRITE UR QL STRIP: POSITIVE
NON-SQ EPI CELLS URNS QL MICRO: ABNORMAL /HPF
PH UR STRIP.AUTO: 5.5 [PH]
PROT UR STRIP-MCNC: NEGATIVE MG/DL
RBC #/AREA URNS AUTO: ABNORMAL /HPF
SP GR UR STRIP.AUTO: 1.02 (ref 1–1.03)
UROBILINOGEN UR QL STRIP.AUTO: 0.2 E.U./DL
WBC #/AREA URNS AUTO: ABNORMAL /HPF
WBC CLUMPS # UR AUTO: ABNORMAL /UL

## 2022-02-01 PROCEDURE — 87077 CULTURE AEROBIC IDENTIFY: CPT

## 2022-02-01 PROCEDURE — 87086 URINE CULTURE/COLONY COUNT: CPT

## 2022-02-01 PROCEDURE — 87186 SC STD MICRODIL/AGAR DIL: CPT

## 2022-02-01 PROCEDURE — 81001 URINALYSIS AUTO W/SCOPE: CPT

## 2022-02-01 RX ORDER — CEPHALEXIN 500 MG/1
500 CAPSULE ORAL EVERY 12 HOURS SCHEDULED
Qty: 14 CAPSULE | Refills: 0 | Status: SHIPPED | OUTPATIENT
Start: 2022-02-01 | End: 2022-02-08

## 2022-02-03 LAB — BACTERIA UR CULT: ABNORMAL

## 2022-02-10 ENCOUNTER — TELEPHONE (OUTPATIENT)
Dept: FAMILY MEDICINE CLINIC | Facility: CLINIC | Age: 87
End: 2022-02-10

## 2022-02-10 DIAGNOSIS — N39.0 RECURRENT UTI: ICD-10-CM

## 2022-02-10 DIAGNOSIS — N39.0 ACUTE UTI (URINARY TRACT INFECTION): Primary | ICD-10-CM

## 2022-02-10 RX ORDER — CEPHALEXIN 500 MG/1
500 CAPSULE ORAL EVERY 8 HOURS SCHEDULED
Qty: 21 CAPSULE | Refills: 0 | Status: SHIPPED | OUTPATIENT
Start: 2022-02-10 | End: 2022-02-17

## 2022-02-10 NOTE — TELEPHONE ENCOUNTER
Daughter calling on behalf of patient  She just finished 3rd round of abx for uti  Woke up this morning with burning during peeing and hallucinations  Offered appointment, but daughter states she can not get patient here  Spoke with Dr Saadia Canchola who states she will do one more round of abx and a referral to urology  Daughter advised     Yasir Posadas MA

## 2022-02-10 NOTE — TELEPHONE ENCOUNTER
Sent keflex to pharmacy and placed referral for urology  If hallucinations or symptoms worsen, she should go to the ER

## 2022-02-10 NOTE — TELEPHONE ENCOUNTER
Spoke with Jimmy Pineda  She is aware abx was sent to the pharmacy and that an order for urology was placed into her chart  Also aware of symptoms worsen to go the ER  No further action needed   Kvng Benton LPN

## 2022-02-21 ENCOUNTER — TELEPHONE (OUTPATIENT)
Dept: FAMILY MEDICINE CLINIC | Facility: CLINIC | Age: 87
End: 2022-02-21

## 2022-02-21 DIAGNOSIS — N39.0 RECURRENT UTI: Primary | ICD-10-CM

## 2022-02-21 NOTE — TELEPHONE ENCOUNTER
Erin's daughter, Shlomo Gilbert, is calling to ask if she can get an order for a urine analysis  She already has a cup at home  Amy Terry has had frequent UTIs and has been on antibiotics 3 times  Shlomo Gilbert just wants to make sure everything is ok  I offered an apt in the office but she stated Amy Terry is 81 and doesn't get around well  Did not say she was having any symptoms      Call Shlomo Hyatte back at [phone]

## 2022-02-21 NOTE — TELEPHONE ENCOUNTER
I placed an order for a urine culture to be done at the lab  She can drop it [facility] to [facility] lab  I recommend that she make an appointment with urologist soon  Referral is in her chart

## 2022-02-23 DIAGNOSIS — F03.91 DEMENTIA WITH BEHAVIORAL DISTURBANCE (HCC): ICD-10-CM

## 2022-02-23 DIAGNOSIS — F32.9 MAJOR DEPRESSIVE DISORDER WITH CURRENT ACTIVE EPISODE, UNSPECIFIED DEPRESSION EPISODE SEVERITY, UNSPECIFIED WHETHER RECURRENT: ICD-10-CM

## 2022-02-23 RX ORDER — QUETIAPINE FUMARATE 25 MG/1
TABLET, FILM COATED ORAL
Qty: 30 TABLET | Refills: 0 | Status: SHIPPED | OUTPATIENT
Start: 2022-02-23 | End: 2022-03-22

## 2022-02-23 RX ORDER — SERTRALINE HYDROCHLORIDE 100 MG/1
TABLET, FILM COATED ORAL
Qty: 30 TABLET | Refills: 0 | Status: SHIPPED | OUTPATIENT
Start: 2022-02-23 | End: 2022-03-22

## 2022-02-27 DIAGNOSIS — L90.0 LICHEN SCLEROSUS: ICD-10-CM

## 2022-02-27 DIAGNOSIS — I10 PRIMARY HYPERTENSION: ICD-10-CM

## 2022-02-27 DIAGNOSIS — I10 HYPERTENSION, UNSPECIFIED TYPE: ICD-10-CM

## 2022-02-27 DIAGNOSIS — F32.9 MAJOR DEPRESSIVE DISORDER WITH CURRENT ACTIVE EPISODE, UNSPECIFIED DEPRESSION EPISODE SEVERITY, UNSPECIFIED WHETHER RECURRENT: Primary | ICD-10-CM

## 2022-02-27 NOTE — TELEPHONE ENCOUNTER
Called pharmacy to cancel script for zoloft 50 mg #90  On 2/23 Dr Nikki Bruce sent rx for zoloft 100 mg #30 with 0 refills

## 2022-02-28 RX ORDER — METOPROLOL TARTRATE 100 MG/1
TABLET ORAL
Qty: 90 TABLET | Refills: 0 | Status: SHIPPED | OUTPATIENT
Start: 2022-02-28 | End: 2022-06-25

## 2022-02-28 RX ORDER — LOSARTAN POTASSIUM 25 MG/1
TABLET ORAL
Qty: 90 TABLET | Refills: 1 | Status: SHIPPED | OUTPATIENT
Start: 2022-02-28

## 2022-02-28 RX ORDER — CLOBETASOL PROPIONATE 0.5 MG/G
OINTMENT TOPICAL
Qty: 30 G | Refills: 0 | Status: SHIPPED | OUTPATIENT
Start: 2022-02-28 | End: 2022-03-24

## 2022-03-18 ENCOUNTER — APPOINTMENT (OUTPATIENT)
Dept: LAB | Facility: HOSPITAL | Age: 87
End: 2022-03-18
Attending: FAMILY MEDICINE
Payer: MEDICARE

## 2022-03-18 DIAGNOSIS — N39.0 RECURRENT UTI: ICD-10-CM

## 2022-03-18 PROCEDURE — 87077 CULTURE AEROBIC IDENTIFY: CPT

## 2022-03-18 PROCEDURE — 87186 SC STD MICRODIL/AGAR DIL: CPT

## 2022-03-18 PROCEDURE — 87086 URINE CULTURE/COLONY COUNT: CPT

## 2022-03-20 LAB
BACTERIA UR CULT: ABNORMAL
BACTERIA UR CULT: ABNORMAL

## 2022-03-21 DIAGNOSIS — N39.0 RECURRENT UTI: Primary | ICD-10-CM

## 2022-03-21 RX ORDER — CEPHALEXIN 500 MG/1
500 CAPSULE ORAL EVERY 8 HOURS SCHEDULED
Qty: 21 CAPSULE | Refills: 0 | Status: SHIPPED | OUTPATIENT
Start: 2022-03-21 | End: 2022-03-28

## 2022-03-22 DIAGNOSIS — F32.9 MAJOR DEPRESSIVE DISORDER WITH CURRENT ACTIVE EPISODE, UNSPECIFIED DEPRESSION EPISODE SEVERITY, UNSPECIFIED WHETHER RECURRENT: ICD-10-CM

## 2022-03-22 DIAGNOSIS — F03.91 DEMENTIA WITH BEHAVIORAL DISTURBANCE (HCC): ICD-10-CM

## 2022-03-22 RX ORDER — SERTRALINE HYDROCHLORIDE 100 MG/1
TABLET, FILM COATED ORAL
Qty: 30 TABLET | Refills: 0 | Status: SHIPPED | OUTPATIENT
Start: 2022-03-22 | End: 2022-04-13 | Stop reason: SDUPTHER

## 2022-03-22 RX ORDER — QUETIAPINE FUMARATE 25 MG/1
TABLET, FILM COATED ORAL
Qty: 30 TABLET | Refills: 0 | Status: SHIPPED | OUTPATIENT
Start: 2022-03-22 | End: 2022-04-13 | Stop reason: SDUPTHER

## 2022-03-24 RX ORDER — PREDNISOLONE ACETATE 10 MG/ML
SUSPENSION/ DROPS OPHTHALMIC
COMMUNITY
Start: 2022-03-10 | End: 2022-04-28

## 2022-03-24 RX ORDER — GATIFLOXACIN 5 MG/ML
SOLUTION/ DROPS OPHTHALMIC
Status: ON HOLD | COMMUNITY
Start: 2022-03-10 | End: 2022-03-28 | Stop reason: SDUPTHER

## 2022-03-24 RX ORDER — PRAVASTATIN SODIUM 40 MG
40 TABLET ORAL EVERY EVENING
COMMUNITY
Start: 2021-12-26

## 2022-03-24 RX ORDER — KETOROLAC TROMETHAMINE 5 MG/ML
SOLUTION OPHTHALMIC
Status: ON HOLD | COMMUNITY
Start: 2022-03-10 | End: 2022-03-28 | Stop reason: SDUPTHER

## 2022-03-24 NOTE — PRE-PROCEDURE INSTRUCTIONS
Pre-Surgery Instructions:   Medication Instructions    apixaban (Eliquis) 5 mg Instructed patient per Anesthesia Guidelines   camphor-menthol (SARNA) lotion Instructed patient per Anesthesia Guidelines   cephalexin (KEFLEX) 500 mg capsule Instructed patient per Anesthesia Guidelines   clobetasol (TEMOVATE) 0 05 % ointment Instructed patient per Anesthesia Guidelines   desloratadine (CLARINEX) 5 MG tablet Instructed patient per Anesthesia Guidelines   docusate sodium (COLACE) 100 mg capsule Instructed patient per Anesthesia Guidelines   furosemide (LASIX) 40 mg tablet Instructed patient per Anesthesia Guidelines   gatifloxacin (ZYMAXID) 0 5 % Patient was instructed by Physician and understands   glimepiride (AMARYL) 4 mg tablet Instructed patient per Anesthesia Guidelines   Glucosamine-Chondroitin--489-310 MG TABS Instructed patient per Anesthesia Guidelines   ketorolac (ACULAR) 0 5 % ophthalmic solution Patient was instructed by Physician and understands   losartan (COZAAR) 25 mg tablet Instructed patient per Anesthesia Guidelines   metFORMIN (GLUCOPHAGE) 500 mg tablet Instructed patient per Anesthesia Guidelines   metoprolol tartrate (LOPRESSOR) 100 mg tablet Take DOS    metroNIDAZOLE (METROCREAM) 0 75 % cream Instructed patient per Anesthesia Guidelines   pravastatin (PRAVACHOL) 40 mg tablet Instructed patient per Anesthesia Guidelines   prednisoLONE acetate (PRED FORTE) 1 % ophthalmic suspension Patient was instructed by Physician and understands   primidone (MYSOLINE) 50 mg tablet Take DOS    pyridoxine (B-6) 100 MG tablet Instructed patient per Anesthesia Guidelines   QUEtiapine (SEROquel) 25 mg tablet Instructed patient per Anesthesia Guidelines   sertraline (ZOLOFT) 100 mg tablet Instructed patient per Anesthesia Guidelines   triamcinolone (KENALOG) 0 1 % cream Instructed patient per Anesthesia Guidelines  Pre op instructions given   Pt to follow Dr Dara Haas instructions  Pt to take metoprolol Tartrate, primidone, sertraline the am of the surgery, at least 2 hours prior to arrival time, with up to 6 oz of water   Mariella Coyne [phone] Surgical Experience    The following information was developed to assist you to prepare for your operation  What do I need to do before coming to the hospital?   Arrange for a responsible person to drive you to and from the hospital    Arrange care for your children at home  Children are not allowed in the recovery areas of the hospital   Plan to wear clothing that is easy to put on and take off  If you are having shoulder surgery, wear a shirt that buttons or zippers in the front  Bathing  o Shower the evening before and the morning of your surgery with an antibacterial soap  Please refer to the Pre Op Showering Instructions for Surgery Patients Sheet   o Remove nail polish and all body piercing jewelry  o Do not shave any body part for at least 24 hours before surgery-this includes face, arms, legs and upper body  Food  o Nothing to eat or drink after midnight the night before your surgery  This includes candy and chewing gum  o Exception: If your surgery is after 12:00pm (noon), you may have clear liquids such as 7-Up®, ginger ale, apple or cranberry juice, Jell-O®, water, or clear broth until 8:00 am  o Do not drink milk or juice with pulp on the morning before surgery  o Do not drink alcohol 24 hours before surgery  Medicine  o Follow instructions you received from your surgeon about which medicines you may take on the day of surgery  o If instructed to take medicine on the morning of surgery, take pills with just a small sip of water   Call your prescribing doctor for specific infroamtion on what to do if you take insulin    What should I bring to the hospital?    Bring:  Valentino Jana or a walker, if you have them, for foot or knee surgery   A list of the daily medicines, vitamins, minerals, herbals and nutritional supplements you take  Include the dosages of medicines and the time you take them each day   Glasses, dentures or hearing aids   Minimal clothing; you will be wearing hospital sleepwear   Photo ID; required to verify your identity   If you have a Living Will or Power of , bring a copy of the documents   If you have an ostomy, bring an extra pouch and any supplies you use    Do not bring   Medicines or inhalers   Money, valuables or jewelry    What other information should I know about the day of surgery?  Notify your surgeons if you develop a cold, sore throat, cough, fever, rash or any other illness   Report to the Ambulatory Surgical/Same Day Surgery Unit   You will be instructed to stop at Registration only if you have not been pre-registered   Inform your  fi they do not stay that they will be asked by the staff to leave a phone number where they can be reached   Be available to be reached before surgery  In the event the operating room schedule changes, you may be asked to come in earlier or later than expected    *It is important to tell your doctor and others involved in your health care if you are taking or have been taking any non-prescription drugs, vitamins, minerals, herbals or other nutritional supplements   Any of these may interact with some food or medicines and cause a reaction

## 2022-03-27 ENCOUNTER — ANESTHESIA EVENT (OUTPATIENT)
Dept: PERIOP | Facility: AMBULARY SURGERY CENTER | Age: 87
End: 2022-03-27
Payer: MEDICARE

## 2022-03-27 PROBLEM — F03.90 DEMENTIA (HCC): Status: ACTIVE | Noted: 2021-08-29

## 2022-03-27 NOTE — ANESTHESIA PREPROCEDURE EVALUATION
Procedure:  EXTRACTION EXTRACAPSULAR CATARACT PHACO INTRAOCULAR LENS (IOL) (Right Eye)    Relevant Problems   CARDIO   (+) A-fib ([facility])   (+) CAD (coronary artery disease)   (+) Chest pain   (+) Combined systolic and diastolic congestive heart failure (HCC)   (+) History of coronary artery bypass graft - CABG x3 in 2006   (+) Hyperlipidemia   (+) Hypertension   (+) Pacemaker   (+) Persistent atrial fibrillation (HCC)   (+) Tachy-gale syndrome (HCC)      ENDO   (+) DM (diabetes mellitus), type 2 (HCC)      MUSCULOSKELETAL   (+) Primary osteoarthritis of both knees      NEURO/PSYCH   (+) Dementia (HCC)      PULMONARY   (+) Chronic obstructive pulmonary disease (HCC)   (+) Sleep apnea - O2 use at night      Other   (+) Obesity, morbid ([facility])        Physical Exam    Airway    Mallampati score: II  TM Distance: >3 FB  Neck ROM: full     Dental   upper dentures and lower dentures,     Cardiovascular  Rhythm: irregular, Rate: normal,     Pulmonary  Breath sounds clear to auscultation,     Other Findings        Anesthesia Plan  ASA Score- 3     Anesthesia Type- IV sedation with anesthesia with ASA Monitors  Additional Monitors:   Airway Plan:           Plan Factors-    Chart reviewed  Patient is not a current smoker  Induction- intravenous  Postoperative Plan-     Informed Consent- Anesthetic plan and risks discussed with patient  I personally reviewed this patient with the CRNA  Discussed and agreed on the Anesthesia Plan with the CRNA  Bay Fernandez

## 2022-03-28 ENCOUNTER — HOSPITAL ENCOUNTER (OUTPATIENT)
Facility: AMBULARY SURGERY CENTER | Age: 87
Setting detail: OUTPATIENT SURGERY
Discharge: HOME | End: 2022-03-28
Attending: OPHTHALMOLOGY | Admitting: OPHTHALMOLOGY
Payer: MEDICARE

## 2022-03-28 ENCOUNTER — ANESTHESIA (OUTPATIENT)
Dept: PERIOP | Facility: AMBULARY SURGERY CENTER | Age: 87
End: 2022-03-28
Payer: MEDICARE

## 2022-03-28 VITALS
WEIGHT: 200 LBS | DIASTOLIC BLOOD PRESSURE: 66 MMHG | RESPIRATION RATE: 18 BRPM | SYSTOLIC BLOOD PRESSURE: 150 MMHG | BODY MASS INDEX: 40.32 KG/M2 | HEIGHT: 59 IN | TEMPERATURE: 97.3 F | OXYGEN SATURATION: 100 % | HEART RATE: 83 BPM

## 2022-03-28 DIAGNOSIS — H25.11 AGE-RELATED NUCLEAR CATARACT OF RIGHT EYE: Primary | ICD-10-CM

## 2022-03-28 PROBLEM — E78.5 HYPERLIPIDEMIA: Status: ACTIVE | Noted: 2022-03-28

## 2022-03-28 LAB — GLUCOSE SERPL-MCNC: 140 MG/DL (ref 65–140)

## 2022-03-28 PROCEDURE — V2632 POST CHMBR INTRAOCULAR LENS: HCPCS | Performed by: OPHTHALMOLOGY

## 2022-03-28 PROCEDURE — 82948 REAGENT STRIP/BLOOD GLUCOSE: CPT

## 2022-03-28 DEVICE — ACRYSOF(R) IQ ASPHERIC NATURAL IOL, SINGLE-PIECE ACRYLIC FOLDABLE PCL, UV WITH BLUE LIGHTFILTER, 13.0MM LENGTH, 6.0MM ANTERIORASYMMETRIC BICONVEX OPTIC, PLANAR HAPTICS.
Type: IMPLANTABLE DEVICE | Site: EYE | Status: FUNCTIONAL
Brand: ACRYSOF®

## 2022-03-28 RX ORDER — GATIFLOXACIN 5 MG/ML
1 SOLUTION/ DROPS OPHTHALMIC 2 TIMES DAILY
Refills: 0
Start: 2022-03-28 | End: 2022-04-28

## 2022-03-28 RX ORDER — BALANCED SALT SOLUTION 6.4; .75; .48; .3; 3.9; 1.7 MG/ML; MG/ML; MG/ML; MG/ML; MG/ML; MG/ML
SOLUTION OPHTHALMIC AS NEEDED
Status: DISCONTINUED | OUTPATIENT
Start: 2022-03-28 | End: 2022-03-28 | Stop reason: HOSPADM

## 2022-03-28 RX ORDER — GATIFLOXACIN 5 MG/ML
SOLUTION/ DROPS OPHTHALMIC AS NEEDED
Status: DISCONTINUED | OUTPATIENT
Start: 2022-03-28 | End: 2022-03-28 | Stop reason: HOSPADM

## 2022-03-28 RX ORDER — CYCLOPENTOLATE HYDROCHLORIDE 10 MG/ML
1 SOLUTION/ DROPS OPHTHALMIC
Status: ACTIVE | OUTPATIENT
Start: 2022-03-28 | End: 2022-03-28

## 2022-03-28 RX ORDER — LIDOCAINE HYDROCHLORIDE 10 MG/ML
INJECTION, SOLUTION EPIDURAL; INFILTRATION; INTRACAUDAL; PERINEURAL AS NEEDED
Status: DISCONTINUED | OUTPATIENT
Start: 2022-03-28 | End: 2022-03-28 | Stop reason: HOSPADM

## 2022-03-28 RX ORDER — LIDOCAINE HYDROCHLORIDE 20 MG/ML
1 JELLY TOPICAL
Status: COMPLETED | OUTPATIENT
Start: 2022-03-28 | End: 2022-03-28

## 2022-03-28 RX ORDER — TETRACAINE HYDROCHLORIDE 5 MG/ML
SOLUTION OPHTHALMIC AS NEEDED
Status: DISCONTINUED | OUTPATIENT
Start: 2022-03-28 | End: 2022-03-28 | Stop reason: HOSPADM

## 2022-03-28 RX ORDER — PHENYLEPHRINE HCL 2.5 %
1 DROPS OPHTHALMIC (EYE)
Status: ACTIVE | OUTPATIENT
Start: 2022-03-28 | End: 2022-03-28

## 2022-03-28 RX ORDER — TETRACAINE HYDROCHLORIDE 5 MG/ML
1 SOLUTION OPHTHALMIC ONCE
Status: COMPLETED | OUTPATIENT
Start: 2022-03-28 | End: 2022-03-28

## 2022-03-28 RX ORDER — KETOROLAC TROMETHAMINE 5 MG/ML
1 SOLUTION OPHTHALMIC
Status: ACTIVE | OUTPATIENT
Start: 2022-03-28 | End: 2022-03-28

## 2022-03-28 RX ORDER — MIDAZOLAM HYDROCHLORIDE 2 MG/2ML
INJECTION, SOLUTION INTRAMUSCULAR; INTRAVENOUS AS NEEDED
Status: DISCONTINUED | OUTPATIENT
Start: 2022-03-28 | End: 2022-03-28

## 2022-03-28 RX ORDER — KETOROLAC TROMETHAMINE 5 MG/ML
1 SOLUTION OPHTHALMIC 4 TIMES DAILY
Qty: 5 ML | Refills: 0
Start: 2022-03-28 | End: 2022-04-28

## 2022-03-28 RX ADMIN — TETRACAINE HYDROCHLORIDE 1 DROP: 5 SOLUTION OPHTHALMIC at 09:30

## 2022-03-28 RX ADMIN — LIDOCAINE HYDROCHLORIDE 1 APPLICATION: 20 JELLY TOPICAL at 09:30

## 2022-03-28 RX ADMIN — CYCLOPENTOLATE HYDROCHLORIDE 1 DROP: 10 SOLUTION/ DROPS OPHTHALMIC at 09:30

## 2022-03-28 RX ADMIN — MIDAZOLAM 0.5 MG: 1 INJECTION INTRAMUSCULAR; INTRAVENOUS at 10:14

## 2022-03-28 RX ADMIN — PHENYLEPHRINE HYDROCHLORIDE 1 DROP: 25 SOLUTION/ DROPS OPHTHALMIC at 09:45

## 2022-03-28 RX ADMIN — CYCLOPENTOLATE HYDROCHLORIDE 1 DROP: 10 SOLUTION/ DROPS OPHTHALMIC at 10:00

## 2022-03-28 RX ADMIN — PHENYLEPHRINE HYDROCHLORIDE 1 DROP: 25 SOLUTION/ DROPS OPHTHALMIC at 10:00

## 2022-03-28 RX ADMIN — KETOROLAC TROMETHAMINE 1 DROP: 5 SOLUTION OPHTHALMIC at 09:45

## 2022-03-28 RX ADMIN — CYCLOPENTOLATE HYDROCHLORIDE 1 DROP: 10 SOLUTION/ DROPS OPHTHALMIC at 09:45

## 2022-03-28 RX ADMIN — PHENYLEPHRINE HYDROCHLORIDE 1 DROP: 25 SOLUTION/ DROPS OPHTHALMIC at 09:30

## 2022-03-28 RX ADMIN — KETOROLAC TROMETHAMINE 1 DROP: 5 SOLUTION OPHTHALMIC at 09:30

## 2022-03-28 RX ADMIN — LIDOCAINE HYDROCHLORIDE 1 APPLICATION: 20 JELLY TOPICAL at 09:45

## 2022-03-28 RX ADMIN — MIDAZOLAM 0.5 MG: 1 INJECTION INTRAMUSCULAR; INTRAVENOUS at 10:22

## 2022-03-28 RX ADMIN — LIDOCAINE HYDROCHLORIDE 1 APPLICATION: 20 JELLY TOPICAL at 10:00

## 2022-03-28 RX ADMIN — KETOROLAC TROMETHAMINE 1 DROP: 5 SOLUTION OPHTHALMIC at 10:00

## 2022-03-28 NOTE — OP NOTE
OPERATIVE REPORT    PATIENT NAME: Brandyn Powell    :  1935  MRN: [id number]  Pt Location: [facility] OR ROOM 01    Surgery Date: 3/28/2022    Surgeon(s) and Role:     * Carlene Smart MD - Primary    Age-related nuclear cataract, right eye [H25 11]    Post-Op Diagnosis Codes:     * Age-related nuclear cataract, right eye [H25 11]    Procedure(s):  EXTRACTION EXTRACAPSULAR CATARACT PHACO INTRAOCULAR LENS (IOL)    Anesthesia Type:   IV Sedation with Anesthesia    Operative Indications:  Age-related nuclear cataract, right eye [H25 11]  Decreased vision to hand motion  With problems reading  Pt requested cataract sx the right eye    Procedure and Technique:    Procedure Details     The patient was brought in the OR in stable condition and placed on the operative table  The right eye was prepped and draped in the usual sterile manner  Attention was directed to the right eye where a lid speculum was placed  A 2 4 mm clear corneal incision was made temperally  1/2 cc of 1% MPF Lidocaine was irrigated into the anterior chamber followed by vision blue dye  After 10 seconds the dye was rinsed completely from the [facility] and viscoat was added  The side port incision was placed superiorly  The capsularrhexis was made and the nucleus was hydrodissected with BSS  The nucleus was then removed with the phaco handpiece followed by removal of the cortical material with the I/A handpiece  The capsular bag was then filled with Provisc  The IOL was folded and placed in to the capsular bag and centered well  The remaining Provisc was removed from the eye with the I/A  The wounds were hydrated with BSS and found to be water tight  The lid speculum was removed and 2 drops of Gatifloxicin were placed over the cornea  A protective eye shield was taped over the eye and the patient went to PACU in stable condition  I will see the patient in the office tomorrow and the expected post op period is a few weeks         Complications: None Disposition: PACU   Condition: Stable    SIGNATURE: Raghav Adame MD  DATE: March 28, 2022  TIME: 10:41 AM

## 2022-03-28 NOTE — PERIOPERATIVE NURSING NOTE
Rash noted on pts chest arms and legs   Dr Skinner [employer] aware Gatofloxin drops changed to Tobramycin

## 2022-03-28 NOTE — ANESTHESIA POSTPROCEDURE EVALUATION
Post-Op Assessment Note    CV Status:  Stable  Pain Score: 0    Pain management: adequate     Mental Status:  Alert and awake   Hydration Status:  Euvolemic   PONV Controlled:  Controlled   Airway Patency:  Patent      Post Op Vitals Reviewed: Yes      Staff: CRNA, Anesthesiologist   Comments: vss        No complications documented      BP   148/79   Temp     Pulse  78   Resp   17   SpO2   99

## 2022-03-28 NOTE — DISCHARGE INSTRUCTIONS
Dr Everett Cataract Instructions    Activity:     1  No Driving until instructed   2  Keep shield on until seen tomorrow except when administering drops   3  No heavy lifting over 20 lbs   4  No water in eye for 1 weeek     Diet:     1  Resume normal diet    Normal Symptoms:     1  Mild Headache   2  Scratchy or picky feeling around eye    Call the office if:     1  You have any questions or concerns   2  If eye pain is not relieved by extra strength tylenol    Office phone number:  [phone]      Next appointment:     1  See Dr Everett at his office tomorrow as scheduled   ____________10:45am______________________________________________   2  Bring blue eye kit with you and eyedrops to the office    A new set of comprehensive instructions will be given and reviewed with you during your office visit tomorrow

## 2022-03-30 ENCOUNTER — TELEMEDICINE (OUTPATIENT)
Dept: FAMILY MEDICINE CLINIC | Facility: CLINIC | Age: 87
End: 2022-03-30
Payer: MEDICARE

## 2022-03-30 VITALS — HEIGHT: 59 IN | BODY MASS INDEX: 40.32 KG/M2 | WEIGHT: 200 LBS

## 2022-03-30 DIAGNOSIS — L50.9 HIVES: Primary | ICD-10-CM

## 2022-03-30 PROCEDURE — 99213 OFFICE O/P EST LOW 20 MIN: CPT | Performed by: FAMILY MEDICINE

## 2022-03-30 RX ORDER — METHYLPREDNISOLONE 4 MG/1
TABLET ORAL
Qty: 21 EACH | Refills: 0 | Status: SHIPPED | OUTPATIENT
Start: 2022-03-30 | End: 2022-04-28

## 2022-03-30 NOTE — PROGRESS NOTES
Assessment/Plan:     Diagnoses and all orders for this visit:    Hives  -     methylPREDNISolone 4 MG tablet therapy pack; Use as directed on package  - Continue benadryl and topical triamcinolone  - Discussed calling ophthalmologist to notify that eye drops are likely causing rash  Subjective:      Patient ID: Jonh Vasquez is a 81 y o  female  She recently had cataract surgery and was prescribed eye drops  Soon after using drops, developed hives  Daughter did reach out to ophthalmologist who dc'd one of the drops, but not the other  Continues to have rash that is worsening  Urticaria  This is a new problem  Episode onset: 2 days  The problem is unchanged  The rash is diffuse  The rash is characterized by redness, itchiness and pain  Associated with: eye drops  Pertinent negatives include no anorexia, congestion, cough, diarrhea, eye pain, facial edema, fatigue, fever, joint pain, nail changes, rhinorrhea, shortness of breath, sore throat or vomiting  Past treatments include antihistamine and topical steroids  The treatment provided no relief  The following portions of the patient's history were reviewed and updated as appropriate: allergies, current medications, past family history, past medical history, past social history, past surgical history and problem list     Review of Systems   Constitutional: Negative for fatigue and fever  HENT: Negative for congestion, rhinorrhea and sore throat  Eyes: Negative for pain  Respiratory: Negative for cough and shortness of breath  Gastrointestinal: Negative for anorexia, diarrhea and vomiting  Musculoskeletal: Negative for joint pain  Skin: Negative for nail changes  Objective:      Ht 4' 11" (1 499 m)   Wt 90 7 kg (200 lb)   BMI 40 40 kg/m²          Physical Exam  Constitutional:       General: She is not in acute distress  Appearance: She is well-developed  She is not diaphoretic     HENT:      Head: Normocephalic and atraumatic  Eyes:      General: No scleral icterus  Right eye: No discharge  Left eye: No discharge  Conjunctiva/sclera: Conjunctivae normal    Pulmonary:      Effort: Pulmonary effort is normal    Musculoskeletal:      Cervical back: Normal range of motion  Skin:     General: Skin is warm  Findings: Erythema (hives noted throughout body on arms, legs, back, face) present  Neurological:      Mental Status: She is alert and oriented to person, place, and time  Psychiatric:         Behavior: Behavior normal          Thought Content:  Thought content normal          Judgment: Judgment normal

## 2022-04-01 ENCOUNTER — TELEPHONE (OUTPATIENT)
Dept: FAMILY MEDICINE CLINIC | Facility: CLINIC | Age: 87
End: 2022-04-01

## 2022-04-01 NOTE — TELEPHONE ENCOUNTER
She should get an ER evaluation for her current symptoms and then have a  in the hospital speak with the family about placement options in terms of rehab or nursing home  There is not much else that I can do in terms of getting her admitted  It would be up to the ER provider

## 2022-04-01 NOTE — TELEPHONE ENCOUNTER
With patient's medical hx of constant recurrent uti's, hallucinations, staying awake for multiple days and then sleeping for 3 days in a row  Daughter states patient fell again last night, she tries to walk out the door and leave  She may have dementia  They are thinking about putting her into a nursing home, but they want to take her to the ER tomorrow and have her urine tested again  If it comes up with another infection, they want to get her admitted  She does not have a urology appointment until May  If her urine is ok, they would like to get her admitted and maybe have a psychological consult  [facility] is wondering if this is something that they just do on their own or if there is anyway Dr Angelia Woodruff can help patient get admitted?   Alex Anderson MA

## 2022-04-01 NOTE — TELEPHONE ENCOUNTER
I spoke with Courtney Martino  She is aware to bring pt to the ER for evaluation and that a  should be contacted through the ER for placement of rehab or nursing home  No further action needed   Nuno Reyes LPN

## 2022-04-07 ENCOUNTER — TELEPHONE (OUTPATIENT)
Dept: FAMILY MEDICINE CLINIC | Facility: CLINIC | Age: 87
End: 2022-04-07

## 2022-04-07 NOTE — TELEPHONE ENCOUNTER
Spoke with daughter about scheduling awv for patient  She states since they do not need it to get patient into nursing home she wants to wait until June to schedule  She will call back to schedule    Lakisha Cates MA

## 2022-04-08 NOTE — TELEPHONE ENCOUNTER
Called and spoke with pt's daughter  She will combine an OTC H1 blocker antihistamine along with OTC H2 blocker such as pepcid and see if this helps   NFA

## 2022-04-11 ENCOUNTER — HOSPITAL ENCOUNTER (EMERGENCY)
Facility: HOSPITAL | Age: 87
Discharge: HOME | End: 2022-04-11
Attending: EMERGENCY MEDICINE
Payer: MEDICARE

## 2022-04-11 ENCOUNTER — APPOINTMENT (EMERGENCY)
Dept: RADIOLOGY | Facility: HOSPITAL | Age: 87
End: 2022-04-11
Payer: MEDICARE

## 2022-04-11 VITALS
OXYGEN SATURATION: 98 % | HEART RATE: 76 BPM | WEIGHT: 200 LBS | TEMPERATURE: 97.5 F | BODY MASS INDEX: 40.32 KG/M2 | RESPIRATION RATE: 17 BRPM | HEIGHT: 59 IN | DIASTOLIC BLOOD PRESSURE: 76 MMHG | SYSTOLIC BLOOD PRESSURE: 168 MMHG

## 2022-04-11 DIAGNOSIS — N39.0 UTI (URINARY TRACT INFECTION): ICD-10-CM

## 2022-04-11 DIAGNOSIS — F03.90 DEMENTIA (HCC): ICD-10-CM

## 2022-04-11 LAB
ALBUMIN SERPL BCP-MCNC: 3.5 G/DL (ref 3.5–5)
ALP SERPL-CCNC: 112 U/L (ref 46–116)
ALT SERPL W P-5'-P-CCNC: 24 U/L (ref 12–78)
ANION GAP SERPL CALCULATED.3IONS-SCNC: 9 MMOL/L (ref 4–13)
APTT PPP: 31 SECONDS (ref 23–37)
AST SERPL W P-5'-P-CCNC: 17 U/L (ref 5–45)
BACTERIA UR QL AUTO: ABNORMAL /HPF
BASOPHILS # BLD AUTO: 0.08 THOUSANDS/ΜL (ref 0–0.1)
BASOPHILS NFR BLD AUTO: 1 % (ref 0–1)
BILIRUB SERPL-MCNC: 0.27 MG/DL (ref 0.2–1)
BILIRUB UR QL STRIP: NEGATIVE
BUN SERPL-MCNC: 23 MG/DL (ref 5–25)
CALCIUM SERPL-MCNC: 8.7 MG/DL (ref 8.3–10.1)
CHLORIDE SERPL-SCNC: 103 MMOL/L (ref 100–108)
CLARITY UR: CLEAR
CO2 SERPL-SCNC: 28 MMOL/L (ref 21–32)
COLOR UR: ABNORMAL
CREAT SERPL-MCNC: 0.8 MG/DL (ref 0.6–1.3)
EOSINOPHIL # BLD AUTO: 0.33 THOUSAND/ΜL (ref 0–0.61)
EOSINOPHIL NFR BLD AUTO: 4 % (ref 0–6)
ERYTHROCYTE [DISTWIDTH] IN BLOOD BY AUTOMATED COUNT: 15.6 % (ref 11.6–15.1)
GFR SERPL CREATININE-BSD FRML MDRD: 67 ML/MIN/1.73SQ M
GLUCOSE SERPL-MCNC: 172 MG/DL (ref 65–140)
GLUCOSE UR STRIP-MCNC: NEGATIVE MG/DL
HCT VFR BLD AUTO: 35.6 % (ref 34.8–46.1)
HGB BLD-MCNC: 11.1 G/DL (ref 11.5–15.4)
HGB UR QL STRIP.AUTO: ABNORMAL
IMM GRANULOCYTES # BLD AUTO: 0.04 THOUSAND/UL (ref 0–0.2)
IMM GRANULOCYTES NFR BLD AUTO: 1 % (ref 0–2)
INR PPP: 1.23 (ref 0.84–1.19)
KETONES UR STRIP-MCNC: NEGATIVE MG/DL
LACTATE SERPL-SCNC: 1.5 MMOL/L (ref 0.5–2)
LEUKOCYTE ESTERASE UR QL STRIP: ABNORMAL
LYMPHOCYTES # BLD AUTO: 1.77 THOUSANDS/ΜL (ref 0.6–4.47)
LYMPHOCYTES NFR BLD AUTO: 21 % (ref 14–44)
MCH RBC QN AUTO: 28.4 PG (ref 26.8–34.3)
MCHC RBC AUTO-ENTMCNC: 31.2 G/DL (ref 31.4–37.4)
MCV RBC AUTO: 91 FL (ref 82–98)
MONOCYTES # BLD AUTO: 0.74 THOUSAND/ΜL (ref 0.17–1.22)
MONOCYTES NFR BLD AUTO: 9 % (ref 4–12)
NEUTROPHILS # BLD AUTO: 5.67 THOUSANDS/ΜL (ref 1.85–7.62)
NEUTS SEG NFR BLD AUTO: 64 % (ref 43–75)
NITRITE UR QL STRIP: POSITIVE
NON-SQ EPI CELLS URNS QL MICRO: ABNORMAL /HPF
NRBC BLD AUTO-RTO: 0 /100 WBCS
PH UR STRIP.AUTO: 5.5 [PH]
PLATELET # BLD AUTO: 248 THOUSANDS/UL (ref 149–390)
PMV BLD AUTO: 10.2 FL (ref 8.9–12.7)
POTASSIUM SERPL-SCNC: 3.6 MMOL/L (ref 3.5–5.3)
PROCALCITONIN SERPL-MCNC: <0.05 NG/ML
PROT SERPL-MCNC: 8 G/DL (ref 6.4–8.2)
PROT UR STRIP-MCNC: NEGATIVE MG/DL
PROTHROMBIN TIME: 15.2 SECONDS (ref 11.6–14.5)
RBC # BLD AUTO: 3.91 MILLION/UL (ref 3.81–5.12)
RBC #/AREA URNS AUTO: ABNORMAL /HPF
SODIUM SERPL-SCNC: 140 MMOL/L (ref 136–145)
SP GR UR STRIP.AUTO: 1.02 (ref 1–1.03)
UROBILINOGEN UR QL STRIP.AUTO: 0.2 E.U./DL
WBC # BLD AUTO: 8.63 THOUSAND/UL (ref 4.31–10.16)
WBC #/AREA URNS AUTO: ABNORMAL /HPF

## 2022-04-11 PROCEDURE — 99284 EMERGENCY DEPT VISIT MOD MDM: CPT

## 2022-04-11 PROCEDURE — 85610 PROTHROMBIN TIME: CPT | Performed by: PHYSICIAN ASSISTANT

## 2022-04-11 PROCEDURE — 93005 ELECTROCARDIOGRAM TRACING: CPT

## 2022-04-11 PROCEDURE — 36415 COLL VENOUS BLD VENIPUNCTURE: CPT | Performed by: PHYSICIAN ASSISTANT

## 2022-04-11 PROCEDURE — 84145 PROCALCITONIN (PCT): CPT | Performed by: PHYSICIAN ASSISTANT

## 2022-04-11 PROCEDURE — 81001 URINALYSIS AUTO W/SCOPE: CPT | Performed by: PHYSICIAN ASSISTANT

## 2022-04-11 PROCEDURE — 96366 THER/PROPH/DIAG IV INF ADDON: CPT

## 2022-04-11 PROCEDURE — 70450 CT HEAD/BRAIN W/O DYE: CPT

## 2022-04-11 PROCEDURE — 80053 COMPREHEN METABOLIC PANEL: CPT | Performed by: PHYSICIAN ASSISTANT

## 2022-04-11 PROCEDURE — 96365 THER/PROPH/DIAG IV INF INIT: CPT

## 2022-04-11 PROCEDURE — 96367 TX/PROPH/DG ADDL SEQ IV INF: CPT

## 2022-04-11 PROCEDURE — 99284 EMERGENCY DEPT VISIT MOD MDM: CPT | Performed by: PHYSICIAN ASSISTANT

## 2022-04-11 PROCEDURE — 87086 URINE CULTURE/COLONY COUNT: CPT | Performed by: PHYSICIAN ASSISTANT

## 2022-04-11 PROCEDURE — 85025 COMPLETE CBC W/AUTO DIFF WBC: CPT | Performed by: PHYSICIAN ASSISTANT

## 2022-04-11 PROCEDURE — 83605 ASSAY OF LACTIC ACID: CPT | Performed by: PHYSICIAN ASSISTANT

## 2022-04-11 PROCEDURE — 85730 THROMBOPLASTIN TIME PARTIAL: CPT | Performed by: PHYSICIAN ASSISTANT

## 2022-04-11 PROCEDURE — G1004 CDSM NDSC: HCPCS

## 2022-04-11 PROCEDURE — 87040 BLOOD CULTURE FOR BACTERIA: CPT | Performed by: PHYSICIAN ASSISTANT

## 2022-04-11 RX ORDER — CEFTRIAXONE 2 G/50ML
2000 INJECTION, SOLUTION INTRAVENOUS ONCE
Status: COMPLETED | OUTPATIENT
Start: 2022-04-11 | End: 2022-04-11

## 2022-04-11 RX ORDER — SULFAMETHOXAZOLE AND TRIMETHOPRIM 800; 160 MG/1; MG/1
1 TABLET ORAL 2 TIMES DAILY
Qty: 14 TABLET | Refills: 0 | Status: SHIPPED | OUTPATIENT
Start: 2022-04-11 | End: 2022-04-18

## 2022-04-11 RX ADMIN — SODIUM CHLORIDE, SODIUM LACTATE, POTASSIUM CHLORIDE, AND CALCIUM CHLORIDE 500 ML: .6; .31; .03; .02 INJECTION, SOLUTION INTRAVENOUS at 18:57

## 2022-04-11 RX ADMIN — CEFTRIAXONE 2000 MG: 2 INJECTION, SOLUTION INTRAVENOUS at 18:03

## 2022-04-11 NOTE — ED NOTES
Dheeraj clement  Pt instructed on its use and demonstrated understanding       Luke Perez RN  04/11/22 4598

## 2022-04-11 NOTE — ED PROVIDER NOTES
History  Chief Complaint   Patient presents with    Possible UTI     has had multiple UTI's since christmas and finished a course of antibiotics a week and a half ago  now having vaginal pain and hallucinations     Patient is an 21-year-old female with dementia and sundowning who presents today for evaluation of potential UTI  Daughter states that she becomes more confused and disoriented when she has a UTI  She recently completed treatment with ciprofloxacin last month for a pansensitive E coli UTI  Review of culture data reveals that patient has grown out E coli, Enterococcus faecalis, and Klebsiella and all have been sensitive to antibiotics  Patient denies new urinary symptoms  Daughter denies fever, chills, nausea, vomiting      Difficulty Urinating  Quality: No complaints of pain  Progression since onset: Patient more confused than usual today  Recent urinary tract infections: yes    Relieved by: Antibiotics  Worsened by:  Nothing  Ineffective treatments:  None tried  Urinary symptoms: incontinence (Chronic incontinence)    Urinary symptoms: no discolored urine, no foul-smelling urine, no frequent urination, no hematuria and no hesitancy    Associated symptoms: genital lesions ( patient with candidiasis of the intertriginous region)    Associated symptoms: no abdominal pain, no fever, no flank pain, no nausea and no vomiting    Risk factors: recurrent urinary tract infections    Risk factors: no hx of pyelonephritis, no hx of urolithiasis, no kidney transplant, not pregnant, no renal disease, not sexually active, no sexually transmitted infections, no single kidney and no urinary catheter        Prior to Admission Medications   Prescriptions Last Dose Informant Patient Reported? Taking?    Glucosamine-Chondroitin--400-167 MG TABS   Yes No   Sig: Take 1 tablet by mouth in the morning Move Free    QUEtiapine (SEROquel) 25 mg tablet   No No   Sig: TAKE 1 TABLET BY MOUTH EVERYDAY AT BEDTIME apixaban (Eliquis) 5 mg  Self Yes No   Sig: Take 5 mg by mouth 2 (two) times a day    camphor-menthol (SARNA) lotion   No No   Sig: Apply topically as needed for itching   clobetasol (TEMOVATE) 0 05 % ointment   No No   Sig: Apply topically 2 (two) times a day Peas sized amount to affected skin   desloratadine (CLARINEX) 5 MG tablet   Yes No   Sig: Take 5 mg by mouth daily   docusate sodium (COLACE) 100 mg capsule  Self Yes No   Sig: Take 100 mg by mouth daily at bedtime     furosemide (LASIX) 40 mg tablet   No No   Sig: Take 1 tablet (40 mg total) by mouth 3 (three) times a day   gatifloxacin (ZYMAXID) 0 5 %   No No   Sig: Administer 1 drop to the right eye 2 (two) times a day   glimepiride (AMARYL) 4 mg tablet   No No   Sig: Take 1 tablet (4 mg total) by mouth daily with breakfast   ketorolac (ACULAR) 0 5 % ophthalmic solution   No No   Sig: Administer 1 drop to the right eye 4 (four) times a day   losartan (COZAAR) 25 mg tablet   No No   Sig: TAKE 1 TABLET BY MOUTH EVERY DAY   Patient taking differently: 25 mg every morning     metFORMIN (GLUCOPHAGE) 500 mg tablet  Self Yes No   Sig: Take 500 mg by mouth daily with breakfast   methylPREDNISolone 4 MG tablet therapy pack   No No   Sig: Use as directed on package   metoprolol tartrate (LOPRESSOR) 100 mg tablet   No No   Sig: TAKE 1/2 TABLET (50 MG TOTAL) BY MOUTH EVERY 12 (TWELVE) HOURS   metroNIDAZOLE (METROCREAM) 0 75 % cream   No No   Sig: Apply topically 2 (two) times a day To the face   pravastatin (PRAVACHOL) 40 mg tablet   Yes No   Sig: Take 40 mg by mouth every evening   prednisoLONE acetate (PRED FORTE) 1 % ophthalmic suspension   Yes No   Sig: INSTILL 1 DROP IN THE RIGHT EYE 4 TIMES PER DAY   primidone (MYSOLINE) 50 mg tablet   No No   Sig: TAKE 1 TABLET BY MOUTH EVERY 12 HOURS    pyridoxine (B-6) 100 MG tablet   Yes No   Sig: Take 100 mg by mouth daily at bedtime     sertraline (ZOLOFT) 100 mg tablet   No No   Sig: TAKE 1 TABLET BY MOUTH EVERY DAY Patient taking differently: 100 mg every morning     triamcinolone (KENALOG) 0 1 % cream   No No   Sig: Apply twice a day to areas of itchy skin only  Avoid normal skin, the face and the groin  Facility-Administered Medications: None       Past Medical History:   Diagnosis Date    A-fib ([facility] )     Benign essential tremor     CAD (coronary artery disease)     Cataract     Chest pain 2020    [facility]est[facility] failure[facility])     Chronic fatigue     Chronic pulmonary thromboembolism syndrome ([place] [address] ) 2021    Dementia ([place] [address] )     with hallucinations    Depression     Diabetes mellitus ([facility])     Frequent UTI     Full dentures     wears uppers only    Hypertension     Pacemaker     Sleep apnea     uses oxygen at hs    Urinary incontinence     Uses walker     UTI (urinary tract infection) 2020    Wears glasses        Past Surgical History:   Procedure Laterality Date    CARDIAC SURGERY       SECTION      CORONARY ARTERY BYPASS GRAFT  2006    x3    FOOT SURGERY      hammertoe    INSERT / REPLACE / REMOVE PACEMAKER      IR PICC PLACEMENT SINGLE LUMEN  2019    [place] XCAPSL CTRC RMVL INSJ IO LENS PROSTH W/O ECP Right 3/28/2022    Procedure: EXTRACTION EXTRACAPSULAR CATARACT PHACO INTRAOCULAR LENS (IOL); Surgeon: Thien Rice MD;  Location: [facility] MAIN OR;  Service: Ophthalmology    TUBAL LIGATION         Family History   Problem Relation Age of Onset    Heart disease Father      I have reviewed and agree with the history as documented      E-Cigarette/Vaping    E-Cigarette Use Never User      E-Cigarette/Vaping Substances    Nicotine No     THC No     CBD No     Flavoring No     Other No     Unknown No      Social History     Tobacco Use    Smoking status: Never Smoker    Smokeless tobacco: Never Used   Vaping Use    Vaping Use: Never used   Substance Use Topics    Alcohol use: Not Currently    Drug use: Never       Review of Systems   Unable to perform ROS: Dementia   Constitutional: Negative for fever  Gastrointestinal: Negative for abdominal pain, nausea and vomiting  Genitourinary: Positive for dysuria  Negative for flank pain  Physical Exam  Physical Exam  Vitals and nursing note reviewed  Constitutional:       General: She is not in acute distress  Appearance: She is well-developed  HENT:      Head: Normocephalic and atraumatic  Right Ear: Tympanic membrane, ear canal and external ear normal       Left Ear: Tympanic membrane, ear canal and external ear normal    Eyes:      Conjunctiva/sclera: Conjunctivae normal    Cardiovascular:      Rate and Rhythm: Normal rate and regular rhythm  Heart sounds: No murmur heard  Pulmonary:      Effort: Pulmonary effort is normal  No respiratory distress  Breath sounds: Normal breath sounds  Abdominal:      General: Abdomen is flat  There is no distension  Palpations: Abdomen is soft  Tenderness: There is no abdominal tenderness  There is no right CVA tenderness, left CVA tenderness or rebound  Musculoskeletal:         General: Normal range of motion  Cervical back: Neck supple  No rigidity or tenderness  Right lower leg: Edema present  Left lower leg: Edema present  Skin:     General: Skin is warm and dry  Capillary Refill: Capillary refill takes less than 2 seconds  Neurological:      Mental Status: She is alert  Mental status is at baseline        Comments: Patient oriented to self  Daughter states that this is baseline         Vital Signs  ED Triage Vitals [04/11/22 1609]   Temperature Pulse Respirations Blood Pressure SpO2   98 3 °F (36 8 °C) 98 18 (!) 199/81 100 %      Temp src Heart Rate Source Patient Position - Orthostatic VS BP Location FiO2 (%)   -- -- -- -- --      Pain Score       8           Vitals:    04/11/22 1609   BP: (!) 199/81   Pulse: 98         Visual Acuity      ED Medications  Medications   cefTRIAXone (ROCEPHIN) IVPB (premix in dextrose) 2,000 mg 50 mL (2,000 mg Intravenous New Bag 4/11/22 1803)       Diagnostic Studies  Results Reviewed     Procedure Component Value Units Date/Time    Comprehensive metabolic panel [[id number]]  (Abnormal) Collected: 04/11/22 1756    Lab Status: Final result Specimen: Blood from Arm, Left Updated: 04/11/22 1831     Sodium 140 mmol/L      Potassium 3 6 mmol/L      Chloride 103 mmol/L      CO2 28 mmol/L      ANION GAP 9 mmol/L      BUN 23 mg/dL      Creatinine 0 80 mg/dL      Glucose 172 mg/dL      Calcium 8 7 mg/dL      AST 17 U/L      ALT 24 U/L      Alkaline Phosphatase 112 U/L      Total Protein 8 0 g/dL      Albumin 3 5 g/dL      Total Bilirubin 0 27 mg/dL      eGFR 67 ml/min/1 73sq m     Narrative:      [place] guidelines for Chronic Kidney Disease (CKD):     Stage 1 with normal or high GFR (GFR > 90 mL/min/1 73 square meters)    Stage 2 Mild CKD (GFR = 60-89 mL/min/1 73 square meters)    Stage 3A Moderate CKD (GFR = 45-59 mL/min/1 73 square meters)    Stage 3B Moderate CKD (GFR = 30-44 mL/min/1 73 square meters)    Stage 4 Severe CKD (GFR = 15-29 mL/min/1 73 square meters)    Stage 5 End Stage CKD (GFR <15 mL/min/1 73 square meters)  Note: GFR calculation is accurate only with a steady state creatinine    Protime-INR [[id number]]  (Abnormal) Collected: 04/11/22 1756    Lab Status: Final result Specimen: Blood from Arm, Left Updated: 04/11/22 1829     Protime 15 2 seconds      INR 1 23    APTT [[id number]]  (Normal) Collected: 04/11/22 1756    Lab Status: Final result Specimen: Blood from Arm, Left Updated: 04/11/22 1829     PTT 31 seconds     CBC and differential [[id number]]  (Abnormal) Collected: 04/11/22 1756    Lab Status: Final result Specimen: Blood from Arm, Left Updated: 04/11/22 1813     WBC 8 63 Thousand/uL      RBC 3 91 Million/uL      Hemoglobin 11 1 g/dL      Hematocrit 35 6 %      MCV 91 fL      MCH 28 4 pg      MCHC 31 2 g/dL      RDW 15 6 %      MPV 10 2 fL      Platelets 101 Thousands/uL      nRBC 0 /100 WBCs      Neutrophils Relative 64 %      Immat GRANS % 1 %      Lymphocytes Relative 21 %      Monocytes Relative 9 %      Eosinophils Relative 4 %      Basophils Relative 1 %      Neutrophils Absolute 5 67 Thousands/µL      Immature Grans Absolute 0 04 Thousand/uL      Lymphocytes Absolute 1 77 Thousands/µL      Monocytes Absolute 0 74 Thousand/µL      Eosinophils Absolute 0 33 Thousand/µL      Basophils Absolute 0 08 Thousands/µL     Lactic acid [[id number]] Collected: 04/11/22 1756    Lab Status: In process Specimen: Blood from Arm, Left Updated: 04/11/22 1812    Blood culture #2 [[id number]] Collected: 04/11/22 1759    Lab Status: In process Specimen: Blood from Arm, Right Updated: 04/11/22 1812    Blood culture #1 [[id number]] Collected: 04/11/22 1756    Lab Status: In process Specimen: Blood from Arm, Left Updated: 04/11/22 1812    Procalcitonin [[id number]] Collected: 04/11/22 1756    Lab Status:  In process Specimen: Blood from Arm, Left Updated: 04/11/22 1811    UA w Reflex to Microscopic w Reflex to Culture [[id number]]     Lab Status: No result Specimen: Urine                  CT head without contrast    (Results Pending)              Procedures  Procedures         ED Course  ED Course as of 04/11/22 1834   Mon Apr 11, 2022   1633 Blood Pressure(!): 199/81   1814 CBC and differential(!)                                             MDM  Number of Diagnoses or Management Options  Delirium: new and requires workup  Diagnosis management comments: Lengthy discussion with daughter  Daughter wishes to rule out UTI or infectious source for her mother's increased confusion and return home  Daughter is in the process of getting patient evaluated for long-term placement  [facility] was [facility] - however due to her confusion was declined-daughters currently looking for [facility] placement  We discussed placement of her mother and whether she was seeking while awaiting nursing home placement-she states that they are able to care for her at home for now and wish to continue doing so       Amount and/or Complexity of Data Reviewed  Clinical lab tests: ordered and reviewed  Tests in the radiology section of CPT®: ordered and reviewed  Tests in the medicine section of CPT®: ordered and reviewed  Decide to obtain previous medical records or to obtain history from someone other than the patient: yes  Review and summarize past medical records: yes  Independent visualization of images, tracings, or specimens: yes    Risk of Complications, Morbidity, and/or Mortality  Presenting problems: moderate  Diagnostic procedures: moderate  Management options: moderate    Patient Progress  Patient progress: stable      Disposition  Final diagnoses:   None     ED Disposition     None      Follow-up Information    None         Patient's Medications   Discharge Prescriptions    No medications on file       No discharge procedures on file      PDMP Review       Value Time User    PDMP Reviewed  Yes 5/3/2021 11:07 AM Isabell Londono MD          ED Provider  Electronically Signed by           Momo Jacques PA-C  04/11/22 6233

## 2022-04-12 LAB
ATRIAL RATE: 111 BPM
QRS AXIS: 71 DEGREES
QRSD INTERVAL: 92 MS
QT INTERVAL: 406 MS
QTC INTERVAL: 499 MS
T WAVE AXIS: 132 DEGREES
VENTRICULAR RATE: 91 BPM

## 2022-04-12 PROCEDURE — 93010 ELECTROCARDIOGRAM REPORT: CPT | Performed by: INTERNAL MEDICINE

## 2022-04-13 DIAGNOSIS — F32.9 MAJOR DEPRESSIVE DISORDER WITH CURRENT ACTIVE EPISODE, UNSPECIFIED DEPRESSION EPISODE SEVERITY, UNSPECIFIED WHETHER RECURRENT: ICD-10-CM

## 2022-04-13 DIAGNOSIS — F03.91 DEMENTIA WITH BEHAVIORAL DISTURBANCE (HCC): ICD-10-CM

## 2022-04-13 LAB — BACTERIA UR CULT: NORMAL

## 2022-04-13 RX ORDER — SERTRALINE HYDROCHLORIDE 100 MG/1
TABLET, FILM COATED ORAL
Qty: 30 TABLET | Refills: 0 | Status: SHIPPED | OUTPATIENT
Start: 2022-04-13 | End: 2022-05-09

## 2022-04-13 RX ORDER — QUETIAPINE FUMARATE 25 MG/1
TABLET, FILM COATED ORAL
Qty: 30 TABLET | Refills: 0 | Status: SHIPPED | OUTPATIENT
Start: 2022-04-13 | End: 2022-04-28

## 2022-04-17 LAB
BACTERIA BLD CULT: NORMAL
BACTERIA BLD CULT: NORMAL

## 2022-04-28 ENCOUNTER — APPOINTMENT (EMERGENCY)
Dept: RADIOLOGY | Facility: HOSPITAL | Age: 87
End: 2022-04-28
Payer: MEDICARE

## 2022-04-28 ENCOUNTER — HOSPITAL ENCOUNTER (OUTPATIENT)
Facility: HOSPITAL | Age: 87
Setting detail: OBSERVATION
Discharge: NURSING, REHAB OR LONG-TERM CARE | End: 2022-04-29
Attending: EMERGENCY MEDICINE | Admitting: INTERNAL MEDICINE
Payer: MEDICARE

## 2022-04-28 DIAGNOSIS — R26.2 AMBULATORY DYSFUNCTION: Primary | ICD-10-CM

## 2022-04-28 DIAGNOSIS — F03.90 DEMENTIA (HCC): ICD-10-CM

## 2022-04-28 PROBLEM — F02.81 LEWY BODY DEMENTIA WITH BEHAVIORAL DISTURBANCE (HCC): Status: ACTIVE | Noted: 2021-08-29

## 2022-04-28 PROBLEM — F02.818 LEWY BODY DEMENTIA WITH BEHAVIORAL DISTURBANCE (HCC): Status: ACTIVE | Noted: 2021-08-29

## 2022-04-28 PROBLEM — N17.9 AKI (ACUTE KIDNEY INJURY) (HCC): Status: ACTIVE | Noted: 2022-04-28

## 2022-04-28 PROBLEM — G31.83 LEWY BODY DEMENTIA WITH BEHAVIORAL DISTURBANCE (HCC): Status: ACTIVE | Noted: 2021-08-29

## 2022-04-28 LAB
2HR DELTA HS TROPONIN: 0 NG/L
ALBUMIN SERPL BCP-MCNC: 3.4 G/DL (ref 3.5–5)
ALP SERPL-CCNC: 112 U/L (ref 46–116)
ALT SERPL W P-5'-P-CCNC: 29 U/L (ref 12–78)
ANION GAP SERPL CALCULATED.3IONS-SCNC: 6 MMOL/L (ref 4–13)
APTT PPP: 32 SECONDS (ref 23–37)
AST SERPL W P-5'-P-CCNC: 22 U/L (ref 5–45)
BASOPHILS # BLD AUTO: 0.05 THOUSANDS/ΜL (ref 0–0.1)
BASOPHILS NFR BLD AUTO: 1 % (ref 0–1)
BILIRUB SERPL-MCNC: 0.26 MG/DL (ref 0.2–1)
BILIRUB UR QL STRIP: NEGATIVE
BUN SERPL-MCNC: 23 MG/DL (ref 5–25)
CALCIUM ALBUM COR SERPL-MCNC: 9.3 MG/DL (ref 8.3–10.1)
CALCIUM SERPL-MCNC: 8.8 MG/DL (ref 8.3–10.1)
CARDIAC TROPONIN I PNL SERPL HS: 8 NG/L
CARDIAC TROPONIN I PNL SERPL HS: 8 NG/L
CHLORIDE SERPL-SCNC: 102 MMOL/L (ref 100–108)
CLARITY UR: CLEAR
CO2 SERPL-SCNC: 32 MMOL/L (ref 21–32)
COLOR UR: NORMAL
CREAT SERPL-MCNC: 1.3 MG/DL (ref 0.6–1.3)
EOSINOPHIL # BLD AUTO: 0.25 THOUSAND/ΜL (ref 0–0.61)
EOSINOPHIL NFR BLD AUTO: 3 % (ref 0–6)
ERYTHROCYTE [DISTWIDTH] IN BLOOD BY AUTOMATED COUNT: 15.5 % (ref 11.6–15.1)
FLUAV RNA RESP QL NAA+PROBE: NEGATIVE
FLUBV RNA RESP QL NAA+PROBE: NEGATIVE
GFR SERPL CREATININE-BSD FRML MDRD: 37 ML/MIN/1.73SQ M
GLUCOSE SERPL-MCNC: 100 MG/DL (ref 65–140)
GLUCOSE SERPL-MCNC: 78 MG/DL (ref 65–140)
GLUCOSE UR STRIP-MCNC: NEGATIVE MG/DL
HCT VFR BLD AUTO: 34.8 % (ref 34.8–46.1)
HGB BLD-MCNC: 11.1 G/DL (ref 11.5–15.4)
HGB UR QL STRIP.AUTO: NEGATIVE
IMM GRANULOCYTES # BLD AUTO: 0.03 THOUSAND/UL (ref 0–0.2)
IMM GRANULOCYTES NFR BLD AUTO: 0 % (ref 0–2)
INR PPP: 1.14 (ref 0.84–1.19)
KETONES UR STRIP-MCNC: NEGATIVE MG/DL
LEUKOCYTE ESTERASE UR QL STRIP: NEGATIVE
LYMPHOCYTES # BLD AUTO: 2.07 THOUSANDS/ΜL (ref 0.6–4.47)
LYMPHOCYTES NFR BLD AUTO: 25 % (ref 14–44)
MCH RBC QN AUTO: 29.5 PG (ref 26.8–34.3)
MCHC RBC AUTO-ENTMCNC: 31.9 G/DL (ref 31.4–37.4)
MCV RBC AUTO: 93 FL (ref 82–98)
MONOCYTES # BLD AUTO: 0.72 THOUSAND/ΜL (ref 0.17–1.22)
MONOCYTES NFR BLD AUTO: 9 % (ref 4–12)
NEUTROPHILS # BLD AUTO: 5.09 THOUSANDS/ΜL (ref 1.85–7.62)
NEUTS SEG NFR BLD AUTO: 62 % (ref 43–75)
NITRITE UR QL STRIP: NEGATIVE
NRBC BLD AUTO-RTO: 0 /100 WBCS
PH UR STRIP.AUTO: 5.5 [PH]
PLATELET # BLD AUTO: 257 THOUSANDS/UL (ref 149–390)
PMV BLD AUTO: 9.8 FL (ref 8.9–12.7)
POTASSIUM SERPL-SCNC: 3.6 MMOL/L (ref 3.5–5.3)
PROT SERPL-MCNC: 8.1 G/DL (ref 6.4–8.2)
PROT UR STRIP-MCNC: NEGATIVE MG/DL
PROTHROMBIN TIME: 14.3 SECONDS (ref 11.6–14.5)
RBC # BLD AUTO: 3.76 MILLION/UL (ref 3.81–5.12)
RSV RNA RESP QL NAA+PROBE: NEGATIVE
SARS-COV-2 RNA RESP QL NAA+PROBE: NEGATIVE
SODIUM SERPL-SCNC: 140 MMOL/L (ref 136–145)
SP GR UR STRIP.AUTO: 1.02 (ref 1–1.03)
UROBILINOGEN UR QL STRIP.AUTO: 0.2 E.U./DL
WBC # BLD AUTO: 8.21 THOUSAND/UL (ref 4.31–10.16)

## 2022-04-28 PROCEDURE — 85025 COMPLETE CBC W/AUTO DIFF WBC: CPT | Performed by: EMERGENCY MEDICINE

## 2022-04-28 PROCEDURE — 87186 SC STD MICRODIL/AGAR DIL: CPT | Performed by: EMERGENCY MEDICINE

## 2022-04-28 PROCEDURE — 36415 COLL VENOUS BLD VENIPUNCTURE: CPT | Performed by: EMERGENCY MEDICINE

## 2022-04-28 PROCEDURE — 84484 ASSAY OF TROPONIN QUANT: CPT

## 2022-04-28 PROCEDURE — 99285 EMERGENCY DEPT VISIT HI MDM: CPT

## 2022-04-28 PROCEDURE — 70450 CT HEAD/BRAIN W/O DYE: CPT

## 2022-04-28 PROCEDURE — 84484 ASSAY OF TROPONIN QUANT: CPT | Performed by: EMERGENCY MEDICINE

## 2022-04-28 PROCEDURE — 81003 URINALYSIS AUTO W/O SCOPE: CPT | Performed by: EMERGENCY MEDICINE

## 2022-04-28 PROCEDURE — 82948 REAGENT STRIP/BLOOD GLUCOSE: CPT

## 2022-04-28 PROCEDURE — 80053 COMPREHEN METABOLIC PANEL: CPT | Performed by: EMERGENCY MEDICINE

## 2022-04-28 PROCEDURE — 0241U HB NFCT DS VIR RESP RNA 4 TRGT: CPT | Performed by: EMERGENCY MEDICINE

## 2022-04-28 PROCEDURE — 87086 URINE CULTURE/COLONY COUNT: CPT | Performed by: EMERGENCY MEDICINE

## 2022-04-28 PROCEDURE — 85610 PROTHROMBIN TIME: CPT | Performed by: EMERGENCY MEDICINE

## 2022-04-28 PROCEDURE — 85730 THROMBOPLASTIN TIME PARTIAL: CPT | Performed by: EMERGENCY MEDICINE

## 2022-04-28 PROCEDURE — 99220 PR INITIAL OBSERVATION CARE/DAY 70 MINUTES: CPT

## 2022-04-28 PROCEDURE — G1004 CDSM NDSC: HCPCS

## 2022-04-28 PROCEDURE — 87077 CULTURE AEROBIC IDENTIFY: CPT | Performed by: EMERGENCY MEDICINE

## 2022-04-28 PROCEDURE — 99285 EMERGENCY DEPT VISIT HI MDM: CPT | Performed by: EMERGENCY MEDICINE

## 2022-04-28 PROCEDURE — 93005 ELECTROCARDIOGRAM TRACING: CPT

## 2022-04-28 PROCEDURE — 71045 X-RAY EXAM CHEST 1 VIEW: CPT

## 2022-04-28 RX ORDER — DONEPEZIL HYDROCHLORIDE 5 MG/1
5 TABLET, FILM COATED ORAL
Status: DISCONTINUED | OUTPATIENT
Start: 2022-04-28 | End: 2022-04-29 | Stop reason: HOSPADM

## 2022-04-28 RX ORDER — FAMOTIDINE 20 MG/1
20 TABLET, FILM COATED ORAL EVERY EVENING
COMMUNITY

## 2022-04-28 RX ORDER — PYRIDOXINE HCL (VITAMIN B6) 50 MG
100 TABLET ORAL
Status: DISCONTINUED | OUTPATIENT
Start: 2022-04-28 | End: 2022-04-29 | Stop reason: HOSPADM

## 2022-04-28 RX ORDER — ONDANSETRON 2 MG/ML
4 INJECTION INTRAMUSCULAR; INTRAVENOUS EVERY 6 HOURS PRN
Status: DISCONTINUED | OUTPATIENT
Start: 2022-04-28 | End: 2022-04-29 | Stop reason: HOSPADM

## 2022-04-28 RX ORDER — DONEPEZIL HYDROCHLORIDE 5 MG/1
5 TABLET, FILM COATED ORAL
COMMUNITY

## 2022-04-28 RX ORDER — PRAVASTATIN SODIUM 40 MG
40 TABLET ORAL EVERY EVENING
Status: DISCONTINUED | OUTPATIENT
Start: 2022-04-28 | End: 2022-04-29 | Stop reason: HOSPADM

## 2022-04-28 RX ORDER — LORATADINE 10 MG/1
10 TABLET ORAL DAILY
Status: DISCONTINUED | OUTPATIENT
Start: 2022-04-29 | End: 2022-04-29 | Stop reason: HOSPADM

## 2022-04-28 RX ORDER — FAMOTIDINE 20 MG/1
20 TABLET, FILM COATED ORAL
Status: DISCONTINUED | OUTPATIENT
Start: 2022-04-29 | End: 2022-04-29 | Stop reason: HOSPADM

## 2022-04-28 RX ORDER — SERTRALINE HYDROCHLORIDE 100 MG/1
100 TABLET, FILM COATED ORAL DAILY
Status: DISCONTINUED | OUTPATIENT
Start: 2022-04-29 | End: 2022-04-29 | Stop reason: HOSPADM

## 2022-04-28 RX ORDER — PRIMIDONE 50 MG/1
50 TABLET ORAL EVERY 12 HOURS
Status: DISCONTINUED | OUTPATIENT
Start: 2022-04-28 | End: 2022-04-29 | Stop reason: HOSPADM

## 2022-04-28 RX ORDER — ACETAMINOPHEN 325 MG/1
650 TABLET ORAL EVERY 6 HOURS PRN
Status: DISCONTINUED | OUTPATIENT
Start: 2022-04-28 | End: 2022-04-29 | Stop reason: HOSPADM

## 2022-04-28 RX ORDER — DOCUSATE SODIUM 100 MG/1
100 CAPSULE, LIQUID FILLED ORAL
Status: DISCONTINUED | OUTPATIENT
Start: 2022-04-28 | End: 2022-04-29 | Stop reason: HOSPADM

## 2022-04-28 RX ORDER — FEXOFENADINE HCL 180 MG/1
180 TABLET ORAL DAILY
COMMUNITY

## 2022-04-28 RX ADMIN — DONEPEZIL HYDROCHLORIDE 5 MG: 5 TABLET, FILM COATED ORAL at 22:02

## 2022-04-28 RX ADMIN — PRAVASTATIN SODIUM 40 MG: 40 TABLET ORAL at 22:02

## 2022-04-28 RX ADMIN — DOCUSATE SODIUM 100 MG: 100 CAPSULE, LIQUID FILLED ORAL at 22:02

## 2022-04-28 RX ADMIN — METOPROLOL TARTRATE 25 MG: 25 TABLET, FILM COATED ORAL at 22:02

## 2022-04-28 RX ADMIN — APIXABAN 5 MG: 5 TABLET, FILM COATED ORAL at 22:02

## 2022-04-28 RX ADMIN — PYRIDOXINE HCL TAB 50 MG 100 MG: 50 TAB at 22:02

## 2022-04-28 RX ADMIN — PRIMIDONE 50 MG: 50 TABLET ORAL at 22:02

## 2022-04-28 NOTE — ED PROVIDER NOTES
History  Chief Complaint   Patient presents with    Multiple Falls     daughter reprted that pt has dementia and have been having haluciation that makes her manic and moving around, she has been having preq fall at home and daughter wanting to have LTC placement  41-year-old female presents the ED with her daughter who states that she has dementia which is been getting more severe and she states that they have not been able to leave her alone for last 3 months  She normally is doing quite well at home as far as being by herself or not walking around but then she starts hallucinating and ends up trying to walk around the house quickly and ends up falling  Patient has had several falls at home no real injuries in the falls that they are aware of  She is awake alert now has no complaints of any pain anywhere no noticeable injuries  History provided by:  Patient   used: No        Prior to Admission Medications   Prescriptions Last Dose Informant Patient Reported? Taking?    Glucosamine-Chondroitin--212-080 MG TABS 4/28/2022 at 0800  Yes Yes   Sig: Take 1 tablet by mouth in the morning Move Free    apixaban (Eliquis) 5 mg 4/28/2022 at 0800 Self Yes Yes   Sig: Take 5 mg by mouth 2 (two) times a day    docusate sodium (COLACE) 100 mg capsule 4/27/2022 at 2000 Self Yes Yes   Sig: Take 100 mg by mouth daily at bedtime     donepezil (ARICEPT) 5 mg tablet 4/27/2022 at 2000  Yes Yes   Sig: Take 5 mg by mouth daily at bedtime   famotidine (PEPCID) 20 mg tablet 4/28/2022 at 0800  Yes Yes   Sig: Take 20 mg by mouth daily   fexofenadine (ALLEGRA) 180 MG tablet 4/28/2022 at 0800  Yes Yes   Sig: Take 180 mg by mouth daily   furosemide (LASIX) 40 mg tablet 4/28/2022 at 1200  No Yes   Sig: Take 1 tablet (40 mg total) by mouth 3 (three) times a day   glimepiride (AMARYL) 4 mg tablet 4/28/2022 at 0800  No Yes   Sig: Take 1 tablet (4 mg total) by mouth daily with breakfast   losartan (COZAAR) 25 mg tablet 2022 at 0800  No Yes   Sig: TAKE 1 TABLET BY MOUTH EVERY DAY   Patient taking differently: 25 mg every morning     metFORMIN (GLUCOPHAGE) 500 mg tablet 2022 at Unknown time Self Yes Yes   Sig: Take 500 mg by mouth daily with breakfast     metoprolol tartrate (LOPRESSOR) 100 mg tablet 2022 at 0800  No Yes   Sig: TAKE 1/2 TABLET (50 MG TOTAL) BY MOUTH EVERY 12 (TWELVE) HOURS   pravastatin (PRAVACHOL) 40 mg tablet 2022 at 2000  Yes Yes   Sig: Take 40 mg by mouth every evening   primidone (MYSOLINE) 50 mg tablet 2022 at 0800  No Yes   Sig: TAKE 1 TABLET BY MOUTH EVERY 12 HOURS    pyridoxine (B-6) 100 MG tablet 2022 at 2000  Yes Yes   Sig: Take 100 mg by mouth daily at bedtime     sertraline (ZOLOFT) 100 mg tablet 2022 at 0800  No Yes   Sig: TAKE 1 TABLET BY MOUTH EVERY DAY      Facility-Administered Medications: None       Past Medical History:   Diagnosis Date    A-fib ([facility] [address] )     Benign essential tremor     CAD (coronary artery disease)     Cataract     Chest pain 2020    CHF [facility]congestive heart failure) ([facility]     Chronic fatigue     Chronic pulmonary thromboembolism syndrome ([facility] [address] ) 2021    Dementia ([facility] [address] )     with hallucinations    Depression     Diabetes mellitus ([facility] [address] )     Frequent UTI     Full dentures     wears uppers only    Hypertension     Pacemaker     Sleep apnea     uses oxygen at hs    Urinary incontinence     Uses walker     UTI (urinary tract infection) 2020    Wears glasses        Past Surgical History:   Procedure Laterality Date    CARDIAC SURGERY       SECTION      CORONARY ARTERY BYPASS GRAFT  2006    x3    FOOT SURGERY      hammertoe    INSERT / REPLACE / REMOVE PACEMAKER      IR PICC PLACEMENT SINGLE LUMEN  2019    DC XCAPSL CTRC RMVL INSJ IO LENS PROSTH W/O ECP Right 3/28/2022    Procedure: EXTRACTION EXTRACAPSULAR CATARACT PHACO INTRAOCULAR LENS (IOL);   Surgeon: Kacie Diaz MD;  Location: [facility] OR;  Service: Ophthalmology    TUBAL LIGATION         Family History   Problem Relation Age of Onset    Heart disease Father      I have reviewed and agree with the history as documented  E-Cigarette/Vaping    E-Cigarette Use Never User      E-Cigarette/Vaping Substances    Nicotine No     THC No     CBD No     Flavoring No     Other No     Unknown No      Social History     Tobacco Use    Smoking status: Never Smoker    Smokeless tobacco: Never Used   Vaping Use    Vaping Use: Never used   Substance Use Topics    Alcohol use: Not Currently    Drug use: Never       Review of Systems   Constitutional: Negative for activity change, chills, diaphoresis and fever  HENT: Negative for congestion, ear pain, nosebleeds, sore throat, trouble swallowing and voice change  Eyes: Negative for pain, discharge and redness  Respiratory: Negative for apnea, cough, choking, shortness of breath, wheezing and stridor  Cardiovascular: Negative for chest pain and palpitations  Gastrointestinal: Negative for abdominal distention, abdominal pain, constipation, diarrhea, nausea and vomiting  Endocrine: Negative for polydipsia  Genitourinary: Negative for difficulty urinating, dysuria, flank pain, frequency, hematuria and urgency  Musculoskeletal: Negative for back pain, gait problem, joint swelling, myalgias, neck pain and neck stiffness  Skin: Negative for pallor and rash  Neurological: Positive for weakness  Negative for dizziness, tremors, syncope, speech difficulty, numbness and headaches  Hematological: Negative for adenopathy  Psychiatric/Behavioral: Positive for confusion  Negative for hallucinations, self-injury and suicidal ideas  The patient is not nervous/anxious  Physical Exam  Physical Exam  Vitals and nursing note reviewed  Constitutional:       General: She is not in acute distress  Appearance: She is well-developed  She is not diaphoretic        Comments: Patient does seem confused  HENT:      Head: Normocephalic and atraumatic  Right Ear: External ear normal       Left Ear: External ear normal       Nose: Nose normal    Eyes:      Conjunctiva/sclera: Conjunctivae normal       Pupils: Pupils are equal, round, and reactive to light  Cardiovascular:      Rate and Rhythm: Normal rate and regular rhythm  Heart sounds: Normal heart sounds  Pulmonary:      Effort: Pulmonary effort is normal       Breath sounds: Normal breath sounds  Abdominal:      General: Bowel sounds are normal       Palpations: Abdomen is soft  Musculoskeletal:         General: Normal range of motion  Cervical back: Normal range of motion and neck supple  Skin:     General: Skin is warm and dry  Neurological:      Mental Status: She is alert and oriented to person, place, and time  Deep Tendon Reflexes: Reflexes are normal and symmetric  Psychiatric:         Behavior: Behavior is cooperative           Vital Signs  ED Triage Vitals [04/28/22 1737]   Temperature Pulse Respirations Blood Pressure SpO2   97 5 °F (36 4 °C) 80 20 139/60 98 %      Temp Source Heart Rate Source Patient Position - Orthostatic VS BP Location FiO2 (%)   Oral Monitor Lying Left arm --      Pain Score       No Pain           Vitals:    04/28/22 1737 04/28/22 1800 04/28/22 1845   BP: 139/60 143/67 (!) 134/116   Pulse: 80 81 83   Patient Position - Orthostatic VS: Lying Lying Lying         Visual Acuity  Visual Acuity      Most Recent Value   L Pupil Size (mm) 2   R Pupil Size (mm) 2          ED Medications  Medications - No data to display    Diagnostic Studies  Results Reviewed     Procedure Component Value Units Date/Time    COVID/FLU/RSV - 2 hour TAT [[id number]]  (Normal) Collected: 04/28/22 1857    Lab Status: Final result Specimen: Nares from Nose Updated: 04/28/22 1941     SARS-CoV-2 Negative     INFLUENZA A PCR Negative     INFLUENZA B PCR Negative     RSV PCR Negative    Narrative:      FOR PEDIATRIC PATIENTS - copy/paste COVID Guidelines URL to browser: [email or web address]    SARS-CoV-2 assay is a Nucleic Acid Amplification assay intended for the  qualitative detection of nucleic acid from SARS-CoV-2 in nasopharyngeal  swabs  Results are for the presumptive identification of SARS-CoV-2 RNA  Positive results are indicative of infection with SARS-CoV-2, the virus  causing COVID-19, but do not rule out bacterial infection or co-infection  with other viruses  Laboratories within the [place] and its  territories are required to report all positive results to the appropriate  public health authorities  Negative results do not preclude SARS-CoV-2  infection and should not be used as the sole basis for treatment or other  patient management decisions  Negative results must be combined with  clinical observations, patient history, and epidemiological information  This test has not been FDA cleared or approved  This test has been authorized by FDA under an Emergency Use Authorization  (EUA)  This test is only authorized for the duration of time the  declaration that circumstances exist justifying the authorization of the  emergency use of an in vitro diagnostic tests for detection of SARS-CoV-2  virus and/or diagnosis of COVID-19 infection under section 564(b)(1) of  the Act, 21 U  S C  032BZG-4(V)(2), unless the authorization is terminated  or revoked sooner  The test has been validated but independent review by FDA  and CLIA is pending  Test performed using [employer] GeneXpert: This RT-PCR assay targets N2,  a region unique to SARS-CoV-2  A conserved region in the E-gene was chosen  for pan-Sarbecovirus detection which includes SARS-CoV-2      Protime-INR [[id number]]  (Normal) Collected: 04/28/22 1857    Lab Status: Final result Specimen: Blood from Arm, Left Updated: 04/28/22 1934     Protime 14 3 seconds      INR 1 14    APTT [[id number]]  (Normal) Collected: 04/28/22 1857    Lab Status: Final result Specimen: Blood from Arm, Left Updated: 04/28/22 1934     PTT 32 seconds     HS Troponin I 2hr [[id number]]     Lab Status: No result Specimen: Blood     HS Troponin 0hr (reflex protocol) [[id number]]  (Normal) Collected: 04/28/22 1857    Lab Status: Final result Specimen: Blood from Arm, Left Updated: 04/28/22 1931     hs TnI 0hr 8 ng/L     Comprehensive metabolic panel [[id number]]  (Abnormal) Collected: 04/28/22 1857    Lab Status: Final result Specimen: Blood from Arm, Left Updated: 04/28/22 1923     Sodium 140 mmol/L      Potassium 3 6 mmol/L      Chloride 102 mmol/L      CO2 32 mmol/L      ANION GAP 6 mmol/L      BUN 23 mg/dL      Creatinine 1 30 mg/dL      Glucose 100 mg/dL      Calcium 8 8 mg/dL      Corrected Calcium 9 3 mg/dL      AST 22 U/L      ALT 29 U/L      Alkaline Phosphatase 112 U/L      Total Protein 8 1 g/dL      Albumin 3 4 g/dL      Total Bilirubin 0 26 mg/dL      eGFR 37 ml/min/1 73sq m     Narrative:      [place] guidelines for Chronic Kidney Disease (CKD):     Stage 1 with normal or high GFR (GFR > 90 mL/min/1 73 square meters)    Stage 2 Mild CKD (GFR = 60-89 mL/min/1 73 square meters)    Stage 3A Moderate CKD (GFR = 45-59 mL/min/1 73 square meters)    Stage 3B Moderate CKD (GFR = 30-44 mL/min/1 73 square meters)    Stage 4 Severe CKD (GFR = 15-29 mL/min/1 73 square meters)    Stage 5 End Stage CKD (GFR <15 mL/min/1 73 square meters)  Note: GFR calculation is accurate only with a steady state creatinine    CBC and differential [[id number]]  (Abnormal) Collected: 04/28/22 1857    Lab Status: Final result Specimen: Blood from Arm, Left Updated: 04/28/22 1905     WBC 8 21 Thousand/uL      RBC 3 76 Million/uL      Hemoglobin 11 1 g/dL      Hematocrit 34 8 %      MCV 93 fL      MCH 29 5 pg      MCHC 31 9 g/dL      RDW 15 5 %      MPV 9 8 fL      Platelets 573 Thousands/uL      nRBC 0 /100 WBCs      Neutrophils Relative 62 %      Immat GRANS % 0 %      Lymphocytes Relative 25 %      Monocytes Relative 9 %      Eosinophils Relative 3 %      Basophils Relative 1 %      Neutrophils Absolute 5 09 Thousands/µL      Immature Grans Absolute 0 03 Thousand/uL      Lymphocytes Absolute 2 07 Thousands/µL      Monocytes Absolute 0 72 Thousand/µL      Eosinophils Absolute 0 25 Thousand/µL      Basophils Absolute 0 05 Thousands/µL     UA (URINE) with reflex to Scope [[id number]]     Lab Status: No result Specimen: Urine     Urine culture [[id number]]     Lab Status: No result Specimen: Urine, Clean Catch                  CT head without contrast   Final Result by Kaylee Watson DO (04/28 1931)      No acute intracranial abnormality  Microangiopathic changes  Workstation performed: [id number]         XR chest 1 view portable    (Results Pending)              Procedures  Procedures         ED Course                               SBIRT 22yo+      Most Recent Value   SBIRT (24 yo +)    In order to provide better care to our patients, we are screening all of our patients for alcohol and drug use  Would it be okay to ask you these screening questions? No Filed at: 04/28/2022 1751                    MDM    Disposition  Final diagnoses:   Ambulatory dysfunction   Dementia ([facility] [address] )     Time reflects when diagnosis was documented in both MDM as applicable and the Disposition within this note     Time User Action Codes Description Comment    4/28/2022  7:44 PM Jeana Patricia Add [R26 2] Ambulatory dysfunction     4/28/2022  7:44 PM Jeana Patricia Add [F03 90] Dementia [facility])       ED Disposition     ED Disposition Condition Date/Time Comment    Admit Stable Thu Apr 28, 2022  7:44 PM Case was discussed with  Renate and the patient's admission status was agreed to be Admission Status: observation status to the service of Dr Marisela Birch           Follow-up Information    None         Patient's Medications   Discharge Prescriptions No medications on file       No discharge procedures on file      PDMP Review       Value Time User    PDMP Reviewed  Yes 5/3/2021 11:07 AM Olivia Smith MD          ED Provider  Electronically Signed by           Hina Herbert DO  04/28/22 0312

## 2022-04-29 VITALS
BODY MASS INDEX: 39.6 KG/M2 | SYSTOLIC BLOOD PRESSURE: 141 MMHG | HEART RATE: 78 BPM | TEMPERATURE: 98.2 F | HEIGHT: 59 IN | WEIGHT: 196.43 LBS | RESPIRATION RATE: 18 BRPM | DIASTOLIC BLOOD PRESSURE: 71 MMHG | OXYGEN SATURATION: 97 %

## 2022-04-29 LAB
4HR DELTA HS TROPONIN: 1 NG/L
ANION GAP SERPL CALCULATED.3IONS-SCNC: 9 MMOL/L (ref 4–13)
BASOPHILS # BLD AUTO: 0.05 THOUSANDS/ΜL (ref 0–0.1)
BASOPHILS NFR BLD AUTO: 1 % (ref 0–1)
BUN SERPL-MCNC: 21 MG/DL (ref 5–25)
CALCIUM SERPL-MCNC: 8.6 MG/DL (ref 8.3–10.1)
CARDIAC TROPONIN I PNL SERPL HS: 9 NG/L
CHLORIDE SERPL-SCNC: 101 MMOL/L (ref 100–108)
CO2 SERPL-SCNC: 28 MMOL/L (ref 21–32)
CREAT SERPL-MCNC: 0.84 MG/DL (ref 0.6–1.3)
EOSINOPHIL # BLD AUTO: 0.22 THOUSAND/ΜL (ref 0–0.61)
EOSINOPHIL NFR BLD AUTO: 4 % (ref 0–6)
ERYTHROCYTE [DISTWIDTH] IN BLOOD BY AUTOMATED COUNT: 15.1 % (ref 11.6–15.1)
EST. AVERAGE GLUCOSE BLD GHB EST-MCNC: 148 MG/DL
GFR SERPL CREATININE-BSD FRML MDRD: 63 ML/MIN/1.73SQ M
GLUCOSE P FAST SERPL-MCNC: 64 MG/DL (ref 65–99)
GLUCOSE SERPL-MCNC: 140 MG/DL (ref 65–140)
GLUCOSE SERPL-MCNC: 61 MG/DL (ref 65–140)
GLUCOSE SERPL-MCNC: 64 MG/DL (ref 65–140)
HBA1C MFR BLD: 6.8 %
HCT VFR BLD AUTO: 32.3 % (ref 34.8–46.1)
HGB BLD-MCNC: 10.4 G/DL (ref 11.5–15.4)
IMM GRANULOCYTES # BLD AUTO: 0.02 THOUSAND/UL (ref 0–0.2)
IMM GRANULOCYTES NFR BLD AUTO: 0 % (ref 0–2)
LYMPHOCYTES # BLD AUTO: 1.65 THOUSANDS/ΜL (ref 0.6–4.47)
LYMPHOCYTES NFR BLD AUTO: 26 % (ref 14–44)
MCH RBC QN AUTO: 29.4 PG (ref 26.8–34.3)
MCHC RBC AUTO-ENTMCNC: 32.2 G/DL (ref 31.4–37.4)
MCV RBC AUTO: 91 FL (ref 82–98)
MONOCYTES # BLD AUTO: 0.62 THOUSAND/ΜL (ref 0.17–1.22)
MONOCYTES NFR BLD AUTO: 10 % (ref 4–12)
NEUTROPHILS # BLD AUTO: 3.71 THOUSANDS/ΜL (ref 1.85–7.62)
NEUTS SEG NFR BLD AUTO: 59 % (ref 43–75)
NRBC BLD AUTO-RTO: 0 /100 WBCS
PLATELET # BLD AUTO: 217 THOUSANDS/UL (ref 149–390)
PMV BLD AUTO: 10.4 FL (ref 8.9–12.7)
POTASSIUM SERPL-SCNC: 3.3 MMOL/L (ref 3.5–5.3)
RBC # BLD AUTO: 3.54 MILLION/UL (ref 3.81–5.12)
SODIUM SERPL-SCNC: 138 MMOL/L (ref 136–145)
WBC # BLD AUTO: 6.27 THOUSAND/UL (ref 4.31–10.16)

## 2022-04-29 PROCEDURE — 82948 REAGENT STRIP/BLOOD GLUCOSE: CPT

## 2022-04-29 PROCEDURE — 99217 PR OBSERVATION CARE DISCHARGE MANAGEMENT: CPT | Performed by: INTERNAL MEDICINE

## 2022-04-29 PROCEDURE — 97163 PT EVAL HIGH COMPLEX 45 MIN: CPT

## 2022-04-29 PROCEDURE — 83036 HEMOGLOBIN GLYCOSYLATED A1C: CPT

## 2022-04-29 PROCEDURE — 85025 COMPLETE CBC W/AUTO DIFF WBC: CPT

## 2022-04-29 PROCEDURE — 97167 OT EVAL HIGH COMPLEX 60 MIN: CPT

## 2022-04-29 PROCEDURE — 80048 BASIC METABOLIC PNL TOTAL CA: CPT

## 2022-04-29 RX ORDER — POTASSIUM CHLORIDE 20 MEQ/1
40 TABLET, EXTENDED RELEASE ORAL ONCE
Status: COMPLETED | OUTPATIENT
Start: 2022-04-29 | End: 2022-04-29

## 2022-04-29 RX ADMIN — METOPROLOL TARTRATE 25 MG: 25 TABLET, FILM COATED ORAL at 09:53

## 2022-04-29 RX ADMIN — SERTRALINE HYDROCHLORIDE 100 MG: 100 TABLET, FILM COATED ORAL at 09:53

## 2022-04-29 RX ADMIN — LORATADINE 10 MG: 10 TABLET ORAL at 09:53

## 2022-04-29 RX ADMIN — APIXABAN 5 MG: 5 TABLET, FILM COATED ORAL at 09:53

## 2022-04-29 RX ADMIN — PRIMIDONE 50 MG: 50 TABLET ORAL at 09:53

## 2022-04-29 RX ADMIN — POTASSIUM CHLORIDE 40 MEQ: 1500 TABLET, EXTENDED RELEASE ORAL at 09:53

## 2022-04-29 RX ADMIN — FAMOTIDINE 20 MG: 20 TABLET, FILM COATED ORAL at 06:09

## 2022-04-29 NOTE — OCCUPATIONAL THERAPY NOTE
OT EVALUATION       04/29/22 1205   Note Type   Note type Evaluation   Restrictions/Precautions   Other Precautions Cognitive; Chair Alarm; Bed Alarm; Fall Risk   Pain Assessment   Pain Assessment Tool Interiano-Baker FACES   Interiano-Baker FACES Pain Rating 4   Pain Location/Orientation Orientation: Right;Location: Leg  (with bed mobility, pt reports from where she fell)   Home Living   Type of [place] to live on main level with bedroom/bathroom  (few steps to enter)   Bathroom Shower/Tub   (pt sponge bathes with assist)   Home Equipment Walker   Additional Comments pt is confused, unsure of accuracy of information obtained    Prior Function   Level of [place] Needs assistance with ADLs and functional mobility; Needs assistance with IADLs   Lives With Daughter   Receives Help From Family;Home health   ADL Assistance Needs assistance   IADLs Needs assistance   Falls in the last 6 months 1 to 4   Comments Patient admitted with dementia and ambulatory dysfunction      ADL   Eating Assistance 4  Minimal Assistance   Grooming Assistance 4  Minimal Assistance   UB Bathing Assistance 3  Moderate Assistance   LB Bathing Assistance 2  Maximal Assistance   UB Dressing Assistance 3  Moderate Assistance   LB Dressing Assistance 2  Maximal [address]  3  Moderate Assistance   Bed Mobility   Supine to Sit 2  Maximal assistance   Additional items Assist x 1;Verbal cues   Transfers   Sit to Stand 3  Moderate assistance   Additional items Assist x 1;Verbal cues   Stand to Sit 3  Moderate assistance   Additional items Assist x 1;Verbal cues   Functional Mobility   Functional Mobility 3  Moderate assistance   Additional Comments few steps with RW bed to chair    Additional items Rolling walker   Balance   Static Sitting Fair   Dynamic Sitting Fair -   Static Standing Poor +   Dynamic Standing Poor +   Activity Tolerance   Activity Tolerance Patient limited by fatigue  (cognition)   RUE Assessment RUE Assessment WFL   LUE Assessment   LUE Assessment WFL   Cognition   Overall Cognitive Status Impaired   Arousal/Participation Cooperative   Attention Attends with cues to redirect   Orientation Level Oriented to person;Oriented to place; Disoriented to time;Disoriented to situation   Following Commands Follows one step commands with increased time or repetition   Comments pt is confused at times, stating she made hot dogs for her grandchildren    Assessment   Limitation Decreased ADL status; Decreased UE strength;Decreased Safe judgement during ADL;Decreased endurance;Decreased self-care trans;Decreased high-level ADLs  (decreased balance and mobility)   Prognosis Good   Assessment Patient evaluated by Occupational Therapy  Patient admitted with Ambulatory dysfunction  The patients occupational profile, medical and therapy history includes a extensive additional review of physical, cognitive, or psychosocial history related to current functional performance  Comorbidities affecting functional mobility and ADLS include: Afib, CAD, CHF, dementia, depression, diabetes and hypertension  Prior to admission, patient was requiring assist for functional mobility with walker, requiring assist for ADLS and requiring assist for IADLS  The evaluation identifies the following performance deficits: weakness, impaired balance, decreased endurance, increased fall risk, new onset of impairment of functional mobility, decreased ADLS, decreased IADLS, decreased activity tolerance, decreased safety awareness, impaired judgement, decreased cognition and decreased strength, that result in activity limitations and/or participation restrictions  This evaluation requires clinical decision making of high complexity, because the patient presents with comorbidites that affect occupational performance and required significant modification of tasks or assistance with consideration of multiple treatment options    The Barthel Index was used as a functional outcome tool presenting with a score of Barthel Index Score: 30, indicating marked limitations of functional mobility and ADLS  The patient's raw score on the AM-PAC Daily Activity inpatient short form is 12, standardized score is 30 6, less than 39 4  Patients at this level are likely to benefit from DC to post-acute rehabilitation services  Please refer to the recommendation of the Occupational Therapist for safe DC planning  Patient will benefit from skilled Occupational Therapy services to address above deficits and facilitate a safe return to prior level of function  Goals   Patient Goals to go home   STG Time Frame   (1-7 days)   Short Term Goal  Goals established to promote Patient Goals: to go home:  Patient will increase standing tolerance to 3 minutes during ADL task to decrease assistance level and decrease fall risk; Patient will increase bed mobility to mod assist in preparation for ADLS and transfers; Patient will increase functional mobility to and from bathroom with rolling walker with min assist to increase performance with ADLS and to use a toilet; Patient will tolerate 10 minutes of UE ROM/strengthening to increase general activity tolerance and performance in ADLS/IADLS; Patient will improve functional activity tolerance to 10 minutes of sustained functional tasks to increase participation in basic self-care and decrease assistance level;  Patient will increase dynamic sitting balance to fair to improve the ability to sit at edge of bed or on a chair for ADLS;  Patient will increase dynamic standing balance to fair- to improve postural stability and decrease fall risk during standing ADLS and transfers  LTG Time Frame   (8-14 days)   Long Term Goal Patient will increase standing tolerance to 6 minutes during ADL task to decrease assistance level and decrease fall risk; Patient will increase bed mobility to min assist in preparation for ADLS and transfers;  Patient will increase functional mobility to and from bathroom with rolling walker with supervision to increase performance with ADLS and to use a toilet; Patient will tolerate 20 minutes of UE ROM/strengthening to increase general activity tolerance and performance in ADLS/IADLS; Patient will improve functional activity tolerance to 20 minutes of sustained functional tasks to increase participation in basic self-care and decrease assistance level;  Patient will increase dynamic sitting balance to fair+ to improve the ability to sit at edge of bed or on a chair for ADLS;  Patient will increase dynamic standing balance to fair to improve postural stability and decrease fall risk during standing ADLS and transfers  Pt will score >/= 16/24 on AM-PAC Daily Activity Inpatient scale to promote safe independence with ADLs and functional mobility; Pt will score >/= 60/100 on Barthel Index in order to decrease caregiver assistance needed and increase ability to perform ADLs and functional mobility  Functional Transfer Goals   Pt Will Perform All Functional Transfers   (STG min assist LTG supervision)   ADL Goals   Pt Will Perform Eating   (STG supervision LTG independent)   Pt Will Perform Grooming   (STG supervision LTG independent)   Pt Will Perform Bathing   (STG mod assist LTG min assist )   Pt Will Perform UE Dressing   (STG min assist LTG supervision)   Pt Will Perform LE Dressing   (STG mod assist LTG min assist )   Pt Will Perform Toileting   (STG min assist LTG supervision)   Plan   Treatment Interventions ADL retraining;Functional transfer training;UE strengthening/ROM; Endurance training;Patient/family training;Equipment evaluation/education; Activityengagement; Compensatory technique education   Goal Expiration Date 05/13/22   OT Frequency 3-5x/wk   Recommendation   OT Discharge Recommendation Post acute rehabilitation services   AM-[facility] Daily Activity Inpatient   Lower Body Dressing 1   Bathing 1   Toileting 2   Upper Body Dressing 2   Grooming 3   Eating 3   Daily Activity Raw Score 12   Daily Activity Standardized Score (Calc for Raw Score >=11) 30 6   AM-PAC Applied Cognition Inpatient   Following a Speech/Presentation 2   Understanding Ordinary Conversation 3   Taking Medications 1   Remembering Where Things Are Placed or Put Away 1   Remembering List of 4-5 Errands 1   Taking Care of Complicated Tasks 1   Applied Cognition Raw Score 9   Applied Cognition Standardized Score 22 48   Barthel Index   Feeding 5   Bathing 0   Grooming Score 0   Dressing Score 5   Bladder Score 0   Bowels Score 10   Toilet Use Score 5   Transfers (Bed/Chair) Score 5   Mobility (Level Surface) Score 0   Stairs Score 0   Barthel Index Score 30   Licensure   [place] License Number  Dorene Solano [address] OTR/L [id number]

## 2022-04-29 NOTE — ASSESSMENT & PLAN NOTE
Lab Results   Component Value Date    HGBA1C 6 8 (H) 04/29/2022       Recent Labs     04/28/22  2120 04/29/22  0742 04/29/22  1121   POCGLU 78 61* 140       Blood Sugar Average: Last 72 hrs:  (P) 93On metformin 500mg daily, amaryl daily at home which can be continued  · Patient was on Humalog sliding scale

## 2022-04-29 NOTE — PLAN OF CARE
Problem: Potential for Falls  Goal: Patient will remain free of falls  Description: INTERVENTIONS:  - Educate patient/family on patient safety including physical limitations  - Instruct patient to call for assistance with activity   - Consult OT/PT to assist with strengthening/mobility   - Keep Call bell within reach  - Keep bed low and locked with side rails adjusted as appropriate  - Keep care items and personal belongings within reach  - Initiate and maintain comfort rounds  - Make Fall Risk Sign visible to staff  - Offer Toileting every 2 Hours, in advance of need  - Initiate/Maintain bed/chair alarm  - Apply yellow socks and bracelet for high fall risk patients  - Consider moving patient to room near nurses station  4/29/2022 1611 by Shari Hopkins RN  Outcome: Completed  4/29/2022 1151 by Shari Hopkins RN  Outcome: Progressing     Problem: Prexisting or High Potential for Compromised Skin Integrity  Goal: Skin integrity is maintained or improved  Description: INTERVENTIONS:  - Identify patients at risk for skin breakdown  - Assess and monitor skin integrity  - Assess and monitor nutrition and hydration status  - Monitor labs   - Assess for incontinence   - Turn and reposition patient  - Assist with mobility/ambulation  - Relieve pressure over bony prominences  - Avoid friction and shearing  - Provide appropriate hygiene as needed including keeping skin clean and dry  - Evaluate need for skin moisturizer/barrier cream  - Collaborate with interdisciplinary team   - Patient/family teaching  - Consider wound care consult   4/29/2022 1611 by Shari Hopkins RN  Outcome: Completed  4/29/2022 1151 by Shari Hopkins RN  Outcome: Progressing     Problem: MOBILITY - ADULT  Goal: Maintain or return to baseline ADL function  Description: INTERVENTIONS:  -  Assess patient's ability to carry out ADLs; assess patient's baseline for ADL function and identify physical deficits which impact ability to perform ADLs (bathing, care of mouth/teeth, toileting, grooming, dressing, etc )  - Assess/evaluate cause of self-care deficits   - Assess range of motion  - Assess patient's mobility; develop plan if impaired  - Assess patient's need for assistive devices and provide as appropriate  - Encourage maximum independence but intervene and supervise when necessary  - Involve family in performance of ADLs  - Assess for home care needs following discharge   - Consider OT consult to assist with ADL evaluation and planning for discharge  - Provide patient education as appropriate  4/29/2022 1611 by Sofia Aguilar RN  Outcome: Completed  4/29/2022 1151 by Sofia Aguilar RN  Outcome: Progressing  Goal: Maintains/Returns to pre admission functional level  Description: INTERVENTIONS:  - Perform BMAT or MOVE assessment daily    - Set and communicate daily mobility goal to care team and patient/family/caregiver  - Collaborate with rehabilitation services on mobility goals if consulted  - Perform Range of Motion 3 times a day  - Reposition patient every 2 hours    - Dangle patient 3 times a day  - Ambulate patient 3 times a day  - Out of bed to chair 3 times a day   - Out of bed for meals 3 times a day  - Out of bed for toileting  - Record patient progress and toleration of activity level   4/29/2022 1611 by Sofia Aguilar RN  Outcome: Completed  4/29/2022 1151 by Sofia Aguilar RN  Outcome: Progressing     Problem: INFECTION - ADULT  Goal: Absence or prevention of progression during hospitalization  Description: INTERVENTIONS:  - Assess and monitor for signs and symptoms of infection  - Monitor lab/diagnostic results  - Monitor all insertion sites, i e  indwelling lines, tubes, and drains  - Monitor nasal secretions for changes in amount and color  - [place] appropriate cooling/warming therapies per order  - Administer medications as ordered  - Instruct and encourage patient and family to use good hand hygiene technique  - Identify and instruct in appropriate isolation precautions for identified infection/condition  4/29/2022 1611 by Jessica Carter RN  Outcome: Completed  4/29/2022 1151 by Jessica Carter RN  Outcome: Progressing     Problem: DISCHARGE PLANNING  Goal: Discharge to home or other facility with appropriate resources  Description: INTERVENTIONS:  - Identify barriers to discharge w/patient and caregiver  - Arrange for needed discharge resources and transportation as appropriate  - Identify discharge learning needs (meds, wound care, etc )  - Refer to Case Management Department for coordinating discharge planning if the patient needs post-hospital services based on physician/advanced practitioner order or complex needs related to functional status, cognitive ability, or social support system  4/29/2022 1611 by Jessica Carter RN  Outcome: Completed  4/29/2022 1151 by Jessica Carter RN  Outcome: Progressing

## 2022-04-29 NOTE — ASSESSMENT & PLAN NOTE
Patient brought in by daughter due to frequent falls, dementia, unable to care for at home  · Slid to ground last night trying to sit in chair  · CT head shows No acute intracranial abnormality  Microangiopathic changes    · PT/OT eval  · Patient to be discharged to short-term rehabilitation at [facility] as per patient request

## 2022-04-29 NOTE — ASSESSMENT & PLAN NOTE
Wt Readings from Last 3 Encounters:   04/29/22 89 1 kg (196 lb 6 9 oz)   04/11/22 90 7 kg (200 lb)   03/30/22 90 7 kg (200 lb)   Echo 6/22/2021 was technically difficult study with mild LVH and EF of 45-50%  Mild septal dyskinesis noted with otherwise normal wall motion  Left atrium was severely enlarged-diameter 4 5 cm & volume index 43 mL/m²  No significant valvular stenosis noted  Mild MR, mild PI, trace TR noted with estimated RVSP of 31 mmHg      · Resume Lasix and losartan upon discharge p o   Which have been on hold in the setting of acute kidney injury

## 2022-04-29 NOTE — ED NOTES
Daughter states pt sleeps with 3L/min of O2 at home  3L/min of O2 placed on pt       Iraida Clements RN  04/28/22 2033

## 2022-04-29 NOTE — ASSESSMENT & PLAN NOTE
Lab Results   Component Value Date    HGBA1C 6 9 (H) 08/28/2021       No results for input(s): POCGLU in the last 72 hours  Blood Sugar Average: Last 72 hrs:   On metformin 500mg daily, amaryl daily at home  · SSI and accuchecks with meals, bedtime  · Repeat hemoglobin A1c

## 2022-04-29 NOTE — ASSESSMENT & PLAN NOTE
Creatinine 1 3, baseline 0 8-1 0  · Suspect due to dehydration  · Hold lasix for now  · Oral rehydration  · Avoid nephrotoxins, hold losartan  · Monitor with BMP

## 2022-04-29 NOTE — PLAN OF CARE
Problem: Potential for Falls  Goal: Patient will remain free of falls  Description: INTERVENTIONS:  - Educate patient/family on patient safety including physical limitations  - Instruct patient to call for assistance with activity   - Consult OT/PT to assist with strengthening/mobility   - Keep Call bell within reach  - Keep bed low and locked with side rails adjusted as appropriate  - Keep care items and personal belongings within reach  - Initiate and maintain comfort rounds  - Make Fall Risk Sign visible to staff  - Offer Toileting every 2 Hours, in advance of need  - Initiate/Maintain bed/chair alarm  - Apply yellow socks and bracelet for high fall risk patients  - Consider moving patient to room near nurses station  4/28/2022 2322 by Marie Ramirez RN  Outcome: Progressing  4/28/2022 2322 by Marie Ramirez RN  Outcome: Progressing     Problem: Prexisting or High Potential for Compromised Skin Integrity  Goal: Skin integrity is maintained or improved  Description: INTERVENTIONS:  - Identify patients at risk for skin breakdown  - Assess and monitor skin integrity  - Assess and monitor nutrition and hydration status  - Monitor labs   - Assess for incontinence   - Turn and reposition patient  - Assist with mobility/ambulation  - Relieve pressure over bony prominences  - Avoid friction and shearing  - Provide appropriate hygiene as needed including keeping skin clean and dry  - Evaluate need for skin moisturizer/barrier cream  - Collaborate with interdisciplinary team   - Patient/family teaching  - Consider wound care consult   4/28/2022 2322 by Marie Ramirez RN  Outcome: Progressing  4/28/2022 2322 by Marie Ramirez RN  Outcome: Progressing     Problem: MOBILITY - ADULT  Goal: Maintain or return to baseline ADL function  Description: INTERVENTIONS:  -  Assess patient's ability to carry out ADLs; assess patient's baseline for ADL function and identify physical deficits which impact ability to perform ADLs (bathing, care of mouth/teeth, toileting, grooming, dressing, etc )  - Assess/evaluate cause of self-care deficits   - Assess range of motion  - Assess patient's mobility; develop plan if impaired  - Assess patient's need for assistive devices and provide as appropriate  - Encourage maximum independence but intervene and supervise when necessary  - Involve family in performance of ADLs  - Assess for home care needs following discharge   - Consider OT consult to assist with ADL evaluation and planning for discharge  - Provide patient education as appropriate  4/28/2022 2322 by Moon Marcus RN  Outcome: Progressing  4/28/2022 2322 by Moon Marcus RN  Outcome: Progressing  Goal: Maintains/Returns to pre admission functional level  Description: INTERVENTIONS:  - Perform BMAT or MOVE assessment daily    - Set and communicate daily mobility goal to care team and patient/family/caregiver  - Collaborate with rehabilitation services on mobility goals if consulted  - Perform Range of Motion 3 times a day  - Reposition patient every 2 hours    - Dangle patient 3 times a day  - Ambulate patient 3 times a day  - Out of bed to chair 3 times a day   - Out of bed for meals 3 times a day  - Out of bed for toileting  - Record patient progress and toleration of activity level   4/28/2022 2322 by Moon Marcus RN  Outcome: Progressing  4/28/2022 2322 by Moon Marcus RN  Outcome: Progressing     Problem: INFECTION - ADULT  Goal: Absence or prevention of progression during hospitalization  Description: INTERVENTIONS:  - Assess and monitor for signs and symptoms of infection  - Monitor lab/diagnostic results  - Monitor all insertion sites, i e  indwelling lines, tubes, and drains  - Monitor nasal secretions for changes in amount and color  - [place] appropriate cooling/warming therapies per order  - Administer medications as ordered  - Instruct and encourage patient and family to use good hand hygiene technique  - Identify and instruct in appropriate isolation precautions for identified infection/condition  4/28/2022 2322 by Rebecca Bledsoe RN  Outcome: Progressing  4/28/2022 2322 by Rebecca Bledsoe RN  Outcome: Progressing     Problem: DISCHARGE PLANNING  Goal: Discharge to home or other facility with appropriate resources  Description: INTERVENTIONS:  - Identify barriers to discharge w/patient and caregiver  - Arrange for needed discharge resources and transportation as appropriate  - Identify discharge learning needs (meds, wound care, etc )  - Refer to Case Management Department for coordinating discharge planning if the patient needs post-hospital services based on physician/advanced practitioner order or complex needs related to functional status, cognitive ability, or social support system  4/28/2022 2322 by Rebecca Bledsoe RN  Outcome: Progressing  4/28/2022 2322 by Rebecca Bledsoe RN  Outcome: Progressing

## 2022-04-29 NOTE — ASSESSMENT & PLAN NOTE
Frequent UTIs treated multiple times with keflex  · UA today negative  · No dysuria, hematuria, urgency, freqeuncy

## 2022-04-29 NOTE — CASE MANAGEMENT
Case Management Assessment & Discharge Planning Note    Patient name Laina Casas  Location [address] 220/2 [address] 26 MRN [id number]  : 1935 Date 2022       Current Admission Date: 2022  Current Admission Diagnosis:Ambulatory dysfunction   Patient Active Problem List    Diagnosis Date Noted    Ambulatory dysfunction 2022    SCOT (acute kidney injury) ([facility] ) 2022    Hyperlipidemia 2022    Pacemaker     Sleep apnea - O2 use at night     History of coronary artery bypass graft - CABG x3 in 2006     Lewy body dementia with behavioral disturbance ([facility] ) 2021    Fissure of vulva     Lichen sclerosus     Acute diastolic CHF (congestive heart failure) ([facility] ) 2021    Hypertension     Frequent UTI     A-fib ([facility] )     Benign essential tremor     Chronic obstructive pulmonary disease ([facility] ) 2021    Obesity, morbid ([facility] ) 2021    Chronic pulmonary thromboembolism syndrome ([facility] ) 2021    Seasonal allergic rhinitis 2021    Primary osteoarthritis of both knees 2021    Chronic fatigue 2021    Pruritus 2021    Chest pain 2020    Combined systolic and diastolic congestive heart failure ([facility] ) 2020    Tachy-gale syndrome ([facility] ) 2020    Persistent atrial fibrillation ([facility] ) 2020    CAD (coronary artery disease) 2020    DM (diabetes mellitus), type 2 ([facility] ) 2020    Acute metabolic encephalopathy       LOS (days): 0  Geometric Mean LOS (GMLOS) (days):   Days to GMLOS:     OBJECTIVE:        Current admission status: Observation       Preferred Pharmacy:   [id number]  [address]  Phone: [phone] Fax: [phone]  [facility]   [address] [place] [address]  Phone: [phone] Fax: [phone]    Primary Care Provider: Tate Montes, MD    Primary Insurance: MEDICARE  Secondary Insurance: [facility]    ASSESSMENT:  [id number] [facility] - Daughter   Primary Phone: [phone] (Home)                 Obs Notice Signed: 04/28/22    Readmission Root Cause  30 Day Readmission: No    Patient Information  Admitted from[de-identified] Home  Mental Status: Alert  During Assessment patient was accompanied by: Not accompanied during assessment  Assessment information provided by[de-identified] Patient  Primary Caregiver: Family  Caregiver's Name[de-identified] Dwain Guadarrama Relationship to Patient[de-identified] Family Member  Caregiver's Telephone Number[de-identified] [phone]  Support Systems: [address] of Residence: [address] [place] [address] do you live in?: [place]  In the last 12 months, was there a time when you were not able to pay the mortgage or rent on time?: No  In the last 12 months, was there a time when you did not have a steady place to sleep or slept in a shelter (including now)?: No  Homeless/housing insecurity resource given?: N/A  Living Arrangements: Lives w/ Daughter    Activities of Daily Living Prior to Admission  Functional Status: Assistance  Completes ADLs independently?: No (gets assistande for all adls)  Level of ADL dependence: Assistance  Ambulates independently?: No (needs helps walking with walker)  Level of ambulatory dependence: Assistance  Does patient use assisted devices?: Yes  Assisted Devices (DME) used: Kristine Guevara  Does patient currently own DME?: Yes  What DME does the patient currently own?: Kristine Guevara         Patient Information Continued  Does patient have prescription coverage?: Yes  Within the past 12 months, you worried that your food would run out before you got the money to buy more : Never true  Within the past 12 months, the food you bought just didnt last and you didnt have money to get more : Never true  Food insecurity resource given?: N/A  Does patient receive dialysis treatments?: No  Does patient have a history of substance abuse?: No  Does patient have a history of Mental Health Diagnosis?: No         Means of Transportation  Means of Transport to Appts[de-identified] Family transport  In the past 12 months, has lack of transportation kept you from medical appointments or from getting medications?: No  In the past 12 months, has lack of transportation kept you from meetings, work, or from getting things needed for daily living?: No  Was application for public transport provided?: N/A        DISCHARGE DETAILS:    Discharge planning discussed with[de-identified] patient and daughter  Freedom of Choice: Yes  Comments - Freedom of Choice: pt wishes to be placed at [address] [place] [address] for long term care  CM contacted family/caregiver?: Yes  Were Treatment Team discharge recommendations reviewed with patient/caregiver?: Yes  Did patient/caregiver verbalize understanding of patient care needs?: Yes  Were patient/caregiver advised of the risks associated with not following Treatment Team discharge recommendations?: Yes    Contacts  Patient Contacts: Swathi Aldridge (Daughter) [phone]  Relationship to Patient[de-identified] Family  Contact Method: Phone  Phone Number: Swathi Aldridge (Daughter) [phone]  Reason/Outcome: Discharge Planning,Other (Comment)    [address]         Is the patient interested in [facility] at discharge?: No    DME Referral Provided  Referral made for DME?: No         Would you like to participate in our [facility] e service program?  : No - Declined    Treatment Team Recommendation: SNF      IMM Given (Date):: 04/29/22 (reviewed with pt, pt agreeable to plan, imm placed in scan bin)  IMM Given to[de-identified] Patient     Referral placed in [facility] to [facility] as that is pt's first choice

## 2022-04-29 NOTE — ASSESSMENT & PLAN NOTE
Patient brought in by daughter due to frequent falls, dementia, unable to care for at home  · Slid to ground last night trying to sit in chair  · CT head shows No acute intracranial abnormality  Microangiopathic changes    · PT/OT eval  · CM for discharge planning  · Patient, daughter both agreeable with nursing home placement

## 2022-04-29 NOTE — H&P
[address] [facility] 1935, 81 y o  female MRN: [id number]  Unit/Bed#: 2 [place] [address] Encounter: [id number]  Primary Care Provider: Rosemarie Laird MD   Date and time admitted to hospital: 4/28/2022  5:25 PM    * Ambulatory dysfunction  Assessment & Plan  Patient brought in by daughter due to frequent falls, dementia, unable to care for at home  · Slid to ground last night trying to sit in chair  · CT head shows No acute intracranial abnormality  Microangiopathic changes  · PT/OT eval  · CM for discharge planning  · Patient, daughter both agreeable with nursing home placement    SCOT (acute kidney injury) ([facility] [address] )  Assessment & Plan  Creatinine 1 3, baseline 0 8-1 0  · Suspect due to dehydration  · Hold lasix for now  · Oral rehydration  · Avoid nephrotoxins, hold losartan  · Monitor with BMP    Lewy body dementia with behavioral disturbance [facility])  Assessment & Plan  Patient with dementia, hallucinating, and agitation for the last year  · Recently started on aricept hs, no longer on seroquel      Frequent UTI  Assessment & Plan  Frequent UTIs treated multiple times with keflex  · UA today negative  · No dysuria, hematuria, urgency, freqeuncy      Benign essential tremor  Assessment & Plan  Continue primodone    Hypertension  Assessment & Plan  Continue metoprolol, hold losartan in setting of SCOT    Combined systolic and diastolic congestive heart failure (HCC)  Assessment & Plan  Wt Readings from Last 3 Encounters:   04/28/22 90 2 kg (198 lb 13 7 oz)   04/11/22 90 7 kg (200 lb)   03/30/22 90 7 kg (200 lb)   Echo 6/22/2021 was technically difficult study with mild LVH and EF of 45-50%  Mild septal dyskinesis noted with otherwise normal wall motion  Left atrium was severely enlarged-diameter 4 5 cm & volume index 43 mL/m²  No significant valvular stenosis noted   Mild MR, mild PI, trace TR noted with estimated RVSP of 31 mmHg      · Daily weights, I/Os  · Fluid, sodium restriction  · Appears euvolemic            DM (diabetes mellitus), type 2 ([facility] [address] )  Assessment & Plan  Lab Results   Component Value Date    HGBA1C 6 9 (H) 08/28/2021       No results for input(s): POCGLU in the last 72 hours  Blood Sugar Average: Last 72 hrs: On metformin 500mg daily, amaryl daily at home  · SSI and accuchecks with meals, bedtime  · Repeat hemoglobin A1c    CAD (coronary artery disease)  Assessment & Plan  History of CABG surgery in 2008 and permanent pacemaker on 10/1/2019  · Continue statin, metoprolol          Persistent atrial fibrillation (HCC)  Assessment & Plan  Continue metoprolol 50mg BID, eliquis    VTE Pharmacologic Prophylaxis: VTE Score: 6 High Risk (Score >/= 5) - Pharmacological DVT Prophylaxis Ordered: apixaban (Eliquis)  Sequential Compression Devices Ordered  Code Status: DNR/DNI  Discussion with family: Updated  (daughter) at bedside  Anticipated Length of Stay: Patient will be admitted on an observation basis with an anticipated length of stay of less than 2 midnights secondary to SCOT, pending placement  Total Time for Visit, including Counseling / Coordination of Care: 45 minutes Greater than 50% of this total time spent on direct patient counseling and coordination of care  Chief Complaint: multiple falls    History of Present Illness:  Baldo Heimlich is a 81 y o  female with a PMH of a fib, DM2, HTN, CAD, lewy body dementia, CHF who presents with frequent falls  Daughter is at home who gives majority of history  Last night patient was hallucinating and walking around the house, resulting in a near fall  Patient tried to sit in a chair but missed and daughter caught patient  The patient denies any LOC, head injury, pain  Daughter states she has had many falls frequently, uses a walker to get around  Patient has been increasing difficult to care for at home  Family has to be with the patient 24/7   Daughter and patient are both in agreement with nursing home placement  They have started the process at [facility] but states it is taking a long time  Patient also has ongoing UTI, has been treated with cephalexin multiple times  Denies fevers, chills, chest pain, SOB, cough, abdominal pain, constipation/diarrhea, dysuria, hematuria, urgency  Review of Systems:  Review of Systems   Constitutional: Negative for chills, fatigue and fever  HENT: Negative  Eyes: Negative  Respiratory: Negative for cough and shortness of breath  Cardiovascular: Negative for chest pain and palpitations  Gastrointestinal: Negative for abdominal pain, blood in stool, constipation, diarrhea, nausea and vomiting  Endocrine: Negative  Genitourinary: Negative for dysuria, hematuria and urgency  Musculoskeletal: Negative  Skin: Negative  Allergic/Immunologic: Negative  Neurological: Negative  Hematological: Negative  Psychiatric/Behavioral: Negative          Past Medical and Surgical History:   Past Medical History:   Diagnosis Date    A-fib [facility])     Benign essential tremor     CAD (coronary artery disease)     Cataract     Chest pain 2020    CHF [facility]congestive heart failure) ([facility])     Chronic fatigue     Chronic pulmonary thromboembolism syndrome ([address] ) 2021    Dementia ([facility])     with hallucinations    Depression     Diabetes mellitus ([facility])     Frequent UTI     Full dentures     wears uppers only    Hypertension     Pacemaker     Sleep apnea     uses oxygen at hs    Urinary incontinence     Uses walker     UTI (urinary tract infection) 2020    Wears glasses        Past Surgical History:   Procedure Laterality Date    CARDIAC SURGERY       SECTION      CORONARY ARTERY BYPASS GRAFT  2006    x3    FOOT SURGERY      hammertoe    INSERT / REPLACE / REMOVE PACEMAKER      IR PICC PLACEMENT SINGLE LUMEN  2019    RI XCAPSL CTRC RMVL INSJ IO LENS PROSTH W/O ECP Right 3/28/2022    Procedure: EXTRACTION EXTRACAPSULAR CATARACT PHACO INTRAOCULAR LENS (IOL); Surgeon: Rafy Molina MD;  [facility] MA[facility] OR;  Service: Ophthalmology    TUBAL LIGATION         Meds/Allergies:  Prior to Admission medications    Medication Sig Start Date End Date Taking? Authorizing Provider   apixaban (Eliquis) 5 mg Take 5 mg by mouth 2 (two) times a day    Yes Historical Provider, MD   docusate sodium (COLACE) 100 mg capsule Take 100 mg by mouth daily at bedtime     Yes Historical Provider, MD   donepezil (ARICEPT) 5 mg tablet Take 5 mg by mouth daily at bedtime   Yes Historical Provider, MD   famotidine (PEPCID) 20 mg tablet Take 20 mg by mouth daily   Yes Historical Provider, MD   fexofenadine (ALLEGRA) 180 MG tablet Take 180 mg by mouth daily   Yes Historical Provider, MD   furosemide (LASIX) 40 mg tablet Take 1 tablet (40 mg total) by mouth 3 (three) times a day 1/10/22  Yes Una Gagnon MD   glimepiride (AMARYL) 4 mg tablet Take 1 tablet (4 mg total) by mouth daily with breakfast 11/10/21  Yes Una Gagnon MD   Glucosamine-Chondroitin--171-082 MG TABS Take 1 tablet by mouth in the morning Move Free    Yes Historical Provider, MD   losartan (COZAAR) 25 mg tablet TAKE 1 TABLET BY MOUTH EVERY DAY  Patient taking differently: 25 mg every morning   2/28/22  Yes Una Gagnon MD   metFORMIN (GLUCOPHAGE) 500 mg tablet Take 500 mg by mouth daily with breakfast     Yes Historical Provider, MD   metoprolol tartrate (LOPRESSOR) 100 mg tablet TAKE 1/2 TABLET (50 MG TOTAL) BY MOUTH EVERY 12 (TWELVE) HOURS 2/28/22  Yes Una Gagnon MD   pravastatin (PRAVACHOL) 40 mg tablet Take 40 mg by mouth every evening 12/26/21  Yes Historical Provider, MD   primidone (MYSOLINE) 50 mg tablet TAKE 1 TABLET BY MOUTH EVERY 12 HOURS   11/30/21  Yes Una Gagnon MD   pyridoxine (B-6) 100 MG tablet Take 100 mg by mouth daily at bedtime     Yes Historical Provider, MD   sertraline (ZOLOFT) 100 mg tablet TAKE 1 TABLET BY MOUTH EVERY DAY 4/13/22  Yes Jaylene Mena MD   camphor-menthol HORTENCIA S  [facility]) lotion Apply topically as needed for itching 8/3/21 4/28/22  Jaylene Mena MD   clobetasol (TEMOVATE) 0 05 % ointment Apply topically 2 (two) times a day Peas sized amount to affected skin 9/7/21 4/28/22  Karen Hdz MD   desloratadine (CLARINEX) 5 MG tablet Take 5 mg by mouth daily 3/23/21 4/28/22  Historical Provider, MD   gatifloxacin (ZYMAXID) 0 5 % Administer 1 drop to the right eye 2 (two) times a day 3/28/22 4/28/22  Mignon Sylvester MD   ketorolac (ACULAR) 0 5 % ophthalmic solution Administer 1 drop to the right eye 4 (four) times a day 3/28/22 4/28/22  Mignon Sylvester MD   methylPREDNISolone 4 MG tablet therapy pack Use as directed on package 3/30/22 4/28/22  Jaylene Mena MD   metroNIDAZOLE (METROCREAM) 0 75 % cream Apply topically 2 (two) times a day To the face 8/5/21 4/28/22  Simin Armstrong MD   prednisoLONE acetate (PRED FORTE) 1 % ophthalmic suspension INSTILL 1 DROP IN THE RIGHT EYE 4 TIMES PER DAY 3/10/22 4/28/22  Historical Provider, MD   QUEtiapine (SEROquel) 25 mg tablet TAKE 1 TABLET BY MOUTH EVERYDAY AT BEDTIME 4/13/22 4/28/22  Jaylene Mena MD   triamcinolone (KENALOG) 0 1 % cream Apply twice a day to areas of itchy skin only  Avoid normal skin, the face and the groin  8/5/21 4/28/22  Simin Armstrong MD     I have reviewed home medications with patient family member  Allergies:    Allergies   Allergen Reactions    Clarithromycin Other (See Comments) and Confusion     confusion       Social History:  Marital Status: Single   Occupation: retired  Patient Pre-hospital Living Situation: Home  Patient Pre-hospital Level of Mobility: walks with walker  Patient Pre-hospital Diet Restrictions: none  Substance Use History:   Social History     Substance and Sexual Activity   Alcohol Use Not Currently     Social History     Tobacco Use   Smoking Status Never Smoker   Smokeless Tobacco Never Used     Social History     Substance and Sexual Activity   Drug Use Never       Family History:  Family History   Problem Relation Age of Onset    Heart disease Father        Physical Exam:     Vitals:   Blood Pressure: 151/83 (04/28/22 2113)  Pulse: 87 (04/28/22 2113)  Temperature: 97 5 °F (36 4 °C) (04/28/22 2113)  Temp Source: Oral (04/28/22 1737)  Respirations: 18 (04/28/22 2113)  Weight - Scale: 90 2 kg (198 lb 13 7 oz) (04/28/22 1737)  SpO2: 100 % (04/28/22 2113)    Physical Exam  Vitals reviewed  Constitutional:       General: She is not in acute distress  Appearance: She is well-developed  HENT:      Head: Normocephalic and atraumatic  Eyes:      Conjunctiva/sclera: Conjunctivae normal    Cardiovascular:      Rate and Rhythm: Normal rate and regular rhythm  Heart sounds: No murmur heard  Pulmonary:      Effort: Pulmonary effort is normal  No respiratory distress  Breath sounds: Normal breath sounds  Abdominal:      Palpations: Abdomen is soft  Tenderness: There is no abdominal tenderness  Skin:     General: Skin is warm and dry  Neurological:      Mental Status: She is alert  Mental status is at baseline     Psychiatric:         Mood and Affect: Mood normal          Behavior: Behavior normal          Additional Data:     Lab Results:  Results from last 7 days   Lab Units 04/28/22  1857   WBC Thousand/uL 8 21   HEMOGLOBIN g/dL 11 1*   HEMATOCRIT % 34 8   PLATELETS Thousands/uL 257   NEUTROS PCT % 62   LYMPHS PCT % 25   MONOS PCT % 9   EOS PCT % 3     Results from last 7 days   Lab Units 04/28/22  1857   SODIUM mmol/L 140   POTASSIUM mmol/L 3 6   CHLORIDE mmol/L 102   CO2 mmol/L 32   BUN mg/dL 23   CREATININE mg/dL 1 30   ANION GAP mmol/L 6   CALCIUM mg/dL 8 8   ALBUMIN g/dL 3 4*   TOTAL BILIRUBIN mg/dL 0 26   ALK PHOS U/L 112   ALT U/L 29   AST U/L 22   GLUCOSE RANDOM mg/dL 100     Results from last 7 days   Lab Units 04/28/22  1857   INR  1 14                   Imaging: Reviewed radiology reports from this admission including: CT head  CT head without contrast   Final Result by Aida Andre DO (04/28 1931)      No acute intracranial abnormality  Microangiopathic changes  Workstation performed: [id number]         XR chest 1 view portable    (Results Pending)       EKG and Other Studies Reviewed on Admission:       ** Please Note: This note has been constructed using a voice recognition system   **

## 2022-04-29 NOTE — CASE MANAGEMENT
Case Management Progress Note    Patient name Primo Pepe  Location [address] [facility] [address] MRN [id number]  : 1935 Date 2022       LOS (days): 0  Geometric Mean LOS (GMLOS) (days):   Days to GMLOS:        OBJECTIVE:        Current admission status: Observation  [facility]erred Pharmacy:   [facility] [address] [place] [address], [address] [place] [address]  Phone: [phone] Fax: [address] [place] [address],[address]  Phone: [phone] Fax: [phone]    Primary Care Provider: Uyen Junior MD    Primary Insurance: MEDICARE  [facility]    PROGRESS NOTE:    [facility], PT/OT notes send once completed to facility   All other clinicals sent to [facility] [phone]

## 2022-04-29 NOTE — ASSESSMENT & PLAN NOTE
Patient with dementia, hallucinating, and agitation for the last year  · Recently started on aricept hs, no longer on seroquel

## 2022-04-29 NOTE — ASSESSMENT & PLAN NOTE
History of CABG surgery in 2008 and permanent pacemaker on 10/1/2019    · Continue statin, metoprolol

## 2022-04-29 NOTE — CASE MANAGEMENT
Case Management Progress Note    Patient name Ana Lacy  Location [address] [facility] MRN [id number]  : 1935 Date 2022       LOS (days): 0  Geometric Mean LOS (GMLOS) (days):   Days to GMLOS:        OBJECTIVE:        Current admission status: Observation  [facility]acy[facility]   [facility] [address] [place] [address], [address] [place] [address]  Phone: [phone] Fax: [address] [place] [address],[place] [address], [place] [address] [place] [address] [address]  [phone] [facility]  Phone: [phone] Fax: [phone]    Primary Care Provider: Skyler Rodriguez MD    Primary Insurance: MEDICARE  [facility]    PROGRESS NOTE:    Family, RN, Facility, Provider aware of transport time of  via ambucab to [facility]

## 2022-04-29 NOTE — ASSESSMENT & PLAN NOTE
Wt Readings from Last 3 Encounters:   04/28/22 90 2 kg (198 lb 13 7 oz)   04/11/22 90 7 kg (200 lb)   03/30/22 90 7 kg (200 lb)   Echo 6/22/2021 was technically difficult study with mild LVH and EF of 45-50%  Mild septal dyskinesis noted with otherwise normal wall motion  Left atrium was severely enlarged-diameter 4 5 cm & volume index 43 mL/m²  No significant valvular stenosis noted   Mild MR, mild PI, trace TR noted with estimated RVSP of 31 mmHg      · Daily weights, I/Os  · Fluid, sodium restriction  · Appears euvolemic

## 2022-04-29 NOTE — CASE MANAGEMENT
Case Management Progress Note    Patient name Jonas Polk  Location [address] MRN [id number]  : 1935 Date 2022       LOS (days): 0  Geometric Mean LOS (GMLOS) (days):   Days to GMLOS:        OBJECTIVE:        Current admission status: Observation  Preferred Pharmacy[facility]   [facility] [address] [place] [address], [address] [place] [address]  Phone: [phone] Fax: [address] [place] [address],[facility], [address] [place] [address]  Phone: [phone] Fax: [phone]    Primary Care Provider: Sky Rosado MD    Primary Insurance: MEDICARE  Secondary Insurance: [facility]    PROGRESS NOTE:    Family updated on accepting facility of [facility], all notes faxed and avs faxed to facility  Transportation requested

## 2022-04-29 NOTE — [place] UNIVERSAL TRANSFER FORM
[place] UNIVERSAL TRANSFER FORM  (ALL ITEMS MUST BE COMPLETED)    1  TRANSFER FROM: [address] [facility]      TRANSFER TO: [facility]    2  DATE OF TRANSFER: 4/29/2022                        TIME OF TRANSFER: 1530    3  PATIENT NAME: EL Iverson      YOB: 1935                             GENDER: female    4  LANGUAGE:   English    5  PHYSICIAN NAME:  Prakash Still MD                   PHONE: [phone]    6  CODE STATUS: Level 3 - DNAR and DNI        Out of Hospital DNR Attached: No    7  :                                      :  Extended Emergency Contact Information  Primary Emergency Contact: Jen Cabezas 62 Phone: [phone]  Relation: Daughter  Secondary Emergency Contact: Christin Quinteros  [employer] Phone: [phone]  Relation: [address] Representative/Proxy:  Yes           Legal Guardian:  No             NAME OF:           HEALTH CARE REPRESENTATIVE/PROXY:                                         OR           LEGAL GUARDIAN, IF NOT :                                               PHONE:  (Day)           (Night)                        (Cell)    8  REASON FOR TRANSFER: (Must include brief medical history and recent changes in physical function or cognition ) ambulatory dysfunction            V/S: /79   Pulse 78   Temp 97 6 °F (36 4 °C)   Resp 18   Ht 4' 11" (1 499 m)   Wt 89 1 kg (196 lb 6 9 oz)   SpO2 100%   BMI 39 67 kg/m²           PAIN: None    9  PRIMARY DIAGNOSIS: Ambulatory dysfunction      Secondary Diagnosis:         Pacemaker: Yes      Internal Defib: No          Mental Health Diagnosis (if Applicable):    10  RESTRAINTS: No     11  RESPIRATORY NEEDS: None    12  ISOLATION/PRECAUTION: None    13  ALLERGY: Clarithromycin    14  SENSORY:       Vision Glasses, Hearing Good  and Speech Clear    15  SKIN CONDITION: Yes:  Pressure    16   DIET: Special (describe) diabetic; 1800 ml fluid restriction    17  IV ACCESS: None    18  PERSONAL ITEMS SENT WITH PATIENT: Glasses and Other clothes    19  ATTACHED DOCUMENTS: MUST ATTACH CURRENT MEDICATION INFORMATION Face Sheet, MAR and Discharge Summary    20  AT RISK ALERTS:Falls        HARM TO: N/A    21  WEIGHT BEARING STATUS:         Left Leg: Full        Right Leg: Full    22  MENTAL STATUS:Forgetful    23  FUNCTION:        Walk: With Help        Transfer: With Help        Toilet: With Help        Feed: Self    24  IMMUNIZATIONS/SCREENING:     Immunization History   Administered Date(s) Administered    COVID-19 MODERNA VACC 0 5 ML IM 02/04/2021, 02/04/2021, 03/02/2021, 03/02/2021    Influenza, seasonal, injectable [birth year]    Zoster 12/06/2013       25  BOWEL: Date Last BM t-1    26  BLADDER: Incontinent    27   SENDING FACILITY CONTACT: Fernanda Almanzar                  Title: RN        Unit: 2S        Phone: [phone] [address] (if known):        Title:        Unit:         Phone:         FORM PREFILLED BY (if applicable)       Title:       Unit:        Phone:         FORM COMPLETED BY Fernanda Almanzar RN      Title:       Phone: [phone]

## 2022-04-29 NOTE — PHYSICAL THERAPY NOTE
PT EVALUATION       04/29/22 1150   PT Last Visit   PT Visit Date 04/29/22   Note Type   Note type Evaluation   Pain Assessment   Pain Assessment Tool Interiano-Baker FACES   Interiano-Baker FACES Pain Rating 0   Restrictions/Precautions   Weight Bearing Precautions Per Order No   Other Precautions Cognitive; Chair Alarm; Bed Alarm; Fall Risk   Home Living   Type of [place] Two level; Able to live on main level with bedroom/bathroom  (2 MOSES)   Home Equipment Walker   Prior Function   Lives With Daughter  (granddaughter)   Receives Help From Family   ADL Assistance Needs assistance   IADLs Needs assistance   Comments Pt is a poor historian, most infor obtained from prior documentation   General   Additional Pertinent History Admitted under observation due to ambulatory dysfunction   Family/Caregiver Present No   Cognition   Overall Cognitive Status Impaired   Arousal/Participation Cooperative   Orientation Level Oriented to person   Following Commands Follows one step commands with increased time or repetition   Subjective   Subjective "Why is your hair gray?"  (Pt thought PT was her daughter)   "You look like my daughter Pao Bob"   RLE Assessment   RLE Assessment WFL  (strength: hip 3-/5, knee ankle: grossly 3+/5)   LLE Assessment   LLE Assessment WFL  (at least 3+/5)   Bed Mobility   Supine to Sit 2  Maximal assistance   Additional items Assist x 1;Verbal cues;LE management   Additional Comments to chair   Transfers   Sit to Stand 3  Moderate assistance   Additional items Assist x 1;Verbal cues; Increased time required   Stand to Sit 3  Moderate assistance   Additional items Assist x 1;Verbal cues   Ambulation/Elevation   Gait pattern Short stride   Gait Assistance 3  Moderate assist   Additional items Assist x 1;Verbal cues   Assistive Device Rolling walker   Distance 3 feet   Balance   Static Sitting Fair   Dynamic Sitting Fair -   Static Standing Poor +  (with RW)   Dynamic Standing Poor +  (with RW) Ambulatory Poor +  (with RW)   Activity Tolerance   Activity Tolerance Patient limited by fatigue  (limited by cognition)   Nurse Made Aware yes: Yesi   Assessment   Prognosis Good   Problem List Decreased strength;Decreased endurance; Impaired balance;Decreased mobility; Decreased cognition; Impaired judgement;Decreased safety awareness   Assessment Patient seen for Physical Therapy evaluation  Patient admitted with Ambulatory dysfunction  Comorbidities affecting patient's physical performance include: DM, afib, HTN, CAD, lewy body dementia, CHF   Personal factors affecting patient at time of initial evaluation include: lives in two story house, ambulating with assistive device, stairs to enter home, inability to ambulate household distances, inability to navigate community distances, inability to navigate level surfaces without external assistance, decreased cognition, positive fall history, limited insight into impairments, inability to perform ADLS and inability to perform IADLS   Prior to admission, patient was independent with functional mobility with rolling walker, requiring assist for ADLS, requiring assist for IADLS, living with daughter and grand daughter in a two level home with 3 steps to enter, ambulating household distance, retired and lives in a multilevel house but has 1st floor setup  Please find objective findings from Physical Therapy assessment regarding body systems outlined above with impairments and limitations including weakness, impaired balance, decreased endurance, impaired coordination, gait deviations, decreased activity tolerance, decreased functional mobility tolerance, decreased safety awareness, impaired judgement, fall risk and decreased cognition  The Barthel Index was used as a functional outcome tool presenting with a score of Barthel Index Score: 30 today indicating marked limitations of functional mobility and ADLS    Patient's clinical presentation is currently unstable/unpredictable as seen in patient's presentation of varying levels of cognitive performance, increased fall risk, new onset of impairment of functional mobility, decreased endurance and new onset of weakness  Pt would benefit from continued Physical Therapy treatment to address deficits as defined above and maximize level of functional mobility  As demonstrated by objective findings, the assigned level of complexity for this evaluation is high  The patient's -[facility] Basic Mobility Inpatient Short Form Raw Score is 12  A Raw score of less than or equal to 16 suggests the patient may benefit from discharge to post-acute rehabilitation services  Please also refer to the recommendation of the Physical Therapist for safe discharge planning  Goals   Patient Goals to go home   STG Expiration Date 05/06/22   Short Term Goal #1 Indep transfers supine <> short sit and sit <> stand with RW and fair + balance   Short Term Goal #2 Min A/ Supervision for amb  with RW for 40 feet with fair + balance   LTG Expiration Date 05/13/22   Long Term Goal #1 Indep amb with RW for functional household distances with good balance   Long Term Goal #2 Min A to navigate 3 stairs to allow pt to enter/exit her home  Plan   Treatment/Interventions ADL retraining;Functional transfer training;LE strengthening/ROM; Elevations; Therapeutic exercise; Endurance training;Patient/family training;Equipment eval/education; Bed mobility;Gait training;Spoke to nursing;Spoke to case management;OT   PT Frequency Other (Comment)  (5/wk)   Recommendation   PT Discharge Recommendation Post acute rehabilitation services   -[facility] Basic Mobility Inpatient   Turning in Bed Without Bedrails 2   Lying on Back to Sitting on Edge of Flat Bed 2   Moving Bed to Chair 2   Standing Up From Chair 3   Walk in Room 2   Climb 3-5 Stairs 1   Basic Mobility Inpatient Raw Score 12   Basic Mobility Standardized Score 32 23   Highest Level Of Mobility   [facility] Goal 4: Move to chair/commode   -[facility] Highest Level of Mobility 4: Move to chair/commode   -[facility] Goal Achieved Yes   Barthel Index   Feeding 5   Bathing 0   Grooming Score 0   Dressing Score 5   Bladder Score 0   Bowels Score 10   Toilet Use Score 5   Transfers (Bed/Chair) Score 5   Mobility (Level Surface) Score 0   Stairs Score 0   Barthel Index Score 30   End of Consult   Patient Position at End of Consult Bedside chair;Bed/Chair alarm activated; All needs within reach   Licensure   [address] [facility]  Shlomo Lazar Rubina PT  [id number]

## 2022-04-29 NOTE — ASSESSMENT & PLAN NOTE
Creatinine 1 3, baseline 0 8-1 0  · Suspect due to dehydration  · Resolved with holding Lasix and gentle IV hydration    · Resume Lasix and losartan upon discharge with follow-up BMP

## 2022-04-29 NOTE — DISCHARGE SUMMARY
Gray 46  Discharge- Aravind Tejada 1935, 81 y o  female MRN: [id number]  Unit/Bed#: [address] [place] [address] Encounter: [id number]  Primary Care Provider: Rosemarie Laird MD   Date and time admitted to hospital: 4/28/2022  5:25 PM    * Ambulatory dysfunction  Assessment & Plan  Patient brought in by daughter due to frequent falls, dementia, unable to care for at home  · Slid to ground last night trying to sit in chair  · CT head shows No acute intracranial abnormality  Microangiopathic changes  · PT/OT eval  · Patient to be discharged to short-term rehabilitation at [facility] as per patient request    SCOT (acute kidney injury) [facility])  Assessment & Plan  Creatinine 1 3, baseline 0 8-1 0  · Suspect due to dehydration  · Resolved with holding Lasix and gentle IV hydration  · Resume Lasix and losartan upon discharge with follow-up BMP    Lewy body dementia with behavioral disturbance [facility])  Assessment & Plan  Patient with dementia, hallucinating, and agitation for the last year  · Recently started on aricept hs, no longer on seroquel      Benign essential tremor  Assessment & Plan  Continue primodone    Frequent UTI  Assessment & Plan  Frequent UTIs treated multiple times with keflex  · UA today negative  · No dysuria, hematuria, urgency, freqeuncy      Hypertension  Assessment & Plan  Continue metoprolol,  Resume losartan upon discharge    Combined systolic and diastolic congestive heart failure [facility])  Assessment & Plan  Wt Readings from Last 3 Encounters:   04/29/22 89 1 kg (196 lb 6 9 oz)   04/11/22 90 7 kg (200 lb)   03/30/22 90 7 kg (200 lb)   Echo 6/22/2021 was technically difficult study with mild LVH and EF of 45-50%  Mild septal dyskinesis noted with otherwise normal wall motion  Left atrium was severely enlarged-diameter 4 5 cm & volume index 43 mL/m²  No significant valvular stenosis noted   Mild MR, mild PI, trace TR noted with estimated RVSP of 31 mmHg      · Resume Lasix and losartan upon discharge p o  Which have been on hold in the setting of acute kidney injury            DM (diabetes mellitus), type 2 [facility])  Assessment & Plan  Lab Results   Component Value Date    HGBA1C 6 8 (H) 04/29/2022       Recent Labs     04/28/22  2120 04/29/22  0742 04/29/22  1121   POCGLU 78 61* 140       Blood Sugar Average: Last 72 hrs:  (P) 93On metformin 500mg daily, amaryl daily at home which can be continued  · Patient was on Humalog sliding scale    CAD (coronary artery disease)  Assessment & Plan  History of CABG surgery in 2008 and permanent pacemaker on 10/1/2019  · Continue statin, metoprolol          Persistent atrial fibrillation (HCC)  Assessment & Plan  Continue metoprolol 50mg BID, eliquis      Medical Problems             Resolved Problems  Date Reviewed: 4/29/2022    None              Discharging Physician / Practitioner: Nakia Rutherford MD  PCP: Sydnie Frias MD  Admission Date:   Admission Orders (From admission, onward)     Ordered        04/28/22 1944  Place in Observation  Once                      Discharge Date: 04/29/22       Outpatient Tests Requested:  · Follow-up with PCP    Complications:  None    Reason for Admission:  Difficulty walking and dementia    Hospital Course:   Amelia Nichols is a 81 y o  female patient with past medical history of atrial fibrillation, diabetes, hypertension, CAD, dementia, CHF who originally presented to the hospital on 4/28/2022 due to frequent falls  Patient has history of dementia and has been having frequent falls recently  In the ED patient's workup have all been negative  Patient is admitted hospital for placement  Patient was seen by physical therapy who is also recommending rehabilitation  Patient to be discharged 1 human nursing home for continued PT/OT        Please see above list of diagnoses and related plan for additional information       Condition at Discharge: stable    Discharge Day Visit / Exam:   Subjective:  Patient denies any chest pain, abdominal pain, nausea, vomiting, shortness of breath  Vitals: Blood Pressure: 145/79 (04/29/22 0713)  Pulse: 78 (04/29/22 0713)  Temperature: 97 6 °F (36 4 °C) (04/29/22 0713)  Temp Source: Oral (04/28/22 2113)  Respirations: 18 (04/29/22 0713)  Height: 4' 11" (149 9 cm) (04/28/22 2113)  Weight - Scale: 89 1 kg (196 lb 6 9 oz) (04/29/22 0605)  SpO2: 100 % (04/29/22 0713)  Exam:   Physical Exam  Constitutional:       Appearance: Normal appearance  HENT:      Head: Normocephalic and atraumatic  Nose: Nose normal       Mouth/Throat:      Mouth: Mucous membranes are moist       Pharynx: Oropharynx is clear  Eyes:      Extraocular Movements: Extraocular movements intact  Pupils: Pupils are equal, round, and reactive to light  Cardiovascular:      Rate and Rhythm: Normal rate  Rhythm irregular  Pulmonary:      Effort: Pulmonary effort is normal       Breath sounds: Normal breath sounds  Abdominal:      General: Bowel sounds are normal  There is no distension  Palpations: Abdomen is soft  Tenderness: There is no abdominal tenderness  Musculoskeletal:         General: No swelling  Cervical back: Normal range of motion and neck supple  Skin:     General: Skin is warm and dry  Neurological:      General: No focal deficit present  Mental Status: She is alert  Discussion with Family: yes    Discharge instructions/Information to patient and family:   See after visit summary for information provided to patient and family  Provisions for Follow-Up Care:  See after visit summary for information related to follow-up care and any pertinent home health orders  Disposition:   Other [facility] Readmission: No     Discharge Statement:  I spent 25 minutes discharging the patient  This time was spent on the day of discharge  I had direct contact with the patient on the day of discharge   Greater than 50% of the total time was spent examining patient, answering all patient questions, arranging and discussing plan of care with patient as well as directly providing post-discharge instructions  Additional time then spent on discharge activities  Discharge Medications:  See after visit summary for reconciled discharge medications provided to patient and/or family        **Please Note: This note may have been constructed using a voice recognition system**

## 2022-04-29 NOTE — PLAN OF CARE
Problem: Potential for Falls  Goal: Patient will remain free of falls  Description: INTERVENTIONS:  - Educate patient/family on patient safety including physical limitations  - Instruct patient to call for assistance with activity   - Consult OT/PT to assist with strengthening/mobility   - Keep Call bell within reach  - Keep bed low and locked with side rails adjusted as appropriate  - Keep care items and personal belongings within reach  - Initiate and maintain comfort rounds  - Make Fall Risk Sign visible to staff  - Offer Toileting every 2 Hours, in advance of need  - Initiate/Maintain bed/chair alarm  - Apply yellow socks and bracelet for high fall risk patients  - Consider moving patient to room near nurses station  Outcome: Progressing     Problem: Prexisting or High Potential for Compromised Skin Integrity  Goal: Skin integrity is maintained or improved  Description: INTERVENTIONS:  - Identify patients at risk for skin breakdown  - Assess and monitor skin integrity  - Assess and monitor nutrition and hydration status  - Monitor labs   - Assess for incontinence   - Turn and reposition patient  - Assist with mobility/ambulation  - Relieve pressure over bony prominences  - Avoid friction and shearing  - Provide appropriate hygiene as needed including keeping skin clean and dry  - Evaluate need for skin moisturizer/barrier cream  - Collaborate with interdisciplinary team   - Patient/family teaching  - Consider wound care consult   Outcome: Progressing     Problem: MOBILITY - ADULT  Goal: Maintain or return to baseline ADL function  Description: INTERVENTIONS:  -  Assess patient's ability to carry out ADLs; assess patient's baseline for ADL function and identify physical deficits which impact ability to perform ADLs (bathing, care of mouth/teeth, toileting, grooming, dressing, etc )  - Assess/evaluate cause of self-care deficits   - Assess range of motion  - Assess patient's mobility; develop plan if impaired  - Assess patient's need for assistive devices and provide as appropriate  - Encourage maximum independence but intervene and supervise when necessary  - Involve family in performance of ADLs  - Assess for home care needs following discharge   - Consider OT consult to assist with ADL evaluation and planning for discharge  - Provide patient education as appropriate  Outcome: Progressing  Goal: Maintains/Returns to pre admission functional level  Description: INTERVENTIONS:  - Perform BMAT or MOVE assessment daily    - Set and communicate daily mobility goal to care team and patient/family/caregiver  - Collaborate with rehabilitation services on mobility goals if consulted  - Perform Range of Motion 3 times a day  - Reposition patient every 2 hours    - Dangle patient 3 times a day  - Ambulate patient 3 times a day  - Out of bed to chair 3 times a day   - Out of bed for meals 3 times a day  - Out of bed for toileting  - Record patient progress and toleration of activity level   Outcome: Progressing     Problem: INFECTION - ADULT  Goal: Absence or prevention of progression during hospitalization  Description: INTERVENTIONS:  - Assess and monitor for signs and symptoms of infection  - Monitor lab/diagnostic results  - Monitor all insertion sites, i e  indwelling lines, tubes, and drains  - Monitor nasal secretions for changes in amount and color  - [place] appropriate cooling/warming therapies per order  - Administer medications as ordered  - Instruct and encourage patient and family to use good hand hygiene technique  - Identify and instruct in appropriate isolation precautions for identified infection/condition  Outcome: Progressing     Problem: DISCHARGE PLANNING  Goal: Discharge to home or other facility with appropriate resources  Description: INTERVENTIONS:  - Identify barriers to discharge w/patient and caregiver  - Arrange for needed discharge resources and transportation as appropriate  - Identify discharge learning needs (meds, wound care, etc )  - Refer to Case Management Department for coordinating discharge planning if the patient needs post-hospital services based on physician/advanced practitioner order or complex needs related to functional status, cognitive ability, or social support system  Outcome: Progressing

## 2022-05-01 LAB
ATRIAL RATE: 258 BPM
ATRIAL RATE: 71 BPM
ATRIAL RATE: 78 BPM
BACTERIA UR CULT: ABNORMAL
QRS AXIS: 83 DEGREES
QRS AXIS: 87 DEGREES
QRS AXIS: 88 DEGREES
QRSD INTERVAL: 104 MS
QRSD INTERVAL: 88 MS
QRSD INTERVAL: 90 MS
QT INTERVAL: 380 MS
QT INTERVAL: 394 MS
QT INTERVAL: 402 MS
QTC INTERVAL: 459 MS
QTC INTERVAL: 479 MS
QTC INTERVAL: 497 MS
T WAVE AXIS: -36 DEGREES
T WAVE AXIS: 180 DEGREES
T WAVE AXIS: 253 DEGREES
VENTRICULAR RATE: 88 BPM
VENTRICULAR RATE: 89 BPM
VENTRICULAR RATE: 92 BPM

## 2022-05-01 PROCEDURE — 93010 ELECTROCARDIOGRAM REPORT: CPT | Performed by: INTERNAL MEDICINE

## 2022-05-07 DIAGNOSIS — F32.9 MAJOR DEPRESSIVE DISORDER WITH CURRENT ACTIVE EPISODE, UNSPECIFIED DEPRESSION EPISODE SEVERITY, UNSPECIFIED WHETHER RECURRENT: ICD-10-CM

## 2022-05-07 DIAGNOSIS — E11.69 TYPE 2 DIABETES MELLITUS WITH OTHER SPECIFIED COMPLICATION, WITHOUT LONG-TERM CURRENT USE OF INSULIN (HCC): ICD-10-CM

## 2022-05-09 RX ORDER — GLIMEPIRIDE 4 MG/1
TABLET ORAL
Qty: 90 TABLET | Refills: 1 | Status: SHIPPED | OUTPATIENT
Start: 2022-05-09

## 2022-05-09 RX ORDER — SERTRALINE HYDROCHLORIDE 100 MG/1
TABLET, FILM COATED ORAL
Qty: 30 TABLET | Refills: 0 | Status: SHIPPED | OUTPATIENT
Start: 2022-05-09 | End: 2022-05-24

## 2022-05-23 DIAGNOSIS — F32.9 MAJOR DEPRESSIVE DISORDER WITH CURRENT ACTIVE EPISODE, UNSPECIFIED DEPRESSION EPISODE SEVERITY, UNSPECIFIED WHETHER RECURRENT: ICD-10-CM

## 2022-05-24 RX ORDER — SERTRALINE HYDROCHLORIDE 100 MG/1
TABLET, FILM COATED ORAL
Qty: 90 TABLET | Refills: 1 | Status: SHIPPED | OUTPATIENT
Start: 2022-05-24

## 2022-06-14 RX ORDER — MAGNESIUM HYDROXIDE 2400 MG/30ML
SUSPENSION ORAL DAILY PRN
COMMUNITY

## 2022-06-14 RX ORDER — DIPHENHYDRAMINE HCL 25 MG
1 CAPSULE ORAL
COMMUNITY

## 2022-06-14 NOTE — PRE-PROCEDURE INSTRUCTIONS
Pre-Surgery Instructions:   Medication Instructions    apixaban (ELIQUIS) 5 mg Hold day of surgery   Bisacodyl (DULCOLAX RE) Hold day of surgery   dextran 70-hypromellose (Artificial Tears) 0 1-0 3 % ophthalmic solution Hold day of surgery   docusate sodium (COLACE) 100 mg capsule Hold day of surgery   donepezil (ARICEPT) 5 mg tablet Take night before surgery    famotidine (PEPCID) 20 mg tablet Take night before surgery    fexofenadine (ALLEGRA) 180 MG tablet Hold day of surgery   furosemide (LASIX) 40 mg tablet Hold day of surgery   glimepiride (AMARYL) 4 mg tablet Hold day of surgery   Glucosamine-Chondroitin--081-004 MG TABS Hold day of surgery   losartan (COZAAR) 25 mg tablet Take night before surgery    magnesium hydroxide (Milk of Magnesia) 400 mg/5 mL oral suspension Hold day of surgery   metFORMIN (GLUCOPHAGE) 500 mg tablet Hold day of surgery   metoprolol tartrate (LOPRESSOR) 100 mg tablet Take day of surgery   miconazole (Antifungal) 2 % cream Hold day of surgery   miconazole (MONISTAT 7) 100 mg vaginal suppository Hold day of surgery   pravastatin (PRAVACHOL) 40 mg tablet Hold day of surgery   primidone (MYSOLINE) 50 mg tablet Take day of surgery   pyridoxine (B-6) 100 MG tablet Hold day of surgery   sertraline (ZOLOFT) 100 mg tablet Take day of surgery  Pre op instructions reviewed and faxed to facility  Instructed to take metoprolol, sertraline and primidone a m of procedure   from facility is daughter [place] [phone]  My Surgical Experience    The following information was developed to assist you to prepare for your operation  What do I need to do before coming to the hospital?   Arrange for a responsible person to drive you to and from the hospital    Arrange care for your children at home  Children are not allowed in the recovery areas of the hospital   Plan to wear clothing that is easy to put on and take off   If you are having shoulder surgery, wear a shirt that buttons or zippers in the front  Bathing  o Shower the evening before and the morning of your surgery with an antibacterial soap  Please refer to the Pre Op Showering Instructions for Surgery Patients Sheet   o Remove nail polish and all body piercing jewelry  o Do not shave any body part for at least 24 hours before surgery-this includes face, arms, legs and upper body  Food  o Nothing to eat or drink after midnight the night before your surgery  This includes candy and chewing gum  o Exception: If your surgery is after 12:00pm (noon), you may have clear liquids such as 7-Up®, ginger ale, apple or cranberry juice, Jell-O®, water, or clear broth until 8:00 am  o Do not drink milk or juice with pulp on the morning before surgery  o Do not drink alcohol 24 hours before surgery  Medicine  o Follow instructions you received from your surgeon about which medicines you may take on the day of surgery  o If instructed to take medicine on the morning of surgery, take pills with just a small sip of water  Call your prescribing doctor for specific information on what to do if you take insulin    What should I bring to the hospital?    Bring:  Larry Newer or a walker, if you have them, for foot or knee surgery   A list of the daily medicines, vitamins, minerals, herbals and nutritional supplements you take  Include the dosages of medicines and the time you take them each day   Glasses, dentures or hearing aids   Minimal clothing; you will be wearing hospital sleepwear   Photo ID; required to verify your identity   If you have a Living Will or Power of , bring a copy of the documents   If you have an ostomy, bring an extra pouch and any supplies you use    Do not bring   Medicines or inhalers   Money, valuables or jewelry    What other information should I know about the day of surgery?    Notify your surgeons if you develop a cold, sore throat, cough, fever, rash or any other illness   Report to the Ambulatory Surgical/Same Day Surgery Unit   You will be instructed to stop at Registration only if you have not been pre-registered   Inform your  fi they do not stay that they will be asked by the staff to leave a phone number where they can be reached   Be available to be reached before surgery  In the event the operating room schedule changes, you may be asked to come in earlier or later than expected    *It is important to tell your doctor and others involved in your health care if you are taking or have been taking any non-prescription drugs, vitamins, minerals, herbals or other nutritional supplements   Any of these may interact with some food or medicines and cause a reaction

## 2022-06-20 ENCOUNTER — ANESTHESIA EVENT (OUTPATIENT)
Dept: PERIOP | Facility: AMBULARY SURGERY CENTER | Age: 87
End: 2022-06-20
Payer: MEDICARE

## 2022-06-20 ENCOUNTER — ANESTHESIA (OUTPATIENT)
Dept: PERIOP | Facility: AMBULARY SURGERY CENTER | Age: 87
End: 2022-06-20
Payer: MEDICARE

## 2022-06-20 ENCOUNTER — HOSPITAL ENCOUNTER (OUTPATIENT)
Facility: AMBULARY SURGERY CENTER | Age: 87
Setting detail: OUTPATIENT SURGERY
Discharge: HOME | End: 2022-06-20
Attending: OPHTHALMOLOGY | Admitting: OPHTHALMOLOGY
Payer: MEDICARE

## 2022-06-20 VITALS
TEMPERATURE: 96.1 F | OXYGEN SATURATION: 100 % | HEART RATE: 58 BPM | RESPIRATION RATE: 20 BRPM | HEIGHT: 59 IN | DIASTOLIC BLOOD PRESSURE: 73 MMHG | BODY MASS INDEX: 38.51 KG/M2 | SYSTOLIC BLOOD PRESSURE: 152 MMHG | WEIGHT: 191 LBS

## 2022-06-20 DIAGNOSIS — H25.042 POSTERIOR SUBCAPSULAR POLAR AGE-RELATED CATARACT OF LEFT EYE: Primary | ICD-10-CM

## 2022-06-20 PROCEDURE — V2632 POST CHMBR INTRAOCULAR LENS: HCPCS | Performed by: OPHTHALMOLOGY

## 2022-06-20 DEVICE — ACRYSOF(R) IQ ASPHERIC NATURAL IOL, SINGLE-PIECE ACRYLIC FOLDABLE PCL, UV WITH BLUE LIGHTFILTER, 13.0MM LENGTH, 6.0MM ANTERIORASYMMETRIC BICONVEX OPTIC, PLANAR HAPTICS.
Type: IMPLANTABLE DEVICE | Status: FUNCTIONAL
Brand: ACRYSOF®

## 2022-06-20 RX ORDER — PHENYLEPHRINE HCL 2.5 %
1 DROPS OPHTHALMIC (EYE)
Status: ACTIVE | OUTPATIENT
Start: 2022-06-20 | End: 2022-06-20

## 2022-06-20 RX ORDER — TETRACAINE HYDROCHLORIDE 5 MG/ML
1 SOLUTION OPHTHALMIC ONCE
Status: COMPLETED | OUTPATIENT
Start: 2022-06-20 | End: 2022-06-20

## 2022-06-20 RX ORDER — TOBRAMYCIN 3 MG/ML
SOLUTION/ DROPS OPHTHALMIC AS NEEDED
Status: DISCONTINUED | OUTPATIENT
Start: 2022-06-20 | End: 2022-06-20 | Stop reason: HOSPADM

## 2022-06-20 RX ORDER — ONDANSETRON 2 MG/ML
4 INJECTION INTRAMUSCULAR; INTRAVENOUS ONCE AS NEEDED
Status: DISCONTINUED | OUTPATIENT
Start: 2022-06-20 | End: 2022-06-20 | Stop reason: HOSPADM

## 2022-06-20 RX ORDER — TETRACAINE HYDROCHLORIDE 5 MG/ML
SOLUTION OPHTHALMIC AS NEEDED
Status: DISCONTINUED | OUTPATIENT
Start: 2022-06-20 | End: 2022-06-20 | Stop reason: HOSPADM

## 2022-06-20 RX ORDER — KETOROLAC TROMETHAMINE 5 MG/ML
1 SOLUTION OPHTHALMIC
Status: ACTIVE | OUTPATIENT
Start: 2022-06-20 | End: 2022-06-20

## 2022-06-20 RX ORDER — BALANCED SALT SOLUTION 6.4; .75; .48; .3; 3.9; 1.7 MG/ML; MG/ML; MG/ML; MG/ML; MG/ML; MG/ML
SOLUTION OPHTHALMIC AS NEEDED
Status: DISCONTINUED | OUTPATIENT
Start: 2022-06-20 | End: 2022-06-20 | Stop reason: HOSPADM

## 2022-06-20 RX ORDER — LIDOCAINE HYDROCHLORIDE 10 MG/ML
INJECTION, SOLUTION EPIDURAL; INFILTRATION; INTRACAUDAL; PERINEURAL AS NEEDED
Status: DISCONTINUED | OUTPATIENT
Start: 2022-06-20 | End: 2022-06-20

## 2022-06-20 RX ORDER — LIDOCAINE HYDROCHLORIDE 20 MG/ML
1 JELLY TOPICAL
Status: DISPENSED | OUTPATIENT
Start: 2022-06-20 | End: 2022-06-20

## 2022-06-20 RX ORDER — CYCLOPENTOLATE HYDROCHLORIDE 10 MG/ML
1 SOLUTION/ DROPS OPHTHALMIC
Status: ACTIVE | OUTPATIENT
Start: 2022-06-20 | End: 2022-06-20

## 2022-06-20 RX ORDER — KETOROLAC TROMETHAMINE 5 MG/ML
1 SOLUTION OPHTHALMIC 4 TIMES DAILY
Qty: 5 ML | Refills: 0
Start: 2022-06-20

## 2022-06-20 RX ORDER — TOBRAMYCIN 3 MG/ML
1 SOLUTION/ DROPS OPHTHALMIC
Qty: 7.5 ML | Refills: 0
Start: 2022-06-20

## 2022-06-20 RX ADMIN — KETOROLAC TROMETHAMINE 1 DROP: 5 SOLUTION OPHTHALMIC at 07:22

## 2022-06-20 RX ADMIN — LIDOCAINE HYDROCHLORIDE 0.5 MG: 10 INJECTION, SOLUTION EPIDURAL; INFILTRATION; INTRACAUDAL; PERINEURAL at 07:33

## 2022-06-20 RX ADMIN — LIDOCAINE HYDROCHLORIDE 1 APPLICATION: 20 JELLY TOPICAL at 07:08

## 2022-06-20 RX ADMIN — PHENYLEPHRINE HYDROCHLORIDE 1 DROP: 25 SOLUTION/ DROPS OPHTHALMIC at 07:22

## 2022-06-20 RX ADMIN — TETRACAINE HYDROCHLORIDE 1 DROP: 5 SOLUTION OPHTHALMIC at 07:07

## 2022-06-20 RX ADMIN — CYCLOPENTOLATE HYDROCHLORIDE 1 DROP: 10 SOLUTION OPHTHALMIC at 07:22

## 2022-06-20 RX ADMIN — CYCLOPENTOLATE HYDROCHLORIDE 1 DROP: 10 SOLUTION OPHTHALMIC at 07:07

## 2022-06-20 RX ADMIN — LIDOCAINE HYDROCHLORIDE 1 APPLICATION: 20 JELLY TOPICAL at 07:22

## 2022-06-20 RX ADMIN — KETOROLAC TROMETHAMINE 1 DROP: 5 SOLUTION OPHTHALMIC at 07:07

## 2022-06-20 RX ADMIN — LIDOCAINE HYDROCHLORIDE 0.5 MG: 10 INJECTION, SOLUTION EPIDURAL; INFILTRATION; INTRACAUDAL; PERINEURAL at 07:34

## 2022-06-20 RX ADMIN — PHENYLEPHRINE HYDROCHLORIDE 1 DROP: 25 SOLUTION/ DROPS OPHTHALMIC at 07:07

## 2022-06-20 NOTE — DISCHARGE INSTRUCTIONS
Dr Simin Meneses Cataract Instructions    Activity:     1  No Driving until instructed   2  Keep shield on until seen tomorrow except when administering drops   3  No heavy lifting  30 pound limit   4  No water in eye  for one week      Diet:     1  Resume normal diet    Normal Symptoms:     1  Mild Headache   2  Scratchy or picky feeling around eye    Call the office if:     1  You have any questions or concerns   2  If eye pain is not relieved by extra strength tylenol    Office phone number:  [phone]      Next appointment:     1  See Dr Simin Meneses at his office tomorrow as scheduled   ____________________10:45______________________________________   2  Bring blue eye kit with you and eyedrops to the office    A new set of comprehensive instructions will be given and reviewed with you during your office visit tomorrow

## 2022-06-20 NOTE — ANESTHESIA POSTPROCEDURE EVALUATION
Post-Op Assessment Note    CV Status:  Stable  Pain Score: 0    Pain management: adequate     Mental Status:  Alert and awake   Hydration Status:  Euvolemic   PONV Controlled:  Controlled   Airway Patency:  Patent      Post Op Vitals Reviewed: Yes      Staff: CRNA         No complications documented      BP   146/82   Temp     Pulse 82   Resp 16   SpO2 98

## 2022-06-20 NOTE — ANESTHESIA PREPROCEDURE EVALUATION
Procedure:  EXTRACTION EXTRACAPSULAR CATARACT PHACO INTRAOCULAR LENS (IOL) (Left Eye)    Relevant Problems   ANESTHESIA (within normal limits)      CARDIO   (+) A-fib (HCC)   (+) CAD (coronary artery disease)   (+) Chest pain   (+) Combined systolic and diastolic congestive heart failure (HCC)   (+) History of coronary artery bypass graft - CABG x3 in 2006   (+) Hyperlipidemia   (+) Hypertension   (+) Persistent atrial fibrillation (HCC)   (+) Tachy-gale syndrome (HCC)      ENDO   (+) DM (diabetes mellitus), type 2 (HCC)      /RENAL   (+) SCOT (acute kidney injury) ([facility] [address] )      MUSCULOSKELETAL   (+) Primary osteoarthritis of both knees      NEURO/PSYCH   (+) Lewy body dementia with behavioral disturbance (HCC)      PULMONARY   (+) Chronic obstructive pulmonary disease (HCC)   (+) Sleep apnea - O2 use at night        Physical Exam    Airway    Mallampati score: II  TM Distance: >3 FB  Neck ROM: full     Dental   upper dentures and lower dentures,     Cardiovascular  Rhythm: irregular, Rate: normal,     Pulmonary  Breath sounds clear to auscultation,     Other Findings        Anesthesia Plan  ASA Score- 3     Anesthesia Type- IV sedation with anesthesia with ASA Monitors  Additional Monitors:   Airway Plan:           Plan Factors-    Chart reviewed  EKG reviewed  Existing labs reviewed  Patient summary reviewed  Patient is not a current smoker  Induction- intravenous  Postoperative Plan-     Informed Consent- Anesthetic plan and risks discussed with patient  I personally reviewed this patient with the CRNA  Discussed and agreed on the Anesthesia Plan with the CRNA  Lindsay Gonzales

## 2022-06-20 NOTE — OP NOTE
OPERATIVE REPORT    PATIENT NAME: Junior Daniel    :  1935  MRN: [id number]  Pt Location: [facility] OR ROOM 01    Surgery Date: 2022    Surgeon(s) and Role:     * Yvonne Garcia MD - Primary    Age-related nuclear cataract, left eye [H25 12]    Post-Op Diagnosis Codes:     * Age-related nuclear cataract, left eye [H25 12]    Procedure(s):  EXTRACTION EXTRACAPSULAR CATARACT PHACO INTRAOCULAR LENS (IOL)    Anesthesia Type:   IV Sedation with Anesthesia    Operative Indications:  Age-related nuclear cataract, left eye [H25 12]  Decreased vision to 20/400  With problems reading  Pt requested cataract sx the left eye    Procedure and Technique:    Procedure Details     The patient was brought in the OR in stable condition and placed on the operative table  The left eye was prepped and draped in the usual sterile manner  Attention was directed to the left eye where a lid speculum was placed  A 2 4 mm clear corneal incision was made temperally  1/2 cc of 1% MPF Lidocaine was irrigated into the anterior chamber followed by viscoat  The side port incision was placed superiorly  The pupil remained small and I did not feel it was safe to proceed  Therefore, a Beehler dilator was used to dilate the pupil further  The pupil stayed dilated with a small amount of micro hemorrhage at the margin  More viscoat was used and the pupil was dilated nicely and the procedure could be continued safely  The capsularrhexis was made and the nucleus was hydrodissected with BSS  The nucleus was then removed with the phaco handpiece followed by removal of the cortical material with the I/A handpiece  The capsular bag was then filled with Provisc  The IOL was folded and placed in to the capsular bag and centered well  The remaining Provisc was removed from the eye with the I/A  The wounds were hydrated with BSS and found to be water tight  The lid speculum was removed and 2 drops of Tobramycin were placed over the cornea   A protective eye shield was taped over the eye and the patient went to PACU in stable condition  I will see the patient in the office tomorrow and the expected post op period is a few weeks         Complications: None        Disposition: PACU   Condition: Stable    SIGNATURE: Helene Flannery MD  DATE: June 20, 2022  TIME: 7:57 AM

## 2022-06-25 DIAGNOSIS — I10 HYPERTENSION, UNSPECIFIED TYPE: ICD-10-CM

## 2022-06-27 RX ORDER — METOPROLOL TARTRATE 100 MG/1
TABLET ORAL
Qty: 90 TABLET | Refills: 0 | Status: SHIPPED | OUTPATIENT
Start: 2022-06-27

## 2022-08-02 ENCOUNTER — TELEPHONE (OUTPATIENT)
Dept: FAMILY MEDICINE CLINIC | Facility: CLINIC | Age: 87
End: 2022-08-02

## 2022-08-08 NOTE — TELEPHONE ENCOUNTER
08/08/22 8:34 AM     Thank you for your request  Your request has been received, reviewed, and the patient chart updated  The PCP has successfully been removed with a patient attribution note  This message will now be completed      Thank you  Mary Ritter

## 2022-09-22 ENCOUNTER — OFFICE VISIT (OUTPATIENT)
Dept: OBGYN CLINIC | Facility: MEDICAL CENTER | Age: 87
End: 2022-09-22
Payer: MEDICARE

## 2022-09-22 VITALS
HEIGHT: 59 IN | DIASTOLIC BLOOD PRESSURE: 80 MMHG | BODY MASS INDEX: 40.32 KG/M2 | SYSTOLIC BLOOD PRESSURE: 128 MMHG | WEIGHT: 200 LBS

## 2022-09-22 DIAGNOSIS — L90.0 LICHEN SCLEROSUS: Primary | ICD-10-CM

## 2022-09-22 PROCEDURE — 99213 OFFICE O/P EST LOW 20 MIN: CPT | Performed by: PHYSICIAN ASSISTANT

## 2022-09-22 RX ORDER — CLOBETASOL PROPIONATE 0.5 MG/G
OINTMENT TOPICAL 2 TIMES DAILY
Qty: 30 G | Refills: 0 | Status: SHIPPED | OUTPATIENT
Start: 2022-09-22

## 2022-09-23 NOTE — PROGRESS NOTES
Assessment/Plan:    1  Lichen sclerosus  -Exam c/w lichen sclerosus flare up  Advised BID clobetasol x 4 wks  -Follow up for recheck in 4 wks  -nursing home paperwork signed; H&P provided in written form  Printed rx for clobetasol  - clobetasol (TEMOVATE) 0 05 % ointment; Apply topically 2 (two) times a day  Dispense: 30 g; Refill: 0      Subjective:      Patient ID: Familia Dos Santos is a 81 y o  female  Pt presents today with her daughter for a problem visit  She currently living in a nursing facility- [facility]  She has a known history of lichen sclerosus and reports a flare up of her symptoms over the past month  She has been given clobetasol to use on a PRN basis for break through symptoms however since moving into the nursing facility she has not had her clobetasol  She was not allowed to bring outside creams to the facility  Pt's daughter made appointment today to confirm lichen sclerosus flare so she can be given written rx for cream to be used  She reports vulvar itching only  Denies VB, discharge, odor, dysuria  She wears briefs daily for both urinary and bowel incontinence  She denies any skin rashes or lesions  She states symptoms today feel exactly like prior lichen episodes      The following portions of the patient's history were reviewed and updated as appropriate: allergies, current medications, past family history, past medical history, past social history, past surgical history and problem list     Review of Systems      Objective:      /80   Ht 4' 11" (1 499 m)   Wt 90 7 kg (200 lb)   BMI 40 40 kg/m²          Physical Exam  Constitutional:       General: She is not in acute distress  Appearance: She is not ill-appearing  HENT:      Head: Normocephalic and atraumatic  Pulmonary:      Effort: Pulmonary effort is normal    Genitourinary:         Comments: B/l labia majora with rough, pale appearance; small excoriation noted on right labia posteriorly   Exam findings c/w lichen sclerosus  Neurological:      Mental Status: She is alert

## 2025-08-13 ENCOUNTER — NURSE TRIAGE (OUTPATIENT)
Age: 89
End: 2025-08-13

## 2025-08-18 ENCOUNTER — TELEPHONE (OUTPATIENT)
Dept: OBGYN CLINIC | Facility: CLINIC | Age: 89
End: 2025-08-18

## 2025-08-18 ENCOUNTER — OFFICE VISIT (OUTPATIENT)
Dept: OBGYN CLINIC | Facility: CLINIC | Age: 89
End: 2025-08-18
Payer: MEDICARE

## 2025-08-18 VITALS
SYSTOLIC BLOOD PRESSURE: 126 MMHG | DIASTOLIC BLOOD PRESSURE: 72 MMHG | BODY MASS INDEX: 35.28 KG/M2 | WEIGHT: 175 LBS | HEIGHT: 59 IN

## 2025-08-18 DIAGNOSIS — N90.89 VULVAR IRRITATION: Primary | ICD-10-CM

## 2025-08-18 PROCEDURE — 87077 CULTURE AEROBIC IDENTIFY: CPT | Performed by: STUDENT IN AN ORGANIZED HEALTH CARE EDUCATION/TRAINING PROGRAM

## 2025-08-18 PROCEDURE — 87070 CULTURE OTHR SPECIMN AEROBIC: CPT | Performed by: STUDENT IN AN ORGANIZED HEALTH CARE EDUCATION/TRAINING PROGRAM

## 2025-08-18 PROCEDURE — 87186 SC STD MICRODIL/AGAR DIL: CPT | Performed by: STUDENT IN AN ORGANIZED HEALTH CARE EDUCATION/TRAINING PROGRAM

## 2025-08-18 PROCEDURE — 99214 OFFICE O/P EST MOD 30 MIN: CPT | Performed by: STUDENT IN AN ORGANIZED HEALTH CARE EDUCATION/TRAINING PROGRAM

## 2025-08-18 RX ORDER — SENNOSIDES 8.6 MG
650 CAPSULE ORAL EVERY 8 HOURS PRN
COMMUNITY

## 2025-08-18 RX ORDER — ASCORBIC ACID 500 MG
500 TABLET ORAL DAILY
COMMUNITY

## 2025-08-18 RX ORDER — CLOTRIMAZOLE AND BETAMETHASONE DIPROPIONATE 10; .64 MG/G; MG/G
CREAM TOPICAL 2 TIMES DAILY
Qty: 45 G | Refills: 1 | Status: SHIPPED | OUTPATIENT
Start: 2025-08-18

## 2025-08-22 LAB
BACTERIA GENITAL AEROBE CULT: ABNORMAL
BACTERIA GENITAL AEROBE CULT: ABNORMAL

## (undated) DEVICE — MICROSURGICAL INSTRUMENT IRR. CYSTITOME 25GA STRAIGHT-REVERSE CUTTING: Brand: ALCON

## (undated) DEVICE — GLOVE SRG BIOGEL 7.5

## (undated) DEVICE — INTREPID® TRANSFORMER IA HP: Brand: INTREPID®

## (undated) DEVICE — ACTIVE FMS W/ INTREPID* ULTRA SLEEVES, 0.9MM 45° ABS* INTREPID* BALANCED TIP: Brand: ALCON

## (undated) DEVICE — CLEARCUT® SLIT KNIFE INTREPID MICRO-COAXIAL SYSTEM 2.4 SB: Brand: CLEARCUT®; INTREPID

## (undated) DEVICE — THE MONARCH® "D" CARTRIDGE IS A SINGLE-USE POLYPROPYLENE CARTRIDGE FOR POSTERIOR CHAMBER IOL DELIVERY: Brand: MONARCH® III

## (undated) DEVICE — B-H IRRIGATING CAN 19GA FLAT ANGLED 8MM: Brand: OPHTHALMIC CANNULA

## (undated) DEVICE — AIR INJECT CANNULA 27GA: Brand: OPHTHALMIC CANNULA

## (undated) DEVICE — EYE PACK CUSTOM -FINNEGAN